# Patient Record
Sex: MALE | Race: WHITE | NOT HISPANIC OR LATINO | Employment: PART TIME | RURAL
[De-identification: names, ages, dates, MRNs, and addresses within clinical notes are randomized per-mention and may not be internally consistent; named-entity substitution may affect disease eponyms.]

---

## 2022-03-23 RX ORDER — BACLOFEN 10 MG/1
TABLET ORAL
Qty: 30 TABLET | Refills: 0 | Status: SHIPPED | OUTPATIENT
Start: 2022-03-23 | End: 2022-04-06 | Stop reason: SDUPTHER

## 2022-03-23 RX ORDER — LEVOCETIRIZINE DIHYDROCHLORIDE 5 MG/1
5 TABLET, FILM COATED ORAL EVERY EVENING
Qty: 30 TABLET | Refills: 0 | Status: SHIPPED | OUTPATIENT
Start: 2022-03-23 | End: 2022-04-06 | Stop reason: SDUPTHER

## 2022-03-23 RX ORDER — TRAZODONE HYDROCHLORIDE 50 MG/1
50 TABLET ORAL NIGHTLY
COMMUNITY
End: 2022-03-23 | Stop reason: SDUPTHER

## 2022-03-23 RX ORDER — MELOXICAM 15 MG/1
15 TABLET ORAL DAILY
COMMUNITY
End: 2022-03-23 | Stop reason: SDUPTHER

## 2022-03-23 RX ORDER — SERTRALINE HYDROCHLORIDE 100 MG/1
100 TABLET, FILM COATED ORAL DAILY
COMMUNITY
End: 2022-03-23 | Stop reason: SDUPTHER

## 2022-03-23 RX ORDER — MELOXICAM 15 MG/1
15 TABLET ORAL DAILY
Qty: 14 TABLET | Refills: 0 | Status: SHIPPED | OUTPATIENT
Start: 2022-03-23 | End: 2022-04-06 | Stop reason: SDUPTHER

## 2022-03-23 RX ORDER — SERTRALINE HYDROCHLORIDE 100 MG/1
100 TABLET, FILM COATED ORAL DAILY
Qty: 30 TABLET | Refills: 0 | Status: SHIPPED | OUTPATIENT
Start: 2022-03-23 | End: 2022-06-07

## 2022-03-23 RX ORDER — OMEPRAZOLE 20 MG/1
20 CAPSULE, DELAYED RELEASE ORAL DAILY
Qty: 30 CAPSULE | Refills: 0 | Status: SHIPPED | OUTPATIENT
Start: 2022-03-23 | End: 2022-04-06 | Stop reason: SDUPTHER

## 2022-03-23 RX ORDER — TRAZODONE HYDROCHLORIDE 50 MG/1
50 TABLET ORAL NIGHTLY
Qty: 30 TABLET | Refills: 0 | Status: SHIPPED | OUTPATIENT
Start: 2022-03-23

## 2022-03-23 RX ORDER — LEVOCETIRIZINE DIHYDROCHLORIDE 5 MG/1
5 TABLET, FILM COATED ORAL EVERY EVENING
COMMUNITY
End: 2022-03-23 | Stop reason: SDUPTHER

## 2022-03-23 RX ORDER — ATORVASTATIN CALCIUM 80 MG/1
80 TABLET, FILM COATED ORAL DAILY
COMMUNITY
End: 2022-03-23 | Stop reason: SDUPTHER

## 2022-03-23 RX ORDER — ATORVASTATIN CALCIUM 80 MG/1
80 TABLET, FILM COATED ORAL DAILY
Qty: 30 TABLET | Refills: 0 | Status: SHIPPED | OUTPATIENT
Start: 2022-03-23 | End: 2022-04-06 | Stop reason: SDUPTHER

## 2022-03-23 RX ORDER — OMEPRAZOLE 20 MG/1
20 CAPSULE, DELAYED RELEASE ORAL DAILY
COMMUNITY
End: 2022-03-23 | Stop reason: SDUPTHER

## 2022-03-23 RX ORDER — BACLOFEN 10 MG/1
10 TABLET ORAL 3 TIMES DAILY
COMMUNITY
End: 2022-03-23 | Stop reason: SDUPTHER

## 2022-04-06 ENCOUNTER — OFFICE VISIT (OUTPATIENT)
Dept: FAMILY MEDICINE CLINIC | Facility: CLINIC | Age: 62
End: 2022-04-06

## 2022-04-06 VITALS
HEART RATE: 61 BPM | OXYGEN SATURATION: 98 % | WEIGHT: 184.8 LBS | SYSTOLIC BLOOD PRESSURE: 112 MMHG | BODY MASS INDEX: 25.03 KG/M2 | HEIGHT: 72 IN | DIASTOLIC BLOOD PRESSURE: 60 MMHG

## 2022-04-06 DIAGNOSIS — M25.50 ARTHRALGIA, UNSPECIFIED JOINT: ICD-10-CM

## 2022-04-06 DIAGNOSIS — E11.9 CONTROLLED TYPE 2 DIABETES MELLITUS WITHOUT COMPLICATION, WITHOUT LONG-TERM CURRENT USE OF INSULIN: ICD-10-CM

## 2022-04-06 DIAGNOSIS — J30.9 ALLERGIC RHINITIS, UNSPECIFIED SEASONALITY, UNSPECIFIED TRIGGER: ICD-10-CM

## 2022-04-06 DIAGNOSIS — K21.9 GASTROESOPHAGEAL REFLUX DISEASE, UNSPECIFIED WHETHER ESOPHAGITIS PRESENT: ICD-10-CM

## 2022-04-06 DIAGNOSIS — R41.3 MEMORY DEFICITS: ICD-10-CM

## 2022-04-06 DIAGNOSIS — E78.2 MIXED HYPERLIPIDEMIA: ICD-10-CM

## 2022-04-06 DIAGNOSIS — E56.9 VITAMIN DEFICIENCY: ICD-10-CM

## 2022-04-06 DIAGNOSIS — I10 BENIGN ESSENTIAL HTN: Primary | ICD-10-CM

## 2022-04-06 DIAGNOSIS — F32.A ANXIETY AND DEPRESSION: ICD-10-CM

## 2022-04-06 DIAGNOSIS — F41.9 ANXIETY AND DEPRESSION: ICD-10-CM

## 2022-04-06 PROCEDURE — 36415 COLL VENOUS BLD VENIPUNCTURE: CPT | Performed by: NURSE PRACTITIONER

## 2022-04-06 PROCEDURE — 99204 OFFICE O/P NEW MOD 45 MIN: CPT | Performed by: NURSE PRACTITIONER

## 2022-04-06 RX ORDER — BACLOFEN 10 MG/1
TABLET ORAL
Qty: 60 TABLET | Refills: 1 | Status: SHIPPED | OUTPATIENT
Start: 2022-04-06 | End: 2022-04-18

## 2022-04-06 RX ORDER — LEVOCETIRIZINE DIHYDROCHLORIDE 5 MG/1
5 TABLET, FILM COATED ORAL EVERY EVENING
Qty: 30 TABLET | Refills: 5 | Status: SHIPPED | OUTPATIENT
Start: 2022-04-06

## 2022-04-06 RX ORDER — MELOXICAM 15 MG/1
15 TABLET ORAL DAILY
Qty: 90 TABLET | Refills: 1 | Status: SHIPPED | OUTPATIENT
Start: 2022-04-06 | End: 2022-11-19

## 2022-04-06 RX ORDER — OMEPRAZOLE 20 MG/1
20 CAPSULE, DELAYED RELEASE ORAL DAILY
Qty: 90 CAPSULE | Refills: 1 | Status: SHIPPED | OUTPATIENT
Start: 2022-04-06 | End: 2022-11-03

## 2022-04-06 RX ORDER — ATORVASTATIN CALCIUM 80 MG/1
80 TABLET, FILM COATED ORAL DAILY
Qty: 90 TABLET | Refills: 1 | Status: SHIPPED | OUTPATIENT
Start: 2022-04-06 | End: 2022-12-03

## 2022-04-06 NOTE — PATIENT INSTRUCTIONS
Obesity, Adult  Obesity is the condition of having too much total body fat. Being overweight or obese means that your weight is greater than what is considered healthy for your body size. Obesity is determined by a measurement called BMI. BMI is an estimate of body fat and is calculated from height and weight. For adults, a BMI of 30 or higher is considered obese.  Obesity can lead to other health concerns and major illnesses, including:  · Stroke.  · Coronary artery disease (CAD).  · Type 2 diabetes.  · Some types of cancer, including cancers of the colon, breast, uterus, and gallbladder.  · Osteoarthritis.  · High blood pressure (hypertension).  · High cholesterol.  · Sleep apnea.  · Gallbladder stones.  · Infertility problems.  What are the causes?  Common causes of this condition include:  · Eating daily meals that are high in calories, sugar, and fat.  · Being born with genes that may make you more likely to become obese.  · Having a medical condition that causes obesity, including:  ? Hypothyroidism.  ? Polycystic ovarian syndrome (PCOS).  ? Binge-eating disorder.  ? Cushing syndrome.  · Taking certain medicines, such as steroids, antidepressants, and seizure medicines.  · Not being physically active (sedentary lifestyle).  · Not getting enough sleep.  · Drinking high amounts of sugar-sweetened beverages, such as soft drinks.  What increases the risk?  The following factors may make you more likely to develop this condition:  · Having a family history of obesity.  · Being a woman of  descent.  · Being a man of  descent.  · Living in an area with limited access to:  ? [place], recreation centers, or sidewalks.  ? Healthy food choices, such as grocery stores and farmers' markets.  What are the signs or symptoms?  The main sign of this condition is having too much body fat.  How is this diagnosed?  This condition is diagnosed based on:  · Your BMI. If you are an adult with a BMI of 30 or  higher, you are considered obese.  · Your waist circumference. This measures the distance around your waistline.  · Your skinfold thickness. Your health care provider may gently pinch a fold of your skin and measure it.  You may have other tests to check for underlying conditions.  How is this treated?  Treatment for this condition often includes changing your lifestyle. Treatment may include some or all of the following:  · Dietary changes. This may include developing a healthy meal plan.  · Regular physical activity. This may include activity that causes your heart to beat faster (aerobic exercise) and strength training. Work with your health care provider to design an exercise program that works for you.  · Medicine to help you lose weight if you are unable to lose 1 pound a week after 6 weeks of healthy eating and more physical activity.  · Treating conditions that cause the obesity (underlying conditions).  · Surgery. Surgical options may include gastric banding and gastric bypass. Surgery may be done if:  ? Other treatments have not helped to improve your condition.  ? You have a BMI of 40 or higher.  ? You have life-threatening health problems related to obesity.  Follow these instructions at home:  Eating and drinking    · Follow recommendations from your health care provider about what you eat and drink. Your health care provider may advise you to:  ? Limit fast food, sweets, and processed snack foods.  ? Choose low-fat options, such as low-fat milk instead of whole milk.  ? Eat 5 or more servings of fruits or vegetables every day.  ? Eat at home more often. This gives you more control over what you eat.  ? Choose healthy foods when you eat out.  ? Learn to read food labels. This will help you understand how much food is considered 1 serving.  ? Learn what a healthy serving size is.  ? Keep low-fat snacks available.  ? Limit sugary drinks, such as soda, fruit juice, sweetened iced tea, and flavored  milk.  · Drink enough water to keep your urine pale yellow.  · Do not follow a fad diet. Fad diets can be unhealthy and even dangerous.    Physical activity  · Exercise regularly, as told by your health care provider.  ? Most adults should get up to 150 minutes of moderate-intensity exercise every week.  ? Ask your health care provider what types of exercise are safe for you and how often you should exercise.  · Warm up and stretch before being active.  · Cool down and stretch after being active.  · Rest between periods of activity.  Lifestyle  · Work with your health care provider and a dietitian to set a weight-loss goal that is healthy and reasonable for you.  · Limit your screen time.  · Find ways to reward yourself that do not involve food.  · Do not drink alcohol if:  ? Your health care provider tells you not to drink.  ? You are pregnant, may be pregnant, or are planning to become pregnant.  · If you drink alcohol:  ? Limit how much you use to:  § 0-1 drink a day for women.  § 0-2 drinks a day for men.  ? Be aware of how much alcohol is in your drink. In the [place] one drink equals one 12 oz bottle of beer (355 mL), one 5 oz glass of wine (148 mL), or one 1½ oz glass of hard liquor (44 mL).  General instructions  · Keep a weight-loss journal to keep track of the food you eat and how much exercise you get.  · Take over-the-counter and prescription medicines only as told by your health care provider.  · Take vitamins and supplements only as told by your health care provider.  · Consider joining a support group. Your health care provider may be able to recommend a support group.  · Keep all follow-up visits as told by your health care provider. This is important.  Contact a health care provider if:  · You are unable to meet your weight loss goal after 6 weeks of dietary and lifestyle changes.  Get help right away if you are having:  · Trouble breathing.  · Suicidal thoughts or behaviors.  Summary  · Obesity is  the condition of having too much total body fat.  · Being overweight or obese means that your weight is greater than what is considered healthy for your body size.  · Work with your health care provider and a dietitian to set a weight-loss goal that is healthy and reasonable for you.  · Exercise regularly, as told by your health care provider. Ask your health care provider what types of exercise are safe for you and how often you should exercise.  This information is not intended to replace advice given to you by your health care provider. Make sure you discuss any questions you have with your health care provider.  Document Revised: 08/22/2019 Document Reviewed: 08/22/2019  [employer] Patient Education © 2021 [employer]      Exercising to Lose Weight  Exercise is structured, repetitive physical activity to improve fitness and health. Getting regular exercise is important for everyone. It is especially important if you are overweight. Being overweight increases your risk of heart disease, stroke, diabetes, high blood pressure, and several types of cancer. Reducing your calorie intake and exercising can help you lose weight.  Exercise is usually categorized as moderate or vigorous intensity. To lose weight, most people need to do a certain amount of moderate-intensity or vigorous-intensity exercise each week.  Moderate-intensity exercise    Moderate-intensity exercise is any activity that gets you moving enough to burn at least three times more energy (calories) than if you were sitting.  Examples of moderate exercise include:  · Walking a mile in 15 minutes.  · Doing light yard work.  · Biking at an easy pace.  Most people should get at least 150 minutes (2 hours and 30 minutes) a week of moderate-intensity exercise to maintain their body weight.  Vigorous-intensity exercise  Vigorous-intensity exercise is any activity that gets you moving enough to burn at least six times more calories than if you were sitting.  When you exercise at this intensity, you should be working hard enough that you are not able to carry on a conversation.  Examples of vigorous exercise include:  · Running.  · Playing a team sport, such as football, basketball, and soccer.  · Jumping rope.  Most people should get at least 75 minutes (1 hour and 15 minutes) a week of vigorous-intensity exercise to maintain their body weight.  How can exercise affect me?  When you exercise enough to burn more calories than you eat, you lose weight. Exercise also reduces body fat and builds muscle. The more muscle you have, the more calories you burn. Exercise also:  · Improves mood.  · Reduces stress and tension.  · Improves your overall fitness, flexibility, and endurance.  · Increases bone strength.  The amount of exercise you need to lose weight depends on:  · Your age.  · The type of exercise.  · Any health conditions you have.  · Your overall physical ability.  Talk to your health care provider about how much exercise you need and what types of activities are safe for you.  What actions can I take to lose weight?  Nutrition    · Make changes to your diet as told by your health care provider or diet and nutrition specialist (dietitian). This may include:  ? Eating fewer calories.  ? Eating more protein.  ? Eating less unhealthy fats.  ? Eating a diet that includes fresh fruits and vegetables, whole grains, low-fat dairy products, and lean protein.  ? Avoiding foods with added fat, salt, and sugar.  · Drink plenty of water while you exercise to prevent dehydration or heat stroke.    Activity  · Choose an activity that you enjoy and set realistic goals. Your health care provider can help you make an exercise plan that works for you.  · Exercise at a moderate or vigorous intensity most days of the week.  ? The intensity of exercise may vary from person to person. You can tell how intense a workout is for you by paying attention to your breathing and heartbeat. Most  people will notice their breathing and heartbeat get faster with more intense exercise.  · Do resistance training twice each week, such as:  ? Push-ups.  ? Sit-ups.  ? Lifting weights.  ? Using resistance bands.  · Getting short amounts of exercise can be just as helpful as long structured periods of exercise. If you have trouble finding time to exercise, try to include exercise in your daily routine.  ? Get up, stretch, and walk around every 30 minutes throughout the day.  ? Go for a walk during your lunch break.  ? Park your car farther away from your destination.  ? If you take public transportation, get off one stop early and walk the rest of the way.  ? Make phone calls while standing up and walking around.  ? Take the stairs instead of elevators or escalators.  · Wear comfortable clothes and shoes with good support.  · Do not exercise so much that you hurt yourself, feel dizzy, or get very short of breath.  Where to find more information  · [place] Department of Health and Human Services: www.hhs.gov  · Centers for Disease Control and Prevention (CDC): www.cdc.gov  Contact a health care provider:  · Before starting a new exercise program.  · If you have questions or concerns about your weight.  · If you have a medical problem that keeps you from exercising.  Get help right away if you have any of the following while exercising:  · Injury.  · Dizziness.  · Difficulty breathing or shortness of breath that does not go away when you stop exercising.  · Chest pain.  · Rapid heartbeat.  Summary  · Being overweight increases your risk of heart disease, stroke, diabetes, high blood pressure, and several types of cancer.  · Losing weight happens when you burn more calories than you eat.  · Reducing the amount of calories you eat in addition to getting regular moderate or vigorous exercise each week helps you lose weight.  This information is not intended to replace advice given to you by your health care provider. Make  sure you discuss any questions you have with your health care provider.  Document Revised: 04/15/2021 Document Reviewed: 04/15/2021  [employer] Patient Education © 2021 [employer]

## 2022-04-06 NOTE — ASSESSMENT & PLAN NOTE
Meds refilled.  We discussed all his medications.  Proper diet and exercise plan discussed and encouraged.  Goal blood pressure less than 130/80.  Annual eye exam encouraged.  Check feet daily.  Continue to follow-up with cardiology Dr. Wong as scheduled.  He will also continue to follow-up with [facility] months as scheduled as well as his counselor.  PHQ-9 completed and discussed.  No thoughts of suicide or hurting himself or anyone else.  Risk of meds discussed and understood.  We also discussed immunizations.  Turn to clinic or ED with any issues or concerns.

## 2022-04-07 LAB
ALBUMIN SERPL-MCNC: 4.4 G/DL (ref 3.8–4.8)
ALBUMIN/GLOB SERPL: 1.8 {RATIO} (ref 1.2–2.2)
ALP SERPL-CCNC: 81 IU/L (ref 44–121)
ALT SERPL-CCNC: 32 IU/L (ref 0–44)
AST SERPL-CCNC: 32 IU/L (ref 0–40)
BASOPHILS # BLD AUTO: 0.1 X10E3/UL (ref 0–0.2)
BASOPHILS NFR BLD AUTO: 1 %
BILIRUB SERPL-MCNC: 0.4 MG/DL (ref 0–1.2)
BUN SERPL-MCNC: 12 MG/DL (ref 8–27)
BUN/CREAT SERPL: 13 (ref 10–24)
CALCIUM SERPL-MCNC: 9.9 MG/DL (ref 8.6–10.2)
CHLORIDE SERPL-SCNC: 105 MMOL/L (ref 96–106)
CHOLEST SERPL-MCNC: 95 MG/DL (ref 100–199)
CO2 SERPL-SCNC: 23 MMOL/L (ref 20–29)
CREAT SERPL-MCNC: 0.93 MG/DL (ref 0.76–1.27)
EGFRCR SERPLBLD CKD-EPI 2021: 93 ML/MIN/1.73
EOSINOPHIL # BLD AUTO: 0.2 X10E3/UL (ref 0–0.4)
EOSINOPHIL NFR BLD AUTO: 3 %
ERYTHROCYTE [DISTWIDTH] IN BLOOD BY AUTOMATED COUNT: 12 % (ref 11.6–15.4)
GLOBULIN SER CALC-MCNC: 2.5 G/DL (ref 1.5–4.5)
GLUCOSE SERPL-MCNC: 117 MG/DL (ref 65–99)
HBA1C MFR BLD: 7.6 % (ref 4.8–5.6)
HCT VFR BLD AUTO: 40.4 % (ref 37.5–51)
HDLC SERPL-MCNC: 38 MG/DL
HGB BLD-MCNC: 13.9 G/DL (ref 13–17.7)
IMM GRANULOCYTES # BLD AUTO: 0 X10E3/UL (ref 0–0.1)
IMM GRANULOCYTES NFR BLD AUTO: 0 %
LDLC SERPL CALC-MCNC: 34 MG/DL (ref 0–99)
LYMPHOCYTES # BLD AUTO: 2 X10E3/UL (ref 0.7–3.1)
LYMPHOCYTES NFR BLD AUTO: 24 %
MCH RBC QN AUTO: 32.7 PG (ref 26.6–33)
MCHC RBC AUTO-ENTMCNC: 34.4 G/DL (ref 31.5–35.7)
MCV RBC AUTO: 95 FL (ref 79–97)
MONOCYTES # BLD AUTO: 0.8 X10E3/UL (ref 0.1–0.9)
MONOCYTES NFR BLD AUTO: 9 %
NEUTROPHILS # BLD AUTO: 5.3 X10E3/UL (ref 1.4–7)
NEUTROPHILS NFR BLD AUTO: 63 %
PLATELET # BLD AUTO: 184 X10E3/UL (ref 150–450)
POTASSIUM SERPL-SCNC: 4.2 MMOL/L (ref 3.5–5.2)
PROT SERPL-MCNC: 6.9 G/DL (ref 6–8.5)
RBC # BLD AUTO: 4.25 X10E6/UL (ref 4.14–5.8)
SODIUM SERPL-SCNC: 143 MMOL/L (ref 134–144)
TRIGL SERPL-MCNC: 128 MG/DL (ref 0–149)
TSH SERPL DL<=0.005 MIU/L-ACNC: 1.36 UIU/ML (ref 0.45–4.5)
VIT B12 SERPL-MCNC: 1077 PG/ML (ref 232–1245)
VLDLC SERPL CALC-MCNC: 23 MG/DL (ref 5–40)
WBC # BLD AUTO: 8.5 X10E3/UL (ref 3.4–10.8)

## 2022-04-15 DIAGNOSIS — M25.50 ARTHRALGIA, UNSPECIFIED JOINT: ICD-10-CM

## 2022-04-18 RX ORDER — BACLOFEN 10 MG/1
TABLET ORAL
Qty: 90 TABLET | Refills: 0 | Status: SHIPPED | OUTPATIENT
Start: 2022-04-18 | End: 2022-05-24

## 2022-05-19 NOTE — TELEPHONE ENCOUNTER
PATIENT IS IN NEED OF MEDICATION REFILLS:    1. NITROGLYCERIN  2. DEXCOM GLUCOSE METER (AND ALL THE STRIPS AND SUPPLIES THAT GO WITH IT)    IF THERE ARE ANY QUESTIONS YOU CAN CONTACT THE PATIENT AT NUMBER LISTED.

## 2022-05-23 ENCOUNTER — TELEPHONE (OUTPATIENT)
Dept: FAMILY MEDICINE CLINIC | Facility: CLINIC | Age: 62
End: 2022-05-23

## 2022-05-23 RX ORDER — NITROGLYCERIN 400 UG/1
SPRAY ORAL
Qty: 1 EACH | Refills: 0 | Status: SHIPPED | OUTPATIENT
Start: 2022-05-23 | End: 2022-06-29 | Stop reason: SDUPTHER

## 2022-05-23 RX ORDER — BLOOD-GLUCOSE SENSOR
EACH MISCELLANEOUS
OUTPATIENT
Start: 2022-05-23

## 2022-05-23 RX ORDER — NITROGLYCERIN 400 UG/1
SPRAY ORAL
COMMUNITY
End: 2022-05-23 | Stop reason: SDUPTHER

## 2022-05-23 RX ORDER — BLOOD-GLUCOSE SENSOR
EACH MISCELLANEOUS
COMMUNITY
End: 2022-05-24 | Stop reason: SDUPTHER

## 2022-05-24 DIAGNOSIS — M25.50 ARTHRALGIA, UNSPECIFIED JOINT: ICD-10-CM

## 2022-05-24 RX ORDER — BACLOFEN 10 MG/1
TABLET ORAL
Qty: 90 TABLET | Refills: 0 | Status: SHIPPED | OUTPATIENT
Start: 2022-05-24 | End: 2022-06-22

## 2022-05-24 RX ORDER — BLOOD-GLUCOSE SENSOR
1 EACH MISCELLANEOUS CONTINUOUS PRN
Qty: 1 EACH | Refills: 2 | Status: SHIPPED | OUTPATIENT
Start: 2022-05-24 | End: 2022-08-18

## 2022-06-03 RX ORDER — PROCHLORPERAZINE 25 MG/1
SUPPOSITORY RECTAL
COMMUNITY
End: 2022-06-03 | Stop reason: SDUPTHER

## 2022-06-04 RX ORDER — PROCHLORPERAZINE 25 MG/1
1 SUPPOSITORY RECTAL 3 TIMES DAILY PRN
Qty: 1 EACH | Refills: 0 | Status: SHIPPED | OUTPATIENT
Start: 2022-06-04 | End: 2022-08-18

## 2022-06-06 RX ORDER — PROCHLORPERAZINE 25 MG/1
SUPPOSITORY RECTAL
COMMUNITY
End: 2022-06-06 | Stop reason: SDUPTHER

## 2022-06-06 RX ORDER — PROCHLORPERAZINE 25 MG/1
1 SUPPOSITORY RECTAL DAILY PRN
Qty: 1 EACH | Refills: 0 | Status: SHIPPED | OUTPATIENT
Start: 2022-06-06 | End: 2022-09-20 | Stop reason: SDUPTHER

## 2022-06-06 NOTE — TELEPHONE ENCOUNTER
Pt stopped in stating that his pharmacy [facility] has not received the script for the Dexcom Transmitter. They have received the script for the Sensor and the .

## 2022-06-07 RX ORDER — SERTRALINE HYDROCHLORIDE 100 MG/1
TABLET, FILM COATED ORAL
Qty: 90 TABLET | Refills: 0 | Status: SHIPPED | OUTPATIENT
Start: 2022-06-07

## 2022-06-07 NOTE — TELEPHONE ENCOUNTER
I contacted [facility] and they state that they did receive all dexcom that was needed for pt with no charge.

## 2022-06-18 DIAGNOSIS — M25.50 ARTHRALGIA, UNSPECIFIED JOINT: ICD-10-CM

## 2022-06-22 RX ORDER — BACLOFEN 10 MG/1
TABLET ORAL
Qty: 90 TABLET | Refills: 0 | Status: SHIPPED | OUTPATIENT
Start: 2022-06-22 | End: 2022-07-18

## 2022-06-29 ENCOUNTER — TELEPHONE (OUTPATIENT)
Dept: FAMILY MEDICINE CLINIC | Facility: CLINIC | Age: 62
End: 2022-06-29

## 2022-06-29 RX ORDER — NITROGLYCERIN 400 UG/1
SPRAY ORAL
Qty: 1 EACH | Refills: 0 | Status: SHIPPED | OUTPATIENT
Start: 2022-06-29

## 2022-06-29 NOTE — TELEPHONE ENCOUNTER
Incoming Refill Request      Medication requested (name and dose): NITROGLYCERIN SPRAY 0.4MG     Pharmacy where request should be sent: [facility]    Additional details provided by patient: PT IS OUT OF THE [facility]    Best call back number: [phone]    Does the patient have less than a 3 day supply:  [x] Yes  [] No    Trice ESQUIVEL Rep  06/29/22, 15:51 EDT

## 2022-06-30 ENCOUNTER — TELEPHONE (OUTPATIENT)
Dept: FAMILY MEDICINE CLINIC | Facility: CLINIC | Age: 62
End: 2022-06-30

## 2022-07-01 ENCOUNTER — OFFICE VISIT (OUTPATIENT)
Dept: FAMILY MEDICINE CLINIC | Facility: CLINIC | Age: 62
End: 2022-07-01

## 2022-07-01 VITALS
HEIGHT: 72 IN | OXYGEN SATURATION: 97 % | WEIGHT: 170 LBS | SYSTOLIC BLOOD PRESSURE: 132 MMHG | BODY MASS INDEX: 23.03 KG/M2 | HEART RATE: 64 BPM | DIASTOLIC BLOOD PRESSURE: 66 MMHG

## 2022-07-01 DIAGNOSIS — F43.10 PTSD (POST-TRAUMATIC STRESS DISORDER): Primary | ICD-10-CM

## 2022-07-01 DIAGNOSIS — Z12.5 SCREENING FOR PROSTATE CANCER: ICD-10-CM

## 2022-07-01 DIAGNOSIS — R63.4 WEIGHT LOSS: ICD-10-CM

## 2022-07-01 DIAGNOSIS — E55.9 VITAMIN D DEFICIENCY: ICD-10-CM

## 2022-07-01 LAB
BILIRUB BLD-MCNC: NEGATIVE MG/DL
CLARITY, POC: CLEAR
COLOR UR: YELLOW
EXPIRATION DATE: ABNORMAL
GLUCOSE UR STRIP-MCNC: NEGATIVE MG/DL
KETONES UR QL: NEGATIVE
LEUKOCYTE EST, POC: NEGATIVE
Lab: ABNORMAL
NITRITE UR-MCNC: NEGATIVE MG/ML
PH UR: 7 [PH] (ref 5–8)
PROT UR STRIP-MCNC: NEGATIVE MG/DL
RBC # UR STRIP: ABNORMAL /UL
SP GR UR: 1.02 (ref 1–1.03)
UROBILINOGEN UR QL: NORMAL

## 2022-07-01 PROCEDURE — 81003 URINALYSIS AUTO W/O SCOPE: CPT | Performed by: NURSE PRACTITIONER

## 2022-07-01 PROCEDURE — 99214 OFFICE O/P EST MOD 30 MIN: CPT | Performed by: NURSE PRACTITIONER

## 2022-07-01 PROCEDURE — 36415 COLL VENOUS BLD VENIPUNCTURE: CPT | Performed by: NURSE PRACTITIONER

## 2022-07-01 RX ORDER — ASPIRIN 81 MG/1
81 TABLET, CHEWABLE ORAL
COMMUNITY

## 2022-07-01 RX ORDER — BUPROPION HYDROCHLORIDE 300 MG/1
300 TABLET ORAL EVERY MORNING
COMMUNITY
Start: 2022-03-25

## 2022-07-01 RX ORDER — BUPROPION HYDROCHLORIDE 150 MG/1
TABLET ORAL
COMMUNITY
Start: 2022-04-05

## 2022-07-01 RX ORDER — PRAZOSIN HYDROCHLORIDE 1 MG/1
1 CAPSULE ORAL NIGHTLY
Qty: 30 CAPSULE | Refills: 0 | Status: SHIPPED | OUTPATIENT
Start: 2022-07-01 | End: 2022-07-29

## 2022-07-01 RX ORDER — DONEPEZIL HYDROCHLORIDE 5 MG/1
TABLET, FILM COATED ORAL
COMMUNITY
Start: 2022-04-07

## 2022-07-01 NOTE — PROGRESS NOTES
"Chief Complaint  chronic PTSD    Subjective          Santi Souza Sr. presents to [facility]  History of Present Illness     States he has been continuing to have weekly visits with his counselor.  He states she recently diagnosed him with chronic PTSD.  States this stems from the  but mainly from around 2011 when his son attempted suicide.  His son recently got out of [facility] in 2020 and states there is a lot of stress that comes with that.  States he has stress, anxiety, nightmares, and he has noticed a weight loss also.  He is unsure if the weight loss is just due to his nerves or if it could be something more.  No thoughts of suicide or hurting himself or anyone else.  States he is [facility] but is hoping we could go ahead and try something today to get it started to try to improve his symptoms.    Objective   Vital Signs:   /66   Pulse 64   Ht 182.9 cm (72\")   Wt 77.1 kg (170 lb)   SpO2 97%   BMI 23.06 kg/m²     Body mass index is 23.06 kg/m².    Review of Systems   Constitutional: Positive for fatigue. Negative for fever.   Eyes: Negative for blurred vision and visual disturbance.   Respiratory: Negative for shortness of breath and wheezing.    Cardiovascular: Negative for chest pain and palpitations.   Gastrointestinal: Negative for abdominal pain, nausea and vomiting.   Genitourinary: Negative for urinary incontinence.   Neurological: Negative for dizziness, headache and confusion.   Psychiatric/Behavioral: Positive for sleep disturbance, depressed mood and stress. Negative for agitation, behavioral problems, decreased concentration, dysphoric mood, hallucinations, self-injury, suicidal ideas and negative for hyperactivity. The patient is nervous/anxious.        Past History:  Medical History: has a past medical history of CAD (coronary artery disease) (11/03/2017), Chronic back pain (11/03/2017), Condition not found " (11/03/2017), Depression, Diabetes (HCC) (11/03/2017), Diabetes mellitus (HCC), GERD (gastroesophageal reflux disease) (11/03/2017), Hyperlipidemia (11/03/2017), Hypertension (11/03/2017), Neuropathy, Osteoarthritis, and Vitamin D deficiency.   Surgical History: has a past surgical history that includes Lumbar spine surgery (1998).   Family History: family history includes Diabetes in his father.   Social History: reports that he has been smoking cigarettes. He has a 3.75 pack-year smoking history. He has never used smokeless tobacco.    PHQ-2 Depression Screening  Little interest or pleasure in doing things? 3-->nearly every day   Feeling down, depressed, or hopeless? 3-->nearly every day   PHQ-2 Total Score 24        PHQ-9 Depression Screening  Little interest or pleasure in doing things? 3-->nearly every day   Feeling down, depressed, or hopeless? 3-->nearly every day   Trouble falling or staying asleep, or sleeping too much? 3-->nearly every day   Feeling tired or having little energy? 3-->nearly every day   Poor appetite or overeating? 3-->nearly every day   Feeling bad about yourself - or that you are a failure or have let yourself or your family down? 3-->nearly every day   Trouble concentrating on things, such as reading the newspaper or watching television? 3-->nearly every day   Moving or speaking so slowly that other people could have noticed? Or the opposite - being so fidgety or restless that you have been moving around a lot more than usual? 3-->nearly every day   Thoughts that you would be better off dead, or of hurting yourself in some way? 0-->not at all   PHQ-9 Total Score 24   If you checked off any problems, how difficult have these problems made it for you to do your work, take care of things at home, or get along with other people? not difficult at all     PHQ-9 Total Score: 24      Patient screened positive for depression based on a PHQ-9 score of 24 on 7/1/2022. Follow-up recommendations  include: Prescribed antidepressant medication treatment, Referral to Mental Health specialist and Suicide Risk Assessment performed.         Current Outpatient Medications:   •  aspirin 81 MG chewable tablet, Chew 81 mg., Disp: , Rfl:   •  atorvastatin (LIPITOR) 80 MG tablet, Take 1 tablet by mouth Daily., Disp: 90 tablet, Rfl: 1  •  baclofen (LIORESAL) 10 MG tablet, TAKE 1 TABLET BY MOUTH THREE TIMES A DAY, Disp: 90 tablet, Rfl: 0  •  buPROPion XL (WELLBUTRIN XL) 150 MG 24 hr tablet, TAKE 1 TABLET BY MOUTH EACH MORNING WITH 300 MG TABLET, Disp: , Rfl:   •  buPROPion XL (WELLBUTRIN XL) 300 MG 24 hr tablet, Take 300 mg by mouth Every Morning., Disp: , Rfl:   •  Continuous Blood Gluc  (Dexcom G6 ) device, 1 kit 3 (Three) Times a Day As Needed (diabetes)., Disp: 1 each, Rfl: 0  •  Continuous Blood Gluc Sensor (Dexcom G4 Sensor) misc, 1 each Continuous As Needed (blood glucose). E11.9, Disp: 1 each, Rfl: 2  •  Continuous Blood Gluc Transmit (Dexcom G6 Transmitter) misc, 1 kit Daily As Needed (Diabetes)., Disp: 1 each, Rfl: 0  •  donepezil (ARICEPT) 5 MG tablet, TAKE 1 TABLET (5 MG TOTAL) BY MOUTH 1 (ONE) TIME EACH DAY WITH BREAKFAST., Disp: , Rfl:   •  empagliflozin (Jardiance) 10 MG tablet tablet, Take 1 tablet by mouth Daily., Disp: 90 tablet, Rfl: 0  •  levocetirizine (XYZAL) 5 MG tablet, Take 1 tablet by mouth Every Evening., Disp: 30 tablet, Rfl: 5  •  meloxicam (MOBIC) 15 MG tablet, Take 1 tablet by mouth Daily., Disp: 90 tablet, Rfl: 1  •  metFORMIN (GLUCOPHAGE) 1000 MG tablet, Take 1 tablet by mouth 2 (Two) Times a Day With Meals., Disp: 180 tablet, Rfl: 1  •  nitroglycerin (NITROLINGUAL) 0.4 MG/SPRAY spray, Place 1 spray by translingual route on or under the tongue at the first sign of an attack, no more than 3 sprays / 15-minute., Disp: 1 each, Rfl: 0  •  omeprazole (priLOSEC) 20 MG capsule, Take 1 capsule by mouth Daily., Disp: 90 capsule, Rfl: 1  •  sertraline (ZOLOFT) 100 MG tablet, TAKE 1  TABLET EVERY MORNING WITH FOOD, Disp: 90 tablet, Rfl: 0  •  SITagliptin (JANUVIA) 100 MG tablet, Take 1 tablet by mouth Daily., Disp: 90 tablet, Rfl: 1  •  traZODone (DESYREL) 50 MG tablet, Take 1 tablet by mouth Every Night., Disp: 30 tablet, Rfl: 0  •  prazosin (MINIPRESS) 1 MG capsule, Take 1 capsule by mouth Every Night., Disp: 30 capsule, Rfl: 0   (Not in a hospital admission)     Allergies: Codeine, Penicillins, and Sulfa antibiotics    Physical Exam  Constitutional:       Appearance: Normal appearance.   Eyes:      Extraocular Movements: Extraocular movements intact.      Conjunctiva/sclera: Conjunctivae normal.      Pupils: Pupils are equal, round, and reactive to light.   Cardiovascular:      Rate and Rhythm: Normal rate and regular rhythm.      Heart sounds: Normal heart sounds.   Pulmonary:      Effort: Pulmonary effort is normal.      Breath sounds: Normal breath sounds.   Abdominal:      General: Abdomen is flat. Bowel sounds are normal.      Palpations: Abdomen is soft.   Neurological:      General: No focal deficit present.      Mental Status: He is alert and oriented to person, place, and time. Mental status is at baseline.   Psychiatric:         Attention and Perception: Attention and perception normal.         Mood and Affect: Mood and affect normal.         Speech: Speech normal.         Behavior: Behavior normal. Behavior is cooperative.         Thought Content: Thought content normal. Thought content does not include homicidal or suicidal ideation. Thought content does not include homicidal or suicidal plan.         Cognition and Memory: Cognition and memory normal.         Judgment: Judgment normal.          Result Review :                   Assessment and Plan    Diagnoses and all orders for this visit:    1. PTSD (post-traumatic stress disorder) (Primary)  Assessment & Plan:  Will add prazosin.  PHQ-9 completed and discussed.  No thoughts of suicide or hurting himself or anyone else.   Follow-up with psychiatrist next week as scheduled.  Encouraged him to continue with weekly counselor visits.  Risk of meds discussed and understood. Return to clinic or ED with any issues or concerns.    Orders:  -     prazosin (MINIPRESS) 1 MG capsule; Take 1 capsule by mouth Every Night.  Dispense: 30 capsule; Refill: 0    2. Weight loss  Assessment & Plan:  Possibly due to his anxiety.  Will refer to GI and hematology though for further evaluation.  Labs drawn.  UA completed and shows blood, culture sent, call in 2 days for results.  Will need to have repeat UA in 1 to 2 weeks to make sure clears.    Orders:  -     CBC & Differential; Future  -     Comprehensive Metabolic Panel; Future  -     TSH Rfx On Abnormal To Free T4; Future  -     Vitamin B12; Future  -     Folate; Future  -     POC Urinalysis Dipstick, Automated; Future  -     Ambulatory Referral to Hematology  -     Ambulatory Referral to Gastroenterology  -     XR Chest PA & Lateral (In Office); Future  -     POC Urinalysis Dipstick, Automated  -     CBC & Differential  -     Comprehensive Metabolic Panel  -     TSH Rfx On Abnormal To Free T4  -     Vitamin B12  -     Folate    3. Screening for prostate cancer  -     PSA SCREENING; Future  -     PSA SCREENING    4. Vitamin D deficiency  -     Vitamin D 25 Hydroxy; Future  -     Vitamin D 25 Hydroxy            BMI is within normal parameters. No other follow-up for BMI required.       Follow Up   Return in about 4 weeks (around 7/29/2022).  Patient was given instructions and counseling regarding his condition or for health maintenance advice. Please see specific information pulled into the AVS if appropriate.     JESSICA Chandler

## 2022-07-03 NOTE — ASSESSMENT & PLAN NOTE
Will add prazosin.  PHQ-9 completed and discussed.  No thoughts of suicide or hurting himself or anyone else.  Follow-up with psychiatrist next week as scheduled.  Encouraged him to continue with weekly counselor visits.  Risk of meds discussed and understood. Return to clinic or ED with any issues or concerns.

## 2022-07-03 NOTE — ASSESSMENT & PLAN NOTE
Possibly due to his anxiety.  Will refer to GI and hematology though for further evaluation.  Labs drawn.  UA completed and shows blood, culture sent, call in 2 days for results.  Will need to have repeat UA in 1 to 2 weeks to make sure clears.

## 2022-07-04 DIAGNOSIS — F43.10 PTSD (POST-TRAUMATIC STRESS DISORDER): ICD-10-CM

## 2022-07-05 LAB
25(OH)D3+25(OH)D2 SERPL-MCNC: 47.9 NG/ML (ref 30–100)
BASOPHILS # BLD AUTO: 0.1 X10E3/UL (ref 0–0.2)
BASOPHILS NFR BLD AUTO: 1 %
EOSINOPHIL # BLD AUTO: 0.2 X10E3/UL (ref 0–0.4)
EOSINOPHIL NFR BLD AUTO: 2 %
ERYTHROCYTE [DISTWIDTH] IN BLOOD BY AUTOMATED COUNT: 12.2 % (ref 11.6–15.4)
FOLATE SERPL-MCNC: 6.1 NG/ML
HCT VFR BLD AUTO: 41.5 % (ref 37.5–51)
HGB BLD-MCNC: 14.1 G/DL (ref 13–17.7)
IMM GRANULOCYTES # BLD AUTO: 0 X10E3/UL (ref 0–0.1)
IMM GRANULOCYTES NFR BLD AUTO: 0 %
LYMPHOCYTES # BLD AUTO: 2.5 X10E3/UL (ref 0.7–3.1)
LYMPHOCYTES NFR BLD AUTO: 25 %
MCH RBC QN AUTO: 32.9 PG (ref 26.6–33)
MCHC RBC AUTO-ENTMCNC: 34 G/DL (ref 31.5–35.7)
MCV RBC AUTO: 97 FL (ref 79–97)
MONOCYTES # BLD AUTO: 0.9 X10E3/UL (ref 0.1–0.9)
MONOCYTES NFR BLD AUTO: 9 %
NEUTROPHILS # BLD AUTO: 6.3 X10E3/UL (ref 1.4–7)
NEUTROPHILS NFR BLD AUTO: 63 %
PLATELET # BLD AUTO: 188 X10E3/UL (ref 150–450)
RBC # BLD AUTO: 4.29 X10E6/UL (ref 4.14–5.8)
SPECIMEN STATUS: NORMAL
WBC # BLD AUTO: 10 X10E3/UL (ref 3.4–10.8)

## 2022-07-06 ENCOUNTER — TELEPHONE (OUTPATIENT)
Dept: FAMILY MEDICINE CLINIC | Facility: CLINIC | Age: 62
End: 2022-07-06

## 2022-07-06 LAB
ALBUMIN SERPL-MCNC: 4.2 G/DL (ref 3.8–4.8)
ALBUMIN/GLOB SERPL: 1.8 {RATIO} (ref 1.2–2.2)
ALP SERPL-CCNC: 79 IU/L (ref 44–121)
ALT SERPL-CCNC: 32 IU/L (ref 0–44)
AST SERPL-CCNC: 24 IU/L (ref 0–40)
BILIRUB SERPL-MCNC: <0.2 MG/DL (ref 0–1.2)
BUN SERPL-MCNC: 20 MG/DL (ref 8–27)
BUN/CREAT SERPL: 22 (ref 10–24)
CALCIUM SERPL-MCNC: 10.4 MG/DL (ref 8.6–10.2)
CHLORIDE SERPL-SCNC: 105 MMOL/L (ref 96–106)
CO2 SERPL-SCNC: 21 MMOL/L (ref 20–29)
CREAT SERPL-MCNC: 0.92 MG/DL (ref 0.76–1.27)
EGFRCR SERPLBLD CKD-EPI 2021: 94 ML/MIN/1.73
GLOBULIN SER CALC-MCNC: 2.3 G/DL (ref 1.5–4.5)
GLUCOSE SERPL-MCNC: 177 MG/DL (ref 65–99)
POTASSIUM SERPL-SCNC: 4.5 MMOL/L (ref 3.5–5.2)
PROT SERPL-MCNC: 6.5 G/DL (ref 6–8.5)
SODIUM SERPL-SCNC: 143 MMOL/L (ref 134–144)
TSH SERPL DL<=0.005 MIU/L-ACNC: 1.47 UIU/ML (ref 0.45–4.5)
VIT B12 SERPL-MCNC: 689 PG/ML (ref 232–1245)

## 2022-07-07 ENCOUNTER — TELEPHONE (OUTPATIENT)
Dept: FAMILY MEDICINE CLINIC | Facility: CLINIC | Age: 62
End: 2022-07-07

## 2022-07-07 RX ORDER — NITROGLYCERIN 400 UG/1
SPRAY ORAL
Qty: 4.9 EACH | OUTPATIENT
Start: 2022-07-07

## 2022-07-07 RX ORDER — PRAZOSIN HYDROCHLORIDE 1 MG/1
CAPSULE ORAL
Qty: 30 CAPSULE | Refills: 0 | OUTPATIENT
Start: 2022-07-07

## 2022-07-08 DIAGNOSIS — E83.52 HYPERCALCEMIA: Primary | ICD-10-CM

## 2022-07-08 DIAGNOSIS — E11.9 TYPE 2 DIABETES MELLITUS WITHOUT COMPLICATION, WITHOUT LONG-TERM CURRENT USE OF INSULIN: Primary | ICD-10-CM

## 2022-07-17 DIAGNOSIS — M25.50 ARTHRALGIA, UNSPECIFIED JOINT: ICD-10-CM

## 2022-07-18 RX ORDER — BACLOFEN 10 MG/1
TABLET ORAL
Qty: 90 TABLET | Refills: 0 | Status: SHIPPED | OUTPATIENT
Start: 2022-07-18 | End: 2022-08-11

## 2022-07-28 DIAGNOSIS — F43.10 PTSD (POST-TRAUMATIC STRESS DISORDER): ICD-10-CM

## 2022-07-29 RX ORDER — PRAZOSIN HYDROCHLORIDE 1 MG/1
CAPSULE ORAL
Qty: 30 CAPSULE | Refills: 0 | Status: SHIPPED | OUTPATIENT
Start: 2022-07-29 | End: 2022-08-23

## 2022-08-10 RX ORDER — EMPAGLIFLOZIN 10 MG/1
TABLET, FILM COATED ORAL
Qty: 90 TABLET | Refills: 0 | Status: SHIPPED | OUTPATIENT
Start: 2022-08-10 | End: 2022-11-11

## 2022-08-11 DIAGNOSIS — M25.50 ARTHRALGIA, UNSPECIFIED JOINT: ICD-10-CM

## 2022-08-11 RX ORDER — BACLOFEN 10 MG/1
TABLET ORAL
Qty: 90 TABLET | Refills: 0 | Status: SHIPPED | OUTPATIENT
Start: 2022-08-11 | End: 2022-09-07

## 2022-08-18 RX ORDER — PROCHLORPERAZINE 25 MG/1
SUPPOSITORY RECTAL
Qty: 3 EACH | Refills: 2 | Status: SHIPPED | OUTPATIENT
Start: 2022-08-18 | End: 2022-09-20 | Stop reason: SDUPTHER

## 2022-08-18 RX ORDER — PROCHLORPERAZINE 25 MG/1
SUPPOSITORY RECTAL
Qty: 1 EACH | Refills: 0 | Status: SHIPPED | OUTPATIENT
Start: 2022-08-18

## 2022-08-23 DIAGNOSIS — F43.10 PTSD (POST-TRAUMATIC STRESS DISORDER): ICD-10-CM

## 2022-08-23 RX ORDER — PRAZOSIN HYDROCHLORIDE 1 MG/1
CAPSULE ORAL
Qty: 30 CAPSULE | Refills: 0 | Status: SHIPPED | OUTPATIENT
Start: 2022-08-23 | End: 2022-09-16

## 2022-09-07 DIAGNOSIS — M25.50 ARTHRALGIA, UNSPECIFIED JOINT: ICD-10-CM

## 2022-09-07 RX ORDER — BACLOFEN 10 MG/1
TABLET ORAL
Qty: 90 TABLET | Refills: 0 | Status: SHIPPED | OUTPATIENT
Start: 2022-09-07 | End: 2022-10-24

## 2022-09-16 DIAGNOSIS — F43.10 PTSD (POST-TRAUMATIC STRESS DISORDER): ICD-10-CM

## 2022-09-16 RX ORDER — PRAZOSIN HYDROCHLORIDE 1 MG/1
CAPSULE ORAL
Qty: 30 CAPSULE | Refills: 0 | Status: SHIPPED | OUTPATIENT
Start: 2022-09-16 | End: 2022-10-11

## 2022-09-20 NOTE — TELEPHONE ENCOUNTER
Caller: Santi Souza Sr.    Relationship: Self    Best call back number: [phone]    Requested Prescriptions:   Requested Prescriptions     Pending Prescriptions Disp Refills   • Continuous Blood Gluc Transmit (Dexcom G6 Transmitter) misc 1 each 0     Si kit Daily As Needed (Diabetes).   • Continuous Blood Gluc Sensor (Dexcom G6 Sensor) 3 each 2        Pharmacy where request should be sent: [facility]/PHARMACY #[id number] - [facility] [address] [phone] [facility] [phone] FX     Additional details provided by patient: WOULD NEED REFILLS FOR BOTH MEDICATIONS AND WOULD LIKE TO GET SEVERAL REFILLS FOR BOTH AS WELL     COMPLETELY OUT AND WOULD NEED ASAP    Does the patient have less than a 3 day supply:  [x] Yes  [] No    Trice Roach Rep   22 16:31 EDT

## 2022-09-21 RX ORDER — PROCHLORPERAZINE 25 MG/1
1 SUPPOSITORY RECTAL DAILY PRN
Qty: 1 EACH | Refills: 3 | Status: SHIPPED | OUTPATIENT
Start: 2022-09-21 | End: 2022-09-28 | Stop reason: SDUPTHER

## 2022-09-21 RX ORDER — PROCHLORPERAZINE 25 MG/1
SUPPOSITORY RECTAL
Qty: 3 EACH | Refills: 2 | Status: SHIPPED | OUTPATIENT
Start: 2022-09-21 | End: 2022-09-28 | Stop reason: SDUPTHER

## 2022-09-28 ENCOUNTER — OFFICE VISIT (OUTPATIENT)
Dept: FAMILY MEDICINE CLINIC | Facility: CLINIC | Age: 62
End: 2022-09-28

## 2022-09-28 VITALS
OXYGEN SATURATION: 97 % | BODY MASS INDEX: 23.31 KG/M2 | WEIGHT: 172.1 LBS | HEART RATE: 71 BPM | HEIGHT: 72 IN | DIASTOLIC BLOOD PRESSURE: 66 MMHG | SYSTOLIC BLOOD PRESSURE: 138 MMHG

## 2022-09-28 DIAGNOSIS — Z79.899 ENCOUNTER FOR LONG-TERM (CURRENT) USE OF OTHER MEDICATIONS: ICD-10-CM

## 2022-09-28 DIAGNOSIS — R41.3 MEMORY DEFICITS: ICD-10-CM

## 2022-09-28 DIAGNOSIS — I10 BENIGN ESSENTIAL HTN: Primary | ICD-10-CM

## 2022-09-28 DIAGNOSIS — E11.9 TYPE 2 DIABETES MELLITUS WITHOUT COMPLICATION, WITHOUT LONG-TERM CURRENT USE OF INSULIN: ICD-10-CM

## 2022-09-28 DIAGNOSIS — R32 URINARY INCONTINENCE, UNSPECIFIED TYPE: ICD-10-CM

## 2022-09-28 DIAGNOSIS — E11.65 TYPE 2 DIABETES MELLITUS WITH HYPERGLYCEMIA, WITHOUT LONG-TERM CURRENT USE OF INSULIN: ICD-10-CM

## 2022-09-28 DIAGNOSIS — Z12.5 SCREENING FOR PROSTATE CANCER: ICD-10-CM

## 2022-09-28 DIAGNOSIS — E83.52 HYPERCALCEMIA: ICD-10-CM

## 2022-09-28 PROCEDURE — 36415 COLL VENOUS BLD VENIPUNCTURE: CPT | Performed by: NURSE PRACTITIONER

## 2022-09-28 PROCEDURE — 99214 OFFICE O/P EST MOD 30 MIN: CPT | Performed by: NURSE PRACTITIONER

## 2022-09-28 RX ORDER — PROCHLORPERAZINE 25 MG/1
1 SUPPOSITORY RECTAL DAILY PRN
Qty: 1 EACH | Refills: 3 | Status: SHIPPED | OUTPATIENT
Start: 2022-09-28 | End: 2023-04-04 | Stop reason: SDUPTHER

## 2022-09-28 RX ORDER — PROCHLORPERAZINE 25 MG/1
SUPPOSITORY RECTAL
Qty: 3 EACH | Refills: 2 | Status: SHIPPED | OUTPATIENT
Start: 2022-09-28 | End: 2023-04-04 | Stop reason: SDUPTHER

## 2022-09-28 NOTE — ASSESSMENT & PLAN NOTE
Labs drawn.  Denies refills of medications.  Sent refills of he has a Dexcom sensor and transmitter.  Proper diet and exercise plan discussed and encouraged.  Annual eye exams encouraged.  Check feet daily.  If A1c comes back uncontrolled he states he is agreeable to try discussing insulins.  Education provided.  Return to clinic or ED with any issues or concerns.

## 2022-09-28 NOTE — ASSESSMENT & PLAN NOTE
Goal blood pressure less than 140/90.  Let me know if gets to or above that.  Proper diet and exercise plan discussed and encouraged.  Return to clinic or ED with any issues or concerns.

## 2022-09-28 NOTE — ASSESSMENT & PLAN NOTE
Keep follow-up with neurology in 2 weeks as scheduled.  Go to ED if worsens.  Return to clinic or ED with any issues or concerns.

## 2022-09-28 NOTE — ASSESSMENT & PLAN NOTE
PSA checked.  UA completed.  Keep follow-up with urology as scheduled.  Education provided.  Go to ED if worsens.  Return to clinic or ED with any issues or concerns.

## 2022-09-30 DIAGNOSIS — M25.50 ARTHRALGIA, UNSPECIFIED JOINT: ICD-10-CM

## 2022-09-30 LAB
ALBUMIN SERPL-MCNC: 4.8 G/DL (ref 3.8–4.8)
ALBUMIN/GLOB SERPL: 2 {RATIO} (ref 1.2–2.2)
ALP SERPL-CCNC: 104 IU/L (ref 44–121)
ALT SERPL-CCNC: 26 IU/L (ref 0–44)
AST SERPL-CCNC: 26 IU/L (ref 0–40)
BASOPHILS # BLD AUTO: 0.1 X10E3/UL (ref 0–0.2)
BASOPHILS NFR BLD AUTO: 1 %
BILIRUB SERPL-MCNC: 0.4 MG/DL (ref 0–1.2)
BUN SERPL-MCNC: 16 MG/DL (ref 8–27)
BUN/CREAT SERPL: 17 (ref 10–24)
CA-I SERPL ISE-MCNC: 5.3 MG/DL (ref 4.5–5.6)
CALCIUM SERPL-MCNC: 9.9 MG/DL (ref 8.6–10.2)
CHLORIDE SERPL-SCNC: 102 MMOL/L (ref 96–106)
CO2 SERPL-SCNC: 22 MMOL/L (ref 20–29)
CREAT SERPL-MCNC: 0.95 MG/DL (ref 0.76–1.27)
EGFRCR SERPLBLD CKD-EPI 2021: 90 ML/MIN/1.73
EOSINOPHIL # BLD AUTO: 0.1 X10E3/UL (ref 0–0.4)
EOSINOPHIL NFR BLD AUTO: 2 %
ERYTHROCYTE [DISTWIDTH] IN BLOOD BY AUTOMATED COUNT: 12.2 % (ref 11.6–15.4)
GLOBULIN SER CALC-MCNC: 2.4 G/DL (ref 1.5–4.5)
GLUCOSE SERPL-MCNC: 96 MG/DL (ref 70–99)
HBA1C MFR BLD: 6.6 % (ref 4.8–5.6)
HCT VFR BLD AUTO: 41.5 % (ref 37.5–51)
HGB BLD-MCNC: 13.9 G/DL (ref 13–17.7)
IMM GRANULOCYTES # BLD AUTO: 0 X10E3/UL (ref 0–0.1)
IMM GRANULOCYTES NFR BLD AUTO: 0 %
LYMPHOCYTES # BLD AUTO: 2.6 X10E3/UL (ref 0.7–3.1)
LYMPHOCYTES NFR BLD AUTO: 33 %
MCH RBC QN AUTO: 33.1 PG (ref 26.6–33)
MCHC RBC AUTO-ENTMCNC: 33.5 G/DL (ref 31.5–35.7)
MCV RBC AUTO: 99 FL (ref 79–97)
MONOCYTES # BLD AUTO: 0.9 X10E3/UL (ref 0.1–0.9)
MONOCYTES NFR BLD AUTO: 11 %
NEUTROPHILS # BLD AUTO: 4.3 X10E3/UL (ref 1.4–7)
NEUTROPHILS NFR BLD AUTO: 53 %
PLATELET # BLD AUTO: 221 X10E3/UL (ref 150–450)
POTASSIUM SERPL-SCNC: 4.8 MMOL/L (ref 3.5–5.2)
PROT SERPL-MCNC: 7.2 G/DL (ref 6–8.5)
PSA SERPL-MCNC: 0.5 NG/ML (ref 0–4)
RBC # BLD AUTO: 4.2 X10E6/UL (ref 4.14–5.8)
SODIUM SERPL-SCNC: 141 MMOL/L (ref 134–144)
WBC # BLD AUTO: 8.1 X10E3/UL (ref 3.4–10.8)

## 2022-09-30 RX ORDER — BACLOFEN 10 MG/1
TABLET ORAL
Qty: 90 TABLET | Refills: 0 | OUTPATIENT
Start: 2022-09-30

## 2022-10-04 ENCOUNTER — TELEPHONE (OUTPATIENT)
Dept: FAMILY MEDICINE CLINIC | Facility: CLINIC | Age: 62
End: 2022-10-04

## 2022-10-04 NOTE — TELEPHONE ENCOUNTER
Caller: Santi Souza Sr.    Relationship to patient: Self    Best call back number: [phone]    Patient is needing: PATIENT RETURNED CALL- UNABLE TO WARM TRANSFER- PATIENT DOES NOT KNOW WHAT THE LABS ARE FOR ON 10/05/22. PLEASE CALL AS SOON AS POSSIBLE TO SAVE A TRIP IF NOT NEEDED.

## 2022-10-04 NOTE — TELEPHONE ENCOUNTER
Looks like patient is coming in tomorrow for lab work.  Can you contact patient, is he coming in just to have his cholesterol levels checked?  Let me know.  Thanks

## 2022-10-11 DIAGNOSIS — F43.10 PTSD (POST-TRAUMATIC STRESS DISORDER): ICD-10-CM

## 2022-10-11 RX ORDER — PRAZOSIN HYDROCHLORIDE 1 MG/1
CAPSULE ORAL
Qty: 30 CAPSULE | Refills: 2 | Status: SHIPPED | OUTPATIENT
Start: 2022-10-11 | End: 2023-01-09

## 2022-10-24 DIAGNOSIS — M25.50 ARTHRALGIA, UNSPECIFIED JOINT: ICD-10-CM

## 2022-10-24 RX ORDER — BACLOFEN 10 MG/1
TABLET ORAL
Qty: 90 TABLET | Refills: 0 | Status: SHIPPED | OUTPATIENT
Start: 2022-10-24 | End: 2022-11-16

## 2022-11-03 DIAGNOSIS — E11.9 CONTROLLED TYPE 2 DIABETES MELLITUS WITHOUT COMPLICATION, WITHOUT LONG-TERM CURRENT USE OF INSULIN: ICD-10-CM

## 2022-11-03 DIAGNOSIS — K21.9 GASTROESOPHAGEAL REFLUX DISEASE, UNSPECIFIED WHETHER ESOPHAGITIS PRESENT: ICD-10-CM

## 2022-11-03 RX ORDER — OMEPRAZOLE 20 MG/1
CAPSULE, DELAYED RELEASE ORAL
Qty: 90 CAPSULE | Refills: 1 | Status: SHIPPED | OUTPATIENT
Start: 2022-11-03 | End: 2023-04-04

## 2022-11-11 RX ORDER — EMPAGLIFLOZIN 10 MG/1
TABLET, FILM COATED ORAL
Qty: 90 TABLET | Refills: 0 | Status: SHIPPED | OUTPATIENT
Start: 2022-11-11 | End: 2022-12-16 | Stop reason: SDUPTHER

## 2022-11-16 ENCOUNTER — TELEPHONE (OUTPATIENT)
Dept: FAMILY MEDICINE CLINIC | Facility: CLINIC | Age: 62
End: 2022-11-16

## 2022-11-16 DIAGNOSIS — M25.50 ARTHRALGIA, UNSPECIFIED JOINT: ICD-10-CM

## 2022-11-16 RX ORDER — BACLOFEN 10 MG/1
TABLET ORAL
Qty: 90 TABLET | Refills: 0 | Status: SHIPPED | OUTPATIENT
Start: 2022-11-16 | End: 2023-01-05 | Stop reason: SDUPTHER

## 2022-11-16 NOTE — TELEPHONE ENCOUNTER
Is he taking the baclofen 3 times daily? If so and if he truly needs it that much we may need to refer him to someone such as a pain specialist or something. Is he taking it still for his arthralgias and back?

## 2022-11-19 DIAGNOSIS — M25.50 ARTHRALGIA, UNSPECIFIED JOINT: ICD-10-CM

## 2022-11-19 RX ORDER — MELOXICAM 15 MG/1
TABLET ORAL
Qty: 30 TABLET | Refills: 0 | Status: SHIPPED | OUTPATIENT
Start: 2022-11-19 | End: 2022-12-16 | Stop reason: SDUPTHER

## 2022-11-19 NOTE — TELEPHONE ENCOUNTER
Pt contacted. Said he has been on it for years and does take 3 times a day as a results of Dr. Suarez doing 4 surgeries for back pain

## 2022-11-30 RX ORDER — EMPAGLIFLOZIN 10 MG/1
TABLET, FILM COATED ORAL
Qty: 30 TABLET | Refills: 2 | OUTPATIENT
Start: 2022-11-30

## 2022-12-03 DIAGNOSIS — E78.2 MIXED HYPERLIPIDEMIA: ICD-10-CM

## 2022-12-03 RX ORDER — ATORVASTATIN CALCIUM 80 MG/1
TABLET, FILM COATED ORAL
Qty: 90 TABLET | Refills: 0 | Status: SHIPPED | OUTPATIENT
Start: 2022-12-03 | End: 2023-03-03

## 2022-12-16 ENCOUNTER — OFFICE VISIT (OUTPATIENT)
Dept: FAMILY MEDICINE CLINIC | Facility: CLINIC | Age: 62
End: 2022-12-16

## 2022-12-16 VITALS
OXYGEN SATURATION: 98 % | WEIGHT: 184.44 LBS | HEART RATE: 64 BPM | SYSTOLIC BLOOD PRESSURE: 104 MMHG | BODY MASS INDEX: 24.98 KG/M2 | DIASTOLIC BLOOD PRESSURE: 60 MMHG | HEIGHT: 72 IN

## 2022-12-16 DIAGNOSIS — E78.2 MIXED HYPERLIPIDEMIA: ICD-10-CM

## 2022-12-16 DIAGNOSIS — E11.65 TYPE 2 DIABETES MELLITUS WITH HYPERGLYCEMIA, WITHOUT LONG-TERM CURRENT USE OF INSULIN: ICD-10-CM

## 2022-12-16 DIAGNOSIS — M25.50 ARTHRALGIA, UNSPECIFIED JOINT: Primary | ICD-10-CM

## 2022-12-16 DIAGNOSIS — Z79.899 ENCOUNTER FOR LONG-TERM (CURRENT) USE OF OTHER MEDICATIONS: ICD-10-CM

## 2022-12-16 PROCEDURE — 36415 COLL VENOUS BLD VENIPUNCTURE: CPT | Performed by: NURSE PRACTITIONER

## 2022-12-16 PROCEDURE — 99214 OFFICE O/P EST MOD 30 MIN: CPT | Performed by: NURSE PRACTITIONER

## 2022-12-16 RX ORDER — MELOXICAM 15 MG/1
15 TABLET ORAL DAILY
Qty: 90 TABLET | Refills: 1 | Status: SHIPPED | OUTPATIENT
Start: 2022-12-16 | End: 2023-03-20

## 2022-12-16 RX ORDER — CALCIUM CARBONATE 300MG(750)
400 TABLET,CHEWABLE ORAL DAILY
COMMUNITY

## 2022-12-16 NOTE — ASSESSMENT & PLAN NOTE
Jardiance refilled.  Continue also with metformin and Januvia.  Denies refills of other medications.  Annual eye exams encouraged.  Check feet daily.  Proper diet and exercise plan discussed and encouraged.  Risk of meds discussed understood.  Labs drawn.  Return to clinic or ED with any issues or concerns.

## 2022-12-16 NOTE — ASSESSMENT & PLAN NOTE
Meloxicam refilled.  Risk discussed understood.  Labs drawn.  Education provided.  Return to clinic or ED with any issues or concerns.

## 2022-12-16 NOTE — ASSESSMENT & PLAN NOTE
Continue atorvastatin.  Denies refills at this medication.  Proper diet and exercise plan discussed and encouraged.  Fasting labs drawn.  Education provided.  Risk discussed understood.  Return to clinic or ED with any issues or concerns.

## 2022-12-16 NOTE — PROGRESS NOTES
"Chief Complaint  Diabetes    Subjective          Santi Souza Sr. presents to [facility] PRIMARY [facility]  History of Present Illness     Patient presents for checkup of diabetes.  He is currently on Januvia Jardiance and metformin.  Only needs refills of Jardiance today.  Eye exam up-to-date.  States feet are fine with no cuts or sores.  He states his blood sugars have been all over the place lately.  States they range from 100s to 250s.  States his diet has not really changed so he is not sure what is going on.  He used to be on insulin in the past but states that was 7 to 8 years ago.    Also history of arthralgias and does take meloxicam.  Requesting refills of this.    States he is doing well with his other medications with no issues or concerns.  He is continuing to follow-up with all specialist including:    Cardiology Dr. Boston at   Neurology  Hematology   GI  Psyhciatry    Patient knows he will be due a repeat low-dose lung CT scan after 12/29/2022 and he will contact me to let me know when he wants me to schedule it.    Objective   Vital Signs:   /60   Pulse 64   Ht 182.9 cm (72\")   Wt 83.7 kg (184 lb 7 oz)   SpO2 98%   BMI 25.01 kg/m²     Body mass index is 25.01 kg/m².    Review of Systems   Constitutional: Negative for chills, fatigue, fever and unexpected weight loss.   Eyes: Negative for blurred vision and visual disturbance.   Respiratory: Negative for cough, shortness of breath and wheezing.    Cardiovascular: Negative for chest pain and palpitations.   Gastrointestinal: Negative for abdominal pain, diarrhea, nausea and vomiting.   Endocrine: Negative for polydipsia, polyphagia and polyuria.   Genitourinary: Negative for decreased urine volume, dysuria, frequency, hematuria and urgency.   Musculoskeletal: Positive for arthralgias.   Skin: Negative for pallor.   Neurological: Negative for dizziness, tremors, seizures and headache.   Psychiatric/Behavioral: Negative.  " The patient is not nervous/anxious.        Past History:  Medical History: has a past medical history of CAD (coronary artery disease) (11/03/2017), Chronic back pain (11/03/2017), Condition not found (11/03/2017), Depression, Diabetes (HCC) (11/03/2017), Diabetes mellitus (HCC), GERD (gastroesophageal reflux disease) (11/03/2017), Hyperlipidemia (11/03/2017), Hypertension (11/03/2017), Neuropathy, Osteoarthritis, and Vitamin D deficiency.   Surgical History: has a past surgical history that includes Lumbar spine surgery (1998).   Family History: family history includes Diabetes in his father.   Social History: reports that he has been smoking cigarettes. He has a 3.75 pack-year smoking history. He has never used smokeless tobacco.    PHQ-2 Depression Screening  Little interest or pleasure in doing things? 0-->not at all   Feeling down, depressed, or hopeless? 0-->not at all   PHQ-2 Total Score 0        PHQ-9 Depression Screening  Little interest or pleasure in doing things? 0-->not at all   Feeling down, depressed, or hopeless? 0-->not at all   Trouble falling or staying asleep, or sleeping too much?     Feeling tired or having little energy?     Poor appetite or overeating?     Feeling bad about yourself - or that you are a failure or have let yourself or your family down?     Trouble concentrating on things, such as reading the newspaper or watching television?     Moving or speaking so slowly that other people could have noticed? Or the opposite - being so fidgety or restless that you have been moving around a lot more than usual?     Thoughts that you would be better off dead, or of hurting yourself in some way?     PHQ-9 Total Score 0   If you checked off any problems, how difficult have these problems made it for you to do your work, take care of things at home, or get along with other people?       PHQ-9 Total Score: 0      Patient screened positive for depression based on a PHQ-9 score of 0 on 12/16/2022.  Follow-up recommendations include:         Current Outpatient Medications:   •  aspirin 81 MG chewable tablet, Chew 81 mg., Disp: , Rfl:   •  atorvastatin (LIPITOR) 80 MG tablet, TAKE 1 TABLET BY MOUTH EVERY DAY, Disp: 90 tablet, Rfl: 0  •  baclofen (LIORESAL) 10 MG tablet, TAKE 1 TABLET BY MOUTH THREE TIMES A DAY, Disp: 90 tablet, Rfl: 0  •  buPROPion XL (WELLBUTRIN XL) 150 MG 24 hr tablet, TAKE 1 TABLET BY MOUTH EACH MORNING WITH 300 MG TABLET, Disp: , Rfl:   •  buPROPion XL (WELLBUTRIN XL) 300 MG 24 hr tablet, Take 300 mg by mouth Every Morning., Disp: , Rfl:   •  Continuous Blood Gluc  (Dexcom G6 ) device, USE 3 TIMES DAILY AND AS NEEDED AS DIRECTED, Disp: 1 each, Rfl: 0  •  Continuous Blood Gluc Sensor (Dexcom G6 Sensor), Check glucose 2-3 times daily, Disp: 3 each, Rfl: 2  •  Continuous Blood Gluc Transmit (Dexcom G6 Transmitter) misc, 1 kit Daily As Needed (Diabetes)., Disp: 1 each, Rfl: 3  •  donepezil (ARICEPT) 5 MG tablet, TAKE 1 TABLET (5 MG TOTAL) BY MOUTH 1 (ONE) TIME EACH DAY WITH BREAKFAST., Disp: , Rfl:   •  empagliflozin (Jardiance) 10 MG tablet tablet, Take 1 tablet by mouth Daily., Disp: 90 tablet, Rfl: 1  •  levocetirizine (XYZAL) 5 MG tablet, Take 1 tablet by mouth Every Evening., Disp: 30 tablet, Rfl: 5  •  Magnesium 400 MG tablet, 400 mg Daily., Disp: , Rfl:   •  meloxicam (MOBIC) 15 MG tablet, Take 1 tablet by mouth Daily., Disp: 90 tablet, Rfl: 1  •  metFORMIN (GLUCOPHAGE) 1000 MG tablet, TAKE 1 TABLET BY MOUTH TWICE A DAY WITH MEALS, Disp: 180 tablet, Rfl: 1  •  nitroglycerin (NITROLINGUAL) 0.4 MG/SPRAY spray, Place 1 spray by translingual route on or under the tongue at the first sign of an attack, no more than 3 sprays / 15-minute., Disp: 1 each, Rfl: 0  •  omeprazole (priLOSEC) 20 MG capsule, TAKE 1 CAPSULE BY MOUTH EVERY DAY, Disp: 90 capsule, Rfl: 1  •  prazosin (MINIPRESS) 1 MG capsule, TAKE 1 CAPSULE BY MOUTH EVERY NIGHT, Disp: 30 capsule, Rfl: 2  •  sertraline  (ZOLOFT) 100 MG tablet, TAKE 1 TABLET EVERY MORNING WITH FOOD, Disp: 90 tablet, Rfl: 0  •  SITagliptin (JANUVIA) 100 MG tablet, Take 1 tablet by mouth Daily., Disp: 90 tablet, Rfl: 1  •  traZODone (DESYREL) 50 MG tablet, Take 1 tablet by mouth Every Night., Disp: 30 tablet, Rfl: 0   (Not in a hospital admission)     Allergies: Codeine, Penicillins, and Sulfa antibiotics    Physical Exam  Constitutional:       Appearance: Normal appearance.   HENT:      Right Ear: External ear normal.   Eyes:      Conjunctiva/sclera: Conjunctivae normal.      Pupils: Pupils are equal, round, and reactive to light.   Cardiovascular:      Rate and Rhythm: Normal rate and regular rhythm.      Heart sounds: Normal heart sounds.   Pulmonary:      Effort: Pulmonary effort is normal.      Breath sounds: Normal breath sounds.   Neurological:      General: No focal deficit present.      Mental Status: He is alert and oriented to person, place, and time. Mental status is at baseline.   Psychiatric:         Mood and Affect: Mood normal.         Behavior: Behavior normal.         Thought Content: Thought content normal.         Judgment: Judgment normal.          Result Review :                   Assessment and Plan    Diagnoses and all orders for this visit:    1. Arthralgia, unspecified joint (Primary)  Assessment & Plan:  Meloxicam refilled.  Risk discussed understood.  Labs drawn.  Education provided.  Return to clinic or ED with any issues or concerns.    Orders:  -     meloxicam (MOBIC) 15 MG tablet; Take 1 tablet by mouth Daily.  Dispense: 90 tablet; Refill: 1    2. Encounter for long-term (current) use of other medications  -     CBC & Differential; Future  -     Basic Metabolic Panel; Future    3. Mixed hyperlipidemia  Assessment & Plan:  Continue atorvastatin.  Denies refills at this medication.  Proper diet and exercise plan discussed and encouraged.  Fasting labs drawn.  Education provided.  Risk discussed understood.  Return to  clinic or ED with any issues or concerns.    Orders:  -     Lipid Panel; Future    4. Type 2 diabetes mellitus with hyperglycemia, without long-term current use of insulin (HCC)  Assessment & Plan:  Jardiance refilled.  Continue also with metformin and Januvia.  Denies refills of other medications.  Annual eye exams encouraged.  Check feet daily.  Proper diet and exercise plan discussed and encouraged.  Risk of meds discussed understood.  Labs drawn.  Return to clinic or ED with any issues or concerns.    Orders:  -     Hemoglobin A1c; Future  -     empagliflozin (Jardiance) 10 MG tablet tablet; Take 1 tablet by mouth Daily.  Dispense: 90 tablet; Refill: 1            BMI is >= 25 and <30. (Overweight) The following options were offered after discussion;: exercise counseling/recommendations and nutrition counseling/recommendations       Follow Up   Return in about 3 months (around 3/16/2023).  Patient was given instructions and counseling regarding his condition or for health maintenance advice. Please see specific information pulled into the AVS if appropriate.     JESSICA Chandler

## 2022-12-17 LAB
BASOPHILS # BLD AUTO: 0.1 X10E3/UL (ref 0–0.2)
BASOPHILS NFR BLD AUTO: 1 %
BUN SERPL-MCNC: 16 MG/DL (ref 8–27)
BUN/CREAT SERPL: 16 (ref 10–24)
CALCIUM SERPL-MCNC: 10.1 MG/DL (ref 8.6–10.2)
CHLORIDE SERPL-SCNC: 104 MMOL/L (ref 96–106)
CHOLEST SERPL-MCNC: 153 MG/DL (ref 100–199)
CO2 SERPL-SCNC: 25 MMOL/L (ref 20–29)
CREAT SERPL-MCNC: 1 MG/DL (ref 0.76–1.27)
EGFRCR SERPLBLD CKD-EPI 2021: 85 ML/MIN/1.73
EOSINOPHIL # BLD AUTO: 0.2 X10E3/UL (ref 0–0.4)
EOSINOPHIL NFR BLD AUTO: 2 %
ERYTHROCYTE [DISTWIDTH] IN BLOOD BY AUTOMATED COUNT: 12.6 % (ref 11.6–15.4)
GLUCOSE SERPL-MCNC: 135 MG/DL (ref 70–99)
HBA1C MFR BLD: 7.6 % (ref 4.8–5.6)
HCT VFR BLD AUTO: 45.7 % (ref 37.5–51)
HDLC SERPL-MCNC: 38 MG/DL
HGB BLD-MCNC: 15.2 G/DL (ref 13–17.7)
IMM GRANULOCYTES # BLD AUTO: 0 X10E3/UL (ref 0–0.1)
IMM GRANULOCYTES NFR BLD AUTO: 0 %
LDLC SERPL CALC-MCNC: 86 MG/DL (ref 0–99)
LYMPHOCYTES # BLD AUTO: 2.3 X10E3/UL (ref 0.7–3.1)
LYMPHOCYTES NFR BLD AUTO: 28 %
MCH RBC QN AUTO: 30.7 PG (ref 26.6–33)
MCHC RBC AUTO-ENTMCNC: 33.3 G/DL (ref 31.5–35.7)
MCV RBC AUTO: 92 FL (ref 79–97)
MONOCYTES # BLD AUTO: 0.7 X10E3/UL (ref 0.1–0.9)
MONOCYTES NFR BLD AUTO: 9 %
NEUTROPHILS # BLD AUTO: 4.8 X10E3/UL (ref 1.4–7)
NEUTROPHILS NFR BLD AUTO: 60 %
PLATELET # BLD AUTO: 192 X10E3/UL (ref 150–450)
POTASSIUM SERPL-SCNC: 4.7 MMOL/L (ref 3.5–5.2)
RBC # BLD AUTO: 4.95 X10E6/UL (ref 4.14–5.8)
SODIUM SERPL-SCNC: 144 MMOL/L (ref 134–144)
TRIGL SERPL-MCNC: 169 MG/DL (ref 0–149)
VLDLC SERPL CALC-MCNC: 29 MG/DL (ref 5–40)
WBC # BLD AUTO: 8 X10E3/UL (ref 3.4–10.8)

## 2023-01-05 DIAGNOSIS — M25.50 ARTHRALGIA, UNSPECIFIED JOINT: ICD-10-CM

## 2023-01-05 RX ORDER — BACLOFEN 10 MG/1
10 TABLET ORAL 3 TIMES DAILY
Qty: 90 TABLET | Refills: 0 | Status: SHIPPED | OUTPATIENT
Start: 2023-01-05 | End: 2023-01-30

## 2023-01-05 NOTE — TELEPHONE ENCOUNTER
Caller: Santi Souza Sr.    Relationship: Self    Best call back number: [phone]    Requested Prescriptions:    baclofen (LIORESAL) 10 MG tablet      AND ALL OTHER MEDICATIONS PER PATIENT    Pharmacy where request should be sent: [facility]/PHARMACY #[id number] - [place], [place] - [address] - [phone] [facility] [phone] FX     Additional details provided by patient: PLEASE SEND REFILLS. PHARMACY HAS NOT RECEIVED REPLY FROM MULTIPLE REQUESTS    Does the patient have less than a 3 day supply:  [x] Yes  [] No OUT    Would you like a call back once the refill request has been completed: [x] Yes [] No    If the office needs to give you a call back, can they leave a voicemail: [x] Yes [] No    Trice Castorena Rep   01/05/23 15:27 EST

## 2023-01-07 DIAGNOSIS — F43.10 PTSD (POST-TRAUMATIC STRESS DISORDER): ICD-10-CM

## 2023-01-09 RX ORDER — PRAZOSIN HYDROCHLORIDE 1 MG/1
CAPSULE ORAL
Qty: 90 CAPSULE | Refills: 0 | Status: SHIPPED | OUTPATIENT
Start: 2023-01-09 | End: 2023-04-04

## 2023-01-17 NOTE — PROGRESS NOTES
"Chief Complaint  Med Refill (Had lemonade/)    Subjective          Santi Souza  presents to [facility] PRIMARY [facility]  Patient presents for medication refills.  Past medical history of hypertension, hyperlipidemia, type 2 diabetes, arthralgias and gerd.  Doing well on all medications with no issues or concerns.  Tries to watch diet but could do better.  States he does stay active and is on the go a lot.  Continues to see cardiology Dr. Wong.  He also sees psychiatry [facility] every 3 months for anxiety and depression and doing well.  States over the past year or so he has had worsening memory so he has an appointment scheduled with  neurology tomorrow.  No dizziness no headache no chest pain no chest pressure no shortness of breath or trouble breathing no urinary or bowel issues.    Colonoscopy up-to-date.  Has had a recent lung CT scan and has recently also followed up with his cardiologist Dr. Wong.      Objective   Vital Signs:   /60   Pulse 61   Ht 182.9 cm (72\")   Wt 83.8 kg (184 lb 12.8 oz)   SpO2 98%   BMI 25.06 kg/m²     Body mass index is 25.06 kg/m².    Review of Systems   Constitutional: Negative for chills, fatigue and fever.   Eyes: Negative for blurred vision and visual disturbance.   Respiratory: Negative for chest tightness, shortness of breath and wheezing.    Cardiovascular: Negative.    Gastrointestinal: Negative for diarrhea, nausea and vomiting.   Genitourinary: Negative for dysuria and urgency.   Musculoskeletal: Negative for arthralgias and back pain.   Neurological: Negative for dizziness, light-headedness and headache.   Psychiatric/Behavioral: Negative.        Past History:  Medical History: has a past medical history of CAD (coronary artery disease) (11/03/2017), Chronic back pain (11/03/2017), Condition not found (11/03/2017), Depression, Diabetes (HCC) (11/03/2017), Diabetes mellitus (HCC), GERD (gastroesophageal reflux disease) (11/03/2017), " Hyperlipidemia (11/03/2017), Hypertension (11/03/2017), Neuropathy, Osteoarthritis, and Vitamin D deficiency.   Surgical History: has a past surgical history that includes Lumbar spine surgery (1998).   Family History: family history includes Diabetes in his father.   Social History: reports that he has been smoking cigarettes. He has a 3.75 pack-year smoking history. He has never used smokeless tobacco.    PHQ-2 Depression Screening  Little interest or pleasure in doing things? 1-->several days   Feeling down, depressed, or hopeless? 2-->more than half the days   PHQ-2 Total Score 12        PHQ-9 Depression Screening  Little interest or pleasure in doing things? 1-->several days   Feeling down, depressed, or hopeless? 2-->more than half the days   Trouble falling or staying asleep, or sleeping too much? 2-->more than half the days   Feeling tired or having little energy? 2-->more than half the days   Poor appetite or overeating? 1-->several days   Feeling bad about yourself - or that you are a failure or have let yourself or your family down? 1-->several days   Trouble concentrating on things, such as reading the newspaper or watching television? 2-->more than half the days   Moving or speaking so slowly that other people could have noticed? Or the opposite - being so fidgety or restless that you have been moving around a lot more than usual? 1-->several days   Thoughts that you would be better off dead, or of hurting yourself in some way? 0-->not at all   PHQ-9 Total Score 12   If you checked off any problems, how difficult have these problems made it for you to do your work, take care of things at home, or get along with other people? somewhat difficult     PHQ-9 Total Score: 12      Patient screened positive for depression based on a PHQ-9 score of 12 on 4/6/2022. Follow-up recommendations include: Referral to Mental Health specialist.         Current Outpatient Medications:   •  atorvastatin (LIPITOR) 80 MG  tablet, Take 1 tablet by mouth Daily., Disp: 90 tablet, Rfl: 1  •  baclofen (LIORESAL) 10 MG tablet, 1 tablet PO TID PRN, Disp: 60 tablet, Rfl: 1  •  levocetirizine (XYZAL) 5 MG tablet, Take 1 tablet by mouth Every Evening., Disp: 30 tablet, Rfl: 5  •  meloxicam (MOBIC) 15 MG tablet, Take 1 tablet by mouth Daily., Disp: 90 tablet, Rfl: 1  •  metFORMIN (GLUCOPHAGE) 1000 MG tablet, Take 1 tablet by mouth 2 (Two) Times a Day With Meals., Disp: 180 tablet, Rfl: 1  •  omeprazole (priLOSEC) 20 MG capsule, Take 1 capsule by mouth Daily., Disp: 90 capsule, Rfl: 1  •  SITagliptin (JANUVIA) 100 MG tablet, Take 1 tablet by mouth Daily., Disp: 90 tablet, Rfl: 1  •  sertraline (ZOLOFT) 100 MG tablet, Take 1 tablet by mouth Daily., Disp: 30 tablet, Rfl: 0  •  traZODone (DESYREL) 50 MG tablet, Take 1 tablet by mouth Every Night., Disp: 30 tablet, Rfl: 0   (Not in a hospital admission)     Allergies: Codeine, Penicillins, and Sulfa antibiotics    Physical Exam  Constitutional:       Appearance: Normal appearance. He is normal weight.   Eyes:      Conjunctiva/sclera: Conjunctivae normal.      Pupils: Pupils are equal, round, and reactive to light.   Cardiovascular:      Rate and Rhythm: Normal rate and regular rhythm.      Heart sounds: Normal heart sounds.   Pulmonary:      Effort: Pulmonary effort is normal.      Breath sounds: Normal breath sounds.   Neurological:      General: No focal deficit present.      Mental Status: He is alert and oriented to person, place, and time. Mental status is at baseline.   Psychiatric:         Attention and Perception: Attention and perception normal.         Mood and Affect: Mood and affect normal.         Speech: Speech normal.         Behavior: Behavior normal. Behavior is cooperative.         Thought Content: Thought content normal. Thought content does not include homicidal or suicidal ideation. Thought content does not include homicidal or suicidal plan.         Cognition and Memory:  Cognition and memory normal.         Judgment: Judgment normal.          Result Review :                   Assessment and Plan    Diagnoses and all orders for this visit:    1. Benign essential HTN (Primary)  Assessment & Plan:  Meds refilled.  We discussed all his medications.  Proper diet and exercise plan discussed and encouraged.  Goal blood pressure less than 130/80.  Annual eye exam encouraged.  Check feet daily.  Continue to follow-up with cardiology Dr. Wong as scheduled.  He will also continue to follow-up with his psychiatrist at [facility] every 3 months as scheduled as well as his counselor.  PHQ-9 completed and discussed.  No thoughts of suicide or hurting himself or anyone else.  Risk of meds discussed and understood.  We also discussed immunizations.  Turn to clinic or ED with any issues or concerns.    Orders:  -     CBC & Differential; Future  -     Comprehensive Metabolic Panel; Future  -     TSH Rfx On Abnormal To Free T4; Future  -     CBC & Differential  -     Comprehensive Metabolic Panel  -     TSH Rfx On Abnormal To Free T4    2. Mixed hyperlipidemia  -     Lipid Panel; Future  -     atorvastatin (LIPITOR) 80 MG tablet; Take 1 tablet by mouth Daily.  Dispense: 90 tablet; Refill: 1  -     Lipid Panel    3. Controlled type 2 diabetes mellitus without complication, without long-term current use of insulin (HCC)  -     Hemoglobin A1c; Future  -     metFORMIN (GLUCOPHAGE) 1000 MG tablet; Take 1 tablet by mouth 2 (Two) Times a Day With Meals.  Dispense: 180 tablet; Refill: 1  -     SITagliptin (JANUVIA) 100 MG tablet; Take 1 tablet by mouth Daily.  Dispense: 90 tablet; Refill: 1  -     Hemoglobin A1c    4. Anxiety and depression    5. Allergic rhinitis, unspecified seasonality, unspecified trigger  -     levocetirizine (XYZAL) 5 MG tablet; Take 1 tablet by mouth Every Evening.  Dispense: 30 tablet; Refill: 5    6. Arthralgia, unspecified joint  -     baclofen (LIORESAL) 10 MG tablet; 1 tablet  PO TID PRN  Dispense: 60 tablet; Refill: 1  -     meloxicam (MOBIC) 15 MG tablet; Take 1 tablet by mouth Daily.  Dispense: 90 tablet; Refill: 1    7. Vitamin deficiency  -     Vitamin B12; Future  -     Vitamin B12    8. Memory deficits  Assessment & Plan:  Keep appointment with neurology as scheduled for tomorrow.      9. Gastroesophageal reflux disease, unspecified whether esophagitis present  -     omeprazole (priLOSEC) 20 MG capsule; Take 1 capsule by mouth Daily.  Dispense: 90 capsule; Refill: 1            BMI is above normal parameters. Recommendations: exercise counseling/recommendations and nutrition counseling/recommendations        Follow Up   Return in about 6 months (around 10/6/2022).  Patient was given instructions and counseling regarding his condition or for health maintenance advice. Please see specific information pulled into the AVS if appropriate.     JESSICA Chandler   Calculus of gallbladder with acute and chronic cholecystitis without obstruction

## 2023-01-30 DIAGNOSIS — M25.50 ARTHRALGIA, UNSPECIFIED JOINT: ICD-10-CM

## 2023-01-30 RX ORDER — BACLOFEN 10 MG/1
TABLET ORAL
Qty: 90 TABLET | Refills: 0 | Status: SHIPPED | OUTPATIENT
Start: 2023-01-30 | End: 2023-02-27

## 2023-02-24 DIAGNOSIS — M25.50 ARTHRALGIA, UNSPECIFIED JOINT: ICD-10-CM

## 2023-02-25 ENCOUNTER — TELEPHONE (OUTPATIENT)
Dept: FAMILY MEDICINE CLINIC | Facility: CLINIC | Age: 63
End: 2023-02-25
Payer: MEDICARE

## 2023-02-27 RX ORDER — BACLOFEN 10 MG/1
TABLET ORAL
Qty: 90 TABLET | Refills: 0 | Status: SHIPPED | OUTPATIENT
Start: 2023-02-27 | End: 2023-03-22

## 2023-03-03 DIAGNOSIS — E78.2 MIXED HYPERLIPIDEMIA: ICD-10-CM

## 2023-03-03 RX ORDER — ATORVASTATIN CALCIUM 80 MG/1
TABLET, FILM COATED ORAL
Qty: 90 TABLET | Refills: 0 | Status: SHIPPED | OUTPATIENT
Start: 2023-03-03 | End: 2023-03-20

## 2023-03-20 DIAGNOSIS — M25.50 ARTHRALGIA, UNSPECIFIED JOINT: ICD-10-CM

## 2023-03-20 DIAGNOSIS — E78.2 MIXED HYPERLIPIDEMIA: ICD-10-CM

## 2023-03-20 RX ORDER — MELOXICAM 15 MG/1
TABLET ORAL
Qty: 90 TABLET | Refills: 1 | Status: SHIPPED | OUTPATIENT
Start: 2023-03-20

## 2023-03-20 RX ORDER — ATORVASTATIN CALCIUM 80 MG/1
TABLET, FILM COATED ORAL
Qty: 90 TABLET | Refills: 0 | Status: SHIPPED | OUTPATIENT
Start: 2023-03-20 | End: 2023-04-06 | Stop reason: SDUPTHER

## 2023-03-22 DIAGNOSIS — M25.50 ARTHRALGIA, UNSPECIFIED JOINT: ICD-10-CM

## 2023-03-22 RX ORDER — BACLOFEN 10 MG/1
TABLET ORAL
Qty: 90 TABLET | Refills: 0 | Status: SHIPPED | OUTPATIENT
Start: 2023-03-22

## 2023-04-04 DIAGNOSIS — E11.65 TYPE 2 DIABETES MELLITUS WITH HYPERGLYCEMIA, WITHOUT LONG-TERM CURRENT USE OF INSULIN: ICD-10-CM

## 2023-04-04 DIAGNOSIS — M25.50 ARTHRALGIA, UNSPECIFIED JOINT: ICD-10-CM

## 2023-04-04 DIAGNOSIS — F43.10 PTSD (POST-TRAUMATIC STRESS DISORDER): ICD-10-CM

## 2023-04-04 DIAGNOSIS — E11.9 CONTROLLED TYPE 2 DIABETES MELLITUS WITHOUT COMPLICATION, WITHOUT LONG-TERM CURRENT USE OF INSULIN: ICD-10-CM

## 2023-04-04 DIAGNOSIS — K21.9 GASTROESOPHAGEAL REFLUX DISEASE, UNSPECIFIED WHETHER ESOPHAGITIS PRESENT: ICD-10-CM

## 2023-04-04 RX ORDER — PRAZOSIN HYDROCHLORIDE 1 MG/1
CAPSULE ORAL
Qty: 90 CAPSULE | Refills: 0 | Status: SHIPPED | OUTPATIENT
Start: 2023-04-04

## 2023-04-04 RX ORDER — PROCHLORPERAZINE 25 MG/1
1 SUPPOSITORY RECTAL DAILY PRN
Qty: 1 EACH | Refills: 3 | Status: SHIPPED | OUTPATIENT
Start: 2023-04-04

## 2023-04-04 RX ORDER — PROCHLORPERAZINE 25 MG/1
SUPPOSITORY RECTAL
Qty: 3 EACH | Refills: 2 | Status: SHIPPED | OUTPATIENT
Start: 2023-04-04

## 2023-04-04 RX ORDER — OMEPRAZOLE 20 MG/1
CAPSULE, DELAYED RELEASE ORAL
Qty: 90 CAPSULE | Refills: 1 | Status: SHIPPED | OUTPATIENT
Start: 2023-04-04

## 2023-04-04 RX ORDER — BACLOFEN 10 MG/1
TABLET ORAL
Qty: 90 TABLET | Refills: 0 | OUTPATIENT
Start: 2023-04-04

## 2023-04-04 RX ORDER — FLUTICASONE PROPIONATE 50 MCG
SPRAY, SUSPENSION (ML) NASAL
Qty: 48 ML | Refills: 1 | Status: SHIPPED | OUTPATIENT
Start: 2023-04-04

## 2023-04-04 NOTE — TELEPHONE ENCOUNTER
Caller: Santi Souza Sr.    Relationship: Self    Best call back number:    [phone]        Requested Prescriptions:   Requested Prescriptions     Pending Prescriptions Disp Refills   • Continuous Blood Gluc Sensor (Dexcom G6 Sensor) 3 each 2     Sig: Check glucose 2-3 times daily   • Continuous Blood Gluc Transmit (Dexcom G6 Transmitter) misc 1 each 3     Si kit Daily As Needed (Diabetes).        Pharmacy where request should be sent: [address] [phone] [facility] [phone]      Last office visit with prescribing clinician: 2022   Last telemedicine visit with prescribing clinician: Visit date not found   Next office visit with prescribing clinician: Visit date not found     Additional details provided by patient: THE PATIENT REPORTS THAT HE NEEDS THE TRANSMITTER AND SENSOR FOR HIS DEXCOM G6 AND WAS ADVISED BY HIS NEW [facility] MEDICARE INSURANCE THAT A PRIOR AUTHORIZATION IS REQUIRED ON BOTH.      Does the patient have less than a 3 day supply:  [x] Yes  [] No    Would you like a call back once the refill request has been completed: [x] Yes [] No    If the office needs to give you a call back, can they leave a voicemail: [x] Yes [] No    Trice Delgado Rep   23 11:39 EDT

## 2023-04-06 ENCOUNTER — OFFICE VISIT (OUTPATIENT)
Dept: FAMILY MEDICINE CLINIC | Facility: CLINIC | Age: 63
End: 2023-04-06
Payer: MEDICARE

## 2023-04-06 VITALS
OXYGEN SATURATION: 97 % | BODY MASS INDEX: 22.89 KG/M2 | HEART RATE: 65 BPM | HEIGHT: 72 IN | WEIGHT: 169 LBS | SYSTOLIC BLOOD PRESSURE: 110 MMHG | DIASTOLIC BLOOD PRESSURE: 68 MMHG

## 2023-04-06 DIAGNOSIS — F41.9 ANXIETY AND DEPRESSION: ICD-10-CM

## 2023-04-06 DIAGNOSIS — Z12.11 SCREENING FOR COLON CANCER: ICD-10-CM

## 2023-04-06 DIAGNOSIS — J30.9 ALLERGIC RHINITIS, UNSPECIFIED SEASONALITY, UNSPECIFIED TRIGGER: ICD-10-CM

## 2023-04-06 DIAGNOSIS — Z12.5 SCREENING FOR PROSTATE CANCER: ICD-10-CM

## 2023-04-06 DIAGNOSIS — F43.10 PTSD (POST-TRAUMATIC STRESS DISORDER): ICD-10-CM

## 2023-04-06 DIAGNOSIS — K21.9 GASTROESOPHAGEAL REFLUX DISEASE, UNSPECIFIED WHETHER ESOPHAGITIS PRESENT: ICD-10-CM

## 2023-04-06 DIAGNOSIS — M25.50 ARTHRALGIA, UNSPECIFIED JOINT: ICD-10-CM

## 2023-04-06 DIAGNOSIS — R41.3 MEMORY DEFICITS: ICD-10-CM

## 2023-04-06 DIAGNOSIS — Z11.59 NEED FOR HEPATITIS C SCREENING TEST: ICD-10-CM

## 2023-04-06 DIAGNOSIS — E11.65 TYPE 2 DIABETES MELLITUS WITH HYPERGLYCEMIA, WITHOUT LONG-TERM CURRENT USE OF INSULIN: ICD-10-CM

## 2023-04-06 DIAGNOSIS — G89.29 CHRONIC MIDLINE LOW BACK PAIN, UNSPECIFIED WHETHER SCIATICA PRESENT: ICD-10-CM

## 2023-04-06 DIAGNOSIS — Z12.2 SCREENING FOR LUNG CANCER: ICD-10-CM

## 2023-04-06 DIAGNOSIS — F32.A ANXIETY AND DEPRESSION: ICD-10-CM

## 2023-04-06 DIAGNOSIS — Z71.89 ADVANCED DIRECTIVES, COUNSELING/DISCUSSION: ICD-10-CM

## 2023-04-06 DIAGNOSIS — I25.10 CORONARY ARTERY DISEASE INVOLVING NATIVE CORONARY ARTERY OF NATIVE HEART WITHOUT ANGINA PECTORIS: ICD-10-CM

## 2023-04-06 DIAGNOSIS — Z79.899 ENCOUNTER FOR LONG-TERM (CURRENT) USE OF OTHER MEDICATIONS: ICD-10-CM

## 2023-04-06 DIAGNOSIS — I67.9 CEREBROVASCULAR DISEASE: ICD-10-CM

## 2023-04-06 DIAGNOSIS — Z00.00 INITIAL MEDICARE ANNUAL WELLNESS VISIT: Primary | ICD-10-CM

## 2023-04-06 DIAGNOSIS — E56.9 VITAMIN DEFICIENCY: ICD-10-CM

## 2023-04-06 DIAGNOSIS — M54.50 CHRONIC MIDLINE LOW BACK PAIN, UNSPECIFIED WHETHER SCIATICA PRESENT: ICD-10-CM

## 2023-04-06 DIAGNOSIS — I10 BENIGN ESSENTIAL HTN: ICD-10-CM

## 2023-04-06 DIAGNOSIS — E78.2 MIXED HYPERLIPIDEMIA: ICD-10-CM

## 2023-04-06 DIAGNOSIS — E55.9 VITAMIN D DEFICIENCY: ICD-10-CM

## 2023-04-06 DIAGNOSIS — G47.00 INSOMNIA, UNSPECIFIED TYPE: ICD-10-CM

## 2023-04-06 PROBLEM — R32 URINARY INCONTINENCE: Status: RESOLVED | Noted: 2022-09-28 | Resolved: 2023-04-06

## 2023-04-06 PROBLEM — R63.4 WEIGHT LOSS: Status: RESOLVED | Noted: 2022-07-01 | Resolved: 2023-04-06

## 2023-04-06 LAB
ALBUMIN/CREATININE RATIO, URINE: 30
BILIRUB BLD-MCNC: NEGATIVE MG/DL
CLARITY, POC: CLEAR
COLOR UR: YELLOW
EXPIRATION DATE: ABNORMAL
GLUCOSE UR STRIP-MCNC: ABNORMAL MG/DL
KETONES UR QL: NEGATIVE
LEUKOCYTE EST, POC: NEGATIVE
Lab: ABNORMAL
NITRITE UR-MCNC: NEGATIVE MG/ML
PH UR: 7.5 [PH] (ref 5–8)
POC CREATININE URINE: 200
POC MICROALBUMIN URINE: 10
PROT UR STRIP-MCNC: NEGATIVE MG/DL
RBC # UR STRIP: NEGATIVE /UL
SP GR UR: 1.02 (ref 1–1.03)
UROBILINOGEN UR QL: NORMAL

## 2023-04-06 RX ORDER — ATORVASTATIN CALCIUM 80 MG/1
80 TABLET, FILM COATED ORAL DAILY
Qty: 90 TABLET | Refills: 1 | Status: SHIPPED | OUTPATIENT
Start: 2023-04-06

## 2023-04-06 NOTE — ASSESSMENT & PLAN NOTE
Fasting labs drawn.  UA completed.  Micro completed.  Proper diet and exercise plan discussed and encouraged.  Check feet daily.  Annual dental and eye exams encouraged.  Goal blood pressure less than 140/90.  Let me know if gets to or above that.  PHQ-9 completed and discussed.  No thoughts of suicide or hurting himself or anyone else.  Colonoscopy up-to-date.  Patient states he will discuss all immunizations further with his pharmacist including tetanus and pneumonia vaccines.  Patient states he does have mild cognitive impairment so he will continue to follow-up with his neurologist regularly.  No hearing impairment.  Does not need help with ADLs.  Risk of meds discussed and understood.  Education provided.  Follow-up with all specialist including Dr. Wong cardiology, Dr. Benitez neurology, Dr. Garay urology, and his psychiatrist at [facility] as scheduled.  Patient states he did not smoke over 1 pack a year for 20 years so would not be a candidate for low-dose lung CT scan.  He did have a CTA though of the lungs 9/2022.  Return to clinic or ED with any issues or concerns.

## 2023-04-06 NOTE — PROGRESS NOTES
The ABCs of the Annual Wellness Visit  Initial Medicare Wellness Visit    Subjective     Santi Souza Sr. is a 62 y.o. male who presents for an Initial Medicare Wellness Visit.    The following portions of the patient's history were reviewed and   updated as appropriate: allergies, current medications, past family history, past medical history, past social history, past surgical history and problem list.     Compared to one year ago, the patient feels his physical   health is the same.    Compared to one year ago, the patient feels his mental   health is the same.    Patient presents for annual exam.  He is fasting.  Quit smoking 1.5 years ago.  States he did not smoke over 20 years and did not smoke a pack a day.  No alcohol use.  Tries to watch his diet but admits that it could be better.  Walks daily.  No dizziness no headache no chest pain no chest pressure no shortness of breath no trouble breathing no urinary or bowel issues.    Past medical history of hypertension hyperlipidemia type 2 diabetes anxiety and depression PTSD chronic low back pain with past surgeries coronary artery disease with 7 stents placed GERD insomnia and cognitive impairment with cerebrovascular disease.  States he is doing well on current medications with no issues or side effects.    Has been checking his blood sugars as he has a Dexcom and states they range from 100-300 at times depending on what he eats.  States he is up-to-date on annual eye exams and actually has one coming up.  States his feet are fine with no cuts or sores.  He would like a referral to endocrinology though just with how his blood sugars are all over the place.  States a few years ago he was on insulin and is wondering if he may end up having to get back to it.    Colonoscopy completed in 2020 and is good for 10 years.  He states he is up-to-date on COVID flu and shingles vaccines.  He has had 1 pneumonia vaccine and states he will wait until he is 74 to get  another one.  He is unsure on his last tetanus vaccine and states he will discuss it further with his pharmacist.    Specialist he currently sees include:    Dr. Wong (cardiology)  Dr. Benitez (neurology)  Dr. Sosa (urology, history of kidney stones)  Psychiatry ([facility], he was unsure of their name)    In the past I had also referred him to GI at [facility] and hematology for weight loss.  The gastroenterologist did not come up with anything.  He is unsure if he followed up with hematology but he states his weight is stable now and is not concerned about this. He has lost weight since his last appointment but he states then his diet had been poor over the holidays so is not surprised.     Recent Hospitalizations:  He was not admitted to the hospital during the last year.       Current Medical Providers:  Patient Care Team:  Jordan Heart APRN as PCP - General (Nurse Practitioner)    Outpatient Medications Prior to Visit   Medication Sig Dispense Refill   • aspirin 81 MG chewable tablet Chew 1 tablet.     • baclofen (LIORESAL) 10 MG tablet TAKE 1 TABLET BY MOUTH THREE TIMES A DAY 90 tablet 0   • buPROPion XL (WELLBUTRIN XL) 150 MG 24 hr tablet TAKE 1 TABLET BY MOUTH EACH MORNING WITH 300 MG TABLET     • buPROPion XL (WELLBUTRIN XL) 300 MG 24 hr tablet Take 1 tablet by mouth Every Morning.     • Continuous Blood Gluc  (Dexcom G6 ) device USE 3 TIMES DAILY AND AS NEEDED AS DIRECTED 1 each 0   • Continuous Blood Gluc Sensor (Dexcom G6 Sensor) Check glucose 2-3 times daily 3 each 2   • Continuous Blood Gluc Transmit (Dexcom G6 Transmitter) misc 1 kit Daily As Needed (Diabetes). 1 each 3   • donepezil (ARICEPT) 5 MG tablet TAKE 1 TABLET (5 MG TOTAL) BY MOUTH 1 (ONE) TIME EACH DAY WITH BREAKFAST.     • fluticasone (FLONASE) 50 MCG/ACT nasal spray SPRAY 1 TO 2 SPRAYS INTO EACH NOSTRIL EVERY DAY AS NEEDED 48 mL 1   • levocetirizine (XYZAL) 5 MG tablet Take 1 tablet by mouth Every Evening. 30 tablet 5    • Magnesium 400 MG tablet 400 mg Daily.     • meloxicam (MOBIC) 15 MG tablet TAKE 1 TABLET BY MOUTH EVERY DAY 90 tablet 1   • metFORMIN (GLUCOPHAGE) 1000 MG tablet TAKE 1 TABLET BY MOUTH TWICE A DAY WITH MEALS 180 tablet 1   • nitroglycerin (NITROLINGUAL) 0.4 MG/SPRAY spray Place 1 spray by translingual route on or under the tongue at the first sign of an attack, no more than 3 sprays / 15-minute. 1 each 0   • omeprazole (priLOSEC) 20 MG capsule TAKE 1 CAPSULE BY MOUTH EVERY DAY 90 capsule 1   • prazosin (MINIPRESS) 1 MG capsule TAKE 1 CAPSULE BY MOUTH EVERY NIGHT 90 capsule 0   • sertraline (ZOLOFT) 100 MG tablet TAKE 1 TABLET EVERY MORNING WITH FOOD 90 tablet 0   • traZODone (DESYREL) 50 MG tablet Take 1 tablet by mouth Every Night. 30 tablet 0   • atorvastatin (LIPITOR) 80 MG tablet TAKE 1 TABLET BY MOUTH EVERY DAY 90 tablet 0   • empagliflozin (Jardiance) 25 MG tablet tablet Take 1 tablet by mouth Daily. 90 tablet 1   • SITagliptin (JANUVIA) 100 MG tablet Take 1 tablet by mouth Daily. 90 tablet 1     No facility-administered medications prior to visit.       No opioid medication identified on active medication list. I have reviewed chart for other potential  high risk medication/s and harmful drug interactions in the elderly.          Aspirin is on active medication list. Aspirin use is indicated based on review of current medical condition/s. Pros and cons of this therapy have been discussed today. Benefits of this medication outweigh potential harm.  Patient has been encouraged to continue taking this medication.  .      Patient Active Problem List   Diagnosis   • Benign essential HTN   • Mixed hyperlipidemia   • Type 2 diabetes mellitus with hyperglycemia, without long-term current use of insulin   • Anxiety and depression   • Allergic rhinitis   • Arthralgia   • Vitamin deficiency   • Memory deficits   • PTSD (post-traumatic stress disorder)   • Screening for prostate cancer   • Vitamin D deficiency   •  "Encounter for long-term (current) use of other medications   • Initial Medicare annual wellness visit   • Advanced directives, counseling/discussion   • Screening for colon cancer   • Screening for lung cancer   • GERD (gastroesophageal reflux disease)   • Insomnia   • CAD (coronary artery disease)   • Cerebrovascular disease   • Chronic midline low back pain   • Need for hepatitis C screening test     Advance Care Planning  Advance Directive is not on file.  ACP discussion was held with the patient during this visit. Patient does not have an advance directive, information provided.       Objective    Vitals:    04/06/23 1106 04/06/23 1226   BP: 98/58 110/68   Pulse: 54 65   SpO2: 97%    Weight: 76.7 kg (169 lb)    Height: 182.9 cm (72\")      Estimated body mass index is 22.92 kg/m² as calculated from the following:    Height as of this encounter: 182.9 cm (72\").    Weight as of this encounter: 76.7 kg (169 lb).    BMI is within normal parameters. No other follow-up for BMI required.      Does the patient have evidence of cognitive impairment?   No  Approrpriate today in clinic but he will continue to follow-up with his neurologist Dr. Benitez             Review of Systems   Constitutional: Negative for chills, fatigue, unexpected weight gain and unexpected weight loss.   HENT: Negative for congestion, sinus pressure, sneezing, sore throat and swollen glands.    Eyes: Negative for blurred vision and visual disturbance.   Respiratory: Negative for cough, shortness of breath and wheezing.    Cardiovascular: Negative.    Gastrointestinal: Negative.    Endocrine: Negative for polydipsia, polyphagia and polyuria.   Genitourinary: Negative for decreased urine volume, dysuria, frequency, hematuria and urgency.   Musculoskeletal: Positive for back pain. Negative for myalgias and neck pain.   Skin: Negative for pallor, rash and wound.   Neurological: Negative.  Negative for dizziness, tremors, seizures, speech difficulty and " weakness.   Psychiatric/Behavioral: Negative.  The patient is not nervous/anxious.         Physical Exam  Constitutional:       Appearance: Normal appearance.   HENT:      Head: Normocephalic.      Right Ear: Tympanic membrane, ear canal and external ear normal.      Left Ear: Tympanic membrane, ear canal and external ear normal.      Nose: Nose normal.      Mouth/Throat:      Mouth: Mucous membranes are moist.      Pharynx: Oropharynx is clear.   Eyes:      Extraocular Movements: Extraocular movements intact.      Conjunctiva/sclera: Conjunctivae normal.      Pupils: Pupils are equal, round, and reactive to light.   Cardiovascular:      Rate and Rhythm: Normal rate and regular rhythm.      Heart sounds: Normal heart sounds.   Pulmonary:      Effort: Pulmonary effort is normal.      Breath sounds: Normal breath sounds.   Abdominal:      General: Abdomen is flat. Bowel sounds are normal.      Palpations: Abdomen is soft.   Musculoskeletal:         General: Normal range of motion.      Cervical back: Normal range of motion.   Skin:     General: Skin is warm.      Findings: No erythema or rash.   Neurological:      General: No focal deficit present.      Mental Status: He is alert and oriented to person, place, and time. Mental status is at baseline.   Psychiatric:         Mood and Affect: Mood normal.         Behavior: Behavior normal.         Thought Content: Thought content normal.         Judgment: Judgment normal.          HEALTH RISK ASSESSMENT    Smoking Status:  Social History     Tobacco Use   Smoking Status Former   • Packs/day: 0.25   • Years: 32.00   • Pack years: 8.00   • Types: Cigarettes   • Start date:    • Quit date:    • Years since quittin.2   Smokeless Tobacco Never     Alcohol Consumption:  Social History     Substance and Sexual Activity   Alcohol Use None     Fall Risk Screen:    [facility] Fall Risk Assessment has not been completed.    Depression Screen:   PHQ-2/PHQ-9 Depression  Screening 12/16/2022   Little Interest or Pleasure in Doing Things 0-->not at all   Feeling Down, Depressed or Hopeless 0-->not at all   Trouble Falling or Staying Asleep, or Sleeping Too Much -   Feeling Tired or Having Little Energy -   Poor Appetite or Overeating -   Feeling Bad about Yourself - or that You are a Failure or Have Let Yourself or Your Family Down -   Trouble Concentrating on Things, Such as Reading the Newspaper or Watching Television -   Moving or Speaking So Slowly that Other People Could Have Noticed? Or the Opposite - Being So Fidgety -   Thoughts that You Would be Better Off Dead or of Hurting Yourself in Some Way -   PHQ-9: Brief Depression Severity Measure Score 0   If You Checked Off Any Problems, How Difficult Have These Problems Made It For You to Do Your Work, Take Care of Things at Home, or Get Along with Other People? -       Health Habits and Functional and Cognitive Screening:  Functional & Cognitive Status 4/6/2023   Do you have difficulty preparing food and eating? Yes   Do you have difficulty bathing yourself, getting dressed or grooming yourself? No   Do you have difficulty using the toilet? No   Do you have difficulty moving around from place to place? No   Do you have trouble with steps or getting out of a bed or a chair? No   Current Diet Unhealthy Diet   Dental Exam Not up to date   Eye Exam Not up to date   Exercise (times per week) 7 times per week   Current Exercises Include Other        Exercise Comment walking, playing with dog   Do you need help using the phone?  No   Are you deaf or do you have serious difficulty hearing?  No   Do you need help with transportation? No   Do you need help shopping? No   Do you need help preparing meals?  Yes   Do you need help with housework?  No   Do you need help with laundry? No   Do you need help taking your medications? No   Do you need help managing money? No   Do you ever drive or ride in a car without wearing a seat belt? No    Have you felt unusual stress, anger or loneliness in the last month? Yes   Who do you live with? Alone   If you need help, do you have trouble finding someone available to you? No   Have you been bothered in the last four weeks by sexual problems? No   Do you have difficulty concentrating, remembering or making decisions? Yes       Age-appropriate Screening Schedule:  Refer to the list below for future screening recommendations based on patient's age, sex and/or medical conditions. Orders for these recommended tests are listed in the plan section. The patient has been provided with a written plan.    Health Maintenance   Topic Date Due   • DIABETIC FOOT EXAM  Never done   • DIABETIC EYE EXAM  Never done   • HEPATITIS C SCREENING  04/06/2023 (Originally 3/23/2022)   • Pneumococcal Vaccine 0-64 (2 - PPSV23 if available, else PCV20) 04/06/2023 (Originally 10/1/2017)   • TDAP/TD VACCINES (1 - Tdap) 04/06/2023 (Originally 7/12/1979)   • HEMOGLOBIN A1C  06/16/2023   • INFLUENZA VACCINE  08/01/2023   • LIPID PANEL  12/16/2023   • ANNUAL WELLNESS VISIT  04/06/2024   • URINE MICROALBUMIN  04/06/2024   • COLORECTAL CANCER SCREENING  07/22/2030   • COVID-19 Vaccine  Completed   • ZOSTER VACCINE  Completed          CMS Preventative Services Quick Reference  Risk Factors Identified During Encounter    Chronic Pain: Continue medications as prescribed   Immunizations Discussed/Encouraged: Td, Tdap, Hepatitis A Vaccine/Series, Hepatitis B Vaccine/Series, Influenza, Pneumococcal 23, Prevnar 20 (Pneumococcal 20-valent conjugate), Vaxneuvance (Pneumococcal 15-valent conjugate), Shingrix and COVID19  Dental Screening Recommended  Vision Screening Recommended    The above risks/problems have been discussed with the patient.  Pertinent information has been shared with the patient in the After Visit Summary.  An After Visit Summary and PPPS were made available to the patient.  Diagnoses and all orders for this visit:    1. Initial  Medicare annual wellness visit (Primary)  Assessment & Plan:  Fasting labs drawn.  UA completed.  Micro completed.  Proper diet and exercise plan discussed and encouraged.  Check feet daily.  Annual dental and eye exams encouraged.  Goal blood pressure less than 140/90.  Let me know if gets to or above that.  PHQ-9 completed and discussed.  No thoughts of suicide or hurting himself or anyone else.  Colonoscopy up-to-date.  Patient states he will discuss all immunizations further with his pharmacist including tetanus and pneumonia vaccines.  Patient states he does have mild cognitive impairment so he will continue to follow-up with his neurologist regularly.  No hearing impairment.  Does not need help with ADLs.  Risk of meds discussed and understood.  Education provided.  Follow-up with all specialist including Dr. Wong cardiology, Dr. Benitez neurology, Dr. Garay urology, and his psychiatrist at [facility] as scheduled.  Patient states he did not smoke over 1 pack a year for 20 years so would not be a candidate for low-dose lung CT scan.  He did have a CTA though of the lungs 9/2022.  Return to clinic or ED with any issues or concerns.    Orders:  -     CBC & Differential; Future  -     Comprehensive Metabolic Panel; Future  -     TSH Rfx On Abnormal To Free T4; Future  -     POC Urinalysis Dipstick, Automated    2. Allergic rhinitis, unspecified seasonality, unspecified trigger    3. Benign essential HTN    4. Mixed hyperlipidemia  -     Lipid Panel; Future  -     atorvastatin (LIPITOR) 80 MG tablet; Take 1 tablet by mouth Daily.  Dispense: 90 tablet; Refill: 1    5. Type 2 diabetes mellitus with hyperglycemia, without long-term current use of insulin  -     Hemoglobin A1c; Future  -     POC Microalbumin  -     SITagliptin (JANUVIA) 100 MG tablet; Take 1 tablet by mouth Daily.  Dispense: 90 tablet; Refill: 1  -     empagliflozin (Jardiance) 25 MG tablet tablet; Take 1 tablet by mouth Daily.  Dispense: 90  tablet; Refill: 1    6. Vitamin D deficiency    7. Vitamin deficiency    8. Screening for prostate cancer    9. Encounter for long-term (current) use of other medications  -     Magnesium; Future    10. Anxiety and depression    11. PTSD (post-traumatic stress disorder)    12. Arthralgia, unspecified joint    13. Memory deficits    14. Advanced directives, counseling/discussion    15. Coronary artery disease involving native coronary artery of native heart without angina pectoris    16. Gastroesophageal reflux disease, unspecified whether esophagitis present    17. Screening for colon cancer    18. Chronic midline low back pain, unspecified whether sciatica present    19. Cerebrovascular disease    20. Insomnia, unspecified type    21. Screening for lung cancer    22. Need for hepatitis C screening test  -     Hepatitis C antibody; Future    Follow Up:  Next Medicare Wellness visit to be scheduled in 1 year.        Return in about 3 months (around 7/6/2023).

## 2023-04-07 LAB
ALBUMIN SERPL-MCNC: 4.8 G/DL (ref 3.8–4.8)
ALBUMIN/GLOB SERPL: 2 {RATIO} (ref 1.2–2.2)
ALP SERPL-CCNC: 91 IU/L (ref 44–121)
ALT SERPL-CCNC: 34 IU/L (ref 0–44)
AST SERPL-CCNC: 26 IU/L (ref 0–40)
BASOPHILS # BLD AUTO: 0.1 X10E3/UL (ref 0–0.2)
BASOPHILS NFR BLD AUTO: 1 %
BILIRUB SERPL-MCNC: 0.4 MG/DL (ref 0–1.2)
BUN SERPL-MCNC: 19 MG/DL (ref 8–27)
BUN/CREAT SERPL: 21 (ref 10–24)
CALCIUM SERPL-MCNC: 9.9 MG/DL (ref 8.6–10.2)
CHLORIDE SERPL-SCNC: 101 MMOL/L (ref 96–106)
CHOLEST SERPL-MCNC: 118 MG/DL (ref 100–199)
CO2 SERPL-SCNC: 23 MMOL/L (ref 20–29)
CREAT SERPL-MCNC: 0.9 MG/DL (ref 0.76–1.27)
EGFRCR SERPLBLD CKD-EPI 2021: 96 ML/MIN/1.73
EOSINOPHIL # BLD AUTO: 0.1 X10E3/UL (ref 0–0.4)
EOSINOPHIL NFR BLD AUTO: 2 %
ERYTHROCYTE [DISTWIDTH] IN BLOOD BY AUTOMATED COUNT: 12.5 % (ref 11.6–15.4)
GLOBULIN SER CALC-MCNC: 2.4 G/DL (ref 1.5–4.5)
GLUCOSE SERPL-MCNC: 109 MG/DL (ref 70–99)
HBA1C MFR BLD: 7.1 % (ref 4.8–5.6)
HCT VFR BLD AUTO: 43.7 % (ref 37.5–51)
HCV IGG SERPL QL IA: NON REACTIVE
HDLC SERPL-MCNC: 46 MG/DL
HGB BLD-MCNC: 15 G/DL (ref 13–17.7)
IMM GRANULOCYTES # BLD AUTO: 0 X10E3/UL (ref 0–0.1)
IMM GRANULOCYTES NFR BLD AUTO: 0 %
LDLC SERPL CALC-MCNC: 52 MG/DL (ref 0–99)
LYMPHOCYTES # BLD AUTO: 2.7 X10E3/UL (ref 0.7–3.1)
LYMPHOCYTES NFR BLD AUTO: 31 %
MAGNESIUM SERPL-MCNC: 2 MG/DL (ref 1.6–2.3)
MCH RBC QN AUTO: 32.4 PG (ref 26.6–33)
MCHC RBC AUTO-ENTMCNC: 34.3 G/DL (ref 31.5–35.7)
MCV RBC AUTO: 94 FL (ref 79–97)
MONOCYTES # BLD AUTO: 0.6 X10E3/UL (ref 0.1–0.9)
MONOCYTES NFR BLD AUTO: 7 %
NEUTROPHILS # BLD AUTO: 5 X10E3/UL (ref 1.4–7)
NEUTROPHILS NFR BLD AUTO: 59 %
PLATELET # BLD AUTO: 190 X10E3/UL (ref 150–450)
POTASSIUM SERPL-SCNC: 4.9 MMOL/L (ref 3.5–5.2)
PROT SERPL-MCNC: 7.2 G/DL (ref 6–8.5)
RBC # BLD AUTO: 4.63 X10E6/UL (ref 4.14–5.8)
SODIUM SERPL-SCNC: 140 MMOL/L (ref 134–144)
TRIGL SERPL-MCNC: 108 MG/DL (ref 0–149)
TSH SERPL DL<=0.005 MIU/L-ACNC: 0.83 UIU/ML (ref 0.45–4.5)
VLDLC SERPL CALC-MCNC: 20 MG/DL (ref 5–40)
WBC # BLD AUTO: 8.5 X10E3/UL (ref 3.4–10.8)

## 2023-04-12 ENCOUNTER — TELEPHONE (OUTPATIENT)
Dept: FAMILY MEDICINE CLINIC | Facility: CLINIC | Age: 63
End: 2023-04-12
Payer: MEDICARE

## 2023-04-12 NOTE — TELEPHONE ENCOUNTER
Caller: Santi Souza Sr.    Relationship: Self    Best call back number: [phone]    What medications are you currently taking:   Current Outpatient Medications on File Prior to Visit   Medication Sig Dispense Refill   • aspirin 81 MG chewable tablet Chew 1 tablet.     • atorvastatin (LIPITOR) 80 MG tablet Take 1 tablet by mouth Daily. 90 tablet 1   • baclofen (LIORESAL) 10 MG tablet TAKE 1 TABLET BY MOUTH THREE TIMES A DAY 90 tablet 0   • buPROPion XL (WELLBUTRIN XL) 150 MG 24 hr tablet TAKE 1 TABLET BY MOUTH EACH MORNING WITH 300 MG TABLET     • buPROPion XL (WELLBUTRIN XL) 300 MG 24 hr tablet Take 1 tablet by mouth Every Morning.     • Continuous Blood Gluc  (Dexcom G6 ) device USE 3 TIMES DAILY AND AS NEEDED AS DIRECTED 1 each 0   • Continuous Blood Gluc Sensor (Dexcom G6 Sensor) Check glucose 2-3 times daily 3 each 2   • Continuous Blood Gluc Transmit (Dexcom G6 Transmitter) misc 1 kit Daily As Needed (Diabetes). 1 each 3   • donepezil (ARICEPT) 5 MG tablet TAKE 1 TABLET (5 MG TOTAL) BY MOUTH 1 (ONE) TIME EACH DAY WITH BREAKFAST.     • empagliflozin (Jardiance) 25 MG tablet tablet Take 1 tablet by mouth Daily. 90 tablet 1   • fluticasone (FLONASE) 50 MCG/ACT nasal spray SPRAY 1 TO 2 SPRAYS INTO EACH NOSTRIL EVERY DAY AS NEEDED 48 mL 1   • levocetirizine (XYZAL) 5 MG tablet Take 1 tablet by mouth Every Evening. 30 tablet 5   • Magnesium 400 MG tablet 400 mg Daily.     • meloxicam (MOBIC) 15 MG tablet TAKE 1 TABLET BY MOUTH EVERY DAY 90 tablet 1   • metFORMIN (GLUCOPHAGE) 1000 MG tablet TAKE 1 TABLET BY MOUTH TWICE A DAY WITH MEALS 180 tablet 1   • nitroglycerin (NITROLINGUAL) 0.4 MG/SPRAY spray Place 1 spray by translingual route on or under the tongue at the first sign of an attack, no more than 3 sprays / 15-minute. 1 each 0   • omeprazole (priLOSEC) 20 MG capsule TAKE 1 CAPSULE BY MOUTH EVERY DAY 90 capsule 1   • prazosin (MINIPRESS) 1 MG capsule TAKE 1 CAPSULE BY MOUTH EVERY NIGHT 90  capsule 0   • sertraline (ZOLOFT) 100 MG tablet TAKE 1 TABLET EVERY MORNING WITH FOOD 90 tablet 0   • SITagliptin (JANUVIA) 100 MG tablet Take 1 tablet by mouth Daily. 90 tablet 1   • traZODone (DESYREL) 50 MG tablet Take 1 tablet by mouth Every Night. 30 tablet 0     No current facility-administered medications on file prior to visit.        Which medication are you concerned about:     DEXCOM    What are your concerns:     PATIENT HAS LAST SENSOR ON NOW AND IT WILL GO OUT IN TWO TO THREE DAYS     NEEDS PRIOR AUTHORIZATION ON ALL DEXCOM STUFF TO [facility] IN [place]

## 2023-04-13 ENCOUNTER — TELEPHONE (OUTPATIENT)
Dept: FAMILY MEDICINE CLINIC | Facility: CLINIC | Age: 63
End: 2023-04-13
Payer: MEDICARE

## 2023-04-13 NOTE — TELEPHONE ENCOUNTER
HUB TO READ    I was talking with pt and we got disconnected  Tried to call pt back and had to leave VM    To get PA approved on Dexcom pt will need to call insurance or [phone] to get him activated to try and get him approved    Message left

## 2023-04-17 ENCOUNTER — TELEPHONE (OUTPATIENT)
Dept: FAMILY MEDICINE CLINIC | Facility: CLINIC | Age: 63
End: 2023-04-17
Payer: MEDICARE

## 2023-04-17 NOTE — TELEPHONE ENCOUNTER
Patient called concerning a referral to Endocrinology, he stated that in his last office visit it was dicussed. With his [employer] he needs to be seen to get a new diabetic meter, so he can check his blood sugar asked for a call back.

## 2023-04-17 NOTE — TELEPHONE ENCOUNTER
Please let pt. Know that his last A1c has improved to 7.1 so I was under the impression he would continue with our current plan. Does he want to be set up with an endocrinologist? If so just let me know and I can put in referral. Thanks

## 2023-04-18 NOTE — TELEPHONE ENCOUNTER
Pt contacted  States that he thinks insurance requires referral to get his Dexcom supplies  Pt states that he will call insurance today and find out exactly what's needed

## 2023-04-26 DIAGNOSIS — E11.65 TYPE 2 DIABETES MELLITUS WITH HYPERGLYCEMIA, WITHOUT LONG-TERM CURRENT USE OF INSULIN: Primary | ICD-10-CM

## 2023-05-15 ENCOUNTER — TELEPHONE (OUTPATIENT)
Dept: FAMILY MEDICINE CLINIC | Facility: CLINIC | Age: 63
End: 2023-05-15

## 2023-05-15 ENCOUNTER — OFFICE VISIT (OUTPATIENT)
Dept: FAMILY MEDICINE CLINIC | Facility: CLINIC | Age: 63
End: 2023-05-15
Payer: MEDICARE

## 2023-05-15 VITALS
HEART RATE: 68 BPM | DIASTOLIC BLOOD PRESSURE: 78 MMHG | SYSTOLIC BLOOD PRESSURE: 124 MMHG | WEIGHT: 166.25 LBS | OXYGEN SATURATION: 97 % | HEIGHT: 72 IN | BODY MASS INDEX: 22.52 KG/M2

## 2023-05-15 DIAGNOSIS — E11.65 TYPE 2 DIABETES MELLITUS WITH HYPERGLYCEMIA, WITHOUT LONG-TERM CURRENT USE OF INSULIN: Primary | ICD-10-CM

## 2023-05-15 DIAGNOSIS — G31.84 MILD COGNITIVE IMPAIRMENT: ICD-10-CM

## 2023-05-15 DIAGNOSIS — R73.09 LABILE BLOOD GLUCOSE: ICD-10-CM

## 2023-05-15 LAB
EXPIRATION DATE: NORMAL
HBA1C MFR BLD: 7.1 %
Lab: NORMAL

## 2023-05-15 PROCEDURE — 1160F RVW MEDS BY RX/DR IN RCRD: CPT | Performed by: NURSE PRACTITIONER

## 2023-05-15 PROCEDURE — 3074F SYST BP LT 130 MM HG: CPT | Performed by: NURSE PRACTITIONER

## 2023-05-15 PROCEDURE — 1159F MED LIST DOCD IN RCRD: CPT | Performed by: NURSE PRACTITIONER

## 2023-05-15 PROCEDURE — 3078F DIAST BP <80 MM HG: CPT | Performed by: NURSE PRACTITIONER

## 2023-05-15 PROCEDURE — 3051F HG A1C>EQUAL 7.0%<8.0%: CPT | Performed by: NURSE PRACTITIONER

## 2023-05-15 RX ORDER — PROCHLORPERAZINE 25 MG/1
SUPPOSITORY RECTAL
Qty: 3 EACH | Refills: 2 | Status: SHIPPED | OUTPATIENT
Start: 2023-05-15 | End: 2023-05-15 | Stop reason: SDUPTHER

## 2023-05-15 RX ORDER — PROCHLORPERAZINE 25 MG/1
1 SUPPOSITORY RECTAL DAILY PRN
Qty: 1 EACH | Refills: 3 | Status: SHIPPED | OUTPATIENT
Start: 2023-05-15

## 2023-05-15 RX ORDER — PROCHLORPERAZINE 25 MG/1
1 SUPPOSITORY RECTAL DAILY PRN
Qty: 1 EACH | Refills: 3 | Status: SHIPPED | OUTPATIENT
Start: 2023-05-15 | End: 2023-05-15 | Stop reason: SDUPTHER

## 2023-05-15 RX ORDER — EMPAGLIFLOZIN 10 MG/1
TABLET, FILM COATED ORAL
COMMUNITY
Start: 2023-04-04 | End: 2023-05-15

## 2023-05-15 RX ORDER — PROCHLORPERAZINE 25 MG/1
SUPPOSITORY RECTAL
Qty: 3 EACH | Refills: 2 | Status: SHIPPED | OUTPATIENT
Start: 2023-05-15

## 2023-05-15 NOTE — ASSESSMENT & PLAN NOTE
He will continue to follow-up with neurology as scheduled.  We will place an order for case management to discuss possible resources.  Education provided.  Return to clinic or ED with any issues or concerns.

## 2023-05-15 NOTE — ASSESSMENT & PLAN NOTE
Continue metformin Jardiance and Januvia. A1c 7.1 today in office. Proper diet and exercise plan discussed and encouraged. Annual eye exams encouraged. Check feet daily. Follow=up with endocrinology. Risk of meds discussed and understood. Follow=up 3 months. Will see if we can get dexcom approved as he states he cannot tolerate pricking his fingers and due to his labile blood sugars.  Informed patient to go to hospital if blood sugars get above 350.  Return to clinic or ED with any issues or concerns.

## 2023-05-15 NOTE — TELEPHONE ENCOUNTER
Can we try to get his dexcom approved. Can we try using 'labile blood sugars' as that can sometimes get covereage. Let me know. Thanks

## 2023-05-15 NOTE — PROGRESS NOTES
"Chief Complaint  Diabetes    Subjective          Santi Souza Sr. presents to [facility] PR[facility]  History of Present Illness     Patient presents with concerns of diabetes.  States he has been checking his blood sugars, although not for the past 2 weeks, and they are ranging anywhere from 90s to upper 200s.  He is currently taking Jardiance metformin and Januvia.  States his feet are fine with no cuts or sores.  States tries to watch what he eats.  Walks daily.  No dizziness no headache no chest pain no chest pressure no shortness of breath no trouble breathing no urinary or bowel issues.    States his insurance will no longer pay for Dexcom without him being on insulin.  States he ran out of all glucose supplies 2 weeks ago. States he has been doing dexcom for 2 years. States he cannot tolerate pricking his fingers for blood sugars as it is much too painful for him. Wants to see if we can get dexcom approved.  He states Dexcom is very helpful for him and is hoping we can get it approved.    Is scheduled with endocrinology on 8/16/2023.    He also states he has mild cognitive impairment.  He continues to follow-up with neurology regarding this.  He states he is wondering if there are any resources available that could help him remember to do things.  I informed him I can place an order for case management for further evaluation of this and to try and help him out a little bit.    Objective   Vital Signs:   /78   Pulse 68   Ht 182.9 cm (72\")   Wt 75.4 kg (166 lb 4 oz)   SpO2 97%   BMI 22.55 kg/m²     Body mass index is 22.55 kg/m².    Review of Systems   Constitutional: Negative for chills, fatigue and fever.   Eyes: Negative for visual disturbance.   Respiratory: Negative for cough, shortness of breath and wheezing.    Cardiovascular: Negative.    Gastrointestinal: Negative for abdominal pain, diarrhea, nausea and vomiting.   Endocrine: Negative for polydipsia, polyphagia and " polyuria.   Genitourinary: Negative for decreased urine volume, dysuria, frequency, hematuria and urgency.   Neurological: Negative for dizziness, numbness and headache.   Psychiatric/Behavioral: Negative for suicidal ideas.       Past History:  Medical History: has a past medical history of CAD (coronary artery disease) (11/03/2017), Chronic back pain (11/03/2017), Condition not found (11/03/2017), Depression, Diabetes (11/03/2017), Diabetes mellitus, GERD (gastroesophageal reflux disease) (11/03/2017), Hyperlipidemia (11/03/2017), Hypertension (11/03/2017), Neuropathy, Osteoarthritis, and Vitamin D deficiency.   Surgical History: has a past surgical history that includes Lumbar spine surgery (1998).   Family History: family history includes Diabetes in his father.   Social History: reports that he quit smoking about 16 months ago. His smoking use included cigarettes. He started smoking about 49 years ago. He has a 8.00 pack-year smoking history. He has never used smokeless tobacco.    PHQ-2 Depression Screening  Little interest or pleasure in doing things?     Feeling down, depressed, or hopeless?     PHQ-2 Total Score          PHQ-9 Depression Screening  Little interest or pleasure in doing things?     Feeling down, depressed, or hopeless?     Trouble falling or staying asleep, or sleeping too much?     Feeling tired or having little energy?     Poor appetite or overeating?     Feeling bad about yourself - or that you are a failure or have let yourself or your family down?     Trouble concentrating on things, such as reading the newspaper or watching television?     Moving or speaking so slowly that other people could have noticed? Or the opposite - being so fidgety or restless that you have been moving around a lot more than usual?     Thoughts that you would be better off dead, or of hurting yourself in some way?     PHQ-9 Total Score     If you checked off any problems, how difficult have these problems made  it for you to do your work, take care of things at home, or get along with other people?       PHQ-9 Total Score:        Patient screened positive for depression based on a PHQ-9 score of 0 on 12/16/2022. Follow-up recommendations include:          Current Outpatient Medications:   •  aspirin 81 MG chewable tablet, Chew 1 tablet., Disp: , Rfl:   •  atorvastatin (LIPITOR) 80 MG tablet, Take 1 tablet by mouth Daily., Disp: 90 tablet, Rfl: 1  •  baclofen (LIORESAL) 10 MG tablet, TAKE 1 TABLET BY MOUTH THREE TIMES A DAY, Disp: 90 tablet, Rfl: 0  •  buPROPion XL (WELLBUTRIN XL) 150 MG 24 hr tablet, TAKE 1 TABLET BY MOUTH EACH MORNING WITH 300 MG TABLET, Disp: , Rfl:   •  buPROPion XL (WELLBUTRIN XL) 300 MG 24 hr tablet, Take 1 tablet by mouth Every Morning., Disp: , Rfl:   •  Continuous Blood Gluc  (Dexcom G6 ) device, USE 3 TIMES DAILY AND AS NEEDED AS DIRECTED, Disp: 1 each, Rfl: 0  •  Continuous Blood Gluc Sensor (Dexcom G6 Sensor), Check glucose 2-3 times daily, Disp: 3 each, Rfl: 2  •  Continuous Blood Gluc Transmit (Dexcom G6 Transmitter) misc, 1 kit Daily As Needed (Diabetes)., Disp: 1 each, Rfl: 3  •  donepezil (ARICEPT) 5 MG tablet, TAKE 1 TABLET (5 MG TOTAL) BY MOUTH 1 (ONE) TIME EACH DAY WITH BREAKFAST., Disp: , Rfl:   •  empagliflozin (JARDIANCE) 25 MG tablet tablet, Take 1 tablet by mouth Daily., Disp: , Rfl:   •  levocetirizine (XYZAL) 5 MG tablet, Take 1 tablet by mouth Every Evening., Disp: 30 tablet, Rfl: 5  •  Magnesium 400 MG tablet, 400 mg Daily., Disp: , Rfl:   •  meloxicam (MOBIC) 15 MG tablet, TAKE 1 TABLET BY MOUTH EVERY DAY, Disp: 90 tablet, Rfl: 1  •  metFORMIN (GLUCOPHAGE) 1000 MG tablet, TAKE 1 TABLET BY MOUTH TWICE A DAY WITH MEALS, Disp: 180 tablet, Rfl: 1  •  nitroglycerin (NITROLINGUAL) 0.4 MG/SPRAY spray, Place 1 spray by translingual route on or under the tongue at the first sign of an attack, no more than 3 sprays / 15-minute., Disp: 1 each, Rfl: 0  •  omeprazole  (priLOSEC) 20 MG capsule, TAKE 1 CAPSULE BY MOUTH EVERY DAY, Disp: 90 capsule, Rfl: 1  •  prazosin (MINIPRESS) 1 MG capsule, TAKE 1 CAPSULE BY MOUTH EVERY NIGHT, Disp: 90 capsule, Rfl: 0  •  sertraline (ZOLOFT) 100 MG tablet, TAKE 1 TABLET EVERY MORNING WITH FOOD, Disp: 90 tablet, Rfl: 0  •  SITagliptin (JANUVIA) 100 MG tablet, Take 1 tablet by mouth Daily., Disp: , Rfl:   •  traZODone (DESYREL) 50 MG tablet, Take 1 tablet by mouth Every Night., Disp: 30 tablet, Rfl: 0   (Not in a hospital admission)     Allergies: Codeine, Penicillins, and Sulfa antibiotics    Physical Exam  Constitutional:       Appearance: Normal appearance.   Eyes:      Conjunctiva/sclera: Conjunctivae normal.      Pupils: Pupils are equal, round, and reactive to light.   Cardiovascular:      Rate and Rhythm: Normal rate and regular rhythm.      Heart sounds: Normal heart sounds.   Pulmonary:      Effort: Pulmonary effort is normal.      Breath sounds: Normal breath sounds.   Neurological:      General: No focal deficit present.      Mental Status: He is alert and oriented to person, place, and time. Mental status is at baseline.   Psychiatric:         Mood and Affect: Mood normal.         Behavior: Behavior normal.         Thought Content: Thought content normal.         Judgment: Judgment normal.          Result Review :                   Assessment and Plan    Diagnoses and all orders for this visit:    1. Type 2 diabetes mellitus with hyperglycemia, without long-term current use of insulin (Primary)  Assessment & Plan:   Continue metformin Jardiance and Januvia. A1c 7.1 today in office. Proper diet and exercise plan discussed and encouraged. Annual eye exams encouraged. Check feet daily. Follow=up with endocrinology. Risk of meds discussed and understood. Follow=up 3 months. Will see if we can get dexcom approved as he states he cannot tolerate pricking his fingers and due to his labile blood sugars. Return to clinic or ED with any issues or  concerns.     Orders:  -     POC Glycosylated Hemoglobin (Hb A1C); Future  -     Discontinue: empagliflozin (Jardiance) 25 MG tablet tablet; Take 1 tablet by mouth Daily.  Dispense: 30 tablet; Refill: 2  -     Ambulatory Referral to Case Management Value Based Care, Disease Education, Rising Risk    2. Mild cognitive impairment  Assessment & Plan:  He will continue to follow-up with neurology as scheduled.  We will place an order for case management to discuss possible resources.  Education provided.  Return to clinic or ED with any issues or concerns.    Orders:  -     Ambulatory Referral to Case Management Value Based Care, Disease Education, Rising Risk            BMI is within normal parameters. No other follow-up for BMI required.       Follow Up   Return in about 3 months (around 8/15/2023).  Patient was given instructions and counseling regarding his condition or for health maintenance advice. Please see specific information pulled into the AVS if appropriate.     JESSICA Chandler

## 2023-05-16 ENCOUNTER — REFERRAL TRIAGE (OUTPATIENT)
Dept: CASE MANAGEMENT | Facility: OTHER | Age: 63
End: 2023-05-16
Payer: MEDICARE

## 2023-05-19 ENCOUNTER — TELEPHONE (OUTPATIENT)
Dept: CASE MANAGEMENT | Facility: OTHER | Age: 63
End: 2023-05-19
Payer: MEDICARE

## 2023-05-22 ENCOUNTER — PATIENT OUTREACH (OUTPATIENT)
Dept: CASE MANAGEMENT | Facility: OTHER | Age: 63
End: 2023-05-22
Payer: MEDICARE

## 2023-05-22 ENCOUNTER — TELEPHONE (OUTPATIENT)
Dept: FAMILY MEDICINE CLINIC | Facility: CLINIC | Age: 63
End: 2023-05-22

## 2023-05-22 ENCOUNTER — TELEPHONE (OUTPATIENT)
Dept: CASE MANAGEMENT | Facility: OTHER | Age: 63
End: 2023-05-22
Payer: MEDICARE

## 2023-05-22 DIAGNOSIS — F41.9 ANXIETY: Primary | ICD-10-CM

## 2023-05-22 DIAGNOSIS — I10 BENIGN ESSENTIAL HTN: ICD-10-CM

## 2023-05-22 DIAGNOSIS — E11.65 TYPE 2 DIABETES MELLITUS WITH HYPERGLYCEMIA, WITHOUT LONG-TERM CURRENT USE OF INSULIN: Primary | ICD-10-CM

## 2023-05-22 DIAGNOSIS — F43.10 PTSD (POST-TRAUMATIC STRESS DISORDER): ICD-10-CM

## 2023-05-22 NOTE — TELEPHONE ENCOUNTER
Pharmacy Name:  [facility]    Pharmacy representative name: KARRIE    Pharmacy representative phone number: [phone]    What medication are you calling in regards to: ALL     What question does the pharmacy have: KARRIE STATES THAT SHE IS UNABLE TO GET IN CONTACT WITH THE PHARMACY AND IS REQUESTING THAT SURAJ SEND ALL PRESCRIPTIONS     Who is the provider that prescribed the medication:     Additional notes:

## 2023-05-22 NOTE — TELEPHONE ENCOUNTER
Spoke to patient about anxiety and PTSD management. Patient states he would like to find psychiatry office that takes his insurance. Do you care to place referral?

## 2023-05-22 NOTE — TELEPHONE ENCOUNTER
Patient will need to contact [facility] pharmacy to have prescription transferred to new pharmacy. Attempted to notify patient; left voicemail to return call.

## 2023-05-22 NOTE — OUTREACH NOTE
AMBULATORY CASE MANAGEMENT NOTE    Name and Relationship of Patient/Support Person: Santi Souza Sr. - Self     CCM Interim Update    Spoke with patient and discussed the purpose, goals, and expectations of the program. Reviewed possible costs and ability to stop program at any time. Patient consented to CCM.    Spoke to patient about diabetes management. Patient states he walks daily for at least 30 minutes. Encouraged patient to moderate carb intake and increase water consumption. Patient states he is not interested in utilizing glucometer due to discomfort of pricking finger multiple times per day. Patient states he has utilized Dexcom in the past and would like to receive additional sensors and transmitter.     Patient states he switched to [facility] pharmacy, but he has not heard anything about diabetes equipment or medications. Contacted [facility] pharmacy. Rep states patient's current pharmacy needs to send over medication information. Called [facility] pharmacy to have rep fax medication history and current prescriptions to [facility]. [facility] also indicated that patient needs to go through [facility] medical equipment for Dexcom because Medicare part B covers diabetes equipment. Contacted [facility] medical equipment for further information. Rep states they need to get in touch with patient to discuss copay; then they will send request to PCP office for visit note and completion of order form.     Social determinant questions completed. Patient states he has financial difficulties with medical care, food, and utilities. Patient is not in immediate need of resources. He is utilizing [employer]. Sent referral request to social care services. Also provided [employer] [facility] as an option.    Patient states he struggles with anxiety and PTSD. He would like to find psychiatrist that would be covered through insurance. Sent referral request to PCP. Encouraged patient to practice self care and talk to  family/friends about feelings. Patient states he talks to brother often and has a dog that helps with management of symptoms.         SDOH updated and reviewed with the patient during this program:  Financial Resource Strain: High Risk   • Difficulty of Paying Living Expenses: Very hard      Physical Activity: Sufficiently Active   • Days of Exercise per Week: 6 days   • Minutes of Exercise per Session: 40 min      Food Insecurity: Food Insecurity Present   • Worried About Running Out of Food in the Last Year: Sometimes true   • Ran Out of Food in the Last Year: Sometimes true      Social Connections: Moderately Integrated   • Frequency of Communication with Friends and Family: More than three times a week   • Frequency of Social Gatherings with Friends and Family: Once a week   • Attends Jewish Services: More than 4 times per year   • Active Member of Clubs or Organizations: Yes   • Attends Club or Organization Meetings: Never   • Marital Status:       Transportation Needs: No Transportation Needs   • Lack of Transportation (Medical): No   • Lack of Transportation (Non-Medical): No      Housing Stability: Low Risk    • Unable to Pay for Housing in the Last Year: No   • Number of Places Lived in the Last Year: 1   • Unstable Housing in the Last Year: No      Stress: Stress Concern Present   • Feeling of Stress : Very much       Send Education  Questions/Answers    Flowsheet Row Most Recent Value   Other Patient Education/Resources  Advanced Care Planning, 24/7 [facility] Nurse Call Line   24/7 Nurse Call Line Education Method Verbal   ACP Education Method Verbal          Social Work Assessment  Questions/Answers    Flowsheet Row Most Recent Value   Current Living Arrangements home   Q1: How often do you have a drink containing alcohol? Never   Q2: How many drinks containing alcohol do you have on a typical day when you are drinking? None   Q3: How often do you have six or more drinks on one  occasion? Never   Audit-C Score 0          Adult Patient Profile  Questions/Answers    Flowsheet Row Most Recent Value   Symptoms/Conditions Managed at Home diabetes, type 2   Barriers to Managing Health stress of chronic illness   Diabetes Management Strategies medication therapy, blood glucose testing   A1C Target 7   Last A1C Result 7.1   Identifying Health Goals keep illness under control, managing stress, anxiety or depression   Q1: How often do you have a drink containing alcohol? Never   Q2: How many drinks containing alcohol do you have on a typical day when you are drinking? None   Q3: How often do you have six or more drinks on one occasion? Never   Audit-C Score 0   Current Living Arrangements home   Resource/Environmental Concerns none          Education Documentation  unresolved or worsening symptoms, taught by Adrienne Carrizales RN at 5/22/2023 11:28 AM.  Learner: Patient  Readiness: Acceptance  Method: Explanation  Response: Verbalizes Understanding    tobacco use/smoke exposure, taught by Adrienne Carrizales, RN at 5/22/2023 11:28 AM.  Learner: Patient  Readiness: Acceptance  Method: Explanation  Response: Verbalizes Understanding    safety, taught by Adrienne Carrizales RN at 5/22/2023 11:28 AM.  Learner: Patient  Readiness: Acceptance  Method: Explanation  Response: Verbalizes Understanding    medication management, taught by Adrienne Carrizales RN at 5/22/2023 11:28 AM.  Learner: Patient  Readiness: Acceptance  Method: Explanation  Response: Verbalizes Understanding    food/fluid intake, taught by Adrienne Carrizales, RN at 5/22/2023 11:28 AM.  Learner: Patient  Readiness: Acceptance  Method: Explanation  Response: Verbalizes Understanding    family planning overview, taught by Adrienne Carrizales RN at 5/22/2023 11:28 AM.  Learner: Patient  Readiness: Acceptance  Method: Explanation  Response: Verbalizes Understanding    drug/alcohol use, taught by Adrienne Carrizales RN at 5/22/2023 11:28 AM.  Learner:  Patient  Readiness: Acceptance  Method: Explanation  Response: Verbalizes Understanding    coping strategies, taught by Adrienne Carrizales RN at 5/22/2023 11:28 AM.  Learner: Patient  Readiness: Acceptance  Method: Explanation  Response: Verbalizes Understanding    activity, taught by Adrienne Carrizales RN at 5/22/2023 11:28 AM.  Learner: Patient  Readiness: Acceptance  Method: Explanation  Response: Verbalizes Understanding    preventive care, taught by Adrienne Carrizales RN at 5/22/2023 11:28 AM.  Learner: Patient  Readiness: Acceptance  Method: Explanation  Response: Verbalizes Understanding    practice overview, taught by Adrienne Carrizales RN at 5/22/2023 11:28 AM.  Learner: Patient  Readiness: Acceptance  Method: Explanation  Response: Verbalizes Understanding    patient-centered medical home, taught by Adrienne Carrizales RN at 5/22/2023 11:28 AM.  Learner: Patient  Readiness: Acceptance  Method: Explanation  Response: Verbalizes Understanding    Unresolved/Worsening Symptoms, taught by Adrienne Carrizales RN at 5/22/2023 11:28 AM.  Learner: Patient  Readiness: Acceptance  Method: Explanation  Response: Verbalizes Understanding    Prevention of Complications, taught by Adrienne Carrizales RN at 5/22/2023 11:28 AM.  Learner: Patient  Readiness: Acceptance  Method: Explanation  Response: Verbalizes Understanding    Chronic Complications, taught by Adrienne Carrizales RN at 5/22/2023 11:28 AM.  Learner: Patient  Readiness: Acceptance  Method: Explanation  Response: Verbalizes Understanding    Risks, taught by Adrienne Carrizales RN at 5/22/2023 11:28 AM.  Learner: Patient  Readiness: Acceptance  Method: Explanation  Response: Verbalizes Understanding    Benefits, taught by Adrienne Carrizales RN at 5/22/2023 11:28 AM.  Learner: Patient  Readiness: Acceptance  Method: Explanation  Response: Verbalizes Understanding    Adjustment to Diet/Medications, taught by Adrienne Carrizales RN at 5/22/2023 11:28 AM.  Learner: Patient  Readiness:  Acceptance  Method: Explanation  Response: Verbalizes Understanding    Sweeteners, taught by Adrienne Carrizales RN at 5/22/2023 11:28 AM.  Learner: Patient  Readiness: Acceptance  Method: Explanation  Response: Verbalizes Understanding    Sick-Day Management, taught by Adrienne Carrizales RN at 5/22/2023 11:28 AM.  Learner: Patient  Readiness: Acceptance  Method: Explanation  Response: Verbalizes Understanding    Portion Management, taught by Adrienne Carrizales RN at 5/22/2023 11:28 AM.  Learner: Patient  Readiness: Acceptance  Method: Explanation  Response: Verbalizes Understanding    Insulin Dose Matched to Carbohydrate Intake, taught by Adrienne Carrizales RN at 5/22/2023 11:28 AM.  Learner: Patient  Readiness: Acceptance  Method: Explanation  Response: Verbalizes Understanding    Follow-Up Care, taught by Adrienne Carrizales RN at 5/22/2023 11:28 AM.  Learner: Patient  Readiness: Acceptance  Method: Explanation  Response: Verbalizes Understanding    Emotions and Feelings, taught by Adrienne Carrizales RN at 5/22/2023 11:28 AM.  Learner: Patient  Readiness: Acceptance  Method: Explanation  Response: Verbalizes Understanding    Consistent Eating Pattern, taught by Adrienne Carrizales RN at 5/22/2023 11:28 AM.  Learner: Patient  Readiness: Acceptance  Method: Explanation  Response: Verbalizes Understanding    Carbohydrate Counting, taught by Adrienne Carrizales RN at 5/22/2023 11:28 AM.  Learner: Patient  Readiness: Acceptance  Method: Explanation  Response: Verbalizes Understanding    Alcohol, taught by Adrienne Carrizales RN at 5/22/2023 11:28 AM.  Learner: Patient  Readiness: Acceptance  Method: Explanation  Response: Verbalizes Understanding    Monitoring Carbohydrate Intake, taught by Adrienne Carrizales RN at 5/22/2023 11:28 AM.  Learner: Patient  Readiness: Acceptance  Method: Explanation  Response: Verbalizes Understanding    Healthy Food Choices, taught by Adrienne Carrizales RN at 5/22/2023 11:28 AM.  Learner: Patient  Readiness:  Acceptance  Method: Explanation  Response: Verbalizes Understanding    Carbohydrate-Containing Foods, taught by Adrienne Carrizales RN at 5/22/2023 11:28 AM.  Learner: Patient  Readiness: Acceptance  Method: Explanation  Response: Verbalizes Understanding    Hypoglycemia Identification and Treatment, taught by Adrienne Carrizales RN at 5/22/2023 11:28 AM.  Learner: Patient  Readiness: Acceptance  Method: Explanation  Response: Verbalizes Understanding    Hyperglycemia Identification and Treatment, taught by Adrienne Carrizales RN at 5/22/2023 11:28 AM.  Learner: Patient  Readiness: Acceptance  Method: Explanation  Response: Verbalizes Understanding    Oral Medication, taught by Adrienne Carrizales RN at 5/22/2023 11:28 AM.  Learner: Patient  Readiness: Acceptance  Method: Explanation  Response: Verbalizes Understanding    Importance of Glycemic Management, taught by Adrienne Carrizales RN at 5/22/2023 11:28 AM.  Learner: Patient  Readiness: Acceptance  Method: Explanation  Response: Verbalizes Understanding    Blood Glucose Monitor Use, taught by Adrienne Carrizales RN at 5/22/2023 11:28 AM.  Learner: Patient  Readiness: Acceptance  Method: Explanation  Response: Verbalizes Understanding    Blood Glucose Monitoring, taught by Adrienne Carrizales RN at 5/22/2023 11:28 AM.  Learner: Patient  Readiness: Acceptance  Method: Explanation  Response: Verbalizes Understanding    Blood Glucose Goal, taught by Adrienne Carrizales RN at 5/22/2023 11:28 AM.  Learner: Patient  Readiness: Acceptance  Method: Explanation  Response: Verbalizes Understanding    A1C Goal, taught by Adrienne Carrizales RN at 5/22/2023 11:28 AM.  Learner: Patient  Readiness: Acceptance  Method: Explanation  Response: Verbalizes Understanding    Frequency of Testing, taught by Adrienne Carrizales RN at 5/22/2023 11:28 AM.  Learner: Patient  Readiness: Acceptance  Method: Explanation  Response: Verbalizes Understanding    Definition, taught by Adrienne Carrizales RN at 5/22/2023 11:28  AM.  Learner: Patient  Readiness: Acceptance  Method: Explanation  Response: Verbalizes Understanding    Diagnosis Criteria, taught by Adrienne Carrizales, RN at 5/22/2023 11:28 AM.  Learner: Patient  Readiness: Acceptance  Method: Explanation  Response: Verbalizes Understanding    Diabetes, Type 2, taught by Adrienne Carrizales, RN at 5/22/2023 11:28 AM.  Learner: Patient  Readiness: Acceptance  Method: Explanation  Response: Verbalizes Understanding          Adrienne LEONE  Ambulatory Case Management    5/22/2023, 11:29 EDT

## 2023-05-24 ENCOUNTER — PATIENT OUTREACH (OUTPATIENT)
Dept: CASE MANAGEMENT | Facility: OTHER | Age: 63
End: 2023-05-24
Payer: MEDICARE

## 2023-05-24 DIAGNOSIS — I10 BENIGN ESSENTIAL HTN: Primary | ICD-10-CM

## 2023-05-24 DIAGNOSIS — E11.65 TYPE 2 DIABETES MELLITUS WITH HYPERGLYCEMIA, WITHOUT LONG-TERM CURRENT USE OF INSULIN: ICD-10-CM

## 2023-05-24 NOTE — OUTREACH NOTE
AMBULATORY CASE MANAGEMENT NOTE    Name and Relationship of Patient/Support Person: Santi Souza Sr. - Self    CCM Interim Update    Patient called to discuss updates with [facility] pharmacy and Dexcom supplies. He states [facility] needs prescriptions from past pharmacy and authorization from PCP office. Contacted [facility] pharmacy. Rep states patient is ready to receive medications from pharmacy. Rep suggested sending new prescriptions from PCP office. Notified patient.    Patient states [email or web address] medical supply contacted patient about Dexcom. He states it will cost $150 every 3 months. Medicare will cover 80% of device. Patient would like to talk to  about financial support before deciding to purchase device. Advised pt to check blood sugar daily with glucometer and record values for PCP review. Also encouraged patient to contact ACM if values are lower than 75 or greater than 200. Patient agreed.        Education Documentation  No documentation found.        Adrienne LEONE  Ambulatory Case Management    5/24/2023, 10:12 EDT

## 2023-05-30 ENCOUNTER — PATIENT OUTREACH (OUTPATIENT)
Dept: CASE MANAGEMENT | Facility: CLINIC | Age: 63
End: 2023-05-30

## 2023-05-30 ENCOUNTER — OFFICE VISIT (OUTPATIENT)
Dept: FAMILY MEDICINE CLINIC | Facility: CLINIC | Age: 63
End: 2023-05-30

## 2023-05-30 VITALS
BODY MASS INDEX: 22.91 KG/M2 | SYSTOLIC BLOOD PRESSURE: 140 MMHG | WEIGHT: 169.19 LBS | OXYGEN SATURATION: 97 % | HEART RATE: 61 BPM | DIASTOLIC BLOOD PRESSURE: 72 MMHG | HEIGHT: 72 IN

## 2023-05-30 DIAGNOSIS — M25.511 ACUTE PAIN OF RIGHT SHOULDER: Primary | ICD-10-CM

## 2023-05-30 PROCEDURE — 99213 OFFICE O/P EST LOW 20 MIN: CPT | Performed by: NURSE PRACTITIONER

## 2023-05-30 NOTE — OUTREACH NOTE
"Patient Outreach    SW received SW evaluation request via RN NASREEN. SW attempted to call and speak to patient. Patient answered (outreach created), but reports he was unable to discuss concerns at this time due to \"getting ready for doctor appt\". SW will contact patient, at a later time.     Becka PEREZ -   Ambulatory Case Management    5/30/2023, 15:30 EDT,b      "
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
## 2023-05-30 NOTE — ASSESSMENT & PLAN NOTE
X-ray completed.  Will let know if radiologist sees anything.  Rest and ice in 15-minute intervals encouraged.  Stretching encouraged.  He is on meloxicam.  Will refer to orthopedics for further evaluation.  Go to ED if worsens or becomes severe.  Return to clinic or ED with any issues or concerns.    Monitor blood pressure as it is borderline elevated today.  Let me know if it does not come back down to below the goal of 140/90.

## 2023-05-31 ENCOUNTER — TELEPHONE (OUTPATIENT)
Dept: FAMILY MEDICINE CLINIC | Facility: CLINIC | Age: 63
End: 2023-05-31

## 2023-05-31 ENCOUNTER — PATIENT OUTREACH (OUTPATIENT)
Dept: CASE MANAGEMENT | Facility: OTHER | Age: 63
End: 2023-05-31

## 2023-05-31 DIAGNOSIS — K21.9 GASTROESOPHAGEAL REFLUX DISEASE, UNSPECIFIED WHETHER ESOPHAGITIS PRESENT: ICD-10-CM

## 2023-05-31 DIAGNOSIS — I10 BENIGN ESSENTIAL HTN: Primary | ICD-10-CM

## 2023-05-31 DIAGNOSIS — E11.9 CONTROLLED TYPE 2 DIABETES MELLITUS WITHOUT COMPLICATION, WITHOUT LONG-TERM CURRENT USE OF INSULIN: ICD-10-CM

## 2023-05-31 DIAGNOSIS — E78.2 MIXED HYPERLIPIDEMIA: ICD-10-CM

## 2023-05-31 DIAGNOSIS — J30.9 ALLERGIC RHINITIS, UNSPECIFIED SEASONALITY, UNSPECIFIED TRIGGER: ICD-10-CM

## 2023-05-31 DIAGNOSIS — F43.10 PTSD (POST-TRAUMATIC STRESS DISORDER): ICD-10-CM

## 2023-05-31 DIAGNOSIS — E11.65 TYPE 2 DIABETES MELLITUS WITH HYPERGLYCEMIA, WITHOUT LONG-TERM CURRENT USE OF INSULIN: ICD-10-CM

## 2023-05-31 DIAGNOSIS — M25.50 ARTHRALGIA, UNSPECIFIED JOINT: ICD-10-CM

## 2023-05-31 RX ORDER — PRAZOSIN HYDROCHLORIDE 1 MG/1
1 CAPSULE ORAL NIGHTLY
Qty: 90 CAPSULE | Refills: 0 | Status: CANCELLED | OUTPATIENT
Start: 2023-05-31

## 2023-05-31 RX ORDER — BUPROPION HYDROCHLORIDE 150 MG/1
TABLET ORAL
Status: CANCELLED | OUTPATIENT
Start: 2023-05-31

## 2023-05-31 RX ORDER — NITROGLYCERIN 400 UG/1
SPRAY ORAL
Qty: 1 EACH | Refills: 0 | Status: CANCELLED | OUTPATIENT
Start: 2023-05-31

## 2023-05-31 RX ORDER — SERTRALINE HYDROCHLORIDE 100 MG/1
TABLET, FILM COATED ORAL
Qty: 90 TABLET | Refills: 0 | Status: CANCELLED | OUTPATIENT
Start: 2023-05-31

## 2023-05-31 RX ORDER — OMEPRAZOLE 20 MG/1
20 CAPSULE, DELAYED RELEASE ORAL DAILY
Qty: 90 CAPSULE | Refills: 1 | Status: CANCELLED | OUTPATIENT
Start: 2023-05-31

## 2023-05-31 RX ORDER — ATORVASTATIN CALCIUM 80 MG/1
80 TABLET, FILM COATED ORAL DAILY
Qty: 90 TABLET | Refills: 1 | Status: SHIPPED | OUTPATIENT
Start: 2023-05-31 | End: 2023-06-01 | Stop reason: SDUPTHER

## 2023-05-31 RX ORDER — PRAZOSIN HYDROCHLORIDE 1 MG/1
1 CAPSULE ORAL NIGHTLY
Qty: 90 CAPSULE | Refills: 1 | Status: SHIPPED | OUTPATIENT
Start: 2023-05-31 | End: 2023-06-01 | Stop reason: SDUPTHER

## 2023-05-31 RX ORDER — LEVOCETIRIZINE DIHYDROCHLORIDE 5 MG/1
5 TABLET, FILM COATED ORAL EVERY EVENING
Qty: 90 TABLET | Refills: 1 | Status: SHIPPED | OUTPATIENT
Start: 2023-05-31 | End: 2023-06-01 | Stop reason: SDUPTHER

## 2023-05-31 RX ORDER — MELOXICAM 15 MG/1
15 TABLET ORAL DAILY
Qty: 90 TABLET | Refills: 1 | Status: CANCELLED | OUTPATIENT
Start: 2023-05-31

## 2023-05-31 RX ORDER — BACLOFEN 10 MG/1
10 TABLET ORAL 3 TIMES DAILY
Qty: 90 TABLET | Refills: 0 | Status: SHIPPED | OUTPATIENT
Start: 2023-05-31 | End: 2023-06-01 | Stop reason: SDUPTHER

## 2023-05-31 RX ORDER — DONEPEZIL HYDROCHLORIDE 5 MG/1
TABLET, FILM COATED ORAL
Status: CANCELLED | OUTPATIENT
Start: 2023-05-31

## 2023-05-31 RX ORDER — MELOXICAM 15 MG/1
15 TABLET ORAL DAILY
Qty: 90 TABLET | Refills: 1 | Status: SHIPPED | OUTPATIENT
Start: 2023-05-31 | End: 2023-06-01 | Stop reason: SDUPTHER

## 2023-05-31 RX ORDER — BUPROPION HYDROCHLORIDE 300 MG/1
300 TABLET ORAL EVERY MORNING
Status: CANCELLED | OUTPATIENT
Start: 2023-05-31

## 2023-05-31 RX ORDER — OMEPRAZOLE 20 MG/1
20 CAPSULE, DELAYED RELEASE ORAL DAILY
Qty: 90 CAPSULE | Refills: 1 | Status: SHIPPED | OUTPATIENT
Start: 2023-05-31 | End: 2023-06-01 | Stop reason: SDUPTHER

## 2023-05-31 RX ORDER — ATORVASTATIN CALCIUM 80 MG/1
80 TABLET, FILM COATED ORAL DAILY
Qty: 90 TABLET | Refills: 1 | Status: CANCELLED | OUTPATIENT
Start: 2023-05-31

## 2023-05-31 RX ORDER — LEVOCETIRIZINE DIHYDROCHLORIDE 5 MG/1
5 TABLET, FILM COATED ORAL EVERY EVENING
Qty: 30 TABLET | Refills: 5 | Status: CANCELLED | OUTPATIENT
Start: 2023-05-31

## 2023-05-31 RX ORDER — NITROGLYCERIN 400 UG/1
SPRAY ORAL
Qty: 1 EACH | Refills: 0 | Status: SHIPPED | OUTPATIENT
Start: 2023-05-31 | End: 2023-06-01 | Stop reason: SDUPTHER

## 2023-05-31 NOTE — TELEPHONE ENCOUNTER
I sent most of his meds in to [facility]. It looks like he gets his bupropion and zoloft from his psychiatrist and his donepezil from his neurologist so he needs to contact them for refills of those meds. Thanks

## 2023-05-31 NOTE — OUTREACH NOTE
[facility] End of Month Documentation    This Chronic Medical Management Care Plan for Santi Souza Sr., 62 y.o. male, has been a new plan of care implemented; established and a new plan of care implemented for the month of May.  A cumulative time of 131  minutes was spent on this patient record this month, including phone call with patient; face to face with provider; electronic communication with primary care provider; chart review.    Regarding the patient's problems: has Benign essential HTN; Mixed hyperlipidemia; Type 2 diabetes mellitus with hyperglycemia, without long-term current use of insulin; Anxiety and depression; Allergic rhinitis; Arthralgia; Vitamin deficiency; Memory deficits; PTSD (post-traumatic stress disorder); Screening for prostate cancer; Vitamin D deficiency; Encounter for long-term (current) use of other medications; Initial Medicare annual wellness visit; Advanced directives, counseling/discussion; Screening for colon cancer; Screening for lung cancer; GERD (gastroesophageal reflux disease); Insomnia; CAD (coronary artery disease); Cerebrovascular disease; Chronic midline low back pain; Need for hepatitis C screening test; Mild cognitive impairment; Labile blood glucose; and Acute pain of right shoulder on their problem list., the following items were addressed: medical records; medications and any changes can be found within the plan section of the note.  A detailed listing of time spent for chronic care management is tracked within each outreach encounter.  Current medications include:  has a current medication list which includes the following prescription(s): aspirin, atorvastatin, baclofen, bupropion xl, bupropion xl, dexcom g6 , dexcom g6 sensor, dexcom g6 transmitter, donepezil, empagliflozin, levocetirizine, magnesium, meloxicam, metformin, nitroglycerin, omeprazole, prazosin, sertraline, sitagliptin, and trazodone. and the patient is reported to be patient is compliant with  medication protocol,  Medications are reported to be effective.  All notes on chart for PCP to review.    The patient was monitored remotely for medications; blood glucose.    The patient's physical needs include:  not applicable.     The patient's mental support needs include:  increased support; mental health referral    The patient's cognitive support needs include:  health care; medication    The patient's psychosocial support needs include:  need for increased support, needs additional support with finances    The patient's functional needs include: not applicable    The patient's environmental needs include:  not applicable    Care Plan overall comments:  No data recorded    Refer to previous outreach notes for more information on the areas listed above.    Monthly Billing Diagnoses  (I10) Benign essential HTN    (E11.65) Type 2 diabetes mellitus with hyperglycemia, without long-term current use of insulin    Medications   · Medications have been reconciled    Care Plan progress this month:      Recently Modified Care Plans Updates made since 4/30/2023 12:00 AM     Diabetes Type 2 (Adult)         Problem Priority Last Modified     Glycemic Management (Diabetes, Type 2) --  5/22/2023 11:12 AM by Adirenne Carrizales RN              Goal Recent Progress Last Modified     Glycemic Management Optimized --  5/22/2023 11:12 AM by Adrienne Carrizales RN     Evidence-based guidance:   Anticipate A1C testing (point-of-care) every 3 to 6 months based on goal attainment.   Review mutually-set A1C goal or target range.   Anticipate use of antihyperglycemic with or without insulin and periodic adjustments; consider active involvement of pharmacist.   Provide medical nutrition therapy and development of individualized eating.   Compare self-reported symptoms of hypo or hyperglycemia to blood glucose levels, diet and fluid intake, current medications, psychosocial and physiologic stressors, change in activity and barriers to care  adherence.   Promote self-monitoring of blood glucose levels.   Assess and address barriers to management plan, such as food insecurity, age, developmental ability, depression, anxiety, fear of hypoglycemia or weight gain, as well as medication cost, side effects and complicated regimen.   Consider referral to community-based diabetes education program, visiting nurse, community health worker or health .   Encourage regular dental care for treatment of periodontal disease; refer to dental provider when needed.    Notes:            Task Due Date Last Modified     Alleviate Barriers to Glycemic Management 8/28/2023 5/22/2023 11:24 AM by Adrienne Carrizales RN     Care Management Activities:      - barriers to adherence to treatment plan identified  - blood glucose monitoring encouraged  - mutual A1C goal set or reviewed  - resources required to improve adherence to care identified      Notes:              Problem Priority Last Modified     Disease Progression (Diabetes, Type 2) --  5/22/2023 11:12 AM by Adrienne Carrizales RN              Goal Recent Progress Last Modified     Disease Progression Prevented or Minimized --  5/22/2023 11:24 AM by Adrienne Carrizales RN     Evidence-based guidance:   Prepare patient for laboratory and diagnostic exams based on risk and presentation.   Encourage lifestyle changes, such as increased intake of plant-based foods, stress reduction, consistent physical activity and smoking cessation to prevent long-term complications and chronic disease.    Individualize activity and exercise recommendations while considering potential limitations, such as neuropathy, retinopathy or the ability to prevent hyperglycemia or hypoglycemia.    Prepare patient for use of pharmacologic therapy that may include antihypertensive, analgesic, prostaglandin E1 with periodic adjustments, based on presenting chronic condition and laboratory results.   Assess signs/symptoms and risk factors for hypertension,  sleep-disordered breathing, neuropathy (including changes in gait and balance), retinopathy, nephropathy and sexual dysfunction.   Address pregnancy planning and contraceptive choice, especially when prescribing antihypertensive or statin.   Ensure completion of annual comprehensive foot exam and dilated eye exam.    Implement additional individualized goals and interventions based on identified risk factors.   Prepare patient for consultation or referral for specialist care, such as ophthalmology, neurology, cardiology, podiatry, nephrology or perinatology.    Notes:              Task Due Date Last Modified     Monitor and Manage Follow-up for Comorbidities 8/28/2023 5/22/2023 11:25 AM by Adrienne Carrizales, RN     Care Management Activities:      - activity based on tolerance and functional limitations encouraged  - healthy lifestyle promoted  - reduction of sedentary activity encouraged      Notes:                      · Current Specialty Plan of Care Status signed by both patient and provider    Instructions   · Patient was provided an electronic copy of care plan  · CCM services were explained and offered and patient has accepted these services.  · Patient has given their written consent to receive CCM services and understands that this includes the authorization of electronic communication of medical information with the other treating providers.  · Patient understands that they may stop CCM services at any time and these changes will be effective at the end of the calendar month and will effectively revocate the agreement of CCM services.  · Patient understands that only one practitioner can furnish and be paid for CCM services during one calendar month.  Patient also understands that there may be co-payment or deductible fees in association with CCM services.  · Patient will continue with at least monthly follow-up calls with the Ambulatory .    Adrienne LEONE  Ambulatory Case Management    5/31/2023,  08:51 EDT

## 2023-05-31 NOTE — TELEPHONE ENCOUNTER
Caller: Santi Souza Sr.    Relationship: Self    Best call back number: [phone]    Requested Prescriptions:   Requested Prescriptions     Pending Prescriptions Disp Refills   • atorvastatin (LIPITOR) 80 MG tablet 90 tablet 1     Sig: Take 1 tablet by mouth Daily.   • baclofen (LIORESAL) 10 MG tablet 90 tablet 0     Sig: Take 1 tablet by mouth 3 (Three) Times a Day.   • buPROPion XL (WELLBUTRIN XL) 150 MG 24 hr tablet     • buPROPion XL (WELLBUTRIN XL) 300 MG 24 hr tablet       Sig: Take 1 tablet by mouth Every Morning.   • donepezil (ARICEPT) 5 MG tablet     • empagliflozin (JARDIANCE) 25 MG tablet tablet 30 tablet      Sig: Take 1 tablet by mouth Daily.   • levocetirizine (XYZAL) 5 MG tablet 30 tablet 5     Sig: Take 1 tablet by mouth Every Evening.   • meloxicam (MOBIC) 15 MG tablet 90 tablet 1     Sig: Take 1 tablet by mouth Daily.   • metFORMIN (GLUCOPHAGE) 1000 MG tablet 180 tablet 1     Sig: Take 1 tablet by mouth 2 (Two) Times a Day With Meals.   • nitroglycerin (NITROLINGUAL) 0.4 MG/SPRAY spray 1 each 0     Sig: Place 1 spray by translingual route on or under the tongue at the first sign of an attack, no more than 3 sprays / 15-minute.   • omeprazole (priLOSEC) 20 MG capsule 90 capsule 1     Sig: Take 1 capsule by mouth Daily.   • prazosin (MINIPRESS) 1 MG capsule 90 capsule 0     Sig: Take 1 capsule by mouth Every Night.   • sertraline (ZOLOFT) 100 MG tablet 90 tablet 0   • SITagliptin (JANUVIA) 100 MG tablet       Sig: Take 1 tablet by mouth Daily.        Pharmacy where request should be sent: [facility] - [place], [place] - [address] - [phone]  - [phone] FX     Last office visit with prescribing clinician: 5/30/2023   Last telemedicine visit with prescribing clinician: Visit date not found   Next office visit with prescribing clinician: Visit date not found     Additional details provided by patient: OUT OF baclofen (LIORESAL) 10 MG tablet , NEEDS NEW  PRESCRIPTIONS SENT ASAP! DEO TELLS HIM THEY HAVE TRIED TO CONTACT OFFICE SEVERAL TIMES (7) FOR THESE AND HAVE NOT HEARD FROM YOU.    Does the patient have less than a 3 day supply:  [x] Yes  [] No    Would you like a call back once the refill request has been completed: [x] Yes [] No    If the office needs to give you a call back, can they leave a voicemail: [x] Yes [] No    Tamar Quijano MA   05/31/23 12:32 EDT

## 2023-06-01 ENCOUNTER — TELEPHONE (OUTPATIENT)
Dept: FAMILY MEDICINE CLINIC | Facility: CLINIC | Age: 63
End: 2023-06-01

## 2023-06-01 DIAGNOSIS — K21.9 GASTROESOPHAGEAL REFLUX DISEASE, UNSPECIFIED WHETHER ESOPHAGITIS PRESENT: ICD-10-CM

## 2023-06-01 DIAGNOSIS — J30.9 ALLERGIC RHINITIS, UNSPECIFIED SEASONALITY, UNSPECIFIED TRIGGER: ICD-10-CM

## 2023-06-01 DIAGNOSIS — M25.50 ARTHRALGIA, UNSPECIFIED JOINT: ICD-10-CM

## 2023-06-01 DIAGNOSIS — E78.2 MIXED HYPERLIPIDEMIA: ICD-10-CM

## 2023-06-01 DIAGNOSIS — E11.65 TYPE 2 DIABETES MELLITUS WITH HYPERGLYCEMIA, WITHOUT LONG-TERM CURRENT USE OF INSULIN: ICD-10-CM

## 2023-06-01 DIAGNOSIS — R73.09 LABILE BLOOD GLUCOSE: ICD-10-CM

## 2023-06-01 DIAGNOSIS — E11.9 CONTROLLED TYPE 2 DIABETES MELLITUS WITHOUT COMPLICATION, WITHOUT LONG-TERM CURRENT USE OF INSULIN: ICD-10-CM

## 2023-06-01 DIAGNOSIS — F43.10 PTSD (POST-TRAUMATIC STRESS DISORDER): ICD-10-CM

## 2023-06-01 RX ORDER — MELOXICAM 15 MG/1
15 TABLET ORAL DAILY
Qty: 90 TABLET | Refills: 1 | Status: SHIPPED | OUTPATIENT
Start: 2023-06-01

## 2023-06-01 RX ORDER — NITROGLYCERIN 400 UG/1
SPRAY ORAL
Qty: 1 EACH | Refills: 0 | Status: SHIPPED | OUTPATIENT
Start: 2023-06-01

## 2023-06-01 RX ORDER — LEVOCETIRIZINE DIHYDROCHLORIDE 5 MG/1
5 TABLET, FILM COATED ORAL EVERY EVENING
Qty: 90 TABLET | Refills: 1 | Status: SHIPPED | OUTPATIENT
Start: 2023-06-01

## 2023-06-01 RX ORDER — PRAZOSIN HYDROCHLORIDE 1 MG/1
1 CAPSULE ORAL NIGHTLY
Qty: 90 CAPSULE | Refills: 1 | Status: SHIPPED | OUTPATIENT
Start: 2023-06-01

## 2023-06-01 RX ORDER — OMEPRAZOLE 20 MG/1
20 CAPSULE, DELAYED RELEASE ORAL DAILY
Qty: 90 CAPSULE | Refills: 1 | Status: SHIPPED | OUTPATIENT
Start: 2023-06-01

## 2023-06-01 RX ORDER — ATORVASTATIN CALCIUM 80 MG/1
80 TABLET, FILM COATED ORAL DAILY
Qty: 90 TABLET | Refills: 1 | Status: SHIPPED | OUTPATIENT
Start: 2023-06-01

## 2023-06-01 RX ORDER — BACLOFEN 10 MG/1
10 TABLET ORAL 3 TIMES DAILY
Qty: 90 TABLET | Refills: 0 | Status: SHIPPED | OUTPATIENT
Start: 2023-06-01

## 2023-06-01 NOTE — TELEPHONE ENCOUNTER
PT IS CALLING AND STATING HE WAS AT [facility] AND THAT THEY DO NOT HAVE HIS MEDICATION, AND IT WAS SENT TO [facility] INSTEAD. HE IS ASKING THAT WE SEND THEM TO [facility] INSTEAD AND POSSIBLY CANCEL THE ORDERS FOR THE MAIL IN.

## 2023-06-06 ENCOUNTER — OFFICE VISIT (OUTPATIENT)
Dept: ORTHOPEDIC SURGERY | Facility: CLINIC | Age: 63
End: 2023-06-06
Payer: MEDICARE

## 2023-06-06 VITALS
BODY MASS INDEX: 22.9 KG/M2 | WEIGHT: 169.09 LBS | DIASTOLIC BLOOD PRESSURE: 82 MMHG | HEIGHT: 72 IN | SYSTOLIC BLOOD PRESSURE: 126 MMHG

## 2023-06-06 DIAGNOSIS — M25.511 ACUTE PAIN OF RIGHT SHOULDER: Primary | ICD-10-CM

## 2023-06-06 DIAGNOSIS — S46.011A TRAUMATIC COMPLETE TEAR OF RIGHT ROTATOR CUFF, INITIAL ENCOUNTER: ICD-10-CM

## 2023-06-06 DIAGNOSIS — M75.31 CALCIFIC TENDINITIS OF RIGHT SHOULDER: ICD-10-CM

## 2023-06-06 RX ORDER — METHYLPREDNISOLONE 4 MG/1
TABLET ORAL
Qty: 21 TABLET | Refills: 0 | Status: SHIPPED | OUTPATIENT
Start: 2023-06-06
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
## 2023-06-07 ENCOUNTER — PATIENT OUTREACH (OUTPATIENT)
Dept: CASE MANAGEMENT | Facility: CLINIC | Age: 63
End: 2023-06-07
Payer: MEDICARE

## 2023-06-07 NOTE — OUTREACH NOTE
Social Work Assessment  Questions/Answers      Flowsheet Row Most Recent Value   Referral Source nursing, outpatient staff, outpatient clinic   Reason for Consult financial concerns   Preferred Language English   Advance Care Planning Reviewed no concerns identified   People in Home alone   Current Living Arrangements home   Potentially Unsafe Housing Conditions none   Primary Care Provided by self   Provides Primary Care For no one, other (see comments)  [sibling (brother)]   Family Caregiver if Needed none   Able to Return to Prior Arrangements yes   Employment Status disabled   Source of Income disability, social security   Financial/Environmental Concerns unable to afford rent/mortgage, unable to afford food   Application for Public Assistance applied   Usual Activity Tolerance moderate   Current Activity Tolerance moderate   Medications independent   Meal Preparation independent   Housekeeping independent   Laundry independent   Shopping independent          SDOH updated and reviewed with the patient during this program:  Financial Resource Strain: High Risk    Difficulty of Paying Living Expenses: Very hard      Food Insecurity: Food Insecurity Present    Worried About Running Out of Food in the Last Year: Sometimes true    Ran Out of Food in the Last Year: Sometimes true      Transportation Needs: No Transportation Needs    Lack of Transportation (Medical): No    Lack of Transportation (Non-Medical): No      Housing Stability: Low Risk     Unable to Pay for Housing in the Last Year: No    Number of Places Lived in the Last Year: 1    Unstable Housing in the Last Year: No     Patient Outreach    SW received SW evaluation re: financial insecurity and VA benefits. SW called and spoke to patient via telephone. 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 Patient receives SSDI. Patient reports financial hardship with meals and rent / utility payments. Reports not behind on any bills,  however, reports bills consumes > half of his monthly earnings. SW provided financial resource information ([employer] [phone] and [employer] [phone]). Patient thanked this SW for information provided .     Care Coordination    SW made referral to [facility] re in home care services - Anticipate wait lists.     Care Coordination    CONRAD discussed with RN NASREEN re: possible GeriPact via VA for additional services as GeriPact can be obtained without service connection benefits. Patient will require [facility] PCP , along with, [facility] PCP. SW requesting RN CM assistance with VA healthcare services.     Patient Outreach    SW called and  spoke to patient and provided update re: VA PCP and GeriPact program. Patient verbalized appreciation and will await outreach from RN CM to schedule [facility] PCP.       Becka PEREZ -   Ambulatory Case Management    6/7/2023, 12:51 EDT

## 2023-06-19 ENCOUNTER — TELEPHONE (OUTPATIENT)
Dept: FAMILY MEDICINE CLINIC | Facility: CLINIC | Age: 63
End: 2023-06-19

## 2023-06-19 ENCOUNTER — OFFICE VISIT (OUTPATIENT)
Dept: FAMILY MEDICINE CLINIC | Facility: CLINIC | Age: 63
End: 2023-06-19
Payer: MEDICARE

## 2023-06-19 VITALS
OXYGEN SATURATION: 97 % | HEART RATE: 67 BPM | SYSTOLIC BLOOD PRESSURE: 112 MMHG | BODY MASS INDEX: 21.84 KG/M2 | DIASTOLIC BLOOD PRESSURE: 64 MMHG | HEIGHT: 72 IN | WEIGHT: 161.25 LBS

## 2023-06-19 DIAGNOSIS — M54.2 NECK PAIN: Primary | ICD-10-CM

## 2023-06-19 DIAGNOSIS — M54.6 ACUTE MIDLINE THORACIC BACK PAIN: ICD-10-CM

## 2023-06-19 PROCEDURE — 3051F HG A1C>EQUAL 7.0%<8.0%: CPT | Performed by: NURSE PRACTITIONER

## 2023-06-19 PROCEDURE — 99213 OFFICE O/P EST LOW 20 MIN: CPT | Performed by: NURSE PRACTITIONER

## 2023-06-19 PROCEDURE — 3078F DIAST BP <80 MM HG: CPT | Performed by: NURSE PRACTITIONER

## 2023-06-19 PROCEDURE — 3074F SYST BP LT 130 MM HG: CPT | Performed by: NURSE PRACTITIONER

## 2023-06-19 PROCEDURE — 1160F RVW MEDS BY RX/DR IN RCRD: CPT | Performed by: NURSE PRACTITIONER

## 2023-06-19 PROCEDURE — 1159F MED LIST DOCD IN RCRD: CPT | Performed by: NURSE PRACTITIONER

## 2023-06-19 NOTE — TELEPHONE ENCOUNTER
Caller: Santi Souza Sr.    Relationship: Self    Best call back number: [phone]     Caller requesting test results: PATIENT    What test was performed: NECK AND SHOULDER X-RAY    When was the test performed: TODAY    Where was the test performed: [facility]
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
## 2023-07-25 ENCOUNTER — TELEPHONE (OUTPATIENT)
Dept: FAMILY MEDICINE CLINIC | Facility: CLINIC | Age: 63
End: 2023-07-25

## 2023-07-25 ENCOUNTER — OFFICE VISIT (OUTPATIENT)
Dept: FAMILY MEDICINE CLINIC | Facility: CLINIC | Age: 63
End: 2023-07-25
Payer: MEDICARE

## 2023-07-25 VITALS
HEART RATE: 62 BPM | SYSTOLIC BLOOD PRESSURE: 114 MMHG | HEIGHT: 72 IN | DIASTOLIC BLOOD PRESSURE: 62 MMHG | OXYGEN SATURATION: 97 % | BODY MASS INDEX: 22.55 KG/M2 | WEIGHT: 166.5 LBS

## 2023-07-25 DIAGNOSIS — F43.10 PTSD (POST-TRAUMATIC STRESS DISORDER): ICD-10-CM

## 2023-07-25 DIAGNOSIS — F41.9 ANXIETY AND DEPRESSION: Primary | ICD-10-CM

## 2023-07-25 DIAGNOSIS — R41.3 MEMORY DEFICITS: ICD-10-CM

## 2023-07-25 DIAGNOSIS — F32.A ANXIETY AND DEPRESSION: Primary | ICD-10-CM

## 2023-07-25 DIAGNOSIS — R41.89 COGNITIVE IMPAIRMENT: ICD-10-CM

## 2023-07-25 DIAGNOSIS — I67.9 CEREBROVASCULAR DISEASE: ICD-10-CM

## 2023-07-25 PROCEDURE — 99214 OFFICE O/P EST MOD 30 MIN: CPT | Performed by: NURSE PRACTITIONER

## 2023-07-25 PROCEDURE — 1160F RVW MEDS BY RX/DR IN RCRD: CPT | Performed by: NURSE PRACTITIONER

## 2023-07-25 PROCEDURE — 3051F HG A1C>EQUAL 7.0%<8.0%: CPT | Performed by: NURSE PRACTITIONER

## 2023-07-25 PROCEDURE — 3074F SYST BP LT 130 MM HG: CPT | Performed by: NURSE PRACTITIONER

## 2023-07-25 PROCEDURE — 1159F MED LIST DOCD IN RCRD: CPT | Performed by: NURSE PRACTITIONER

## 2023-07-25 PROCEDURE — 3078F DIAST BP <80 MM HG: CPT | Performed by: NURSE PRACTITIONER

## 2023-07-25 NOTE — ASSESSMENT & PLAN NOTE
Continue with current medications and he will continue to follow-up with his psychiatry team as scheduled.  I am going to reach out to the case management team to see if there is any other options to help him find a therapist that he can afford especially with him being a .  PHQ-9 completed and discussed.  No thoughts of suicide or hurting himself or anyone else.  Education provided.  Return to clinic or ED with any issues or concerns.

## 2023-07-25 NOTE — PROGRESS NOTES
"Chief Complaint  Home Health Care    Subjective          Santi Souza Sr. presents to [facility]  History of Present Illness    Patient presents requesting help with getting some assistance.  History of anxiety and depression PTSD and cognitive impairment cerebrovascular disease.  States due to this he has trouble doing daily life skills in the home such as cleaning and taking care of things such as bills.  He has been seeing neurology Trisha Benitez but states he does not feel they are very beneficial so he would like to be referred to a neurologist with [facility] to see if they can help any further.  He feels that his memory and cognitive impairment is worsening.  He is following up with psychiatry now with [facility] and he states this has been great for him.  He would like to see a therapist but he states due to co-pays he does not feel that is affordable.  He is asking if they have any other options for therapist.  Case management team is working with him also.  States he has a lot of stress at home with his neighbors.  No thoughts of suicide or hurting himself or anyone else.  No dizziness no headache no chest pain no chest pressure no shortness of breath no trouble breathing no urinary or bowel issues.    Objective   Vital Signs:   /62   Pulse 62   Ht 182.9 cm (72\")   Wt 75.5 kg (166 lb 8 oz)   SpO2 97%   BMI 22.58 kg/m²     Body mass index is 22.58 kg/m².    Review of Systems   Constitutional:  Negative for chills, fatigue and fever.   HENT:  Negative for congestion.    Eyes:  Negative for visual disturbance.   Respiratory:  Negative for cough, shortness of breath and wheezing.    Cardiovascular:  Negative for chest pain.   Gastrointestinal:  Negative for abdominal pain.   Genitourinary:  Negative for dysuria and frequency.   Neurological:  Positive for memory problem. Negative for syncope and headache.   Psychiatric/Behavioral:  Positive for stress. Negative for " agitation, behavioral problems, decreased concentration, dysphoric mood, hallucinations, self-injury, sleep disturbance, suicidal ideas, negative for hyperactivity and depressed mood. The patient is not nervous/anxious.      Past History:  Medical History: has a past medical history of CAD (coronary artery disease) (11/03/2017), Chronic back pain (11/03/2017), Condition not found (11/03/2017), Depression, Diabetes (11/03/2017), Diabetes mellitus, GERD (gastroesophageal reflux disease) (11/03/2017), Hyperlipidemia (11/03/2017), Hypertension (11/03/2017), Neuropathy, Osteoarthritis, and Vitamin D deficiency.   Surgical History: has a past surgical history that includes Lumbar spine surgery (1998).   Family History: family history includes Diabetes in his father.   Social History: reports that he quit smoking about 18 months ago. His smoking use included cigarettes. He started smoking about 49 years ago. He has a 8.00 pack-year smoking history. He has never used smokeless tobacco. He reports that he does not use drugs.    PHQ-2 Depression Screening  Little interest or pleasure in doing things?     Feeling down, depressed, or hopeless?     PHQ-2 Total Score          PHQ-9 Depression Screening  Little interest or pleasure in doing things?     Feeling down, depressed, or hopeless?     Trouble falling or staying asleep, or sleeping too much?     Feeling tired or having little energy?     Poor appetite or overeating?     Feeling bad about yourself - or that you are a failure or have let yourself or your family down?     Trouble concentrating on things, such as reading the newspaper or watching television?     Moving or speaking so slowly that other people could have noticed? Or the opposite - being so fidgety or restless that you have been moving around a lot more than usual?     Thoughts that you would be better off dead, or of hurting yourself in some way?     PHQ-9 Total Score     If you checked off any problems, how  difficult have these problems made it for you to do your work, take care of things at home, or get along with other people?       PHQ-9 Total Score:        Patient screened positive for depression based on a PHQ-9 score of 23 on 7/20/2023. Follow-up recommendations include: Referral to Mental Health specialist and [place] to follow=up with psychiatry as scheduled .         Current Outpatient Medications:     aspirin 81 MG chewable tablet, Chew 1 tablet., Disp: , Rfl:     atorvastatin (LIPITOR) 80 MG tablet, Take 1 tablet by mouth Daily., Disp: 90 tablet, Rfl: 1    baclofen (LIORESAL) 10 MG tablet, Take 1 tablet by mouth 3 (Three) Times a Day., Disp: 90 tablet, Rfl: 0    buPROPion XL (Wellbutrin XL) 150 MG 24 hr tablet, Take 1 tablet  by mouth every morning for 14 days then stop., Disp: 14 tablet, Rfl: 0    donepezil (ARICEPT) 10 MG tablet, Take 1 tablet by mouth Daily With Breakfast., Disp: , Rfl:     erythromycin (ROMYCIN) 5 MG/GM ophthalmic ointment, , Disp: , Rfl:     Jardiance 25 MG tablet tablet, TAKE 1 TABLET BY MOUTH EVERY DAY, Disp: 90 tablet, Rfl: 0    levocetirizine (XYZAL) 5 MG tablet, Take 1 tablet by mouth Every Evening., Disp: 90 tablet, Rfl: 1    Magnesium 400 MG tablet, 400 mg Daily., Disp: , Rfl:     meloxicam (MOBIC) 15 MG tablet, Take 1 tablet by mouth Daily., Disp: 90 tablet, Rfl: 1    metFORMIN (GLUCOPHAGE) 1000 MG tablet, Take 1 tablet by mouth 2 (Two) Times a Day With Meals., Disp: 180 tablet, Rfl: 1    nitroglycerin (NITROLINGUAL) 0.4 MG/SPRAY spray, Place 1 spray by translingual route on or under the tongue at the first sign of an attack, no more than 3 sprays / 15-minute., Disp: 1 each, Rfl: 0    omeprazole (priLOSEC) 20 MG capsule, Take 1 capsule by mouth Daily., Disp: 90 capsule, Rfl: 1    prazosin (MINIPRESS) 2 MG capsule, Take 1 capsule by mouth at bedtime., Disp: 30 capsule, Rfl: 0    sertraline (ZOLOFT) 25 MG tablet, Take 1 tablet by mouth daily for 14 days then stop., Disp: 14  tablet, Rfl: 0    SITagliptin (JANUVIA) 100 MG tablet, Take 1 tablet by mouth Daily., Disp: 90 tablet, Rfl: 1    traZODone (DESYREL) 50 MG tablet, Take 1/2 to 1 tablet by mouth every day at bedtime., Disp: 30 tablet, Rfl: 0    valACYclovir (VALTREX) 500 MG tablet, , Disp: , Rfl:    (Not in a hospital admission)     Allergies: Codeine, Penicillins, and Sulfa antibiotics    Physical Exam  Constitutional:       Appearance: Normal appearance.   Eyes:      Conjunctiva/sclera: Conjunctivae normal.      Pupils: Pupils are equal, round, and reactive to light.   Cardiovascular:      Rate and Rhythm: Normal rate and regular rhythm.      Heart sounds: Normal heart sounds.   Pulmonary:      Effort: Pulmonary effort is normal.      Breath sounds: Normal breath sounds.   Neurological:      General: No focal deficit present.      Mental Status: He is alert and oriented to person, place, and time. Mental status is at baseline.   Psychiatric:         Mood and Affect: Mood normal.         Behavior: Behavior normal.         Thought Content: Thought content normal.         Judgment: Judgment normal.        Result Review :                   Assessment and Plan    Diagnoses and all orders for this visit:    1. Anxiety and depression (Primary)  Assessment & Plan:  Continue with current medications and he will continue to follow-up with his psychiatry team as scheduled.  I am going to reach out to the case management team to see if there is any other options to help him find a therapist that he can afford especially with him being a .  PHQ-9 completed and discussed.  No thoughts of suicide or hurting himself or anyone else.  Education provided.  Return to clinic or ED with any issues or concerns.      2. PTSD (post-traumatic stress disorder)  Assessment & Plan:  Continue with current medications and he will continue to follow-up with his psychiatry team as scheduled.  I am going to reach out to the case management team to see if  there is any other options to help him find a therapist that he can afford especially with him being a .  PHQ-9 completed and discussed.  No thoughts of suicide or hurting himself or anyone else.  Education provided.  Return to clinic or ED with any issues or concerns.    Orders:  -     Ambulatory Referral to Neurology    3. Cerebrovascular disease  Assessment & Plan:  Will refer him to a new neurologist.  We will type up a note asking that he be evaluated for programs for helping in his home and again we will also see if case management can help with stuff like this.  Education provided.  Return to clinic or ED with any issues or concerns.    Orders:  -     Ambulatory Referral to Neurology    4. Memory deficits  Assessment & Plan:  Will refer him to a new neurologist.  We will type up a note asking that he be evaluated for programs for helping in his home and again we will also see if case management can help with stuff like this.  Education provided.  Return to clinic or ED with any issues or concerns.    Orders:  -     Ambulatory Referral to Neurology    5. Cognitive impairment  Assessment & Plan:  Will refer him to a new neurologist.  We will type up a note asking that he be evaluated for programs for helping in his home and again we will also see if case management can help with stuff like this.  Education provided.  Return to clinic or ED with any issues or concerns.    Orders:  -     Ambulatory Referral to Neurology      I spent 30 minutes caring for Santi on this date of service. This time includes time spent by me in the following activities:preparing for the visit, reviewing tests, obtaining and/or reviewing a separately obtained history, performing a medically appropriate examination and/or evaluation , counseling and educating the patient/family/caregiver, ordering medications, tests, or procedures, referring and communicating with other health care professionals , and documenting information in  the medical record        BMI is within normal parameters. No other follow-up for BMI required.       Follow Up   Return in about 3 months (around 10/25/2023), or if symptoms worsen or fail to improve.  Patient was given instructions and counseling regarding his condition or for health maintenance advice. Please see specific information pulled into the AVS if appropriate.     JESSICA Chandler

## 2023-07-25 NOTE — ASSESSMENT & PLAN NOTE
Will refer him to a new neurologist.  We will type up a note asking that he be evaluated for programs for helping in his home and again we will also see if case management can help with stuff like this.  Education provided.  Return to clinic or ED with any issues or concerns.

## 2023-07-25 NOTE — LETTER
July 25, 2023     Patient: Santi Souza Sr.   YOB: 1960   Date of Visit: 7/25/2023       To Whom It May Concern:    It is my medical opinion that Santi Souza be evaluated to receive extra assistance in his home with daily activities such as cleaning due to his past medical history.        Sincerely,        JESSICA Chandler    CC: No Recipients

## 2023-07-25 NOTE — TELEPHONE ENCOUNTER
(Please make sure this message gets to Adrienne). Can you help patient possibly find a therapist.  He is a .  He would greatly benefit from having 1 but states he currently cannot afford co-pays.  Are there any programs that would allow him to see 1 for free?  Can you also check on assistance programs to help with things in his home such as with cleaning and money management due to his history of cognitive impairment.  Thanks

## 2023-07-26 ENCOUNTER — PATIENT OUTREACH (OUTPATIENT)
Dept: CASE MANAGEMENT | Facility: OTHER | Age: 63
End: 2023-07-26
Payer: MEDICARE

## 2023-07-26 DIAGNOSIS — E11.65 TYPE 2 DIABETES MELLITUS WITH HYPERGLYCEMIA, WITHOUT LONG-TERM CURRENT USE OF INSULIN: ICD-10-CM

## 2023-07-26 DIAGNOSIS — I10 BENIGN ESSENTIAL HTN: Primary | ICD-10-CM

## 2023-07-26 NOTE — OUTREACH NOTE
AMBULATORY CASE MANAGEMENT NOTE    Name and Relationship of Patient/Support Person:  -     Care Coordination    Received request from PCP regarding patient's need for affordable mental health services and home care. Contacted [facility] benefits eligibility department. Rep states patient is ineligible due to only serving 3 months in [employer]. According to SW note, call made to [facility] for in home care services- hopefully placed on waiting list. Discussed care with SW regarding additional support. Also sent request to referral coordinators. Coordinator will send counseling referral to [employer] as they will provide patient with affordable resources if they are unable to take them into their program. PCP notified.     Education Documentation  No documentation found.        Adrienne LEONE  Ambulatory Case Management    7/26/2023, 09:00 EDT

## 2023-07-26 NOTE — TELEPHONE ENCOUNTER
Patient may need to contact [facility] about therapy visits. Will send referral to social care services for additional needs.

## 2023-07-28 ENCOUNTER — HOSPITAL ENCOUNTER (OUTPATIENT)
Dept: MRI IMAGING | Facility: HOSPITAL | Age: 63
Discharge: HOME | End: 2023-07-28
Admitting: STUDENT IN AN ORGANIZED HEALTH CARE EDUCATION/TRAINING PROGRAM
Payer: MEDICARE

## 2023-07-28 ENCOUNTER — PATIENT OUTREACH (OUTPATIENT)
Dept: CASE MANAGEMENT | Facility: OTHER | Age: 63
End: 2023-07-28
Payer: MEDICARE

## 2023-07-28 DIAGNOSIS — E11.65 TYPE 2 DIABETES MELLITUS WITH HYPERGLYCEMIA, WITHOUT LONG-TERM CURRENT USE OF INSULIN: ICD-10-CM

## 2023-07-28 DIAGNOSIS — S46.011A TRAUMATIC COMPLETE TEAR OF RIGHT ROTATOR CUFF, INITIAL ENCOUNTER: ICD-10-CM

## 2023-07-28 DIAGNOSIS — I10 BENIGN ESSENTIAL HTN: Primary | ICD-10-CM

## 2023-07-28 PROCEDURE — 73221 MRI JOINT UPR EXTREM W/O DYE: CPT

## 2023-07-28 NOTE — OUTREACH NOTE
[facility] End of Month Documentation    This Chronic Medical Management Care Plan for Santi Souza Sr., 63 y.o. male, has been monitored and managed and a new plan of care implemented for the month of July.  A cumulative time of 35  minutes was spent on this patient record this month, including phone call with patient; face to face with provider; chart review; electronic communication with other providers; phone call with other providers.    Regarding the patient's problems: has Benign essential HTN; Mixed hyperlipidemia; Type 2 diabetes mellitus with hyperglycemia, without long-term current use of insulin; Anxiety and depression; Allergic rhinitis; Arthralgia; Vitamin deficiency; Memory deficits; PTSD (post-traumatic stress disorder); Screening for prostate cancer; Vitamin D deficiency; Encounter for long-term (current) use of other medications; Initial Medicare annual wellness visit; Advanced directives, counseling/discussion; Screening for colon cancer; Screening for lung cancer; GERD (gastroesophageal reflux disease); Insomnia; CAD (coronary artery disease); Cerebrovascular disease; Chronic midline low back pain; Need for hepatitis C screening test; Mild cognitive impairment; Labile blood glucose; Acute pain of right shoulder; Neck pain; Acute midline thoracic back pain; and Cognitive impairment on their problem list., the following items were addressed: medical records; referrals to community service providers and any changes can be found within the plan section of the note.  A detailed listing of time spent for chronic care management is tracked within each outreach encounter.  Current medications include:  has a current medication list which includes the following prescription(s): aspirin, atorvastatin, baclofen, bupropion xl, donepezil, erythromycin, jardiance, levocetirizine, magnesium, meloxicam, metformin, nitroglycerin, omeprazole, prazosin, sertraline, sitagliptin, trazodone, and valacyclovir. and the  patient is reported to be patient is compliant with medication protocol,  Medications are reported to be non-effective in controlling symptoms and changes have been made to the medication protocol.  All notes on chart for PCP to review.    The patient was monitored remotely for mood & behavior.    The patient's physical needs include:  not applicable.     The patient's mental support needs include:  increased support; mental health referral    The patient's cognitive support needs include:  health care; medication    The patient's psychosocial support needs include:  not applicable    The patient's functional needs include: not applicable    The patient's environmental needs include:  not applicable    Care Plan overall comments:  No data recorded    Refer to previous outreach notes for more information on the areas listed above.    Monthly Billing Diagnoses  (I10) Benign essential HTN    (E11.65) Type 2 diabetes mellitus with hyperglycemia, without long-term current use of insulin    Medications   Medications have been reconciled    Care Plan progress this month:      Recently Modified Care Plans Updates made since 6/27/2023 12:00 AM      No recently modified care plans.                Instructions   Patient was provided an electronic copy of care plan  CCM services were explained and offered and patient has accepted these services.  Patient has given their written consent to receive CCM services and understands that this includes the authorization of electronic communication of medical information with the other treating providers.  Patient understands that they may stop CCM services at any time and these changes will be effective at the end of the calendar month and will effectively revocate the agreement of CCM services.  Patient understands that only one practitioner can furnish and be paid for CCM services during one calendar month.  Patient also understands that there may be co-payment or deductible fees in  association with CCM services.  Patient will continue with at least monthly follow-up calls with the Ambulatory .    Adrienne LEONE  Ambulatory Case Management    7/28/2023, 10:00 EDT

## 2023-08-03 ENCOUNTER — OFFICE VISIT (OUTPATIENT)
Dept: BEHAVIORAL HEALTH | Facility: CLINIC | Age: 63
End: 2023-08-03
Payer: MEDICARE

## 2023-08-03 VITALS
HEART RATE: 65 BPM | DIASTOLIC BLOOD PRESSURE: 60 MMHG | WEIGHT: 170 LBS | HEIGHT: 72 IN | SYSTOLIC BLOOD PRESSURE: 100 MMHG | BODY MASS INDEX: 23.03 KG/M2

## 2023-08-03 DIAGNOSIS — F34.9 PERSISTENT MOOD (AFFECTIVE) DISORDER, UNSPECIFIED: Primary | ICD-10-CM

## 2023-08-03 DIAGNOSIS — F33.1 MODERATE EPISODE OF RECURRENT MAJOR DEPRESSIVE DISORDER: ICD-10-CM

## 2023-08-03 DIAGNOSIS — F41.1 GENERALIZED ANXIETY DISORDER: ICD-10-CM

## 2023-08-03 DIAGNOSIS — F43.10 POST TRAUMATIC STRESS DISORDER (PTSD): ICD-10-CM

## 2023-08-03 DIAGNOSIS — G47.20 CIRCADIAN RHYTHM SLEEP DISORDER, UNSPECIFIED TYPE: ICD-10-CM

## 2023-08-03 RX ORDER — PRAZOSIN HYDROCHLORIDE 2 MG/1
CAPSULE ORAL
Qty: 30 CAPSULE | Refills: 0 | Status: SHIPPED | OUTPATIENT
Start: 2023-08-03

## 2023-08-03 RX ORDER — TRAZODONE HYDROCHLORIDE 50 MG/1
TABLET ORAL
Qty: 30 TABLET | Refills: 0 | Status: SHIPPED | OUTPATIENT
Start: 2023-08-03

## 2023-08-03 RX ORDER — DULOXETIN HYDROCHLORIDE 20 MG/1
20 CAPSULE, DELAYED RELEASE ORAL DAILY
Qty: 30 CAPSULE | Refills: 0 | Status: SHIPPED | OUTPATIENT
Start: 2023-08-03

## 2023-08-08 ENCOUNTER — TELEPHONE (OUTPATIENT)
Dept: ORTHOPEDIC SURGERY | Facility: CLINIC | Age: 63
End: 2023-08-08

## 2023-08-08 ENCOUNTER — TELEPHONE (OUTPATIENT)
Dept: FAMILY MEDICINE CLINIC | Facility: CLINIC | Age: 63
End: 2023-08-08
Payer: MEDICARE

## 2023-08-08 NOTE — TELEPHONE ENCOUNTER
"Caller: Santi Souza Sr. \"Alonso Ellis\"    Relationship: Self    Best call back number: [phone]    What is the medical concern/diagnosis:      What specialty or service is being requested: CARDIOLOGY    What is the provider, practice or medical service name:     What is the office location: [place]    What is the office phone number:     Any additional details:       "

## 2023-08-15 ENCOUNTER — OFFICE VISIT (OUTPATIENT)
Dept: ORTHOPEDIC SURGERY | Facility: CLINIC | Age: 63
End: 2023-08-15
Payer: MEDICARE

## 2023-08-15 VITALS
BODY MASS INDEX: 23.02 KG/M2 | HEIGHT: 72 IN | SYSTOLIC BLOOD PRESSURE: 119 MMHG | WEIGHT: 169.97 LBS | DIASTOLIC BLOOD PRESSURE: 71 MMHG

## 2023-08-15 DIAGNOSIS — M75.101 TEAR OF RIGHT SUPRASPINATUS TENDON: ICD-10-CM

## 2023-08-15 DIAGNOSIS — M75.31 CALCIFIC TENDINITIS OF RIGHT SHOULDER: Primary | ICD-10-CM
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
## 2023-08-16 ENCOUNTER — OFFICE VISIT (OUTPATIENT)
Dept: ENDOCRINOLOGY | Facility: CLINIC | Age: 63
End: 2023-08-16
Payer: MEDICARE

## 2023-08-16 VITALS
HEART RATE: 72 BPM | BODY MASS INDEX: 23.03 KG/M2 | SYSTOLIC BLOOD PRESSURE: 130 MMHG | HEIGHT: 72 IN | WEIGHT: 170 LBS | DIASTOLIC BLOOD PRESSURE: 70 MMHG | OXYGEN SATURATION: 98 %

## 2023-08-16 DIAGNOSIS — E11.42 TYPE 2 DIABETES MELLITUS WITH DIABETIC POLYNEUROPATHY, WITHOUT LONG-TERM CURRENT USE OF INSULIN: Primary | ICD-10-CM

## 2023-08-16 LAB
EXPIRATION DATE: NORMAL
HBA1C MFR BLD: 6.8 %
Lab: NORMAL

## 2023-08-16 PROCEDURE — 1159F MED LIST DOCD IN RCRD: CPT | Performed by: HOSPITALIST

## 2023-08-16 PROCEDURE — 3044F HG A1C LEVEL LT 7.0%: CPT | Performed by: HOSPITALIST

## 2023-08-16 PROCEDURE — 1160F RVW MEDS BY RX/DR IN RCRD: CPT | Performed by: HOSPITALIST

## 2023-08-16 PROCEDURE — 83036 HEMOGLOBIN GLYCOSYLATED A1C: CPT | Performed by: HOSPITALIST

## 2023-08-16 PROCEDURE — 99203 OFFICE O/P NEW LOW 30 MIN: CPT | Performed by: HOSPITALIST

## 2023-08-16 PROCEDURE — 3078F DIAST BP <80 MM HG: CPT | Performed by: HOSPITALIST

## 2023-08-16 PROCEDURE — 3075F SYST BP GE 130 - 139MM HG: CPT | Performed by: HOSPITALIST

## 2023-08-16 NOTE — PROGRESS NOTES
Chief complaint/Reason for consult: T2DM    Consult requested by JESSICA Chandler    HPI: Mr. Souza is a 63-year-old man with T2DM, hypertension, mixed hyperlipidemia, PTSD, CVA and cognitive impairment who comes for consultation of uncontrolled diabetes.    # [id number], Controlled with complications  # ASCVD with known cerebrovascular disease  # Elevated urine microalbumin  - Diagnosed: 1989 based on routine labs   - Has lost 150 lbs   - Has family history of T2DM on paternal side   - Current regimen includes: Jardiance 25 mg daily and Januvia 100 mg daily  - He stopped metformin because he reports it caused impotence (has been off it for about 3 weeks)   - No history of pancreatitis   - Compliance with medications is good  - HbA1c: 6.8% today, 7.1% on 5/15/2023; no known anemia  - Checks FSBG once daily, reports in the afternoon his BG is mid 100s to mid 200s depending on what he eats, fasting BG is low 100s   - Previously on CGM which he loved but no longer covered by insurance    - BG review unavailable   - Hypoglycemia rarely occurs   - Patient has symptoms with lows when BG is less than 70mg/dl   - Hyperglycemia occurs with poor diet choices   - Patient reports neuropathy   - Patient denies gastroparesis   - Patient with known ASCVD   - not taking ACEi/ARB; blood pressure today 130/70    DM Health Maintenance:  Ophtho: has cataract surgery next month, denies being told he has retinopathy   Monofilament / Foot exam: due 4/2024   Lipids/Statin: taking a statin with last FLP showing LDL 52 done 4/6/2023  JACKSON:Cr 30 done 4/6/2023  TSH: 0.834 done 4/6/2023  Aspirin: taking for secondary prevention    Past medical history, past surgical history, family history and social history reviewed within this encounter.     Review of Systems   Constitutional:  Positive for fatigue. Negative for unexpected weight change.   HENT:  Positive for dental problem. Negative for trouble swallowing and voice change.    Eyes:   "Positive for visual disturbance.   Respiratory:  Negative for shortness of breath.    Cardiovascular:  Negative for chest pain.   Gastrointestinal:  Negative for abdominal pain, constipation and nausea.   Endocrine: Negative for cold intolerance and heat intolerance.   Genitourinary:  Negative for dysuria and genital sores.   Musculoskeletal:  Positive for arthralgias, gait problem, myalgias, neck pain and neck stiffness.   Skin:  Negative for rash and wound.   Neurological:  Positive for numbness.   Psychiatric/Behavioral:  Positive for confusion, decreased concentration and sleep disturbance. Negative for agitation.       /70   Pulse 72   Ht 182.9 cm (72\")   Wt 77.1 kg (170 lb)   SpO2 98%   BMI 23.06 kg/mý      Physical Exam  Vitals reviewed.   Constitutional:       General: He is not in acute distress.  HENT:      Head: Normocephalic.      Nose: Nose normal.   Eyes:      Conjunctiva/sclera: Conjunctivae normal.   Cardiovascular:      Rate and Rhythm: Normal rate.   Pulmonary:      Effort: Pulmonary effort is normal.   Abdominal:      Tenderness: There is no guarding.   Musculoskeletal:      Comments: Right arm in shoulder sling   Skin:     General: Skin is warm and dry.   Neurological:      Mental Status: He is alert and oriented to person, place, and time.   Psychiatric:         Mood and Affect: Mood normal.      Labs and images reviewed as noted in the HPI    Assessment and plan:    Diagnoses and all orders for this visit:    1. Type 2 diabetes mellitus with diabetic polyneuropathy, without long-term current use of insulin (Primary)  Assessment & Plan:  -Diabetes is currently reasonably well controlled based on the data I have  -Complications include known diabetic polyneuropathy, ASCVD status post CVA, elevated urine microalbumin  -Counseled that long term hyperglycemia can cause worsening neuropathy, kidney damage, eye damage, and cardiovascular disease   -Patient would like to continue holding " metformin as he believes it causes impotence  -Recommend he check FSBG daily and if he notices it trending up to notify me for medication changes or additional medication  -Continue Jardiance 25 mg daily and Januvia 100 mg daily  -If his insurance changes and new insurance will cover a CGM I am happy to order it  -Holding ACEi/ARB due to history of borderline low blood pressure; blood pressure today 130/70    DM Health Maintenance:  Ophtho: has cataract surgery next month, denies being told he has retinopathy   Monofilament / Foot exam: due 4/2024, he checks his feet nightly  Lipids/Statin: Continue atorvastatin 80 mg daily, last FLP showing LDL 52 done 4/6/2023  JACKSON:Cr 30 done 4/6/2023; recommend he minimize NSAID intake  TSH: 0.834 done 4/6/2023  Aspirin: taking for secondary prevention    Orders:  -     POC Glycosylated Hemoglobin (Hb A1C)  -     SITagliptin (JANUVIA) 100 MG tablet; Take 1 tablet by mouth Daily.  Dispense: 90 tablet; Refill: 1  -     empagliflozin (Jardiance) 25 MG tablet tablet; Take 1 tablet by mouth Daily.  Dispense: 90 tablet; Refill: 1       Return in about 3 months (around 11/16/2023) for T2DM.     Electronically signed by: Kyle S Rosenstein, MD

## 2023-08-16 NOTE — ASSESSMENT & PLAN NOTE
-Diabetes is currently reasonably well controlled based on the data I have  -Complications include known diabetic polyneuropathy, ASCVD status post CVA, elevated urine microalbumin  -Counseled that long term hyperglycemia can cause worsening neuropathy, kidney damage, eye damage, and cardiovascular disease   -Patient would like to continue holding metformin as he believes it causes impotence  -Recommend he check FSBG daily and if he notices it trending up to notify me for medication changes or additional medication  -Continue Jardiance 25 mg daily and Januvia 100 mg daily  -If his insurance changes and new insurance will cover a CGM I am happy to order it  -Holding ACEi/ARB due to history of borderline low blood pressure; blood pressure today 130/70    DM Health Maintenance:  Ophtho: has cataract surgery next month, denies being told he has retinopathy   Monofilament / Foot exam: due 4/2024, he checks his feet nightly  Lipids/Statin: Continue atorvastatin 80 mg daily, last FLP showing LDL 52 done 4/6/2023  JACKSON:Cr 30 done 4/6/2023; recommend he minimize NSAID intake  TSH: 0.834 done 4/6/2023  Aspirin: taking for secondary prevention

## 2023-08-18 ENCOUNTER — TELEPHONE (OUTPATIENT)
Dept: FAMILY MEDICINE CLINIC | Facility: CLINIC | Age: 63
End: 2023-08-18
Payer: MEDICARE

## 2023-08-18 NOTE — TELEPHONE ENCOUNTER
Pt came into clinic. He saw dr leanna nava on 8/16/23. He has a torn rotator cuff and is in a lot of pain. He sees the surgeon for first appt next Tuesday.    Please send in a few days pain killers for patient to hold him over until he sees ortho next week.    Patient can't take norco. It makes him sick. He requests we send in something else. Tramadol is not helping.    Call alivia   [phone]     Jeremiah maxwell:  Phone: [phone] Fax: [phone]

## 2023-08-19 ENCOUNTER — OFFICE VISIT (OUTPATIENT)
Dept: FAMILY MEDICINE CLINIC | Facility: CLINIC | Age: 63
End: 2023-08-19
Payer: MEDICARE

## 2023-08-19 VITALS
WEIGHT: 173 LBS | HEART RATE: 66 BPM | OXYGEN SATURATION: 96 % | BODY MASS INDEX: 23.43 KG/M2 | DIASTOLIC BLOOD PRESSURE: 72 MMHG | HEIGHT: 72 IN | SYSTOLIC BLOOD PRESSURE: 130 MMHG

## 2023-08-19 DIAGNOSIS — M25.511 CHRONIC RIGHT SHOULDER PAIN: Primary | ICD-10-CM

## 2023-08-19 DIAGNOSIS — G89.29 CHRONIC RIGHT SHOULDER PAIN: Primary | ICD-10-CM

## 2023-08-19 PROCEDURE — 3078F DIAST BP <80 MM HG: CPT | Performed by: NURSE PRACTITIONER

## 2023-08-19 PROCEDURE — 1160F RVW MEDS BY RX/DR IN RCRD: CPT | Performed by: NURSE PRACTITIONER

## 2023-08-19 PROCEDURE — 99213 OFFICE O/P EST LOW 20 MIN: CPT | Performed by: NURSE PRACTITIONER

## 2023-08-19 PROCEDURE — 3075F SYST BP GE 130 - 139MM HG: CPT | Performed by: NURSE PRACTITIONER

## 2023-08-19 PROCEDURE — 1159F MED LIST DOCD IN RCRD: CPT | Performed by: NURSE PRACTITIONER

## 2023-08-19 PROCEDURE — 3044F HG A1C LEVEL LT 7.0%: CPT | Performed by: NURSE PRACTITIONER

## 2023-08-19 RX ORDER — DICLOFENAC SODIUM 75 MG/1
75 TABLET, DELAYED RELEASE ORAL 2 TIMES DAILY
Qty: 60 TABLET | Refills: 0 | Status: SHIPPED | OUTPATIENT
Start: 2023-08-19

## 2023-08-19 NOTE — PROGRESS NOTES
"Chief Complaint  rotator cuff pain (Has seen aleshia and ortho over the last 2 months. Had MRI read Tuesday by Ortho)    Subjective          Santi Souza . presents to [facility]  History of Present Illness  Pt has had right shoulder pain for several months. He has had XRs and MRIs and has seen ortho. He states he has an appt with a shoulder surgeon on Tuesday. He states he cannot take steroids due to his diabetes and he cannot take Tylenol bc of upset stomach. He states he cannot apply ice bc this increases his pain. He was given an order for PT by the ortho but he states his pain is too severe for this.  He took one of his brother's Percocet and this improved his pain.    Objective   Vital Signs:   /72   Pulse 66   Ht 182.9 cm (72\")   Wt 78.5 kg (173 lb)   SpO2 96%   BMI 23.46 kg/mý     Body mass index is 23.46 kg/mý.    Review of Systems   Constitutional:  Negative for fatigue and fever.   Respiratory:  Negative for shortness of breath.    Cardiovascular:  Negative for chest pain, palpitations and leg swelling.   Musculoskeletal:  Positive for arthralgias.   Neurological:  Negative for syncope.   Psychiatric/Behavioral:  The patient is not nervous/anxious.         Current Outpatient Medications:     aspirin 81 MG chewable tablet, Chew 1 tablet., Disp: , Rfl:     atorvastatin (LIPITOR) 80 MG tablet, Take 1 tablet by mouth Daily., Disp: 90 tablet, Rfl: 1    baclofen (LIORESAL) 10 MG tablet, Take 1 tablet by mouth 3 (Three) Times a Day., Disp: 90 tablet, Rfl: 0    donepezil (ARICEPT) 10 MG tablet, Take 1 tablet by mouth Daily With Breakfast., Disp: , Rfl:     DULoxetine (CYMBALTA) 20 MG capsule, Take 1 capsule by mouth Daily., Disp: 30 capsule, Rfl: 0    empagliflozin (Jardiance) 25 MG tablet tablet, Take 1 tablet by mouth Daily., Disp: 90 tablet, Rfl: 1    erythromycin (ROMYCIN) 5 MG/GM ophthalmic ointment, , Disp: , Rfl:     levocetirizine (XYZAL) 5 MG tablet, Take 1 " tablet by mouth Every Evening., Disp: 90 tablet, Rfl: 1    Magnesium 400 MG tablet, 400 mg Daily., Disp: , Rfl:     meloxicam (MOBIC) 15 MG tablet, Take 1 tablet by mouth Daily., Disp: 90 tablet, Rfl: 1    nitroglycerin (NITROLINGUAL) 0.4 MG/SPRAY spray, Place 1 spray by translingual route on or under the tongue at the first sign of an attack, no more than 3 sprays / 15-minute., Disp: 1 each, Rfl: 0    omeprazole (priLOSEC) 20 MG capsule, Take 1 capsule by mouth Daily., Disp: 90 capsule, Rfl: 1    prazosin (MINIPRESS) 2 MG capsule, Take 1 capsule by mouth at bedtime., Disp: 30 capsule, Rfl: 0    SITagliptin (JANUVIA) 100 MG tablet, Take 1 tablet by mouth Daily., Disp: 90 tablet, Rfl: 1    traZODone (DESYREL) 50 MG tablet, Take 1/2 to 1 tablet by mouth every day at bedtime., Disp: 30 tablet, Rfl: 0    valACYclovir (VALTREX) 500 MG tablet, , Disp: , Rfl:     diclofenac (VOLTAREN) 75 MG EC tablet, Take 1 tablet by mouth 2 (Two) Times a Day. (Do not combine with Mobic), Disp: 60 tablet, Rfl: 0    Diclofenac Sodium (VOLTAREN) 1 % gel gel, Apply 4 g topically to the appropriate area as directed 4 (Four) Times a Day As Needed (pain)., Disp: 350 g, Rfl: 0      Allergies: Codeine, Penicillins, and Sulfa antibiotics    Physical Exam  Constitutional:       Appearance: Normal appearance.   HENT:      Head: Normocephalic.   Eyes:      Conjunctiva/sclera: Conjunctivae normal.      Pupils: Pupils are equal, round, and reactive to light.   Cardiovascular:      Rate and Rhythm: Normal rate and regular rhythm.      Heart sounds: Normal heart sounds.   Pulmonary:      Effort: Pulmonary effort is normal.      Breath sounds: Normal breath sounds.   Abdominal:      Tenderness: There is no abdominal tenderness.   Musculoskeletal:         General: Normal range of motion.      Comments: Right shoulder tenderness. Unable to eval ROM due to pain. No edema/erythema/warmth.    Skin:     General: Skin is warm and dry.      Capillary Refill:  Capillary refill takes less than 2 seconds.   Neurological:      General: No focal deficit present.      Mental Status: He is alert and oriented to person, place, and time.   Psychiatric:         Mood and Affect: Mood normal.         Behavior: Behavior normal.         Thought Content: Thought content normal.         Judgment: Judgment normal.        Result Review :                   Assessment and Plan    Diagnoses and all orders for this visit:    1. Chronic right shoulder pain (Primary)  Comments:  I offered prednisone but he declined. Apply ice to painful areas and ROM stretching. Change to Diclofenac and use gel. Add Tylenol. Keep appt with orth on Tues.  Orders:  -     diclofenac (VOLTAREN) 75 MG EC tablet; Take 1 tablet by mouth 2 (Two) Times a Day. (Do not combine with Mobic)  Dispense: 60 tablet; Refill: 0  -     Diclofenac Sodium (VOLTAREN) 1 % gel gel; Apply 4 g topically to the appropriate area as directed 4 (Four) Times a Day As Needed (pain).  Dispense: 350 g; Refill: 0                Follow Up   No follow-ups on file.  Patient was given instructions and counseling regarding his condition or for health maintenance advice. Please see specific information pulled into the AVS if appropriate.     JESSICA Gregory

## 2023-08-22 ENCOUNTER — PREP FOR SURGERY (OUTPATIENT)
Dept: OTHER | Facility: HOSPITAL | Age: 63
End: 2023-08-22
Payer: MEDICARE

## 2023-08-22 ENCOUNTER — TELEPHONE (OUTPATIENT)
Dept: ORTHOPEDIC SURGERY | Facility: CLINIC | Age: 63
End: 2023-08-22

## 2023-08-22 ENCOUNTER — OFFICE VISIT (OUTPATIENT)
Dept: ORTHOPEDIC SURGERY | Facility: CLINIC | Age: 63
End: 2023-08-22
Payer: MEDICARE

## 2023-08-22 VITALS
SYSTOLIC BLOOD PRESSURE: 108 MMHG | BODY MASS INDEX: 23.43 KG/M2 | HEIGHT: 72 IN | DIASTOLIC BLOOD PRESSURE: 58 MMHG | WEIGHT: 173 LBS

## 2023-08-22 DIAGNOSIS — M75.31 CALCIFIC TENDINITIS OF RIGHT SHOULDER: ICD-10-CM

## 2023-08-22 DIAGNOSIS — E11.42 TYPE 2 DIABETES MELLITUS WITH DIABETIC POLYNEUROPATHY, WITHOUT LONG-TERM CURRENT USE OF INSULIN: ICD-10-CM

## 2023-08-22 DIAGNOSIS — M75.101 TEAR OF RIGHT SUPRASPINATUS TENDON: ICD-10-CM

## 2023-08-22 DIAGNOSIS — M25.611 POST-TRAUMATIC STIFFNESS OF RIGHT SHOULDER JOINT: ICD-10-CM

## 2023-08-22 DIAGNOSIS — G89.29 CHRONIC RIGHT SHOULDER PAIN: ICD-10-CM

## 2023-08-22 DIAGNOSIS — R29.818 PSEUDOPARALYSIS: ICD-10-CM

## 2023-08-22 DIAGNOSIS — S46.011A TRAUMATIC COMPLETE TEAR OF RIGHT ROTATOR CUFF, INITIAL ENCOUNTER: Primary | ICD-10-CM

## 2023-08-22 DIAGNOSIS — M25.511 ACUTE PAIN OF RIGHT SHOULDER: ICD-10-CM

## 2023-08-22 DIAGNOSIS — M25.511 CHRONIC RIGHT SHOULDER PAIN: ICD-10-CM

## 2023-08-22 DIAGNOSIS — F17.210 SMOKING GREATER THAN 30 PACK YEARS: ICD-10-CM

## 2023-08-22 RX ORDER — METHOCARBAMOL 500 MG/1
500 TABLET, FILM COATED ORAL 3 TIMES DAILY PRN
Qty: 42 TABLET | Refills: 0 | Status: SHIPPED | OUTPATIENT
Start: 2023-08-22 | End: 2023-09-05

## 2023-08-22 NOTE — PROGRESS NOTES
[facility] Orthopaedic Surgery Office Visit - Geoffrey Francis MD    Office Visit       Patient Name: Santi Souza Sr.    Chief Complaint:   Chief Complaint   Patient presents with    Right Shoulder - Pain     Calcific tendinitis of right shoulder; Tear of right supraspinatus tendon       Referring Physician: No ref. provider found    History of Present Illness:   Santi Souza Sr. is a 63 y.o. male who presents with right body part: shoulder Reason: pain.  Onset:Onset: direct blow. The issue has been ongoing for 7 week(s). Pain is a 10/10 on the pain scale. Pain is described as Pain Characterization: dull, aching, burning, throbbing, stabbing, and shooting. Associated symptoms include Symptoms: pain and stiffness. The pain is worse with walking, standing, sitting, climbing stairs, sleeping, working, leisure, lying on affected side, rising from seated position, and any movement of the joint; pain medication and/or NSAID improve the pain. Previous treatments have included: bracing and NSAIDS.  I have reviewed the patient's history of present illness as noted/entered above.    I have reviewed the patient's past medical history, surgical history, social history, family history, medications, and allergies as noted in the electronic medical record and as noted/entered.  I have reviewed the patient's review of systems as noted/enter and updated as noted in the patient's HPI.    Referral from Dr. Perez    Right shoulder pain  Pain worse over the last 7 weeks, fall direct injury  Direct blow  Rates the pain as 10 out of 10  [place]  Treated with diclofenac activity related pain with all the above walking standing sitting climbing sleeping working leisure lying on the affected side rising from a seated position, any movement of the joint  Pain is rated as all of the above dull aching burning throbbing stabbing shooting pain    Positive smoking-counseled on  "smoking cessation or cutting back he has attempted to quit multiple times in the past; counseled on infection,healing difficulties    Balance issues caused the fall, stroke history, fell and \"ripped my whole shoulder apart\"    RHD -- \"I can't function\"    Stroke - May 2022, balance issues    Diabetes mellitus    \"7 stents\"    Cardiologist -- Dr. Wong    Very high risk for any surgery with stroke history, diabetes mellitus, smoking, cardiac history (7 stents) prior to 2013      63 y.o. male  Body mass index is 23.46 kg/mý.    Subjective   Subjective      Review of Systems   Constitutional: Negative.  Negative for chills, fatigue and fever.   HENT: Negative.  Negative for congestion and dental problem.    Eyes: Negative.  Negative for blurred vision.   Respiratory: Negative.  Negative for shortness of breath.    Cardiovascular: Negative.  Negative for leg swelling.   Gastrointestinal: Negative.  Negative for abdominal pain.   Endocrine: Negative.  Negative for polyuria.   Genitourinary: Negative.  Negative for difficulty urinating.   Musculoskeletal:  Positive for arthralgias.   Skin: Negative.    Allergic/Immunologic: Negative.    Neurological: Negative.    Hematological: Negative.  Negative for adenopathy.   Psychiatric/Behavioral: Negative.  Negative for behavioral problems.       Past Medical History:   Past Medical History:   Diagnosis Date    CAD (coronary artery disease) 11/03/2017    Chronic back pain 11/03/2017    Condition not found 11/03/2017    IDDM    Depression     Diabetes 11/03/2017    Diabetes mellitus     GERD (gastroesophageal reflux disease) 11/03/2017    Hyperlipidemia 11/03/2017    Hypertension 11/03/2017    Neuropathy     Osteoarthritis     Rotator cuff syndrome 6/23    Should be on my chart    Vitamin D deficiency        Past Surgical History:   Past Surgical History:   Procedure Laterality Date    BACK SURGERY  1999    4 lumbar surgeries    LUMBAR SPINE SURGERY  1998 2006, 2013 "       Family History:   Family History   Problem Relation Age of Onset    Diabetes Father     Cancer Maternal Grandfather        Social History:   Social History     Socioeconomic History    Marital status: Single   Tobacco Use    Smoking status: Some Days     Packs/day: 0.25     Years: 32.00     Pack years: 8.00     Types: Cigarettes     Start date: 1974     Last attempt to quit: 2022     Years since quittin.6    Smokeless tobacco: Never   Vaping Use    Vaping Use: Never used   Substance and Sexual Activity    Alcohol use: Never    Drug use: Never    Sexual activity: Not Currently     Partners: Female     Birth control/protection: Condom       Medications:   Current Outpatient Medications:     aspirin 81 MG chewable tablet, Chew 1 tablet., Disp: , Rfl:     atorvastatin (LIPITOR) 80 MG tablet, Take 1 tablet by mouth Daily., Disp: 90 tablet, Rfl: 1    baclofen (LIORESAL) 10 MG tablet, Take 1 tablet by mouth 3 (Three) Times a Day., Disp: 90 tablet, Rfl: 0    diclofenac (VOLTAREN) 75 MG EC tablet, Take 1 tablet by mouth 2 (Two) Times a Day. (Do not combine with Mobic), Disp: 60 tablet, Rfl: 0    Diclofenac Sodium (VOLTAREN) 1 % gel gel, Apply 4 g topically to the appropriate area as directed 4 (Four) Times a Day As Needed (pain)., Disp: 350 g, Rfl: 0    donepezil (ARICEPT) 10 MG tablet, Take 1 tablet by mouth Daily With Breakfast., Disp: , Rfl:     DULoxetine (CYMBALTA) 20 MG capsule, Take 1 capsule by mouth Daily., Disp: 30 capsule, Rfl: 0    empagliflozin (Jardiance) 25 MG tablet tablet, Take 1 tablet by mouth Daily., Disp: 90 tablet, Rfl: 1    erythromycin (ROMYCIN) 5 MG/GM ophthalmic ointment, , Disp: , Rfl:     levocetirizine (XYZAL) 5 MG tablet, Take 1 tablet by mouth Every Evening., Disp: 90 tablet, Rfl: 1    Magnesium 400 MG tablet, 400 mg Daily., Disp: , Rfl:     meloxicam (MOBIC) 15 MG tablet, Take 1 tablet by mouth Daily. (Patient not taking: Reported on 2023), Disp: 90 tablet, Rfl: 1     "methocarbamol (ROBAXIN) 500 MG tablet, Take 1 tablet by mouth 3 (Three) Times a Day As Needed for Muscle Spasms for up to 14 days., Disp: 42 tablet, Rfl: 0    nitroglycerin (NITROLINGUAL) 0.4 MG/SPRAY spray, Place 1 spray by translingual route on or under the tongue at the first sign of an attack, no more than 3 sprays / 15-minute., Disp: 1 each, Rfl: 0    omeprazole (priLOSEC) 20 MG capsule, Take 1 capsule by mouth Daily., Disp: 90 capsule, Rfl: 1    prazosin (MINIPRESS) 2 MG capsule, Take 1 capsule by mouth at bedtime., Disp: 30 capsule, Rfl: 0    SITagliptin (JANUVIA) 100 MG tablet, Take 1 tablet by mouth Daily., Disp: 90 tablet, Rfl: 1    traZODone (DESYREL) 50 MG tablet, Take 1/2 to 1 tablet by mouth every day at bedtime., Disp: 30 tablet, Rfl: 0    valACYclovir (VALTREX) 500 MG tablet, , Disp: , Rfl:     Allergies:   Allergies   Allergen Reactions    Codeine Nausea And Vomiting and Provider Review Needed    Penicillins Swelling and Provider Review Needed    Sulfa Antibiotics Shortness Of Breath, Rash and Provider Review Needed       The following portions of the patient's history were reviewed and updated as appropriate: allergies, current medications, past family history, past medical history, past social history, past surgical history and problem list.        Objective    Objective      Vital Signs:   Vitals:    08/22/23 0957   BP: 108/58   Weight: 78.5 kg (173 lb)   Height: 182.9 cm (72.01\")       Ortho Exam:  RIGHT SHOULDER  General: very significant pain response, unable to assess motion due to significant pain response    He allows no palpation of the shoulder and no passive range of motion.  No evidence of septic joint    Profound pseudoparalysis  Vitals reviewed in chart    Musculoskeletal Exam    SIDE: RIGHT SHOULDER  Shoulder Exam:    Tenderness: rotator cuff biceps    Range of motion measurements (degrees)  Forward flexion/Abduction/External rotation at side/ER at 90/IR at 90/IR position  Active: " 20/20  Passive: Does not allow passive motion beyond the active portion.    Pain and guarded response unable to assess if he has posttraumatic stiffness as well but certainly concern for posttraumatic stiffness given his guarding of the arm and use of his sling.  Did  to wean out of the sling and move the arm as able.    Painful arc of motion: yes  No evidence of septic joint  Pain with forward flexion and abduction greater: 10 degrees  Impingement testing Neer's test - positive/painful  Impingement testing Hawkin's test - positive/painful    Rotator Cuff Testing:  Tenderness to palpation at rotator cuff - YES  Rotator cuff testing Malick's test - positive  Rotator cuff testing External rotation - no  Rotator cuff testing Lag signs -unable to determine as keeps the arm abducted at the side  Rotator cuff testing Belly press - no  Pain with abduction great than 90 degrees - yes    Scapular dyskinesis - present, abnormal scapular motion    Long head of the biceps testing:  Nguyen's test for biceps & Speed's test -painful  Bicipital groove tenderness to palpation/tenderness to palpation of biceps tendon -full      Results Review:   Imaging Results (Last 24 Hours)       ** No results found for the last 24 hours. **          XXR Shoulder 2+ View Right    Result Date: 6/7/2023  Impression: No acute fracture or traumatic malalignment. No significant interval change from 5/30/2023 exam. Electronically Signed: Emmett Bradford  6/7/2023 5:54 PM EDT  Workstation ID: [id number]    XR Shoulder 2+ View Right    Result Date: 5/30/2023  Impression: Rotator cuff calcific tendinosis. Mild degenerative change of the glenohumeral joint. Electronically Signed: Emmett Bradford  5/30/2023 4:52 PM EDT  Workstation ID: [id number]    MRI Shoulder Right Without Contrast    Result Date: 7/31/2023  Impression: Rotator cuff tendinopathy with focal full-thickness tear of the distal anterior supraspinatus tendon. No focal muscular atrophy. Calcific  tendinopathy of infraspinatus. Moderate acromioclavicular and mild glenohumeral degenerative changes. Degeneration and tearing of the labrum with more focal discrete tear inferiorly. Electronically Signed: Vahe Nicole  7/31/2023 12:34 PM EDT  Workstation ID: [id number]      Reviewed the images above calcific tendinitis noted on the x-ray on the MRI.  Full-thickness tearing of the supraspinatus.  Degenerative labral tearing.    Procedures             Assessment / Plan      Assessment/Plan:   Problem List Items Addressed This Visit          Endocrine and Metabolic    Type 2 diabetes mellitus with diabetic polyneuropathy, without long-term current use of insulin    Relevant Medications    SITagliptin (JANUVIA) 100 MG tablet    empagliflozin (Jardiance) 25 MG tablet tablet    methocarbamol (ROBAXIN) 500 MG tablet       Musculoskeletal and Injuries    Acute pain of right shoulder    Relevant Medications    methocarbamol (ROBAXIN) 500 MG tablet    Calcific tendinitis of right shoulder    Relevant Medications    methocarbamol (ROBAXIN) 500 MG tablet    Tear of right supraspinatus tendon    Relevant Medications    methocarbamol (ROBAXIN) 500 MG tablet    Traumatic complete tear of right rotator cuff - Primary    Relevant Medications    methocarbamol (ROBAXIN) 500 MG tablet    Post-traumatic stiffness of right shoulder joint    Relevant Medications    methocarbamol (ROBAXIN) 500 MG tablet       Tobacco    Smoking greater than 30 pack years    Relevant Medications    methocarbamol (ROBAXIN) 500 MG tablet       Other    Pseudoparalysis    Relevant Medications    methocarbamol (ROBAXIN) 500 MG tablet       RIGHT SHOULDER    Risks and benefits of continued nonoperative management versus surgical management were discussed. The patient desires to proceed with operative intervention.    Profound pain response on clinical exam.  He carries significant medical comorbidities as we counseled about today.  Including smoking, stroke  "history, 7 stents, diabetes mellitus non-insulin-dependent.    Rotator cuff repair with possible biceps tenodesis and subacromial decompression with acromioplasty as indicated. Sometimes the rotator cuff tear is not repairable and may require debridement or partial repair. The procedure is routinely done arthroscopically, but sometimes a larger incision needs to be made to complete the repair in an \"open\" fashion for rare cases.    Specific risks include pain, bleeding, infection, injury to surrounding nerve and blood vessels, fracture, failure or lack of healing of rotator cuff repair, incomplete pain relief, hardware failure, potential need for additional procedures, stiffness after surgery, possible biceps deformity, and potential inability to restore range of motion and strength. Medical, anesthetic, and block complications are possible.    General anesthesia is required, sling compliance, and compliance with physical therapy and/or a surgeon guided exercise program will be very important to the recovery process.    Opioid medications are utilized for a short course after surgery for pain control.     RIGHT SHOULDER  Rotator cuff tear - Arthroscopic rotator cuff repair CPT code 48314  Biceps tendonitis - Arthroscopic biceps tenodesis CPT code 26690 -- POSSIBLE  Shoulder impingement syndrome      Ultrasling III - a neutral rotation sling is recommended for this patient for perioperative care.  The patient was fitted for the sling and the sling was provided today.  The sling will be a critical part of the perioperative care for these diagnoses.      Significant risk of nonhealing.  He notes that he cannot perform any functions with his right arm at this time due to the trauma and rotator cuff tear.  He strongly desires to proceed with surgery.  He understands the risks and benefits.  Understands the risk of infection, nonhealing, incomplete restoration of motion.  He is profoundly pseudoparalytic he allows almost " no palpation of the shoulder or passive motion.  No evidence of infection on clinical exam.    Cardiac clearance needed -- Dr. Wong   MAIN  Last A1c 6.8 2-3 days ago  Labs pending  Counseled on smoking cessation  Non-insulin dependent Diabetes  Stroke history      The patient desires to proceed with RIGHT shoulder surgery.    After clinic - desired something for pain -- our team counseled this is reserved for after surgery; Robaxin Rx sent in      Follow Up: desires to proceed with surgery -- RIGHT        Geoffrey Francis MD, FAAOS  Orthopedic Surgeon  Fellowship Trained Shoulder and Elbow Surgeon  [facility]  Orthopedics and Sports Medicine  [address] [place] [address], [address]  [place] [address]    08/22/23  11:35 EDT

## 2023-08-22 NOTE — TELEPHONE ENCOUNTER
Geoffrey Francis MD  You1 minute ago (11:33 AM)     Yes I agree we do not give pain medications before rotator cuff repair surgery.  We will provide pain medication after surgery.  I will send in Robaxin which is for muscle spasms and pain I will send it to the [facility].  We also use Robaxin after surgery so it is very effective.       I spoke with the patient to let him know what  stated above. The patient verbalized understanding.     Marycarmen Ruiz

## 2023-08-22 NOTE — TELEPHONE ENCOUNTER
I spoke with the patient to let him know the typically do not give pain medication prior to surgery. I explained the reason we do not give pain medication before is it makes the recovery worse due to you body being used to that medication. I asked the patient if he has taken any ibuprofen or tylenol and he states he can not take either of those medications due to them hurting his stomach when taking them. The patient asked for me to ask  if there was anything he could do or take over the next 3 weeks to help with the pain.      is there anything he can do or take for the pain until his surgery in 3 weeks?     Marycarmen Ruiz

## 2023-08-22 NOTE — TELEPHONE ENCOUNTER
Patient was seen today and walked back in office, states he feels a lot of pain after getting wrapped up with Arik. Per Arik, normal to experience since patient is sore. Patient would like some pain meds called in     Pharmacy: [facility] in [place].

## 2023-08-28 DIAGNOSIS — M25.50 ARTHRALGIA, UNSPECIFIED JOINT: ICD-10-CM

## 2023-08-30 RX ORDER — BACLOFEN 10 MG/1
TABLET ORAL
Qty: 90 TABLET | Refills: 0 | OUTPATIENT
Start: 2023-08-30

## 2023-08-31 ENCOUNTER — PRE-ADMISSION TESTING (OUTPATIENT)
Dept: PREADMISSION TESTING | Facility: HOSPITAL | Age: 63
End: 2023-08-31
Payer: MEDICARE

## 2023-08-31 ENCOUNTER — LAB (OUTPATIENT)
Dept: LAB | Facility: HOSPITAL | Age: 63
End: 2023-08-31
Payer: MEDICARE

## 2023-08-31 DIAGNOSIS — E11.42 TYPE 2 DIABETES MELLITUS WITH DIABETIC POLYNEUROPATHY, WITHOUT LONG-TERM CURRENT USE OF INSULIN: ICD-10-CM

## 2023-08-31 DIAGNOSIS — M25.511 CHRONIC RIGHT SHOULDER PAIN: ICD-10-CM

## 2023-08-31 DIAGNOSIS — M25.511 ACUTE PAIN OF RIGHT SHOULDER: ICD-10-CM

## 2023-08-31 DIAGNOSIS — R29.818 PSEUDOPARALYSIS: ICD-10-CM

## 2023-08-31 DIAGNOSIS — G89.29 CHRONIC RIGHT SHOULDER PAIN: ICD-10-CM

## 2023-08-31 DIAGNOSIS — M75.101 TEAR OF RIGHT SUPRASPINATUS TENDON: ICD-10-CM

## 2023-08-31 DIAGNOSIS — S46.011A TRAUMATIC COMPLETE TEAR OF RIGHT ROTATOR CUFF, INITIAL ENCOUNTER: ICD-10-CM

## 2023-08-31 DIAGNOSIS — M75.31 CALCIFIC TENDINITIS OF RIGHT SHOULDER: ICD-10-CM

## 2023-08-31 LAB
ALBUMIN SERPL-MCNC: 4.7 G/DL (ref 3.5–5.2)
ALBUMIN/GLOB SERPL: 1.6 G/DL
ALP SERPL-CCNC: 101 U/L (ref 39–117)
ALT SERPL W P-5'-P-CCNC: 36 U/L (ref 1–41)
ANION GAP SERPL CALCULATED.3IONS-SCNC: 10 MMOL/L (ref 5–15)
AST SERPL-CCNC: 30 U/L (ref 1–40)
BASOPHILS # BLD AUTO: 0.08 10*3/MM3 (ref 0–0.2)
BASOPHILS NFR BLD AUTO: 0.9 % (ref 0–1.5)
BILIRUB SERPL-MCNC: 0.5 MG/DL (ref 0–1.2)
BUN SERPL-MCNC: 22 MG/DL (ref 8–23)
BUN/CREAT SERPL: 19.6 (ref 7–25)
CALCIUM SPEC-SCNC: 9.5 MG/DL (ref 8.6–10.5)
CHLORIDE SERPL-SCNC: 103 MMOL/L (ref 98–107)
CO2 SERPL-SCNC: 29 MMOL/L (ref 22–29)
CREAT SERPL-MCNC: 1.12 MG/DL (ref 0.76–1.27)
DEPRECATED RDW RBC AUTO: 44.3 FL (ref 37–54)
EGFRCR SERPLBLD CKD-EPI 2021: 73.4 ML/MIN/1.73
EOSINOPHIL # BLD AUTO: 0.15 10*3/MM3 (ref 0–0.4)
EOSINOPHIL NFR BLD AUTO: 1.7 % (ref 0.3–6.2)
ERYTHROCYTE [DISTWIDTH] IN BLOOD BY AUTOMATED COUNT: 12.1 % (ref 12.3–15.4)
GLOBULIN UR ELPH-MCNC: 3 GM/DL
GLUCOSE SERPL-MCNC: 161 MG/DL (ref 65–99)
HCT VFR BLD AUTO: 46.5 % (ref 37.5–51)
HGB BLD-MCNC: 15.6 G/DL (ref 13–17.7)
IMM GRANULOCYTES # BLD AUTO: 0.03 10*3/MM3 (ref 0–0.05)
IMM GRANULOCYTES NFR BLD AUTO: 0.3 % (ref 0–0.5)
LYMPHOCYTES # BLD AUTO: 2.77 10*3/MM3 (ref 0.7–3.1)
LYMPHOCYTES NFR BLD AUTO: 30.9 % (ref 19.6–45.3)
MCH RBC QN AUTO: 32.9 PG (ref 26.6–33)
MCHC RBC AUTO-ENTMCNC: 33.5 G/DL (ref 31.5–35.7)
MCV RBC AUTO: 98.1 FL (ref 79–97)
MONOCYTES # BLD AUTO: 0.77 10*3/MM3 (ref 0.1–0.9)
MONOCYTES NFR BLD AUTO: 8.6 % (ref 5–12)
NEUTROPHILS NFR BLD AUTO: 5.15 10*3/MM3 (ref 1.7–7)
NEUTROPHILS NFR BLD AUTO: 57.6 % (ref 42.7–76)
NRBC BLD AUTO-RTO: 0 /100 WBC (ref 0–0.2)
PLATELET # BLD AUTO: 204 10*3/MM3 (ref 140–450)
PMV BLD AUTO: 10.7 FL (ref 6–12)
POTASSIUM SERPL-SCNC: 4.4 MMOL/L (ref 3.5–5.2)
PROT SERPL-MCNC: 7.7 G/DL (ref 6–8.5)
RBC # BLD AUTO: 4.74 10*6/MM3 (ref 4.14–5.8)
SODIUM SERPL-SCNC: 142 MMOL/L (ref 136–145)
WBC NRBC COR # BLD: 8.95 10*3/MM3 (ref 3.4–10.8)

## 2023-08-31 PROCEDURE — 85025 COMPLETE CBC W/AUTO DIFF WBC: CPT

## 2023-08-31 PROCEDURE — 93005 ELECTROCARDIOGRAM TRACING: CPT

## 2023-08-31 PROCEDURE — 36415 COLL VENOUS BLD VENIPUNCTURE: CPT

## 2023-08-31 PROCEDURE — 80053 COMPREHEN METABOLIC PANEL: CPT

## 2023-08-31 NOTE — PAT
An arrival time for procedure was not provided during PAT visit. If patient had any questions or concerns about their arrival time, they were instructed to contact their surgeon/physician.  Additionally, if the patient referred to an arrival time that was acquired from their my chart account, patient was encouraged to verify that time with their surgeon/physician. Arrival times are NOT provided in Pre Admission Testing Department.     Patient denies any current skin issues.      [employer] ([employer] pain pump patient informational handout given to patient.  Instructed patient to watch [employer] Patient Education Video regarding Peripheral Nerve Catheter that will be in place for upcoming surgery unless contraindicated. The video can be accessed using QR code noted on handout.  Patient agreed to watch video.  Stressed to patient to call [facility] Nursing Hotline 24/7 if patient has any questions or concerns after discharge.      Patient's surgeon called in a prescription for Benzol Peroxide 5% wash to [facility].  Patient instructed to  from [facility] pharmacy that was submitted electronically.  Verbal and written instructions given regarding proper use of the Benzoyl Peroxide wash were provided to patient and/or famlily during PAT visit. Patient/family also instructed to complete Benzol Peroxide checklist and return it to Pre-op on the day of surgery.  Patient and/or family verbalized understanding.      Additionally, reinforced with patient to acquire this prescription from the [facility] retail pharmacy before leaving the hospital after PAT visit due to the potential unavailability at local pharmacies.     Patient verbalized understanding.

## 2023-08-31 NOTE — DISCHARGE INSTRUCTIONS
[employer] ([employer]) pain pump patient informational handout given to patient.  Instructed patient to watch [employer] Patient Education Video regarding Peripheral Nerve Catheter that will be in place for upcoming surgery unless contraindicated. The video can be accessed using QR code noted on handout.  Patient agreed to watch video.  Stressed to patient to call [facility] Nursing Hotline 24/7 if patient has any questions or concerns after discharge.      Patient's surgeon called in a prescription for Benzol Peroxide 5% wash to [facility].  Patient instructed to  from [facility] pharmacy that was submitted electronically.  Verbal and written instructions given regarding proper use of the Benzoyl Peroxide wash were provided to patient and/or famlily during PAT visit. Patient/family also instructed to complete Benzol Peroxide checklist and return it to Pre-op on the day of surgery.  Patient and/or family verbalized understanding.      Additionally, reinforced with patient to acquire this prescription from the [facility] retail pharmacy before leaving the hospital after PAT visit due to the potential unavailability at local pharmacies.     Patient verbalized understanding.

## 2023-09-01 ENCOUNTER — PATIENT OUTREACH (OUTPATIENT)
Dept: CASE MANAGEMENT | Facility: OTHER | Age: 63
End: 2023-09-01
Payer: MEDICARE

## 2023-09-01 DIAGNOSIS — I10 BENIGN ESSENTIAL HTN: Primary | ICD-10-CM

## 2023-09-01 DIAGNOSIS — E11.65 TYPE 2 DIABETES MELLITUS WITH HYPERGLYCEMIA, WITHOUT LONG-TERM CURRENT USE OF INSULIN: ICD-10-CM

## 2023-09-01 LAB
QT INTERVAL: 430 MS
QTC INTERVAL: 430 MS

## 2023-09-01 NOTE — OUTREACH NOTE
"AMBULATORY CASE MANAGEMENT NOTE    Name and Relationship of Patient/Support Person: Santi Souza Sr. \"Alonso Ellis\" - Self    CCM Interim Update    Discussed recent MRI and orthopedic appointment. Patient states he has surgery scheduled on 9/13 to repair torn rotator cuff. He states his pain is managed at this time. He received Robaxin to help with muscle spasms; he is also avoiding heavy lifting and strenuous activity. Advised patient to utilize heat/cold therapy for pain as well.    Patient had appointment with endocrinologist recently. He received good report from Dr. Rosenstein. His A1C decreased to 6.8. He continues to check blood sugar daily. His blood sugar ranges from 98 to 167. Advised patient to contact [facility] if it increases above 200 or below 70.    Patient recently attended counseling appointment at  behavioral health. He feels like his anxiety/depression symptoms are better managed. Patient states he needs help with medical care and copays. Provided number of SW for additional support.           Education Documentation  No documentation found.        Adrienne LEONE  Ambulatory Case Management    9/1/2023, 11:18 EDT  "

## 2023-09-05 ENCOUNTER — OFFICE VISIT (OUTPATIENT)
Dept: BEHAVIORAL HEALTH | Facility: CLINIC | Age: 63
End: 2023-09-05
Payer: MEDICARE

## 2023-09-05 VITALS
HEART RATE: 93 BPM | SYSTOLIC BLOOD PRESSURE: 122 MMHG | HEIGHT: 71 IN | DIASTOLIC BLOOD PRESSURE: 60 MMHG | WEIGHT: 170 LBS | BODY MASS INDEX: 23.8 KG/M2

## 2023-09-05 DIAGNOSIS — G47.20 CIRCADIAN RHYTHM SLEEP DISORDER, UNSPECIFIED TYPE: ICD-10-CM

## 2023-09-05 DIAGNOSIS — F33.1 MODERATE EPISODE OF RECURRENT MAJOR DEPRESSIVE DISORDER: ICD-10-CM

## 2023-09-05 DIAGNOSIS — F43.10 POST TRAUMATIC STRESS DISORDER (PTSD): ICD-10-CM

## 2023-09-05 DIAGNOSIS — F41.1 GENERALIZED ANXIETY DISORDER: Primary | ICD-10-CM

## 2023-09-05 RX ORDER — TRAZODONE HYDROCHLORIDE 50 MG/1
TABLET ORAL
Qty: 30 TABLET | Refills: 0 | Status: SHIPPED | OUTPATIENT
Start: 2023-09-05

## 2023-09-05 RX ORDER — DULOXETIN HYDROCHLORIDE 30 MG/1
30 CAPSULE, DELAYED RELEASE ORAL DAILY
Qty: 30 CAPSULE | Refills: 0 | Status: SHIPPED | OUTPATIENT
Start: 2023-09-05

## 2023-09-05 RX ORDER — PRAZOSIN HYDROCHLORIDE 2 MG/1
CAPSULE ORAL
Qty: 30 CAPSULE | Refills: 0 | Status: SHIPPED | OUTPATIENT
Start: 2023-09-05

## 2023-09-05 NOTE — PROGRESS NOTES
Follow Up Office Visit      Patient Name: Santi Souza Sr.  : 1960   MRN: [id number]     Referring Provider: Jordan Heart APRN    Chief Complaint:      ICD-10-CM ICD-9-CM   1. Generalized anxiety disorder  F41.1 300.02   2. Moderate episode of recurrent major depressive disorder  F33.1 296.32   3. Post traumatic stress disorder (PTSD)  F43.10 309.81   4. Circadian rhythm sleep disorder, unspecified type  G47.20 327.30        History of Present Illness:   Santi Souza Sr. is a 63 y.o. male who is here today for follow up with psychiatric medications.    Subjective      Patient Reports: Had a rough 4-5 days switching medications, never felt like that before.  Hardly ate, just didn't feel good.   The last 10 days to 2 weeks have felt so much better.  Been thinking about how it has been 4 years since I have been able to ride my motorcycle, upsets me.  Having some neighbor issues, they finally got evicted.      Having reconstructive rotator cuff surgery on the .    Review of Systems:   Review of Systems   Psychiatric/Behavioral:  Positive for depressed mood and stress.      RISK ASSESSMENT:  Patient denies any high risk factors today.     Medications:     Current Outpatient Medications:     prazosin (MINIPRESS) 2 MG capsule, Take 1 capsule by mouth at bedtime., Disp: 30 capsule, Rfl: 0    traZODone (DESYREL) 50 MG tablet, Take 1 tablet by mouth every day at bedtime., Disp: 30 tablet, Rfl: 0    aspirin 81 MG chewable tablet, Chew 1 tablet., Disp: , Rfl:     atorvastatin (LIPITOR) 80 MG tablet, Take 1 tablet by mouth Daily., Disp: 90 tablet, Rfl: 1    baclofen (LIORESAL) 10 MG tablet, Take 1 tablet by mouth 3 (Three) Times a Day., Disp: 90 tablet, Rfl: 0    diclofenac (VOLTAREN) 75 MG EC tablet, Take 1 tablet by mouth 2 (Two) Times a Day. (Do not combine with Mobic), Disp: 60 tablet, Rfl: 0    Diclofenac Sodium (VOLTAREN) 1 % gel gel, Apply 4 g topically to the appropriate area as  directed 4 (Four) Times a Day As Needed (pain)., Disp: 350 g, Rfl: 0    donepezil (ARICEPT) 10 MG tablet, Take 1 tablet by mouth Daily With Breakfast., Disp: , Rfl:     DULoxetine (CYMBALTA) 30 MG capsule, Take 1 capsule by mouth Daily., Disp: 30 capsule, Rfl: 0    empagliflozin (Jardiance) 25 MG tablet tablet, Take 1 tablet by mouth Daily., Disp: 90 tablet, Rfl: 1    erythromycin (ROMYCIN) 5 MG/GM ophthalmic ointment, Will start when have cataract surgery, Disp: , Rfl:     levocetirizine (XYZAL) 5 MG tablet, Take 1 tablet by mouth Every Evening. (Patient taking differently: Take 1 tablet by mouth As Needed for Allergies.), Disp: 90 tablet, Rfl: 1    Magnesium 400 MG tablet, Take 400 mg by mouth Daily., Disp: , Rfl:     methocarbamol (ROBAXIN) 500 MG tablet, Take 1 tablet by mouth 3 (Three) Times a Day As Needed for Muscle Spasms for up to 14 days., Disp: 42 tablet, Rfl: 0    nitroglycerin (NITROLINGUAL) 0.4 MG/SPRAY spray, Place 1 spray by translingual route on or under the tongue at the first sign of an attack, no more than 3 sprays / 15-minute. (Patient taking differently: 1 spray. Place 1 spray by translingual route on or under the tongue at the first sign of an attack, no more than 3 sprays / 15-minute.), Disp: 1 each, Rfl: 0    omeprazole (priLOSEC) 20 MG capsule, Take 1 capsule by mouth Daily., Disp: 90 capsule, Rfl: 1    SITagliptin (JANUVIA) 100 MG tablet, Take 1 tablet by mouth Daily., Disp: 90 tablet, Rfl: 1    valACYclovir (VALTREX) 500 MG tablet, Pt does not recognize this, Disp: , Rfl:     Medication Considerations:  JAMARI reviewed and appropriate.      Allergies:   Allergies   Allergen Reactions    Penicillins Swelling and Provider Review Needed     Entire body swelled as a child     Sulfa Antibiotics Shortness Of Breath, Rash and Provider Review Needed    Codeine Nausea And Vomiting and Provider Review Needed    Oxycontin [Oxycodone] Itching       Objective     Physical Exam:  Vital Signs:   Vitals:  "   09/05/23 0926   BP: 122/60   Pulse: 93   Weight: 77.1 kg (170 lb)   Height: 180.3 cm (71\")     Body mass index is 23.71 kg/m².     Mental Status Exam:   Hygiene:   good  Cooperation:  Cooperative  Eye Contact:  Good  Psychomotor Behavior:  Appropriate  Affect:  Full range  Mood: irritable  Speech:  Normal  Thought Process:  Circum  Thought Content:  Mood congruent  Suicidal:  None  Homicidal:  None  Hallucinations:  None  Delusion:  None  Memory:  Intact  Orientation:  Grossly intact  Reliability:  good  Insight:  Good  Judgement:  Good  Impulse Control:  Fair  Physical/Medical Issues:  Yes Rt Shoulder surgery 9/13/23-pt wearing sling today.      Assessment / Plan      Visit Diagnosis/Orders Placed This Visit:  Diagnoses and all orders for this visit:    1. Generalized anxiety disorder (Primary)  -     DULoxetine (CYMBALTA) 30 MG capsule; Take 1 capsule by mouth Daily.  Dispense: 30 capsule; Refill: 0  -     prazosin (MINIPRESS) 2 MG capsule; Take 1 capsule by mouth at bedtime.  Dispense: 30 capsule; Refill: 0  -     traZODone (DESYREL) 50 MG tablet; Take 1 tablet by mouth every day at bedtime.  Dispense: 30 tablet; Refill: 0    2. Moderate episode of recurrent major depressive disorder  -     DULoxetine (CYMBALTA) 30 MG capsule; Take 1 capsule by mouth Daily.  Dispense: 30 capsule; Refill: 0  -     traZODone (DESYREL) 50 MG tablet; Take 1 tablet by mouth every day at bedtime.  Dispense: 30 tablet; Refill: 0    3. Post traumatic stress disorder (PTSD)  -     DULoxetine (CYMBALTA) 30 MG capsule; Take 1 capsule by mouth Daily.  Dispense: 30 capsule; Refill: 0  -     prazosin (MINIPRESS) 2 MG capsule; Take 1 capsule by mouth at bedtime.  Dispense: 30 capsule; Refill: 0    4. Circadian rhythm sleep disorder, unspecified type  -     prazosin (MINIPRESS) 2 MG capsule; Take 1 capsule by mouth at bedtime.  Dispense: 30 capsule; Refill: 0  -     traZODone (DESYREL) 50 MG tablet; Take 1 tablet by mouth every day at " bedtime.  Dispense: 30 tablet; Refill: 0         Functional Status: Mild impairment     Prognosis: Good with Ongoing Treatment     Impression/Formulation:  Patient appeared alert and oriented.  Patient is receptive to assistance with maintaining a stable lifestyle.  Patient presents with history of     ICD-10-CM ICD-9-CM   1. Generalized anxiety disorder  F41.1 300.02   2. Moderate episode of recurrent major depressive disorder  F33.1 296.32   3. Post traumatic stress disorder (PTSD)  F43.10 309.81   4. Circadian rhythm sleep disorder, unspecified type  G47.20 327.30   .   Patient struggled with switching medications for 4-5 days.  Seeing improvement at this time.  Continue with plan.  Treatment Plan:     Increase duloxetine  Continue prazosin, trazodone  Patient will continue supportive psychotherapy efforts and medications as indicated. Clinic will obtain release of information for current treatment team for continuity of care as needed. Patient will contact this office, call 911 or present to the nearest emergency room should suicidal or homicidal ideations occur.  Discussed medication options and treatment plan of prescribed medication(s) as well as the risks, benefits, and potential side effects. Patient ackowledged and verbally consented to continue with current treatment plan and was educated on the importance of compliance with treatment and follow-up appointments.     Follow Up:   Return in about 4 weeks (around 10/3/2023) for Med Check.        JESSICA Nur, BRIEN-BC  [facility]     This is electronically signed by JESSICA Nur, ANGELO  [unfilled] 16:41 EDT

## 2023-09-12 ENCOUNTER — ANESTHESIA EVENT (OUTPATIENT)
Dept: PERIOP | Facility: HOSPITAL | Age: 63
End: 2023-09-12
Payer: MEDICARE

## 2023-09-12 RX ORDER — FAMOTIDINE 10 MG/ML
20 INJECTION, SOLUTION INTRAVENOUS ONCE
Status: CANCELLED | OUTPATIENT
Start: 2023-09-12 | End: 2023-09-12

## 2023-09-12 RX ORDER — SODIUM CHLORIDE 0.9 % (FLUSH) 0.9 %
10 SYRINGE (ML) INJECTION EVERY 12 HOURS SCHEDULED
Status: CANCELLED | OUTPATIENT
Start: 2023-09-12

## 2023-09-12 RX ORDER — SODIUM CHLORIDE 0.9 % (FLUSH) 0.9 %
10 SYRINGE (ML) INJECTION AS NEEDED
Status: CANCELLED | OUTPATIENT
Start: 2023-09-12

## 2023-09-12 RX ORDER — SODIUM CHLORIDE 9 MG/ML
40 INJECTION, SOLUTION INTRAVENOUS AS NEEDED
Status: CANCELLED | OUTPATIENT
Start: 2023-09-12

## 2023-09-13 ENCOUNTER — HOSPITAL ENCOUNTER (OUTPATIENT)
Facility: HOSPITAL | Age: 63
Discharge: HOME | End: 2023-09-13
Attending: ORTHOPAEDIC SURGERY | Admitting: ORTHOPAEDIC SURGERY
Payer: MEDICARE

## 2023-09-13 ENCOUNTER — ANESTHESIA (OUTPATIENT)
Dept: PERIOP | Facility: HOSPITAL | Age: 63
End: 2023-09-13
Payer: MEDICARE

## 2023-09-13 ENCOUNTER — ANESTHESIA EVENT CONVERTED (OUTPATIENT)
Dept: ANESTHESIOLOGY | Facility: HOSPITAL | Age: 63
End: 2023-09-13
Payer: MEDICARE

## 2023-09-13 VITALS
DIASTOLIC BLOOD PRESSURE: 64 MMHG | TEMPERATURE: 98.1 F | HEIGHT: 72 IN | BODY MASS INDEX: 23.43 KG/M2 | RESPIRATION RATE: 18 BRPM | HEART RATE: 68 BPM | OXYGEN SATURATION: 95 % | SYSTOLIC BLOOD PRESSURE: 138 MMHG | WEIGHT: 173 LBS

## 2023-09-13 DIAGNOSIS — S46.011D TRAUMATIC COMPLETE TEAR OF RIGHT ROTATOR CUFF, SUBSEQUENT ENCOUNTER: Primary | ICD-10-CM

## 2023-09-13 DIAGNOSIS — M75.101 TEAR OF RIGHT SUPRASPINATUS TENDON: ICD-10-CM

## 2023-09-13 DIAGNOSIS — E11.42 TYPE 2 DIABETES MELLITUS WITH DIABETIC POLYNEUROPATHY, WITHOUT LONG-TERM CURRENT USE OF INSULIN: ICD-10-CM

## 2023-09-13 DIAGNOSIS — R29.818 PSEUDOPARALYSIS: ICD-10-CM

## 2023-09-13 DIAGNOSIS — M25.611 POST-TRAUMATIC STIFFNESS OF RIGHT SHOULDER JOINT: ICD-10-CM

## 2023-09-13 DIAGNOSIS — M75.31 CALCIFIC TENDINITIS OF RIGHT SHOULDER: ICD-10-CM

## 2023-09-13 DIAGNOSIS — M25.511 ACUTE PAIN OF RIGHT SHOULDER: ICD-10-CM

## 2023-09-13 DIAGNOSIS — F17.210 SMOKING GREATER THAN 30 PACK YEARS: ICD-10-CM

## 2023-09-13 DIAGNOSIS — S46.011A TRAUMATIC COMPLETE TEAR OF RIGHT ROTATOR CUFF, INITIAL ENCOUNTER: ICD-10-CM

## 2023-09-13 DIAGNOSIS — M25.511 CHRONIC RIGHT SHOULDER PAIN: ICD-10-CM

## 2023-09-13 DIAGNOSIS — G89.29 CHRONIC RIGHT SHOULDER PAIN: ICD-10-CM

## 2023-09-13 LAB — GLUCOSE BLDC GLUCOMTR-MCNC: 116 MG/DL (ref 70–130)

## 2023-09-13 PROCEDURE — 25010000002 PHENYLEPHRINE 10 MG/ML SOLUTION: Performed by: ANESTHESIOLOGY

## 2023-09-13 PROCEDURE — 29827 SHO ARTHRS SRG RT8TR CUF RPR: CPT

## 2023-09-13 PROCEDURE — 29827 SHO ARTHRS SRG RT8TR CUF RPR: CPT | Performed by: ORTHOPAEDIC SURGERY

## 2023-09-13 PROCEDURE — 29828 SHO ARTHRS SRG BICP TENODSIS: CPT | Performed by: ORTHOPAEDIC SURGERY

## 2023-09-13 PROCEDURE — 63710000001 FAMOTIDINE 20 MG TABLET: Performed by: ANESTHESIOLOGY

## 2023-09-13 PROCEDURE — S0260 H&P FOR SURGERY: HCPCS | Performed by: ORTHOPAEDIC SURGERY

## 2023-09-13 PROCEDURE — A9270 NON-COVERED ITEM OR SERVICE: HCPCS | Performed by: ANESTHESIOLOGY

## 2023-09-13 PROCEDURE — C1713 ANCHOR/SCREW BN/BN,TIS/BN: HCPCS | Performed by: ORTHOPAEDIC SURGERY

## 2023-09-13 PROCEDURE — 29828 SHO ARTHRS SRG BICP TENODSIS: CPT

## 2023-09-13 PROCEDURE — 25010000002 PHENYLEPHRINE 10 MG/ML SOLUTION 1 ML VIAL: Performed by: ANESTHESIOLOGY

## 2023-09-13 PROCEDURE — 25010000002 DEXAMETHASONE PER 1 MG: Performed by: ANESTHESIOLOGY

## 2023-09-13 PROCEDURE — 25010000002 PROPOFOL 10 MG/ML EMULSION: Performed by: ANESTHESIOLOGY

## 2023-09-13 PROCEDURE — 82948 REAGENT STRIP/BLOOD GLUCOSE: CPT

## 2023-09-13 PROCEDURE — 25010000002 FENTANYL CITRATE (PF) 50 MCG/ML SOLUTION: Performed by: NURSE ANESTHETIST, CERTIFIED REGISTERED

## 2023-09-13 PROCEDURE — 29826 SHO ARTHRS SRG DECOMPRESSION: CPT | Performed by: ORTHOPAEDIC SURGERY

## 2023-09-13 PROCEDURE — 63710000001 ASPIRIN 325 MG TABLET: Performed by: ANESTHESIOLOGY

## 2023-09-13 PROCEDURE — 25010000002 ROPIVACAINE PER 1 MG: Performed by: NURSE ANESTHETIST, CERTIFIED REGISTERED

## 2023-09-13 PROCEDURE — 25010000002 ONDANSETRON PER 1 MG: Performed by: ANESTHESIOLOGY

## 2023-09-13 PROCEDURE — 25010000002 BUPIVACAINE (PF) 0.25 % SOLUTION: Performed by: NURSE ANESTHETIST, CERTIFIED REGISTERED

## 2023-09-13 PROCEDURE — 25010000002 VANCOMYCIN 10 G RECONSTITUTED SOLUTION: Performed by: ORTHOPAEDIC SURGERY

## 2023-09-13 PROCEDURE — 29826 SHO ARTHRS SRG DECOMPRESSION: CPT

## 2023-09-13 DEVICE — FOOTPRINT ULTRA PK SUTURE ANCHOR 5.5
Type: IMPLANTABLE DEVICE | Site: SHOULDER | Status: FUNCTIONAL
Brand: FOOTPRINT

## 2023-09-13 DEVICE — IMPLANTABLE DEVICE: Type: IMPLANTABLE DEVICE | Site: SHOULDER | Status: FUNCTIONAL

## 2023-09-13 DEVICE — HEALICOIL PK 4.5 MM SUTURE ANCHOR                                    WITH TWO ULTRABRAID NO.2 SUTURES                                    BLUE, BLUE-COBRAID STERILE
Type: IMPLANTABLE DEVICE | Site: SHOULDER | Status: FUNCTIONAL
Brand: HEALICOIL

## 2023-09-13 RX ORDER — FENTANYL CITRATE 50 UG/ML
INJECTION, SOLUTION INTRAMUSCULAR; INTRAVENOUS
Status: COMPLETED | OUTPATIENT
Start: 2023-09-13 | End: 2023-09-13

## 2023-09-13 RX ORDER — PHENYLEPHRINE HYDROCHLORIDE 10 MG/ML
INJECTION INTRAVENOUS AS NEEDED
Status: DISCONTINUED | OUTPATIENT
Start: 2023-09-13 | End: 2023-09-13 | Stop reason: SURG

## 2023-09-13 RX ORDER — MIDAZOLAM HYDROCHLORIDE 1 MG/ML
1 INJECTION INTRAMUSCULAR; INTRAVENOUS
Status: DISCONTINUED | OUTPATIENT
Start: 2023-09-13 | End: 2023-09-13 | Stop reason: HOSPADM

## 2023-09-13 RX ORDER — ROPIVACAINE HYDROCHLORIDE 2 MG/ML
INJECTION, SOLUTION EPIDURAL; INFILTRATION; PERINEURAL CONTINUOUS
Status: DISCONTINUED | OUTPATIENT
Start: 2023-09-13 | End: 2023-09-13 | Stop reason: HOSPADM

## 2023-09-13 RX ORDER — ASPIRIN 325 MG
325 TABLET ORAL
Status: COMPLETED | OUTPATIENT
Start: 2023-09-13 | End: 2023-09-13

## 2023-09-13 RX ORDER — BUPIVACAINE HYDROCHLORIDE 2.5 MG/ML
INJECTION, SOLUTION EPIDURAL; INFILTRATION; INTRACAUDAL
Status: COMPLETED | OUTPATIENT
Start: 2023-09-13 | End: 2023-09-13

## 2023-09-13 RX ORDER — HYDROMORPHONE HYDROCHLORIDE 1 MG/ML
0.5 INJECTION, SOLUTION INTRAMUSCULAR; INTRAVENOUS; SUBCUTANEOUS
Status: DISCONTINUED | OUTPATIENT
Start: 2023-09-13 | End: 2023-09-13 | Stop reason: HOSPADM

## 2023-09-13 RX ORDER — FAMOTIDINE 20 MG/1
20 TABLET, FILM COATED ORAL ONCE
Status: COMPLETED | OUTPATIENT
Start: 2023-09-13 | End: 2023-09-13

## 2023-09-13 RX ORDER — FENTANYL CITRATE 50 UG/ML
50 INJECTION, SOLUTION INTRAMUSCULAR; INTRAVENOUS
Status: DISCONTINUED | OUTPATIENT
Start: 2023-09-13 | End: 2023-09-13 | Stop reason: HOSPADM

## 2023-09-13 RX ORDER — SODIUM CHLORIDE, SODIUM LACTATE, POTASSIUM CHLORIDE, CALCIUM CHLORIDE 600; 310; 30; 20 MG/100ML; MG/100ML; MG/100ML; MG/100ML
9 INJECTION, SOLUTION INTRAVENOUS CONTINUOUS
Status: DISCONTINUED | OUTPATIENT
Start: 2023-09-13 | End: 2023-09-13 | Stop reason: HOSPADM

## 2023-09-13 RX ORDER — OXYCODONE AND ACETAMINOPHEN 10; 325 MG/1; MG/1
1 TABLET ORAL EVERY 4 HOURS PRN
Qty: 42 TABLET | Refills: 0 | Status: SHIPPED | OUTPATIENT
Start: 2023-09-13 | End: 2023-09-20

## 2023-09-13 RX ORDER — GLYCOPYRROLATE 0.2 MG/ML
INJECTION INTRAMUSCULAR; INTRAVENOUS AS NEEDED
Status: DISCONTINUED | OUTPATIENT
Start: 2023-09-13 | End: 2023-09-13 | Stop reason: SURG

## 2023-09-13 RX ORDER — MAGNESIUM HYDROXIDE 1200 MG/15ML
LIQUID ORAL AS NEEDED
Status: DISCONTINUED | OUTPATIENT
Start: 2023-09-13 | End: 2023-09-13 | Stop reason: HOSPADM

## 2023-09-13 RX ORDER — ONDANSETRON 2 MG/ML
INJECTION INTRAMUSCULAR; INTRAVENOUS AS NEEDED
Status: DISCONTINUED | OUTPATIENT
Start: 2023-09-13 | End: 2023-09-13 | Stop reason: SURG

## 2023-09-13 RX ORDER — LIDOCAINE HYDROCHLORIDE 10 MG/ML
0.5 INJECTION, SOLUTION EPIDURAL; INFILTRATION; INTRACAUDAL; PERINEURAL ONCE AS NEEDED
Status: COMPLETED | OUTPATIENT
Start: 2023-09-13 | End: 2023-09-13

## 2023-09-13 RX ORDER — ROCURONIUM BROMIDE 10 MG/ML
INJECTION, SOLUTION INTRAVENOUS AS NEEDED
Status: DISCONTINUED | OUTPATIENT
Start: 2023-09-13 | End: 2023-09-13 | Stop reason: SURG

## 2023-09-13 RX ORDER — ONDANSETRON 4 MG/1
4 TABLET, FILM COATED ORAL EVERY 6 HOURS PRN
Qty: 28 TABLET | Refills: 1 | Status: SHIPPED | OUTPATIENT
Start: 2023-09-13 | End: 2023-09-21

## 2023-09-13 RX ORDER — EPHEDRINE SULFATE 50 MG/ML
INJECTION, SOLUTION INTRAVENOUS AS NEEDED
Status: DISCONTINUED | OUTPATIENT
Start: 2023-09-13 | End: 2023-09-13 | Stop reason: SURG

## 2023-09-13 RX ORDER — DEXAMETHASONE SODIUM PHOSPHATE 4 MG/ML
INJECTION, SOLUTION INTRA-ARTICULAR; INTRALESIONAL; INTRAMUSCULAR; INTRAVENOUS; SOFT TISSUE AS NEEDED
Status: DISCONTINUED | OUTPATIENT
Start: 2023-09-13 | End: 2023-09-13 | Stop reason: SURG

## 2023-09-13 RX ORDER — PROPOFOL 10 MG/ML
VIAL (ML) INTRAVENOUS AS NEEDED
Status: DISCONTINUED | OUTPATIENT
Start: 2023-09-13 | End: 2023-09-13 | Stop reason: SURG

## 2023-09-13 RX ORDER — LIDOCAINE HYDROCHLORIDE 10 MG/ML
INJECTION, SOLUTION EPIDURAL; INFILTRATION; INTRACAUDAL; PERINEURAL AS NEEDED
Status: DISCONTINUED | OUTPATIENT
Start: 2023-09-13 | End: 2023-09-13 | Stop reason: SURG

## 2023-09-13 RX ADMIN — Medication 1000 MG: at 11:22

## 2023-09-13 RX ADMIN — EPHEDRINE SULFATE 10 MG: 50 INJECTION INTRAVENOUS at 10:18

## 2023-09-13 RX ADMIN — EPHEDRINE SULFATE 10 MG: 50 INJECTION INTRAVENOUS at 10:12

## 2023-09-13 RX ADMIN — EPHEDRINE SULFATE 10 MG: 50 INJECTION INTRAVENOUS at 09:34

## 2023-09-13 RX ADMIN — ASPIRIN 325 MG: 325 TABLET ORAL at 09:09

## 2023-09-13 RX ADMIN — BUPIVACAINE HYDROCHLORIDE 15 ML: 2.5 INJECTION, SOLUTION EPIDURAL; INFILTRATION; INTRACAUDAL; PERINEURAL at 08:57

## 2023-09-13 RX ADMIN — SODIUM CHLORIDE, POTASSIUM CHLORIDE, SODIUM LACTATE AND CALCIUM CHLORIDE 9 ML/HR: 600; 310; 30; 20 INJECTION, SOLUTION INTRAVENOUS at 08:31

## 2023-09-13 RX ADMIN — EPHEDRINE SULFATE 10 MG: 50 INJECTION INTRAVENOUS at 09:57

## 2023-09-13 RX ADMIN — LIDOCAINE HYDROCHLORIDE 0.5 ML: 10 INJECTION, SOLUTION EPIDURAL; INFILTRATION; INTRACAUDAL; PERINEURAL at 08:31

## 2023-09-13 RX ADMIN — EPHEDRINE SULFATE 10 MG: 50 INJECTION INTRAVENOUS at 09:50

## 2023-09-13 RX ADMIN — EPHEDRINE SULFATE 10 MG: 50 INJECTION INTRAVENOUS at 09:40

## 2023-09-13 RX ADMIN — VANCOMYCIN HYDROCHLORIDE 1250 MG: 10 INJECTION, POWDER, LYOPHILIZED, FOR SOLUTION INTRAVENOUS at 09:10

## 2023-09-13 RX ADMIN — PHENYLEPHRINE HYDROCHLORIDE 0.2 MCG/KG/MIN: 10 INJECTION INTRAVENOUS at 09:35

## 2023-09-13 RX ADMIN — PROPOFOL 200 MG: 10 INJECTION, EMULSION INTRAVENOUS at 09:22

## 2023-09-13 RX ADMIN — FENTANYL CITRATE 100 MCG: 50 INJECTION, SOLUTION INTRAMUSCULAR; INTRAVENOUS at 08:42

## 2023-09-13 RX ADMIN — ONDANSETRON 4 MG: 2 INJECTION INTRAMUSCULAR; INTRAVENOUS at 10:40

## 2023-09-13 RX ADMIN — PHENYLEPHRINE HYDROCHLORIDE 100 MCG: 10 INJECTION INTRAVENOUS at 09:39

## 2023-09-13 RX ADMIN — FAMOTIDINE 20 MG: 20 TABLET ORAL at 08:31

## 2023-09-13 RX ADMIN — GLYCOPYRROLATE 0.2 MG: 0.2 INJECTION INTRAMUSCULAR; INTRAVENOUS at 09:49

## 2023-09-13 RX ADMIN — PHENYLEPHRINE HYDROCHLORIDE 100 MCG: 10 INJECTION INTRAVENOUS at 09:34

## 2023-09-13 RX ADMIN — LIDOCAINE HYDROCHLORIDE 50 MG: 10 INJECTION, SOLUTION EPIDURAL; INFILTRATION; INTRACAUDAL; PERINEURAL at 09:22

## 2023-09-13 RX ADMIN — EPHEDRINE SULFATE 10 MG: 50 INJECTION INTRAVENOUS at 09:45

## 2023-09-13 RX ADMIN — EPHEDRINE SULFATE 5 MG: 50 INJECTION INTRAVENOUS at 10:41

## 2023-09-13 RX ADMIN — EPHEDRINE SULFATE 10 MG: 50 INJECTION INTRAVENOUS at 09:31

## 2023-09-13 RX ADMIN — ROCURONIUM BROMIDE 50 MG: 10 SOLUTION INTRAVENOUS at 09:22

## 2023-09-13 RX ADMIN — DEXAMETHASONE SODIUM PHOSPHATE 4 MG: 4 INJECTION, SOLUTION INTRAMUSCULAR; INTRAVENOUS at 09:44

## 2023-09-13 NOTE — OP NOTE
Operative Report     Side/SHOULDER: Right shoulder    LOCATION: [facility] operating room #9    [facility] OPERATIVE REPORT     Surgeon: Geoffrey Francis MD     Assistant: MARIELENA Vincent     The skilled assistance of the above noted first assistant was necessary during this complex surgical procedure.  The surgical assistant assisted with every aspect of the operation including, but not limited to, proper and safe positioning of the patient, obtaining adequate surgical exposure, manipulation of surgical instruments, suture management, surgical knot tying when necessary, the continual process of hemostasis during the procedure itself in addition to surgical wound closure and removal of the patient from the operating table and returning the patient back to the [facility].  The assistance of the surgical assistant allowed me to perform the most sensitive and technical potions of this operation using 2 hands, thus enhancing efficiency and patient safety.  This would not be possible without the help of a skilled assistant familiar with the procedure and capable of safely performing the aforementioned tasks.     Date of Surgery: 9/13/2023  Shoulder: Right shoulder   Preoperative Diagnosis:  1.     Rotator cuff tear full-thickness tearing supraspinatus into the infraspinatus with interlaminar split tearing/delamination, calcific tendinitis, intrasubstance tearing subscapularis- treated with arthroscopic rotator cuff repair - CPT 83360  2.     Biceps tendinopathy, base of the biceps tearing, SLAP 2 tearing- treated with arthroscopic biceps tenodesis - CPT 55695  3.     Shoulder impingement syndrome - treated with arthroscopic subacromial decompression with acromioplasty - CPT 51485  4.     Concerned about preoperative stiffness given profound limitations due to pain, pseudoparalysis, limited motion in the office-fortunately good range of motion with examination under anesthesia,  limitations suspected to be due to pain     Postoperative Diagnosis:  1.     SAME as preoperative diagnoses     Procedure:  1.     Arthroscopic rotator cuff repair (2x2) - CPT 21075  2.     Arthroscopic biceps tenodesis (8-8) - CPT 23481  3.     Arthroscopic subacromial decompression with acromioplasty - CPT 44584        Admission status: elective outpatient surgery     COVID-19 Statement: The patient understands that the surgery is elective surgery.  Patient is aware of the ongoing COVID-19 pandemic and potential implications.     Complications: None     EBL: 10mL     Specimen: NONE     Anesthesia: general anesthesia     Block: regional interscalene block with indwelling catheter per anesthesia     Antibiotics: weight based IV antibiotics infused prior to incision     Time out: Time out was called and the patient, side, site, and intended procedures were confirmed.     Counts: Needle and sponge counts were correct prior to closure.     DVT prophylaxis: The patient will be weight bearing as tolerated on bilateral lower extremities postoperatively with no additional chemical DVT prophylaxis indicated.     Marked: The patient was marked with an indelible marker in the preoperative holding area confirming the correct side and site.     History & Physical:   A history and physical examination was completed and updated in the preoperative holding area.     Consent: The patient signed the consent form for surgery with an understanding of the risks and benefits as outlined in the clinic and in the preoperative holding area.     Risks and Benefits: Specific risks and benefits discussed included - pain, bleeding, infection, injury to nerves or blood vessels, fracture, stiffness, failure to heal, revision surgery, deformity of biceps or asymmetry, complications of the block, anesthetic complications, and medical complications associated with surgery.        Indications for surgery:  The patient has history, physical  examination, and radiographic findings confirming the diagnoses.  The patient has persistent pain and functional loss unresponsive to non-operative treatment consisting of selective rest/activity modification, medications, and exercises.  MRI documented the status of the rotator cuff - rotator cuff tearing was noted.     Impingement syndrome - the patient had history and clinical exam findings consistent with shoulder impingement syndrome.  Additionally, the patient had subacromial bursitis which was encountered during the arthroscopic shoulder procedure.  Subacromial decompression with minimal acromioplasty was indicated as part of the treatment of impingement syndrome, and additionally to enhance arthroscopic visualization to enable minimally invasive, arthroscopic rotator cuff repair.      Patient carries significant medical comorbidities including smoking, diabetes mellitus, multiple stents.  He required cardiac clearance from Dr. Wong.  Patient strongly desire to proceed as he notes he is unable to use the arm at all.  In fact he notes that he would like to proceed with evaluation of the contralateral shoulder as soon as this one is completed-he discussed this today in preop.  He is concerned about the contralateral shoulder as well.        Operative Findings:  Rotator Cuff Tissue Quality: Chronic dysplastic tissue noted with intrasubstance tearing and interlaminar split tearing full-thickness tearing supraspinatus into the infraspinatus.     Status of the Rotator Cuff:  Supraspinatus -full-thickness  Infraspinatus -full-thickness anterior portion  Subscapularis -intrasubstance tearing but good attachment at the anatomical footprint  Teres minor - no tear     Size of Rotator Cuff Tear:  Combined supraspinatus tear with extension into the infraspinatus -26 to 28 mm    Calcific tendinitis-I did perform needle decompression with a spinal needle to see if any extravasation of calcification was noted.  Appear to  be fairl mature and more in the infraspinatus.  Suspect some head extravasated as part of the repair for the infraspinatus component.      Rotator Cuff Repair Construct:  2x2 Transosseous Equivalent Double Row Arthroscopic Rotator Cuff Repair      Rotator Cuff Implants:  Medial Row: 2 Medial Anchors - [employer] 4.5mm Healicoil PEEK, all 8 suture limbs were utilized  Lateral Row: 2 Lateral Anchors - [employer] 5.5mm Footprint PEEK      Bone Quality: Good bone quality    Labrum: Degenerative labral tearing, SLAP 2 tearing, base of the biceps tearing     Biceps: tendinopathy and synovitis and base of the biceps tearing, intra-articular and extra-articular findings that were notable.  SLAP 2 tearing as well     Biceps Tenodesis Construct:  Suprapectoral biceps tenodesis in the bicipital groove     Biceps Tendon Implant:  Arthrex SwiveLock Biceps Tenodesis BioComposite screw  Drill Size: 8mm  Screw Size: 8mm     Humeral Head: Mild degenerative  Glenoid: Mild degenerative     Contracture: No obvious contracture in the operating room despite concerns for contracture in the office-presume that this is a pain limited phenomenon.  Guarded prognosis with pseudoparalysis despite MRI and intraoperative findings.  Pathological laxity: Negative     Procedure in detail:  Regional interscalene block was completed in the preoperative area by the anesthesia team.  The patient was supine on the operative table and the anesthesia team initiated general anesthesia.  The patient was placed in a modified beach chair position with the neck secured in neutral alignment and all bony prominences were well padded.     The shoulder was assessed with an examination under anesthesia.     The operative extremity was cleaned with alcohol and then the extremity was prepped and draped in the standard fashion.  The surgical arm was placed into a well-padded arm castro. A standard posterior portal was created with an incision made 1 cm inferior  1 cm medial to the posterolateral acromial corner.  A blunt metal trocar and cannula were inserted into the glenohumeral joint.  The arthroscopic pump was connected and the above findings and diagnoses were noted as part of the diagnostic shoulder arthroscopy.       A spinal needle was used to localize the anterior portal position in the rotator interval. A 5.5mm plastic cannula and trocar were inserted followed by a 4.5mm shaver/cautery device.  The shaver was used for debridement in the glenohumeral joint.  Arthroscopic scissors were used to perform biceps tenotomy of the pathologic biceps tendon prior to subsequent biceps tenodesis.  The remaining intra-articular portion of the biceps tendon was debrided using the shaver/cautery device.  The upper border had some fraying and some intrasubstance tearing but this was tested dynamically with a grasper and also with shuck testing and noted to be stable with excellent anatomical footprint attachment of the subscapularis.     The arthroscope and metal cannula were then removed in order to transition to the subacromial space.  The blunt metal trocar and cannula were inserted into the subacromial space and the lateral portal site identified with a spinal needle.  An 8 mm plastic cannula and trocar were inserted.  I turned my attention to the arthroscopic subacromial decompression with acromioplasty. Bursitis was noted in the subacromial space and impacted visualization of the rotator cuff.  The 4.5mm shaver/cautery device was used to clear the subacromial space until the acromion could be identified and bursal resection was completed to allow rotator cuff visualization.  The shaver was used to remove fibrous tissue from the acromial undersurface until the anterolateral and anterior medial corners of the acromion were clearly identified.  The coracoacromial ligament was not released the CA ligament tearing was noted and was debrided as part of the acromioplasty.  The  shaver was used to perform a minimal acromioplasty.  The shaver/cautery device was used to perform the subacromial decompression with minimal acromioplasty.     I turned my attention to the arthroscopic biceps tenodesis. The arm was placed in a slightly external rotator position and the elbow flexed and shoulder forward flexed into a biceps tendon repair position.  The biceps tendon repair position in achieved in order to try to maintain proper biceps tendon length-tension relationship during the repair.  The biceps sheath was opened using the shaver/cautery device and the pathologic biceps tendon was visualized.  The appropriately sized drill bit was used to create a drill hole for the tendon above the pectoralis major tendon and within the bicipital groove.  The biceps tendon was placed into the drill hole and the screw inserted and tested.  The arthroscopic biceps tenodesis was completed and the repair was noted to be dynamically stable with a solid repair.     I turned my attention to the rotator cuff tear and the arthroscopic rotator cuff repair.  Interlaminar split tearing of the rotator cuff/delamination was noted.  The torn rotator cuff tendon was grasped with a handheld grasper and assessed for mobility and integrity.  The soft tissue at the greater tuberosity insertion site was removed and a power shaver was used to create a healthy bleeding bed to enhance rotator cuff tendon healing.  I also attempted to decompression before and after repair with regards to the calcific tendinitis component.  I counseled the patient preoperatively that some of this may persist given the location and the maturity.     Arthroscopic rotator cuff suture anchors were then inserted for the rotator cuff repair.  The two medial row arthroscopic rotator cuff repair anchors were inserted first from anterior to posterior and the sutures from the anchors were withdrawn through the anterior cannula. An arthroscopic suture passer was  used to place the high strength sutures through the rotator cuff tendon in an inverted mattress fashion. The sutures were then inserted laterally into 2 lateral knotless anchors in a cruciform pattern with appropriate tensioning.  The completed arthroscopic rotator cuff repair was inspected dynamically and the arthroscopic instruments removed along with the arthroscopic fluid. The incisions were closed with nylon suture and steri-strips were placed over the incisions.  A sterile dressing was applied followed by a neutral rotation sling.  The patient tolerated the procedure well and was transported to the recovery room in satisfactory condition.        Postoperative Plan:  Neutral rotation sling  Non-weight bearing  No biceps loading  Pendulums, elbow, wrist, and hand exercises encouraged  Clinic follow-up appointment scheduled for 2 weeks after surgery      Rotator Cuff Repair - POSTOPERATIVE PLAN:  Follow-up in the office in 2 weeks with 1 view of the operative shoulder at that time  Sutures: Nylon sutures to come out in 2 weeks  DVT prophylaxis: Patient will continue his home aspirin as per cardiac instructions  Weightbearing: Nonweightbearing on operative extremity  Sling for 3 to 4 weeks following the surgery  Physical therapy: Formal outpatient physical therapy will be provided at the 2-week appointment in the office.  Rotator cuff repair protocol    Electronically signed by Geoffrey Francis MD, 09/13/23, 10:57 AM EDT.

## 2023-09-13 NOTE — ANESTHESIA PREPROCEDURE EVALUATION
Anesthesia Evaluation     Patient summary reviewed and Nursing notes reviewed   no history of anesthetic complications:   NPO Solid Status: > 8 hours  NPO Liquid Status: > 2 hours           Airway   Mallampati: II  TM distance: >3 FB  Neck ROM: full  No difficulty expected  Dental    (+) edentulous    Pulmonary - normal exam   (+) a smoker Current,  Cardiovascular - normal exam    ECG reviewed    (+) hypertension, CAD, cardiac stents Drug eluting stent more than 12 months ago , hyperlipidemia    ROS comment: Cardiac clearance per Dr. Wong    Neuro/Psych  (+) CVA (short term memory loss and balance difficulty), psychiatric history Anxiety, Depression and PTSD  (-) seizures, TIA  GI/Hepatic/Renal/Endo    (+) GERD well controlled, diabetes mellitus type 2  (-) liver disease, no renal disease, no thyroid disorder    Musculoskeletal     (+) neck pain  Abdominal    Substance History   (+) drug use (marijuana)     OB/GYN          Other   arthritis,                   Anesthesia Plan    ASA 3     general with block     (ASA given DOS pt ran out at home and has not taken in a couple days)  intravenous induction     Anesthetic plan, risks, benefits, and alternatives have been provided, discussed and informed consent has been obtained with: patient.    Plan discussed with CRNA.    CODE STATUS:

## 2023-09-13 NOTE — BRIEF OP NOTE
SHOULDER ARTHROSCOPY WITH ROTATOR CUFF REPAIR  Progress Note    Santi Souza .  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]  Type 2 diabetes mellitus with diabetic polyneuropathy, without long-term current use of insulin [E11.42]  Chronic right shoulder pain [M25.511, G89.29]       Post-Op Diagnosis Codes:     * Traumatic complete tear of right rotator cuff, initial encounter [S46.011A]     * Calcific tendinitis of right shoulder [M75.31]     * Acute pain of right shoulder [M25.511]     * Tear of right supraspinatus tendon [M75.101]     * 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]     * Type 2 diabetes mellitus with diabetic polyneuropathy, without long-term current use of insulin [E11.42]     * Chronic right shoulder pain [M25.511, G89.29]    Procedure/CPT® Codes:        Procedure(s):  SHOULDER ARTHROSCOPY WITH ROTATOR CUFF REPAIR; BICEP TENDONESIS; SUBACROMIAL DECOMPRESSION- RIGHT        SURGICAL APPROACH: Double Row        Surgeon(s):  Geoffrey Francis MD    Anesthesia: General with Block    Staff:   Circulator: Drew Hunter RN; Caity Vega RN; Marycarmen Looney RN  Scrub Person: Darin Umaña Amy, RN  Vendor Representative: Valentino Alegria  Assistant: Yuli Jackson PA-C  Assistant: Yuli Jackson PA-C      Estimated Blood Loss: minimal    Urine Voided: * No values recorded between 9/13/2023  9:15 AM and 9/13/2023 10:56 AM *    Specimens:                None          Drains: * No LDAs found *    Findings:   Full-thickness tearing of the supraspinatus extending into the infraspinatus.  Minimal calcific tendinitis visible arthroscopically with attempted decompression.  No obvious contracture despite preoperative concerns for posttraumatic stiffness.  Base of the biceps  tearing, SLAP 2 tearing        Complications: None    Assistant: Yuli Jackson PA-C  was responsible for performing the following activities: Retraction, Suction, Irrigation, Suturing, Closing, and Held/Positioned Camera and their skilled assistance was necessary for the success of this case.    Geoffrey Francis MD     Date: 9/13/2023  Time: 10:56 EDT

## 2023-09-13 NOTE — ANESTHESIA POSTPROCEDURE EVALUATION
Patient: Santi Souza Sr.    Procedure Summary       Date: 09/13/23 Room / Location:  [facility]    Anesthesia Start: 0913 Anesthesia Stop:     Procedure: SHOULDER ARTHROSCOPY WITH ROTATOR CUFF REPAIR; BICEP TENDONESIS; SUBACROMIAL DECOMPRESSION- RIGHT (Right: Shoulder) Diagnosis:       Traumatic complete tear of right rotator cuff, initial encounter      Calcific tendinitis of right shoulder      Acute pain of right shoulder      Tear of right supraspinatus tendon      Pseudoparalysis      Type 2 diabetes mellitus with diabetic polyneuropathy, without long-term current use of insulin      Chronic right shoulder pain      (Traumatic complete tear of right rotator cuff, initial encounter [S46.011A])      (Calcific tendinitis of right shoulder [M75.31])      (Acute pain of right shoulder [M25.511])      (Tear of right supraspinatus tendon [M75.101])      (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          Post Anesthesia Care and Evaluation    Patient location during evaluation: PACU  Patient participation: complete - patient participated  Level of consciousness: awake and responsive to verbal stimuli  Pain score: 2  Pain management: adequate    Airway patency: patent  Anesthetic complications: No anesthetic complications    Cardiovascular status: acceptable  Respiratory status: acceptable  Hydration status: acceptable    Comments: Pt awake and responsive. SV. VSS. Report to RN. Patient Vitals in the past 24 hrs:  09/13/23 0806, BP:118/82, Temp:97.1 °F (36.2 °C), Temp src:Temporal, Pulse:60, Resp:18, SpO2:95 %, Height:182.9 cm  "(72.01\"), Weight:78.5 kg (173 lb)  133/78. p 72. r 16. t 98.1                "

## 2023-09-13 NOTE — DISCHARGE INSTRUCTIONS
InfuBLOCK - Patient Information    What is a pain pump?  The InfuBLOCK pump delivers post-operative, non-narcotic, numbing medication to the nerve near the surgical site for pain relief.     Where can I find information about my pain pump?           For more information about your pain pump, scan the QR code.  For additional patient resources, visit [email or web address].                                                                                               While your physician is your primary source for information about your treatment there may be times during your treatment that you need assistance with your infusion pump.     If you need assistance take the following steps:    The [employer] Nursing Hotline is Here for You 24/7.  Please call [phone] for the following concerns or complications:    Answers to questions about your infusion pump                 Tubing disconnect  Assistance with pump alarms                                                      Dislodged catheter  Excessive leakage noted from pump                                         Inadequate pain control    2.   [facility] Anesthesia Acute Pain Service: [phone] is available 24/7 for any further needs or concerns about medication or pain control.     -------------------------------------------------------------------------    Nerve Catheter Removal Instructions  When your device is empty:    Remove your catheter by pulling the dressing off slowly (like you would remove a regular bandage). The catheter should pull right out of the skin.  Check that the BLUE tip is intact.                                                                                     If the catheter is stuck, reposition your   extremity and pull slowly until removed.  *If catheter is HURTING and WON'T come out, stop and call [phone] for further assistance.    Remove medication bag from the black carrying case.  Cut the  tubing on right and left side of pump, and discard the medication bag and tubing into garbage.  Place the pump and black carrying case into the plastic bag and then place this into the return box.  Seal box with blue stickers and return to [place] postal service. THIS IS PRE-PAID POSTAGE.        -------------------------------------------------------------------------    [facility] COLD THERAPY - PATIENT INSTRUCTION SHEET    Cold Compression Therapy for your comfort and rehabilitation  Your caregivers want you to be productive in your rehab and comfortable during your stay. In keeping with those goals, you will be receiving an SMI Cold Therapy Wrap to help ease post-operative pain and swelling that might keep you from getting back on track! Your SMI Cold Therapy Wrap is effective and simple-to-use, and you will be encouraged to apply it throughout your hospital stay and at home through the duration of your recovery.    When you are ready to go home  Be sure to take your SMI Cold Therapy Wrap and both sets of Gel Bags with you for continued comfort and use throughout your rehabilitation. If you don't already have them, ask your nurse or aide to retrieve your SMI Gel Bags from the patient freezer.    Home use precautions  Always follow your medical professional's application instructions upon discharge. Your SMI Cold Therapy Wrap and Gel Bags are designed to last for months following your surgery. Never heat the Gel Bags unless specified by your healthcare provider. Supervision is advised when using this product on children or geriatric patients. To avoid danger of suffocation, please keep the outer plastic packaging away from children & pets.    Cold Therapy Instructions  Place Gel Bags in a freezer set ¾ of the way to max temperature for at least (4) hours. For best results, lay the Gel Bags flat and [id number] in the freezer. Once frozen, slide Gel Bags into the gel pouch and secure your wrap to the affected area with the  straps.  Gel wraps that have been stored in a freezer for an extended period of time may require a (10) minute period of softening up in a room temperature environment before application.  The gel pouch acts as a protective barrier. NEVER place frozen bags directly onto skin, as this may cause frostbite injury.  The [facility] Cold Therapy Wrap is designed to be able to be worm while ambulating. The compression straps can be secured well enough so that the Wrap won't fall off while moving.  Wrap Application Videos can be viewed at [email or web address].  An additional protective barrier such as clothing, a washcloth, hand-towel or pillowcase may be used during prolonged treatment applications.  The Gel-Pouch and Wrap are both Latex-Free and the Gel Bag ingredients are non toxic.    [facility] Wrap care instructions  The [facility] Cold Therapy Wrap may be hand washed and hung to dry when needed.    [facility] re-order information  Additional [facility] body specific wraps and/or Gel Bags can be re-ordered from [employer] or call [employer] ([phone])

## 2023-09-13 NOTE — ANESTHESIA PROCEDURE NOTES
Airway  Urgency: elective    Date/Time: 9/13/2023 9:23 AM  Airway not difficult    General Information and Staff    Patient location during procedure: OR  SRNA: Ryan Torres SRNA  Indications and Patient Condition  Indications for airway management: airway protection    Preoxygenated: yes  MILS not maintained throughout  Mask difficulty assessment: 1 - vent by mask    Final Airway Details  Final airway type: endotracheal airway      Successful airway: ETT  Cuffed: yes   Successful intubation technique: direct laryngoscopy  Facilitating devices/methods: intubating stylet  Endotracheal tube insertion site: oral  Blade: Taylor  Blade size: 3  ETT size (mm): 7.5  Cormack-Lehane Classification: grade IIa - partial view of glottis  Placement verified by: chest auscultation and capnometry   Cuff volume (mL): 8  Measured from: lips  ETT/EBT  to lips (cm): 21  Number of attempts at approach: 1  Assessment: lips, teeth, and gum same as pre-op and atraumatic intubation    Additional Comments  Negative epigastric sounds, Breath sound equal bilaterally with symmetric chest rise and fall

## 2023-09-13 NOTE — H&P
Pre-Op H&P  Santi Souza .  [id number]  1960      Chief complaint: Right Shoulder Pain      Subjective:  Patient is a 63 y.o.male presents for scheduled surgery by Dr. Francis. He anticipates a SHOULDER ARTHROSCOPY WITH ROTATOR CUFF REPAIR - RIGHT - Right today.  The patient endorses having right shoulder pain for approximately the past 2-3 months.  He endorses having pain in his shoulder, with some numbness and tingling down his right upper extremity.  She also endorses having decreased range of motion in his right arm.  He denies taking any medications to help alleviate his pain.    Review of Systems:  Constitutional-- No fever, chills or sweats. No fatigue.  CV-- No chest pain, palpitation or syncope. +HTN, HLD.  Resp-- No SOB, cough, hemoptysis  Skin--No rashes or lesions      Allergies:   Allergies   Allergen Reactions    Hydrocodone Itching    Penicillins Swelling and Provider Review Needed     Entire body swelled as a child     Sulfa Antibiotics Shortness Of Breath, Rash and Provider Review Needed    Codeine Nausea And Vomiting, Confusion and Provider Review Needed    Oxycontin [Oxycodone] Itching         Home Meds:  Medications Prior to Admission   Medication Sig Dispense Refill Last Dose    aspirin 81 MG chewable tablet Chew 1 tablet.   Past Week    atorvastatin (LIPITOR) 80 MG tablet Take 1 tablet by mouth Daily. 90 tablet 1 9/13/2023    baclofen (LIORESAL) 10 MG tablet Take 1 tablet by mouth 3 (Three) Times a Day. 90 tablet 0 Past Month    diclofenac (VOLTAREN) 75 MG EC tablet Take 1 tablet by mouth 2 (Two) Times a Day. (Do not combine with Mobic) 60 tablet 0 9/12/2023    Diclofenac Sodium (VOLTAREN) 1 % gel gel Apply 4 g topically to the appropriate area as directed 4 (Four) Times a Day As Needed (pain). 350 g 0 9/12/2023    donepezil (ARICEPT) 10 MG tablet Take 1 tablet by mouth Daily With Breakfast.   9/13/2023    DULoxetine (CYMBALTA) 30 MG capsule Take 1 capsule by mouth Daily. 30 capsule 0  9/13/2023    empagliflozin (Jardiance) 25 MG tablet tablet Take 1 tablet by mouth Daily. 90 tablet 1 9/13/2023    Magnesium 400 MG tablet Take 400 mg by mouth Daily.   9/13/2023    omeprazole (priLOSEC) 20 MG capsule Take 1 capsule by mouth Daily. 90 capsule 1 9/13/2023    prazosin (MINIPRESS) 2 MG capsule Take 1 capsule by mouth at bedtime. 30 capsule 0 9/13/2023    SITagliptin (JANUVIA) 100 MG tablet Take 1 tablet by mouth Daily. 90 tablet 1 9/13/2023    traZODone (DESYREL) 50 MG tablet Take 1 tablet by mouth every day at bedtime. 30 tablet 0 9/12/2023    erythromycin (ROMYCIN) 5 MG/GM ophthalmic ointment Will start when have cataract surgery   Unknown    levocetirizine (XYZAL) 5 MG tablet Take 1 tablet by mouth Every Evening. (Patient taking differently: Take 1 tablet by mouth As Needed for Allergies.) 90 tablet 1 More than a month    nitroglycerin (NITROLINGUAL) 0.4 MG/SPRAY spray Place 1 spray by translingual route on or under the tongue at the first sign of an attack, no more than 3 sprays / 15-minute. (Patient taking differently: 1 spray. Place 1 spray by translingual route on or under the tongue at the first sign of an attack, no more than 3 sprays / 15-minute.) 1 each 0 Unknown    valACYclovir (VALTREX) 500 MG tablet Pt does not recognize this   Unknown         PMH:   Past Medical History:   Diagnosis Date    CAD (coronary artery disease) 11/03/2017    Cataracta     Chronic back pain 11/03/2017    Depression     Diabetes mellitus--Insulin Dependant 11/03/2017    IDDM    GERD (gastroesophageal reflux disease) 11/03/2017    Hyperlipidemia 11/03/2017    Hypertension 11/03/2017    Neuropathy     Osteoarthritis     Rotator cuff syndrome 6/23    Should be on my chart    Stroke 2022    short term memory and difficulty balance    Vitamin D deficiency     Wears glasses     Wears hearing aid in both ears      PSH:    Past Surgical History:   Procedure Laterality Date    BACK SURGERY  1999    4 lumbar surgeries     "CARDIAC CATHETERIZATION      total of 7 stents    COLONOSCOPY      LUMBAR SPINE SURGERY  1998,     TONSILLECTOMY         Immunization History:  Influenza:   Pneumococcal: Yes  Tetanus: Yes  Covid : x3    Social History:   Tobacco:   Social History     Tobacco Use   Smoking Status Some Days    Packs/day: 0.25    Years: 32.00    Pack years: 8.00    Types: Cigarettes    Start date: 1974    Last attempt to quit: 2022    Years since quittin.6   Smokeless Tobacco Never      Alcohol:     Social History     Substance and Sexual Activity   Alcohol Use Never         Physical Exam:/82 (BP Location: Left arm, Patient Position: Lying)   Pulse 60   Temp 97.1 °F (36.2 °C) (Temporal)   Resp 18   Ht 182.9 cm (72.01\")   Wt 78.5 kg (173 lb)   SpO2 95%   BMI 23.46 kg/m²       General Appearance:    Alert, cooperative, no distress, appears stated age   Head:    Normocephalic, without obvious abnormality, atraumatic   Lungs:     Clear to auscultation bilaterally, respirations unlabored    Heart:   Regular rate and rhythm, S1 and S2 normal    Abdomen:    Soft without tenderness   Extremities:   Extremities normal, atraumatic, no cyanosis or edema   Skin:   Skin color, texture, turgor normal, no rashes or lesions   Neurologic:   Grossly intact     Results Review:     LABS:  Lab Results   Component Value Date    WBC 8.95 2023    HGB 15.6 2023    HCT 46.5 2023    MCV 98.1 (H) 2023     2023    NEUTROABS 5.15 2023    GLUCOSE 161 (H) 2023    BUN 22 2023    CREATININE 1.12 2023    EGFRIFNONA >60 10/11/2021    EGFRIFAFRI >60 10/11/2021     2023    K 4.4 2023     2023    CO2 29.0 2023    MG 2.0 2023    CALCIUM 9.5 2023    ALBUMIN 4.7 2023    AST 30 2023    ALT 36 2023    BILITOT 0.5 2023       RADIOLOGY:  Imaging Results (Last 72 Hours)       ** No results found for the last " 72 hours. **            I reviewed the patient's new clinical results.    Impression:   Traumatic complete tear of right rotator cuff, initial encounter       Plan: SHOULDER ARTHROSCOPY WITH ROTATOR CUFF REPAIR - RIGHT - Right       Go Gupta, APRN   9/13/2023   08:43 EDT

## 2023-09-13 NOTE — ANESTHESIA PROCEDURE NOTES
Right Interscalene Catheter      Patient reassessed immediately prior to procedure    Patient location during procedure: pre-op  Reason for block: at surgeon's request and post-op pain management  Performed by  CRNA/CAA: Trevin Yang CRNA  Preanesthetic Checklist  Completed: patient identified, IV checked, site marked, risks and benefits discussed, surgical consent, monitors and equipment checked, pre-op evaluation and timeout performed  Prep:  Sterile barriers:cap, gloves, mask and washed/disinfected hands  Prep: ChloraPrep  Patient monitoring: blood pressure monitoring, continuous pulse oximetry and EKG  Procedure    Sedation: yes  Performed under: local infiltration  Guidance:ultrasound guided    ULTRASOUND INTERPRETATION.  Using ultrasound guidance a 20 G gauge needle was placed in close proximity to the nerve, at which point, under ultrasound guidance anesthetic was injected in the area of the nerve and spread of the anesthesia was seen on ultrasound in close proximity thereto.  There were no abnormalities seen on ultrasound; a digital image was taken; and the patient tolerated the procedure with no complications. Images:still images obtained, printed/placed on chart    Laterality:right  Block Type:interscalene  Injection Technique:catheter  Needle Type:Tuohy and echogenic  Needle Gauge:18 G  Resistance on Injection: none  Catheter Size:20 G (20g)  Cath Depth at skin: 12 cm    Medications Used: bupivacaine PF (MARCAINE) 0.25 % injection - Injection   15 mL - 9/13/2023 8:57:00 AM  fentaNYL citrate (PF) (SUBLIMAZE) injection - Intravenous   100 mcg - 9/13/2023 8:42:00 AM      Post Assessment  Injection Assessment: negative aspiration for heme, no paresthesia on injection and incremental injection  Patient Tolerance:comfortable throughout block  Complications:no  Additional Notes  CATHETER  A high-frequency linear transducer, with sterile cover, was placed in the supraclavicular fossa to identify the subclavian  "artery and trunks and divisions of the brachial plexus. The transducer was then moved in a cephalad orientation with a slight rotation to continue visualization of the brachial plexus from the trunks and divisions, on to the C5-C7 roots. The insertion site was prepped and draped in sterile fashion. Skin and cutaneous tissue was infiltrated with 2-5 ml of 1% Lidocaine. Using ultrasound-guidance, an 18-gauge Contiplex Ultra 360 Touhy needle was advanced in plane from lateral to medial. Preservative-free normal saline was utilized for hydro-dissection of tissue, advancement of Touhy, and to confirm final needle placement at the fascial plane between the middle scalene muscle and sheath of the brachial plexus (C5-C7). A 20-gauge Contiplex Echo catheter was placed through the needle and advance out the tip of the Touhy 3-5 cm with the \"Hernández Flip\". The Touhy needle was then removed, and final catheter position verified lateral to the brachial plexus with local anesthetic (LA) and ultrasound visualization. The catheter was secured in the usual fashion with skin glue, benzoin, steri-strips, CHG tegaderm and label noting \"Nerve Block Catheter\". Jerk tape applied at yellow connector and catheter connection. All LA was injected in increments of 3-5 ml after catheter secured. Aspiration every 5 ml to prevent intravascular injection. Injection was completed with negative aspiration of blood and negative intravascular injection. Injection pressures were normal with minimal resistance.           "

## 2023-09-14 ENCOUNTER — HOSPITAL ENCOUNTER (OUTPATIENT)
Dept: PREOP | Facility: HOSPITAL | Age: 63
Setting detail: HOSPITAL OUTPATIENT SURGERY
Discharge: HOME | End: 2023-09-14
Payer: MEDICARE

## 2023-09-14 ENCOUNTER — NURSE TRIAGE (OUTPATIENT)
Dept: CALL CENTER | Facility: HOSPITAL | Age: 63
End: 2023-09-14
Payer: MEDICARE

## 2023-09-14 NOTE — TELEPHONE ENCOUNTER
Reason for Disposition   [1] Caller has NON-URGENT question AND [2] triager unable to answer question    Additional Information   Negative: Sounds like a life-threatening emergency to the triager   Negative: Chest pain   Negative: Difficulty breathing   Negative: Acting confused (e.g., disoriented, slurred speech) or excessively sleepy   Negative: Post-Op tonsil and adenoid surgery, symptoms or questions about   Negative: Surgical incision symptoms and questions   Negative: [1] Pain or burning with passing urine (urination) AND [2] male   Negative: [1] Pain or burning with passing urine (urination) AND [2] female   Negative: Constipation   Negative: New or worsening leg (calf, thigh) pain   Negative: New or worsening leg swelling   Negative: Dizziness is severe, or persists > 24 hours after surgery   Negative: Pain, redness, swelling, or pus at IV Site   Negative: Symptoms arising from use of a urinary catheter (e.g., Coude, Jeffries)   Negative: Cast problems or questions   Negative: Medication question   Negative: [1] Widespread rash AND [2] bright red, sunburn-like   Negative: [1] SEVERE headache AND [2] after spinal (epidural) anesthesia   Negative: [1] Vomiting AND [2] persists > 4 hours   Negative: [1] Vomiting AND [2] abdomen looks much more swollen than usual   Negative: [1] Drinking very little AND [2] dehydration suspected (e.g., no urine > 12 hours, very dry mouth, very lightheaded)   Negative: Patient sounds very sick or weak to the triager   Negative: Sounds like a serious complication to the triager   Negative: Fever > 100.4 F (38.0 C)   Negative: [1] SEVERE post-op pain (e.g., excruciating, pain scale 8-10) AND [2] not controlled with pain medications   Negative: [1] Caller has URGENT question AND [2] triager unable to answer question   Negative: [1] Headache AND [2] after spinal (epidural) anesthesia AND [3] not severe   Negative: Fever present > 3 days (72 hours)   Negative: [1] MILD-MODERATE post-op  "pain (e.g., pain scale 1-7) AND [2] not controlled with pain medications    Answer Assessment - Initial Assessment Questions  1. SYMPTOM: \"What's the main symptom you're concerned about?\" (e.g., pain, fever, vomiting)       Wiorried pain pump not attached right.   2. ONSET: \"When did this start?\"       This morning  3. SURGERY: \"What surgery did you have?\"       yesterday  4. DATE of SURGERY: \"When was the surgery?\"        Yesterday had rotator repair n  5. ANESTHESIA: \" What type of anesthesia did you have?\" (e.g., general, spinal, epidural, local)      general  6. PAIN: \"Is there any pain?\" If Yes, ask: \"How bad is it?\"  (Scale 1-10; or mild, moderate, severe)      M, moderate  7. FEVER: \"Do you have a fever?\" If Yes, ask: \"What is your temperature, how was it measured, and when did it start?\"      denies  8. VOMITING: \"Is there any vomiting?\" If Yes, ask: \"How many times?\"      denies  9. BLEEDING: \"Is there any bleeding?\" If Yes, ask: \"How much?\" and \"Where?\"      denies  10. OTHER SYMPTOMS: \"Do you have any other symptoms?\" (e.g., drainage from wound, painful urination, constipation)         Has questions for surgeons office.    Protocols used: Post-Op Symptoms and Questions-ADULT-    "

## 2023-09-15 NOTE — PROGRESS NOTES
JENNIFER Willis    Nerve Cath Post Op Call    Patient Name: Santi Souza Sr.  :  1960  MRN:  [id number]  Date of Discharge: 2023    Nerve Cath Post Op Call:    Analgesia:Good  Pain Score:3/10  Side Effects:Metallic taste  Catheter Site:clean  Volume: .  Patient/Family instructed to call ON CALL anesthesia provider for any questions or problems.  Patient Follow Up:          Alerted by surgeon () that patient was experiencing metallic taste in mouth. Spoke with patient by phone, he had already turned pump off. Made plans with patient to come to hospital to have catheter evaluated. Upon arrival, catheter was flushed with PF saline and aspirated. No blood was detected with several attempts of flushing and aspirating. Patient experiencing pain at this point, so catheter was bolused with 0.5% ropivicaine 5 ml, as I felt confident it wasn't intravascular. He was observed for ten minutes and had no symptoms of intravascular absorption. He began to feel relief of pain and I restarted the pump at its upper extremity settings. I escorted patient personally to the exit and he had no further complaints.

## 2023-09-16 ENCOUNTER — HOSPITAL ENCOUNTER (EMERGENCY)
Facility: HOSPITAL | Age: 63
Discharge: HOME | End: 2023-09-16
Attending: STUDENT IN AN ORGANIZED HEALTH CARE EDUCATION/TRAINING PROGRAM
Payer: MEDICARE

## 2023-09-16 ENCOUNTER — APPOINTMENT (OUTPATIENT)
Dept: CT IMAGING | Facility: HOSPITAL | Age: 63
End: 2023-09-16
Payer: MEDICARE

## 2023-09-16 ENCOUNTER — APPOINTMENT (OUTPATIENT)
Dept: GENERAL RADIOLOGY | Facility: HOSPITAL | Age: 63
End: 2023-09-16
Payer: MEDICARE

## 2023-09-16 VITALS
BODY MASS INDEX: 23.03 KG/M2 | OXYGEN SATURATION: 96 % | RESPIRATION RATE: 18 BRPM | SYSTOLIC BLOOD PRESSURE: 123 MMHG | WEIGHT: 170 LBS | HEART RATE: 62 BPM | HEIGHT: 72 IN | DIASTOLIC BLOOD PRESSURE: 96 MMHG | TEMPERATURE: 98.3 F

## 2023-09-16 DIAGNOSIS — R06.02 SHORTNESS OF BREATH: Primary | ICD-10-CM

## 2023-09-16 LAB
ALBUMIN SERPL-MCNC: 4.3 G/DL (ref 3.5–5.2)
ALBUMIN/GLOB SERPL: 1.4 G/DL
ALP SERPL-CCNC: 97 U/L (ref 39–117)
ALT SERPL W P-5'-P-CCNC: 39 U/L (ref 1–41)
ANION GAP SERPL CALCULATED.3IONS-SCNC: 13 MMOL/L (ref 5–15)
AST SERPL-CCNC: 44 U/L (ref 1–40)
BASOPHILS # BLD AUTO: 0.05 10*3/MM3 (ref 0–0.2)
BASOPHILS NFR BLD AUTO: 0.6 % (ref 0–1.5)
BILIRUB SERPL-MCNC: 0.6 MG/DL (ref 0–1.2)
BUN SERPL-MCNC: 19 MG/DL (ref 8–23)
BUN/CREAT SERPL: 26.4 (ref 7–25)
CALCIUM SPEC-SCNC: 9.8 MG/DL (ref 8.6–10.5)
CHLORIDE SERPL-SCNC: 104 MMOL/L (ref 98–107)
CO2 SERPL-SCNC: 24 MMOL/L (ref 22–29)
CREAT SERPL-MCNC: 0.72 MG/DL (ref 0.76–1.27)
DEPRECATED RDW RBC AUTO: 43.2 FL (ref 37–54)
EGFRCR SERPLBLD CKD-EPI 2021: 102 ML/MIN/1.73
EOSINOPHIL # BLD AUTO: 0.12 10*3/MM3 (ref 0–0.4)
EOSINOPHIL NFR BLD AUTO: 1.5 % (ref 0.3–6.2)
ERYTHROCYTE [DISTWIDTH] IN BLOOD BY AUTOMATED COUNT: 11.9 % (ref 12.3–15.4)
GLOBULIN UR ELPH-MCNC: 3 GM/DL
GLUCOSE SERPL-MCNC: 232 MG/DL (ref 65–99)
HCT VFR BLD AUTO: 44 % (ref 37.5–51)
HGB BLD-MCNC: 14.7 G/DL (ref 13–17.7)
HOLD SPECIMEN: NORMAL
IMM GRANULOCYTES # BLD AUTO: 0.03 10*3/MM3 (ref 0–0.05)
IMM GRANULOCYTES NFR BLD AUTO: 0.4 % (ref 0–0.5)
LYMPHOCYTES # BLD AUTO: 1.84 10*3/MM3 (ref 0.7–3.1)
LYMPHOCYTES NFR BLD AUTO: 23.1 % (ref 19.6–45.3)
MCH RBC QN AUTO: 32.5 PG (ref 26.6–33)
MCHC RBC AUTO-ENTMCNC: 33.4 G/DL (ref 31.5–35.7)
MCV RBC AUTO: 97.3 FL (ref 79–97)
MONOCYTES # BLD AUTO: 0.69 10*3/MM3 (ref 0.1–0.9)
MONOCYTES NFR BLD AUTO: 8.7 % (ref 5–12)
NEUTROPHILS NFR BLD AUTO: 5.24 10*3/MM3 (ref 1.7–7)
NEUTROPHILS NFR BLD AUTO: 65.7 % (ref 42.7–76)
NRBC BLD AUTO-RTO: 0 /100 WBC (ref 0–0.2)
NT-PROBNP SERPL-MCNC: 36.3 PG/ML (ref 0–900)
PLATELET # BLD AUTO: 155 10*3/MM3 (ref 140–450)
PMV BLD AUTO: 10.3 FL (ref 6–12)
POTASSIUM SERPL-SCNC: 4.1 MMOL/L (ref 3.5–5.2)
PROT SERPL-MCNC: 7.3 G/DL (ref 6–8.5)
RBC # BLD AUTO: 4.52 10*6/MM3 (ref 4.14–5.8)
SODIUM SERPL-SCNC: 141 MMOL/L (ref 136–145)
TROPONIN T SERPL HS-MCNC: 15 NG/L
WBC NRBC COR # BLD: 7.97 10*3/MM3 (ref 3.4–10.8)
WHOLE BLOOD HOLD COAG: NORMAL
WHOLE BLOOD HOLD SPECIMEN: NORMAL

## 2023-09-16 PROCEDURE — 93005 ELECTROCARDIOGRAM TRACING: CPT | Performed by: STUDENT IN AN ORGANIZED HEALTH CARE EDUCATION/TRAINING PROGRAM

## 2023-09-16 PROCEDURE — 71045 X-RAY EXAM CHEST 1 VIEW: CPT

## 2023-09-16 PROCEDURE — 93005 ELECTROCARDIOGRAM TRACING: CPT

## 2023-09-16 PROCEDURE — 84484 ASSAY OF TROPONIN QUANT: CPT | Performed by: STUDENT IN AN ORGANIZED HEALTH CARE EDUCATION/TRAINING PROGRAM

## 2023-09-16 PROCEDURE — 80053 COMPREHEN METABOLIC PANEL: CPT | Performed by: STUDENT IN AN ORGANIZED HEALTH CARE EDUCATION/TRAINING PROGRAM

## 2023-09-16 PROCEDURE — 25510000001 IOPAMIDOL PER 1 ML: Performed by: STUDENT IN AN ORGANIZED HEALTH CARE EDUCATION/TRAINING PROGRAM

## 2023-09-16 PROCEDURE — 83880 ASSAY OF NATRIURETIC PEPTIDE: CPT | Performed by: STUDENT IN AN ORGANIZED HEALTH CARE EDUCATION/TRAINING PROGRAM

## 2023-09-16 PROCEDURE — 85025 COMPLETE CBC W/AUTO DIFF WBC: CPT | Performed by: STUDENT IN AN ORGANIZED HEALTH CARE EDUCATION/TRAINING PROGRAM

## 2023-09-16 PROCEDURE — 71275 CT ANGIOGRAPHY CHEST: CPT

## 2023-09-16 PROCEDURE — 99285 EMERGENCY DEPT VISIT HI MDM: CPT

## 2023-09-16 RX ORDER — SODIUM CHLORIDE 0.9 % (FLUSH) 0.9 %
10 SYRINGE (ML) INJECTION AS NEEDED
Status: DISCONTINUED | OUTPATIENT
Start: 2023-09-16 | End: 2023-09-16 | Stop reason: HOSPADM

## 2023-09-16 RX ADMIN — IOPAMIDOL 80 ML: 755 INJECTION, SOLUTION INTRAVENOUS at 12:59

## 2023-09-16 NOTE — ED PROVIDER NOTES
Subjective   History of Present Illness  Mr. Souza is a 63 yr old male with a history of CAD, DM, PTSD, prior CVA, HTN, HLD, who underwent right rotator cuff repair on 9/13 (Dr. Francis).  He had a ropivacaine pump placed for pain control.  He presents to the ED today with complaints of shortness of breath and states he feels like he is not getting a full breath.  No chest pain.  No fever.  No cough.  He states he was told that the pump could extravasate and could decreased diaphragmatic exertion on the affected side.   He is desiring adjustment of the pump.      Review of Systems   Constitutional:  Negative for chills and fever.   HENT:  Negative for sore throat.    Respiratory:  Positive for shortness of breath. Negative for cough.    Cardiovascular:  Negative for chest pain, palpitations and leg swelling.   Gastrointestinal:  Negative for abdominal pain, nausea and vomiting.   Genitourinary:  Negative for dysuria.   Musculoskeletal:  Positive for arthralgias (right shoulder post-op pain).   Skin:  Negative for wound.   Allergic/Immunologic: Negative for immunocompromised state.   Neurological:  Negative for numbness.   Hematological: Negative.    Psychiatric/Behavioral: Negative.       Past Medical History:   Diagnosis Date    CAD (coronary artery disease) 11/03/2017    Cataracta     Chronic back pain 11/03/2017    Depression     Diabetes mellitus--Insulin Dependant 11/03/2017    IDDM    GERD (gastroesophageal reflux disease) 11/03/2017    Hyperlipidemia 11/03/2017    Hypertension 11/03/2017    Neuropathy     Osteoarthritis     Rotator cuff syndrome 6/23    Should be on my chart    Stroke 2022    short term memory and difficulty balance    Vitamin D deficiency     Wears glasses     Wears hearing aid in both ears        Allergies   Allergen Reactions    Hydrocodone Itching    Penicillins Swelling and Provider Review Needed     Entire body swelled as a child     Sulfa Antibiotics Shortness Of Breath, Rash and  Provider Review Needed    Codeine Nausea And Vomiting, Confusion and Provider Review Needed    Oxycontin [Oxycodone] Itching       Past Surgical History:   Procedure Laterality Date    BACK SURGERY      4 lumbar surgeries    CARDIAC CATHETERIZATION      total of 7 stents    COLONOSCOPY      LUMBAR SPINE SURGERY  1998,     SHOULDER ARTHROSCOPY W/ ROTATOR CUFF REPAIR Right 2023    Procedure: SHOULDER ARTHROSCOPY WITH ROTATOR CUFF REPAIR, BICEP TENDONESIS, SUBACROMIAL DECOMPRESSION- RIGHT;  Surgeon: Geoffrey Francis MD;  Location: [facility];  Service: Orthopedics;  Laterality: Right;    TONSILLECTOMY         Family History   Problem Relation Age of Onset    Diabetes Father     Cancer Maternal Grandfather        Social History     Socioeconomic History    Marital status: Single   Tobacco Use    Smoking status: Some Days     Packs/day: 0.25     Years: 32.00     Pack years: 8.00     Types: Cigarettes     Start date: 1974     Last attempt to quit: 2022     Years since quittin.7    Smokeless tobacco: Never   Vaping Use    Vaping Use: Never used   Substance and Sexual Activity    Alcohol use: Never    Drug use: Yes     Frequency: 7.0 times per week     Types: Marijuana     Comment: vape every night or smoke    Sexual activity: Defer     Partners: Female     Birth control/protection: Condom           Objective   Physical Exam  Constitutional:       General: He is not in acute distress.     Appearance: He is well-developed.   HENT:      Head: Normocephalic.      Nose: Nose normal.      Mouth/Throat:      Mouth: Mucous membranes are moist.   Eyes:      Pupils: Pupils are equal, round, and reactive to light.   Cardiovascular:      Rate and Rhythm: Normal rate and regular rhythm.      Pulses: Normal pulses.      Heart sounds: Normal heart sounds.   Pulmonary:      Effort: Pulmonary effort is normal.      Breath sounds: Normal breath sounds. No wheezing, rhonchi or rales.   Musculoskeletal:       Cervical back: Normal range of motion and neck supple.      Right lower leg: No edema.      Left lower leg: No edema.   Skin:     General: Skin is warm.   Neurological:      Mental Status: He is alert and oriented to person, place, and time.   Psychiatric:         Mood and Affect: Mood normal.       Procedures           ED Course      In summary, 63-year-old male status post right shoulder surgery on 913 (Dr. Francis), presents with some shortness of breath.  The patient states that he feels like he cannot get a full breath.  No chest pain.  No cough or fever.  The patient has a ropivacaine pump in place on the right shoulder and states that he was told that this may extravasate and cause decreased diaphragmatic excursion on the affected side, leading to shortness of breath.  The patient is requesting that someone from anesthesiology see him to adjust his pump.  Past medical history of coronary artery disease, diabetes, prior CVA, PTSD and previous back surgeries.    MDM: Differential includes pneumonia, PE, pneumothorax, impaired diaphragmatic excursion secondary to anesthetic effects on diaphragmatic nerve, CHF, etc.    Labs, chest x-ray and EKG obtained.  Patient sent for CTA chest to rule out PE.    Labs are bland.  White count is normal at 7.97.  No anemia.  Normal chemistries.  Glucose is up a bit at 232.  proBNP is normal at 36.  High-sensitivity troponin is 15.    EKG shows normal sinus rhythm with sinus arrhythmia with a rate of 69.  No ischemia.    I spoke with anesthesiology (Dr. Argueta) who advised that he would come see the pt and see if he could adjust the pump.    15:24 EDT  I was advised by the RN that the anesthesiologist had been in to see and pt and told her the pt was ready for discharge.                                                 Medical Decision Making  Amount and/or Complexity of Data Reviewed  Labs: ordered.  Radiology: ordered.  ECG/medicine tests: ordered.    Risk  Prescription drug  management.        Final diagnoses:   Shortness of breath       ED Disposition  ED Disposition       ED Disposition   Discharge    Condition   Stable    Comment   --               Jordan Heart APRN  [address] [facility] [address]  [phone]      As needed    Geoffrey Francis MD  [address] [facility] [address]  [place] [address]  [phone]      As needed    [facility] EMERGENCY DEPARTMENT  [address] [facility] [address] [phone]    If symptoms worsen         Medication List        Changed      levocetirizine 5 MG tablet  Commonly known as: XYZAL  Take 1 tablet by mouth Every Evening.  What changed:   when to take this  reasons to take this     nitroglycerin 0.4 MG/SPRAY spray  Commonly known as: NITROLINGUAL  Place 1 spray by translingual route on or under the tongue at the first sign of an attack, no more than 3 sprays / 15-minute.  What changed: how much to take                 Mikael Dash, PA  09/16/23 1524

## 2023-09-17 LAB
QT INTERVAL: 400 MS
QTC INTERVAL: 428 MS

## 2023-09-18 ENCOUNTER — TELEPHONE (OUTPATIENT)
Dept: ORTHOPEDIC SURGERY | Facility: CLINIC | Age: 63
End: 2023-09-18
Payer: MEDICARE

## 2023-09-18 NOTE — TELEPHONE ENCOUNTER
Called patient back. Explained that Dr Francis is only going to prescribe the Oxycodone. I recommended that he take the benadryl with it. He states that his niece is bringing him some. He had spoken to the anesthesia team and has turned off the pain pump and will remove it here shortly. He did not have any further questions at this time. I confirmed his follow up appointment with him. He will keep that appointment and call back with any further problems in the meantime.     Marya

## 2023-09-18 NOTE — TELEPHONE ENCOUNTER
Caller: JOSIAH   Relationship to Patient: SELF  Phone Number: [phone]  Reason for Call: POST OP PATIENT HAVING ISSUES WITH HIS PAIN PUMP, HE CALLED LAST WEEK AND WAS TOLD TO GO TO THE ED. SO HE DID, HE STATES THAT HE WANTS THE PAIN PUMP REMOVED HE STATES THAT HE COULD NOT GET IT REMOVED WHILE HE WAS IN THE ED AND HE WAS INFORMED TO CALL DR WOODARD. ATTEMPTED TO WARM TRANSFER AND ASKED TO SEND URGENT MESSAGE TO CLINICAL    High Dose Vitamin A Pregnancy And Lactation Text: High dose vitamin A therapy is contraindicated during pregnancy and breast feeding.

## 2023-09-18 NOTE — TELEPHONE ENCOUNTER
Called patient back. He states that the pain pump is causing him issues and he wants to remove it. I explained to him that he can remove it at any time if he chooses to. He states that he cannot take Hydrocodone or Oxycodone due to it causing extreme issues with itching. He is requesting and oral form of morphine to be prescribed instead. He has a long history of back issues and states that this is the only medication that he has been able to take. He is not going to remove the pain pump until He hears back from me. I let him know that I would send the message to Dr Franics and see if he could provide a different medication and get back to him.     Marya

## 2023-09-28 ENCOUNTER — OFFICE VISIT (OUTPATIENT)
Dept: ORTHOPEDIC SURGERY | Facility: CLINIC | Age: 63
End: 2023-09-28
Payer: MEDICARE

## 2023-09-28 VITALS — TEMPERATURE: 98.1 F

## 2023-09-28 DIAGNOSIS — Z98.890 S/P RIGHT ROTATOR CUFF REPAIR: ICD-10-CM

## 2023-09-28 DIAGNOSIS — Z09 SURGERY FOLLOW-UP: Primary | ICD-10-CM

## 2023-09-28 PROCEDURE — 99024 POSTOP FOLLOW-UP VISIT: CPT

## 2023-09-28 NOTE — PROGRESS NOTES
[facility] Follow Up       Office Follow Up Visit       Patient Name: Santi Souza Sr.    Chief Complaint:   Chief Complaint   Patient presents with    Post-op     2 week s/p Right shoulder Rotator Cuff Repair with Biceps Tenodesis 9/13/23         Referring Physician: No ref. provider found    History of Present Illness:   Santi Souza Sr. returns to clinic today for 2-week postop visit s/p right shoulder arthroscopy rotator cuff repair, biceps tenodesis 9/13/2023 with Dr. Francis.  He reports to be doing well at this point.  He had some concerns initially postoperatively due to shortness of breath from nerve block catheter.  He went to the emergency department on 9/16/2023 due to the shortness of breath.  Nerve catheter was pulled at that time in the ED.  Symptoms of shortness of breath resolved.  He has now had some issues regarding pain control given allergies to oxycodone and hydrocodone.  He is also unable to tolerate NSAID medications and says Tylenol is ineffective.  He says icing makes the pain worse. He has been fighting through pain especially at nighttime.  Reports slow improvements in pain since time of surgery.  He has been compliant with sling use.       Procedure:  1.     Arthroscopic rotator cuff repair (2x2) - CPT 60188  2.     Arthroscopic biceps tenodesis (8-8) - CPT 05758  3.     Arthroscopic subacromial decompression with acromioplasty - CPT 57501       Subjective     Review of Systems   Constitutional:  Negative for chills, fever, unexpected weight gain and unexpected weight loss.   HENT:  Negative for congestion, postnasal drip and rhinorrhea.    Eyes:  Negative for blurred vision and photophobia.   Respiratory:  Negative for shortness of breath.    Cardiovascular:  Negative for leg swelling.   Gastrointestinal:  Negative for abdominal pain, nausea and vomiting.   Genitourinary:  Negative for  difficulty urinating.   Musculoskeletal:  Positive for arthralgias. Negative for gait problem, joint swelling and myalgias.   Skin:  Negative for skin lesions and wound.   Neurological:  Negative for dizziness, weakness, light-headedness and numbness.   Hematological:  Does not bruise/bleed easily.   Psychiatric/Behavioral:  Negative for depressed mood.    All other systems reviewed and are negative.     I have reviewed and updated the following portions of the patient's history and review of systems: allergies, current medications, past family history, past medical history, past social history, past surgical history and problem list.    Medications:   Current Outpatient Medications:     aspirin 81 MG chewable tablet, Chew 1 tablet., Disp: , Rfl:     atorvastatin (LIPITOR) 80 MG tablet, Take 1 tablet by mouth Daily., Disp: 90 tablet, Rfl: 1    baclofen (LIORESAL) 10 MG tablet, Take 1 tablet by mouth 3 (Three) Times a Day., Disp: 90 tablet, Rfl: 0    diclofenac (VOLTAREN) 75 MG EC tablet, Take 1 tablet by mouth 2 (Two) Times a Day. (Do not combine with Mobic), Disp: 60 tablet, Rfl: 0    donepezil (ARICEPT) 10 MG tablet, Take 1 tablet by mouth Daily With Breakfast., Disp: , Rfl:     DULoxetine (CYMBALTA) 30 MG capsule, Take 1 capsule by mouth Daily., Disp: 30 capsule, Rfl: 0    empagliflozin (Jardiance) 25 MG tablet tablet, Take 1 tablet by mouth Daily., Disp: 90 tablet, Rfl: 1    erythromycin (ROMYCIN) 5 MG/GM ophthalmic ointment, Will start when have cataract surgery, Disp: , Rfl:     levocetirizine (XYZAL) 5 MG tablet, Take 1 tablet by mouth Every Evening. (Patient taking differently: Take 1 tablet by mouth As Needed for Allergies.), Disp: 90 tablet, Rfl: 1    Magnesium 400 MG tablet, Take 400 mg by mouth Daily., Disp: , Rfl:     nitroglycerin (NITROLINGUAL) 0.4 MG/SPRAY spray, Place 1 spray by translingual route on or under the tongue at the first sign of an attack, no more than 3 sprays / 15-minute. (Patient  taking differently: 1 spray. Place 1 spray by translingual route on or under the tongue at the first sign of an attack, no more than 3 sprays / 15-minute.), Disp: 1 each, Rfl: 0    omeprazole (priLOSEC) 20 MG capsule, Take 1 capsule by mouth Daily., Disp: 90 capsule, Rfl: 1    prazosin (MINIPRESS) 2 MG capsule, Take 1 capsule by mouth at bedtime., Disp: 30 capsule, Rfl: 0    SITagliptin (JANUVIA) 100 MG tablet, Take 1 tablet by mouth Daily., Disp: 90 tablet, Rfl: 1    traZODone (DESYREL) 50 MG tablet, Take 1 tablet by mouth every day at bedtime., Disp: 30 tablet, Rfl: 0    valACYclovir (VALTREX) 500 MG tablet, Pt does not recognize this, Disp: , Rfl:     Allergies:   Allergies   Allergen Reactions    Hydrocodone Itching    Penicillins Swelling and Provider Review Needed     Entire body swelled as a child     Sulfa Antibiotics Shortness Of Breath, Rash and Provider Review Needed    Codeine Nausea And Vomiting, Confusion and Provider Review Needed    Oxycontin [Oxycodone] Itching         Objective      Vital Signs:   Vitals:    09/28/23 1358   Temp: 98.1 °F (36.7 °C)       Ortho Exam:  General: comfortable  Vascular: 2+ radial pulse  Neurologic: sensation to light touch is intact distally, elbow flexion/elbow extension/wrist flexion/wrist extension/hand intrinsics intact, able to fire deltoid; no residual defects noted from the block  Dermatologic: surgical incisions are well appearing, with no drainage or surrounding erythema; no signs or symptoms of infection or DVT      Results Review:  XR Shoulder 1 View Right  AP view of the right shoulder is taken in the office today.  No acute bony   abnormalities are noted.  There is no significant difference when compared   to the AP view of the right shoulder with internal rotation on 6/6/2023.     Quality Metrics:   BMI:   BMI is within normal parameters. No other follow-up for BMI required.       Tobacco:   Santi Souza Sr.  reports that he has been smoking  cigarettes. He started smoking about 49 years ago. He has a 8.00 pack-year smoking history. He has never used smokeless tobacco..  He understands risk associated with tobacco use.  Assessment / Plan      Assessment:   Diagnoses and all orders for this visit:    1. Surgery follow-up (Primary)  -     XR Shoulder 1 View Right  -     Ambulatory Referral to Physical Therapy Evaluate and treat, Ortho, POST OP    2. S/P right rotator cuff repair  -     Ambulatory Referral to Physical Therapy Evaluate and treat, Ortho, POST OP           Plan:  2 weeks s/p rotator cuff repair, biceps tenodesis, subacromial decompression with Dr. Francis.  He had large tearing of his rotator cuff with a 2 x 2 repair.  He understands extent of tearing and importance of recovery process for healing.  Pain control has been difficult given allergies to oxycodone and hydrocodone.  He says he is unable to tolerate NSAIDs and Tylenol is effective.  Icing has worsened the pain.  He  understands the pain is temporary and will improve as rotator cuff heals.  Patient will continue with the sling for another week or 2.  Remain nonweightbearing on operative side.  To start with physical therapy.  I have given a PT prescription as well as 2 copies of the protocol. Plan to return to see Dr. Francis in 2 months.    We reviewed the intraoperative photographs together.  Sutures out today.         Follow Up:   2 months    Yuli Jackson PA-C  [facility]    Dictated using Dragon Speech Recognition.

## 2023-10-03 ENCOUNTER — TELEPHONE (OUTPATIENT)
Dept: ORTHOPEDIC SURGERY | Facility: CLINIC | Age: 63
End: 2023-10-03
Payer: MEDICARE

## 2023-10-03 DIAGNOSIS — M25.511 ACUTE PAIN OF RIGHT SHOULDER: Primary | ICD-10-CM

## 2023-10-03 DIAGNOSIS — M75.101 TEAR OF RIGHT SUPRASPINATUS TENDON: ICD-10-CM

## 2023-10-03 DIAGNOSIS — G47.20 CIRCADIAN RHYTHM SLEEP DISORDER, UNSPECIFIED TYPE: ICD-10-CM

## 2023-10-03 DIAGNOSIS — F43.10 POST TRAUMATIC STRESS DISORDER (PTSD): ICD-10-CM

## 2023-10-03 DIAGNOSIS — S46.011A TRAUMATIC COMPLETE TEAR OF RIGHT ROTATOR CUFF, INITIAL ENCOUNTER: ICD-10-CM

## 2023-10-03 DIAGNOSIS — R29.818 PSEUDOPARALYSIS: ICD-10-CM

## 2023-10-03 DIAGNOSIS — M25.511 CHRONIC RIGHT SHOULDER PAIN: ICD-10-CM

## 2023-10-03 DIAGNOSIS — F41.1 GENERALIZED ANXIETY DISORDER: ICD-10-CM

## 2023-10-03 DIAGNOSIS — M75.31 CALCIFIC TENDINITIS OF RIGHT SHOULDER: ICD-10-CM

## 2023-10-03 DIAGNOSIS — F17.210 SMOKING GREATER THAN 30 PACK YEARS: ICD-10-CM

## 2023-10-03 DIAGNOSIS — E11.42 TYPE 2 DIABETES MELLITUS WITH DIABETIC POLYNEUROPATHY, WITHOUT LONG-TERM CURRENT USE OF INSULIN: ICD-10-CM

## 2023-10-03 DIAGNOSIS — F33.1 MODERATE EPISODE OF RECURRENT MAJOR DEPRESSIVE DISORDER: ICD-10-CM

## 2023-10-03 DIAGNOSIS — M25.611 POST-TRAUMATIC STIFFNESS OF RIGHT SHOULDER JOINT: ICD-10-CM

## 2023-10-03 DIAGNOSIS — M25.511 ACUTE PAIN OF RIGHT SHOULDER: ICD-10-CM

## 2023-10-03 DIAGNOSIS — G89.29 CHRONIC RIGHT SHOULDER PAIN: ICD-10-CM

## 2023-10-03 DIAGNOSIS — Z98.890 S/P RIGHT ROTATOR CUFF REPAIR: ICD-10-CM

## 2023-10-03 RX ORDER — DICLOFENAC SODIUM 75 MG/1
TABLET, DELAYED RELEASE ORAL
Qty: 60 TABLET | Refills: 0 | Status: SHIPPED | OUTPATIENT
Start: 2023-10-03

## 2023-10-03 RX ORDER — OXYCODONE AND ACETAMINOPHEN 10; 325 MG/1; MG/1
1 TABLET ORAL EVERY 12 HOURS PRN
Qty: 14 TABLET | Refills: 0 | Status: SHIPPED | OUTPATIENT
Start: 2023-10-03 | End: 2023-10-10

## 2023-10-03 RX ORDER — METHOCARBAMOL 500 MG/1
TABLET, FILM COATED ORAL
Qty: 42 TABLET | Refills: 0 | Status: SHIPPED | OUTPATIENT
Start: 2023-10-03

## 2023-10-03 NOTE — TELEPHONE ENCOUNTER
Called pt to let him know that Dr Francis sent them a refill for Oxycodone into their pharmacy and noticed he did not have his 2 month post op appointment made so we also scheduled him a 2 month f/u appointment 11/28 at 11:30am. Pt verbalized understanding and was thankful for making him an appointment.

## 2023-10-03 NOTE — TELEPHONE ENCOUNTER
Caller: PIEDAD POON     Relationship: SELF    Best call back number: [phone] (home)       Requested Prescriptions:   OXYCODONE   325MG WITH TYLENOL    Pharmacy where request should be sent: [facility] [address] [place] [address] [phone] [facility] [phone] FX     Last office visit with prescribing clinician: 8/22/2023   Last telemedicine visit with prescribing clinician: Visit date not found   Next office visit with prescribing clinician: Visit date not found     Additional details provided by patient: PATIENT STATED HE WAS PRESCRIBED THIS WHEN HE HAD HIS RIGHT SHOULDER SX. PATIENT STATED HE TAKES ONE IN THE MORNING AND ONE AT NIGHT.     Does the patient have less than a 3 day supply:  [x] Yes  [] No    Would you like a call back once the refill request has been completed: [x] Yes [] No    If the office needs to give you a call back, can they leave a voicemail: [x] Yes [] No    Trice Huang Rep   10/03/23 15:36 EDT

## 2023-10-04 NOTE — TELEPHONE ENCOUNTER
Rx Refill Note    Requested Prescriptions     Pending Prescriptions Disp Refills    prazosin (MINIPRESS) 2 MG capsule [Pharmacy Med Name: Prazosin HCl 2 MG Oral Capsule] 30 capsule 0     Sig: Take 1 capsule by mouth at bedtime    DULoxetine (CYMBALTA) 30 MG capsule [Pharmacy Med Name: DULoxetine HCl 30 MG Oral Capsule Delayed Release Particles] 30 capsule 0     Sig: Take 1 capsule by mouth once daily        Last office visit with prescribing clinician: 9/5/2023      Next office visit with prescribing clinician: 10/12/2023   Last labs:   Last refill: 09/05/2023   Pharmacy (be sure to add in Epic). correct

## 2023-10-05 RX ORDER — PRAZOSIN HYDROCHLORIDE 2 MG/1
CAPSULE ORAL
Qty: 30 CAPSULE | Refills: 0 | OUTPATIENT
Start: 2023-10-05

## 2023-10-05 RX ORDER — DULOXETIN HYDROCHLORIDE 30 MG/1
30 CAPSULE, DELAYED RELEASE ORAL DAILY
Qty: 30 CAPSULE | Refills: 0 | OUTPATIENT
Start: 2023-10-05

## 2023-10-06 ENCOUNTER — TELEPHONE (OUTPATIENT)
Dept: BEHAVIORAL HEALTH | Facility: CLINIC | Age: 63
End: 2023-10-06
Payer: MEDICARE

## 2023-10-06 ENCOUNTER — TELEPHONE (OUTPATIENT)
Dept: FAMILY MEDICINE CLINIC | Facility: CLINIC | Age: 63
End: 2023-10-06
Payer: MEDICARE

## 2023-10-06 DIAGNOSIS — F43.10 POST TRAUMATIC STRESS DISORDER (PTSD): ICD-10-CM

## 2023-10-06 DIAGNOSIS — F33.1 MODERATE EPISODE OF RECURRENT MAJOR DEPRESSIVE DISORDER: ICD-10-CM

## 2023-10-06 DIAGNOSIS — F41.1 GENERALIZED ANXIETY DISORDER: ICD-10-CM

## 2023-10-06 NOTE — TELEPHONE ENCOUNTER
Rx Refill Note    Requested Prescriptions     Pending Prescriptions Disp Refills    DULoxetine (CYMBALTA) 30 MG capsule [Pharmacy Med Name: DULoxetine HCl 30 MG Oral Capsule Delayed Release Particles] 30 capsule 0     Sig: Take 1 capsule by mouth once daily        Last office visit with prescribing clinician: 9/5/2023      Next office visit with prescribing clinician: 10/12/2023   Last labs:   Last refill: 09/05/2023   Pharmacy (be sure to add in Epic). correct

## 2023-10-06 NOTE — TELEPHONE ENCOUNTER
[facility] CALLED STATING THEY COULD NOT PRESCRIBE PATIENT AN EMERGENCY DOSE, DUE TO THE BEING DENIED BY PROVIDER .     PHARMACY DID ASK FOR AUTHORIZATION FOR ENOUGH TO GET PATIENT TO HIS APPT ON 10/12/23.

## 2023-10-06 NOTE — TELEPHONE ENCOUNTER
PATIENT CALLED REQUESTING A REFILL ON HIS MEDS. INFORMED PATIENT THAT [facility] IS NOT IN THE OFFICE ON FRIDAYS. THE BEST I CAN DO IS SEND A MESSAGED.     PATIENT STATES HE HAS BEEN OUT OF MEDICATION FOR 3 DAYS NOW. STATES HE IS ABOUT TO LOSE IT. AGAIN REQUESTED SOMEONE TO FILL HIS MEDICATION.     PATIENT ASKED FOR A WAY TO CONTACT [facility], TOLD HIM I COULD NOT GIVE THAT INFORMATION. INFORMED PATIENT HE COULD CALL 911, IF HE FELT LIKE IT WAS AN EMERGENCY, PATIENT HUNG UP ON ME.        REFILL REQUEST WAS PUT IN PREVIOUSLY ON 10/03/23, [facility] REPLIED THAT PATIENT HAS UPCOMING APPT NEXT WEEK, MAY NEED SOME ADJUSTMENTS

## 2023-10-09 RX ORDER — DULOXETIN HYDROCHLORIDE 30 MG/1
30 CAPSULE, DELAYED RELEASE ORAL DAILY
Qty: 4 CAPSULE | Refills: 0 | Status: SHIPPED | OUTPATIENT
Start: 2023-10-09

## 2023-10-16 ENCOUNTER — TELEPHONE (OUTPATIENT)
Dept: BEHAVIORAL HEALTH | Facility: CLINIC | Age: 63
End: 2023-10-16
Payer: MEDICARE

## 2023-10-16 ENCOUNTER — OFFICE VISIT (OUTPATIENT)
Dept: BEHAVIORAL HEALTH | Facility: CLINIC | Age: 63
End: 2023-10-16
Payer: MEDICARE

## 2023-10-16 DIAGNOSIS — F41.1 GENERALIZED ANXIETY DISORDER: ICD-10-CM

## 2023-10-16 DIAGNOSIS — F43.10 PTSD (POST-TRAUMATIC STRESS DISORDER): ICD-10-CM

## 2023-10-16 DIAGNOSIS — F41.9 ANXIETY AND DEPRESSION: Primary | ICD-10-CM

## 2023-10-16 DIAGNOSIS — F32.A ANXIETY AND DEPRESSION: Primary | ICD-10-CM

## 2023-10-16 DIAGNOSIS — G47.20 CIRCADIAN RHYTHM SLEEP DISORDER, UNSPECIFIED TYPE: ICD-10-CM

## 2023-10-16 DIAGNOSIS — F33.1 MODERATE EPISODE OF RECURRENT MAJOR DEPRESSIVE DISORDER: ICD-10-CM

## 2023-10-16 DIAGNOSIS — F43.10 POST TRAUMATIC STRESS DISORDER (PTSD): ICD-10-CM

## 2023-10-16 PROCEDURE — 90834 PSYTX W PT 45 MINUTES: CPT | Performed by: SOCIAL WORKER

## 2023-10-16 NOTE — TELEPHONE ENCOUNTER
Incoming Refill Request  {Tip  DIRECTIONS Type Rx details below. Pend Rx from patient's med list if dosage and other details match request. Jump to Medication Section:    Medication requested (name and dose):     DULOXETINE (CYMBALTA) 30 MG CAPSULE     PRAZOSIN (MINIPRESS) 2 MG CAPSULE     TRAZODONE (DESYREL) 50 MG TABLET     Pharmacy where request should be sent:     [facility]     Additional details provided by patient:     PATIENT IS OUT OF MEDICATION, DOES HAVE UPCOMING APPT ON 10/19/23    Best call back number: [phone]    Does the patient have less than a 3 day supply:  [x] Yes  [] No    Trice Mayo Rep  10/16/23, 10:05 EDT        {Tip  DIRECTIONS Send the encounter HIGH priority, If patient has less than a 3 day supply. If the patient will run out of medication over the weekend add that information to the additional details line. Send this encounter to the clinical pool:[id number]
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
## 2023-10-17 RX ORDER — TRAZODONE HYDROCHLORIDE 50 MG/1
TABLET ORAL
Qty: 30 TABLET | Refills: 0 | Status: SHIPPED | OUTPATIENT
Start: 2023-10-17 | End: 2023-10-19 | Stop reason: SDUPTHER

## 2023-10-17 RX ORDER — DULOXETIN HYDROCHLORIDE 30 MG/1
30 CAPSULE, DELAYED RELEASE ORAL DAILY
Qty: 4 CAPSULE | Refills: 0 | Status: SHIPPED | OUTPATIENT
Start: 2023-10-17 | End: 2023-10-19 | Stop reason: DRUGHIGH

## 2023-10-17 RX ORDER — PRAZOSIN HYDROCHLORIDE 2 MG/1
CAPSULE ORAL
Qty: 30 CAPSULE | Refills: 0 | Status: SHIPPED | OUTPATIENT
Start: 2023-10-17 | End: 2023-10-19 | Stop reason: SDUPTHER

## 2023-10-17 NOTE — ADDENDUM NOTE
Addended by: CHARLOTTE LAY on: 10/17/2023 06:54 PM     Modules accepted: Orders     CHIEF COMPLAINT:  Chief Complaint   Patient presents with    Right Hand - Follow-up       SUBJECTIVE:  Manohar Herrera is a 5y o  year oldfemale who presents for follow-up regarding a one year follow up of right hand bone lesions suspected Melorheostosis  Patient stated that she has been doing well  She stated that she has no pain or symptoms about the right hand  PAST MEDICAL HISTORY:  Past Medical History:   Diagnosis Date    Acne     Eczema        PAST SURGICAL HISTORY:  History reviewed  No pertinent surgical history  FAMILY HISTORY:  Family History   Problem Relation Age of Onset    Eczema Mother     Acne Father     Acne Brother     Arthritis Paternal Uncle        SOCIAL HISTORY:  Social History     Tobacco Use    Smoking status: Never Smoker    Smokeless tobacco: Never Used   Substance Use Topics    Alcohol use: No    Drug use: No       MEDICATIONS:    Current Outpatient Medications:     Pediatric Multiple Vitamins (CHILDRENS MULTI-VITAMINS PO), Take by mouth, Disp: , Rfl:     Adapalene 0 3 % gel, Apply a pea-sized amount topically to entire face 1 hour before bed (Patient not taking: Reported on 7/12/2021), Disp: 59 g, Rfl: 6    clindamycin (CLEOCIN T) 1 % external solution, Apply topically to entire face in the AM (Patient not taking: Reported on 7/12/2021), Disp: 60 mL, Rfl: 6    ALLERGIES:  Allergies   Allergen Reactions    Other      Seasonal       REVIEW OF SYSTEMS:  Review of Systems   Constitutional: Negative for chills and fever  HENT: Negative for ear pain and sore throat  Eyes: Negative for pain and visual disturbance  Respiratory: Negative for cough and shortness of breath  Cardiovascular: Negative for chest pain and palpitations  Gastrointestinal: Negative for abdominal pain and vomiting  Genitourinary: Negative for dysuria and hematuria  Musculoskeletal: Negative for back pain and gait problem  Skin: Negative for color change and rash     Neurological: Negative for seizures and syncope  All other systems reviewed and are negative  VITALS:  Vitals:    07/12/21 1103   BP: 106/68   Pulse: 70       LABS:  HgA1c: No results found for: HGBA1C  BMP: No results found for: GLUCOSE, CALCIUM, NA, K, CO2, CL, BUN, CREATININE    _____________________________________________________  PHYSICAL EXAMINATION:  General: well developed and well nourished, alert, oriented times 3 and appears comfortable  Psychiatric: Normal  HEENT: Trachea Midline, No torticollis  Pulmonary: No audible wheezing or respiratory distress   Skin: No masses, erythema, lacerations, fluctation, ulcerations  Neurovascular: Sensation Intact to the Median, Ulnar, Radial Nerve, Motor Intact to the Median, Ulnar, Radial Nerve and Pulses Intact    MUSCULOSKELETAL EXAMINATION:  Right Hand:   Skin intact   No obvious swelling   No erythema or ecchymosis   Small deformity noted on radial aspect of DIP joint   Full fist formation without rotational deformity noted  Sensation intact   Brisk capillary refill   Palpable distal radial pulse     ___________________________________________________  STUDIES REVIEWED:  Images obtained today of the right hand  3 views demonstrate No significant radiographic changes  Note diagnosis from previous x-ray 6/22/2021 was nonaggressive mixed lytic/sclerotic lesions in the right 3rd metacarpal, 3rd proximal phalanx and 3rd middle phalanx consistent with enchondromas  PROCEDURES PERFORMED:  Procedures  No Procedures performed today    _____________________________________________________  ASSESSMENT/PLAN:    5 y o  female with right hand bone lesions-suspected melorheostosis vs osteochondroma Currently asymptomatic  -I discussed that due to her x-rays showing no changes from last year and being asymptomatic she does not need to continue with follow ups at this time    -I discussed that she does not have any restrictions with activities     -I will see her back in office on an as needed basis if symptoms worsen or fail to improve  Diagnoses and all orders for this visit:    Finger lesion  -     XR hand 3+ vw right; Future      Follow Up:  Return if symptoms worsen or fail to improve  Work/school status:  No restrictions     To Do Next Visit:   PRN   :    Scribe Attestation    I,:  Bessy Noe am acting as a scribe while in the presence of the attending physician :       I,:  Airam Johnson MD personally performed the services described in this documentation    as scribed in my presence :           Portions of the record may have been created with voice recognition software  Occasional wrong word or "sound a like" substitutions may have occurred due to the inherent limitations of voice recognition software  Read the chart carefully and recognize, using context, where substitutions have occurred

## 2023-10-18 ENCOUNTER — TELEPHONE (OUTPATIENT)
Dept: ORTHOPEDIC SURGERY | Facility: CLINIC | Age: 63
End: 2023-10-18
Payer: MEDICARE

## 2023-10-18 DIAGNOSIS — M25.511 ACUTE PAIN OF RIGHT SHOULDER: ICD-10-CM

## 2023-10-18 DIAGNOSIS — Z98.890 S/P RIGHT ROTATOR CUFF REPAIR: ICD-10-CM

## 2023-10-18 RX ORDER — OXYCODONE AND ACETAMINOPHEN 10; 325 MG/1; MG/1
1 TABLET ORAL EVERY 12 HOURS PRN
Qty: 14 TABLET | Refills: 0 | Status: SHIPPED | OUTPATIENT
Start: 2023-10-18 | End: 2023-10-25

## 2023-10-18 NOTE — TELEPHONE ENCOUNTER
"  Caller: Santi Souza Sr. \"Alonso Ellis\"    Relationship: Self    Best call back number: [phone]    Requested Prescriptions:   OXYCODONE 10      Pharmacy where request should be sent:  [facility]    Last office visit with prescribing clinician: 8/22/2023   Last telemedicine visit with prescribing clinician: Visit date not found   Next office visit with prescribing clinician: 11/28/2023       Does the patient have less than a 3 day supply:  [x] Yes  [] No    Would you like a call back once the refill request has been completed: [x] Yes [] No    If the office needs to give you a call back, can they leave a voicemail: [x] Yes [] No    Trice Schmitz Rep   10/18/23 13:39 EDT     "

## 2023-10-19 ENCOUNTER — OFFICE VISIT (OUTPATIENT)
Dept: BEHAVIORAL HEALTH | Facility: CLINIC | Age: 63
End: 2023-10-19
Payer: MEDICARE

## 2023-10-19 VITALS
SYSTOLIC BLOOD PRESSURE: 140 MMHG | HEIGHT: 72 IN | HEART RATE: 65 BPM | WEIGHT: 169 LBS | DIASTOLIC BLOOD PRESSURE: 78 MMHG | BODY MASS INDEX: 22.89 KG/M2 | OXYGEN SATURATION: 98 %

## 2023-10-19 DIAGNOSIS — F43.10 POST TRAUMATIC STRESS DISORDER (PTSD): ICD-10-CM

## 2023-10-19 DIAGNOSIS — F41.1 GENERALIZED ANXIETY DISORDER: ICD-10-CM

## 2023-10-19 DIAGNOSIS — F33.1 MODERATE EPISODE OF RECURRENT MAJOR DEPRESSIVE DISORDER: ICD-10-CM

## 2023-10-19 DIAGNOSIS — G47.20 CIRCADIAN RHYTHM SLEEP DISORDER, UNSPECIFIED TYPE: ICD-10-CM

## 2023-10-19 NOTE — PROGRESS NOTES
Follow Up Office Visit      Patient Name: Santi Souza Sr.  : 1960   MRN: [id number]     Referring Provider: Jordan Heart APRN    Chief Complaint:      ICD-10-CM ICD-9-CM   1. Generalized anxiety disorder  F41.1 300.02   2. Post traumatic stress disorder (PTSD)  F43.10 309.81   3. Circadian rhythm sleep disorder, unspecified type  G47.20 327.30   4. Moderate episode of recurrent major depressive disorder  F33.1 296.32        History of Present Illness:   Santi Souza Sr. is a 63 y.o. male who is here today for follow up with psychiatric medications.    Subjective      Patient Reports:   I get my teeth today, at least something is going right.  Had my shoulder surgery and started physical therapy today.    Have been more emotional and I need something to help me with that.  Dealing with by brother who has to drive me around because my truck broke down.  Worry about not having money hardly for groceries and utilities, I have had so many medical bills coming in too.        Review of Systems:   Review of Systems   Psychiatric/Behavioral:  Positive for agitation, depressed mood and stress.        Screening Scores:   PHQ-9 : 17  GARETT-7 : 13    RISK ASSESSMENT:  Patient denies any high risk factors today.    Medications:     Current Outpatient Medications:     prazosin (MINIPRESS) 2 MG capsule, Take 1 capsule by mouth at bedtime., Disp: 30 capsule, Rfl: 1    traZODone (DESYREL) 50 MG tablet, Take 1 tablet by mouth every day at bedtime., Disp: 30 tablet, Rfl: 1    aspirin 81 MG chewable tablet, Chew 1 tablet., Disp: , Rfl:     atorvastatin (LIPITOR) 80 MG tablet, Take 1 tablet by mouth Daily., Disp: 90 tablet, Rfl: 1    baclofen (LIORESAL) 10 MG tablet, Take 1 tablet by mouth 3 (Three) Times a Day., Disp: 90 tablet, Rfl: 0    diclofenac (VOLTAREN) 75 MG EC tablet, TAKE 1 TABLET BY MOUTH TWICE DAILY *DO  NOT  COMBINE  WITH  MOBIC*, Disp: 60 tablet, Rfl: 0    donepezil (ARICEPT) 10 MG tablet,  Take 1 tablet by mouth Daily With Breakfast., Disp: , Rfl:     DULoxetine (CYMBALTA) 60 MG capsule, Take 1 tablet by mouth each morning., Disp: 30 capsule, Rfl: 1    empagliflozin (Jardiance) 25 MG tablet tablet, Take 1 tablet by mouth Daily., Disp: 90 tablet, Rfl: 1    erythromycin (ROMYCIN) 5 MG/GM ophthalmic ointment, Will start when have cataract surgery, Disp: , Rfl:     levocetirizine (XYZAL) 5 MG tablet, Take 1 tablet by mouth Every Evening. (Patient taking differently: Take 1 tablet by mouth As Needed for Allergies.), Disp: 90 tablet, Rfl: 1    Magnesium 400 MG tablet, Take 400 mg by mouth Daily., Disp: , Rfl:     methocarbamol (ROBAXIN) 500 MG tablet, TAKE 1 TABLET BY MOUTH THREE TIMES DAILY AS NEEDED FOR  MUSCLE  SPASMS FOR UP TO 14 DAYS, Disp: 42 tablet, Rfl: 0    nitroglycerin (NITROLINGUAL) 0.4 MG/SPRAY spray, Place 1 spray by translingual route on or under the tongue at the first sign of an attack, no more than 3 sprays / 15-minute. (Patient taking differently: 1 spray. Place 1 spray by translingual route on or under the tongue at the first sign of an attack, no more than 3 sprays / 15-minute.), Disp: 1 each, Rfl: 0    omeprazole (priLOSEC) 20 MG capsule, Take 1 capsule by mouth Daily., Disp: 90 capsule, Rfl: 1    oxyCODONE-acetaminophen (PERCOCET)  MG per tablet, Take 1 tablet by mouth Every 12 (Twelve) Hours As Needed for Moderate Pain for up to 7 days., Disp: 14 tablet, Rfl: 0    SITagliptin (JANUVIA) 100 MG tablet, Take 1 tablet by mouth Daily., Disp: 90 tablet, Rfl: 1    valACYclovir (VALTREX) 500 MG tablet, Pt does not recognize this, Disp: , Rfl:     Medication Considerations:  JAMARI reviewed and appropriate.      Allergies:   Allergies   Allergen Reactions    Hydrocodone Itching    Penicillins Swelling and Provider Review Needed     Entire body swelled as a child     Sulfa Antibiotics Shortness Of Breath, Rash and Provider Review Needed    Codeine Nausea And Vomiting, Confusion and  "Provider Review Needed    Oxycontin [Oxycodone] Itching       Results Reviewed:   screeners     Objective     Physical Exam:  Vital Signs:   Vitals:    10/19/23 1424   BP: 140/78   Pulse: 65   SpO2: 98%   Weight: 76.7 kg (169 lb)   Height: 182.9 cm (72\")     Body mass index is 22.92 kg/m².     Mental Status Exam:   Hygiene:   good  Cooperation:  Cooperative  Eye Contact:  Good  Psychomotor Behavior:  Appropriate  Affect:   animated  Mood: depressed, anxious, and irritable  Speech:  Normal  Thought Process:  Tangential  Thought Content:  Mood congruent  Suicidal:  None  Homicidal:  None  Hallucinations:  None  Delusion:  None  Memory:  Intact  Orientation:  Grossly intact  Reliability:  good  Insight:  Good  Judgement:  Fair  Impulse Control:  Fair  Physical/Medical Issues:  Yes recent surgery on rt shoulder      Assessment / Plan      Visit Diagnosis/Orders Placed This Visit:  Diagnoses and all orders for this visit:    1. Generalized anxiety disorder  -     prazosin (MINIPRESS) 2 MG capsule; Take 1 capsule by mouth at bedtime.  Dispense: 30 capsule; Refill: 1  -     traZODone (DESYREL) 50 MG tablet; Take 1 tablet by mouth every day at bedtime.  Dispense: 30 tablet; Refill: 1  -     DULoxetine (CYMBALTA) 60 MG capsule; Take 1 tablet by mouth each morning.  Dispense: 30 capsule; Refill: 1    2. Post traumatic stress disorder (PTSD)  -     prazosin (MINIPRESS) 2 MG capsule; Take 1 capsule by mouth at bedtime.  Dispense: 30 capsule; Refill: 1    3. Circadian rhythm sleep disorder, unspecified type  -     prazosin (MINIPRESS) 2 MG capsule; Take 1 capsule by mouth at bedtime.  Dispense: 30 capsule; Refill: 1  -     traZODone (DESYREL) 50 MG tablet; Take 1 tablet by mouth every day at bedtime.  Dispense: 30 tablet; Refill: 1    4. Moderate episode of recurrent major depressive disorder  -     traZODone (DESYREL) 50 MG tablet; Take 1 tablet by mouth every day at bedtime.  Dispense: 30 tablet; Refill: 1  -     DULoxetine " (CYMBALTA) 60 MG capsule; Take 1 tablet by mouth each morning.  Dispense: 30 capsule; Refill: 1         Functional Status: Mild impairment     Prognosis: Good with Ongoing Treatment     Impression/Formulation:  Patient appeared alert and oriented. Patient is receptive to assistance with maintaining a stable lifestyle.  Patient presents with history of     ICD-10-CM ICD-9-CM   1. Generalized anxiety disorder  F41.1 300.02   2. Post traumatic stress disorder (PTSD)  F43.10 309.81   3. Circadian rhythm sleep disorder, unspecified type  G47.20 327.30   4. Moderate episode of recurrent major depressive disorder  F33.1 296.32   .    Patient dealing with multiple stressors and a recent surgery.  Continuing with previous plan to increase duloxetine.  Patient has recently started psychotherapy with Duc Dixon.     Treatment Plan:   Continue prazosin,   Increase duloxetine  Start trazodone prn  Continue therapy with Duc Dixon  Patient will continue supportive psychotherapy efforts and medications as indicated. Clinic will obtain release of information for current treatment team for continuity of care as needed. Patient will contact this office, call 911 or present to the nearest emergency room should suicidal or homicidal ideations occur.  Discussed medication options and treatment plan of prescribed medication(s) as well as the risks, benefits, and potential side effects. Patient ackowledged and verbally consented to continue with current treatment plan and was educated on the importance of compliance with treatment and follow-up appointments.     Follow Up:   Return in about 4 weeks (around 11/16/2023) for Med Check.        JESSICA Nur, BRIEN-BC  [facility]     This is electronically signed by JESSICA Nur, ANGELO  [unfilled] 15:06 EDT

## 2023-10-20 RX ORDER — PRAZOSIN HYDROCHLORIDE 2 MG/1
CAPSULE ORAL
Qty: 30 CAPSULE | Refills: 1 | Status: SHIPPED | OUTPATIENT
Start: 2023-10-20

## 2023-10-20 RX ORDER — DULOXETIN HYDROCHLORIDE 60 MG/1
CAPSULE, DELAYED RELEASE ORAL
Qty: 30 CAPSULE | Refills: 1 | Status: SHIPPED | OUTPATIENT
Start: 2023-10-20

## 2023-10-20 RX ORDER — TRAZODONE HYDROCHLORIDE 50 MG/1
TABLET ORAL
Qty: 30 TABLET | Refills: 1 | Status: SHIPPED | OUTPATIENT
Start: 2023-10-20

## 2023-10-23 ENCOUNTER — OFFICE VISIT (OUTPATIENT)
Dept: BEHAVIORAL HEALTH | Facility: CLINIC | Age: 63
End: 2023-10-23
Payer: MEDICARE

## 2023-10-23 DIAGNOSIS — F43.10 PTSD (POST-TRAUMATIC STRESS DISORDER): ICD-10-CM

## 2023-10-23 DIAGNOSIS — F41.9 ANXIETY AND DEPRESSION: Primary | ICD-10-CM

## 2023-10-23 DIAGNOSIS — F32.A ANXIETY AND DEPRESSION: Primary | ICD-10-CM

## 2023-10-23 PROCEDURE — 90834 PSYTX W PT 45 MINUTES: CPT | Performed by: SOCIAL WORKER

## 2023-10-23 NOTE — PROGRESS NOTES
"     Follow Up Adult Note     Date:10/23/2023   Patient Name: Santi Souza Sr.  : 1960   MRN: [id number]   Time IN: 1:00 pm    Time OUT: 1:45 pm     Referring Provider: Jordan Heart APRN    Chief Complaint:      ICD-10-CM ICD-9-CM   1. Anxiety and depression  F41.9 300.00    F32.A 311   2. PTSD (post-traumatic stress disorder)  F43.10 309.81        History of Present Illness:   Santi Souza Sr. is a 63 y.o. male who is being seen today for Psychotherapy session. \"I've been about the same. Up and down. Always something. \" Dealing with financial concerns: car shield, and outstanding state taxes. He does not qualify for state medicaid because he makes 63.00 dollars too much on medicare. He does not qualify for  subsidies. He refuses to engage in \"[employer]\" to qualify for  benefits. Client states he has too many mental health issues to work. \"I have no family except his brother, who helps out as much as he can.\"     What keeps me  in the whole is my financials - not being able to pay co-pays, car is broken down, low on groceries, and currently unemployed. \"I can't get resources. I'm turned away every time I reach out for help. Can't get no help from the government.\"  He said, \"[employer] destroyed my home [place], and told me I am not allowed back in the store.\" He said he thinks his civil rights are being violated: age, disability, and mental health.      Subjective     PHQ-9 Depression Screening: No update    GARETT-7 Anxiety Screening: No update     Patient's Support Network Includes:    comrades    Functional Status: Moderate impairment     Progress toward goal: Not at goal    Prognosis: Fair with Ongoing Treatment     Medications:     Current Outpatient Medications:     aspirin 81 MG chewable tablet, Chew 1 tablet., Disp: , Rfl:     atorvastatin (LIPITOR) 80 MG tablet, Take 1 tablet by mouth Daily., Disp: 90 tablet, Rfl: 1    baclofen (LIORESAL) 10 MG " tablet, Take 1 tablet by mouth 3 (Three) Times a Day., Disp: 90 tablet, Rfl: 0    diclofenac (VOLTAREN) 75 MG EC tablet, TAKE 1 TABLET BY MOUTH TWICE DAILY *DO  NOT  COMBINE  WITH  MOBIC*, Disp: 60 tablet, Rfl: 0    donepezil (ARICEPT) 10 MG tablet, Take 1 tablet by mouth Daily With Breakfast., Disp: , Rfl:     DULoxetine (CYMBALTA) 60 MG capsule, Take 1 tablet by mouth each morning., Disp: 30 capsule, Rfl: 1    empagliflozin (Jardiance) 25 MG tablet tablet, Take 1 tablet by mouth Daily., Disp: 90 tablet, Rfl: 1    erythromycin (ROMYCIN) 5 MG/GM ophthalmic ointment, Will start when have cataract surgery, Disp: , Rfl:     levocetirizine (XYZAL) 5 MG tablet, Take 1 tablet by mouth Every Evening. (Patient taking differently: Take 1 tablet by mouth As Needed for Allergies.), Disp: 90 tablet, Rfl: 1    Magnesium 400 MG tablet, Take 400 mg by mouth Daily., Disp: , Rfl:     methocarbamol (ROBAXIN) 500 MG tablet, TAKE 1 TABLET BY MOUTH THREE TIMES DAILY AS NEEDED FOR  MUSCLE  SPASMS FOR UP TO 14 DAYS, Disp: 42 tablet, Rfl: 0    nitroglycerin (NITROLINGUAL) 0.4 MG/SPRAY spray, Place 1 spray by translingual route on or under the tongue at the first sign of an attack, no more than 3 sprays / 15-minute. (Patient taking differently: 1 spray. Place 1 spray by translingual route on or under the tongue at the first sign of an attack, no more than 3 sprays / 15-minute.), Disp: 1 each, Rfl: 0    omeprazole (priLOSEC) 20 MG capsule, Take 1 capsule by mouth Daily., Disp: 90 capsule, Rfl: 1    oxyCODONE-acetaminophen (PERCOCET)  MG per tablet, Take 1 tablet by mouth Every 12 (Twelve) Hours As Needed for Moderate Pain for up to 7 days., Disp: 14 tablet, Rfl: 0    prazosin (MINIPRESS) 2 MG capsule, Take 1 capsule by mouth at bedtime., Disp: 30 capsule, Rfl: 1    SITagliptin (JANUVIA) 100 MG tablet, Take 1 tablet by mouth Daily., Disp: 90 tablet, Rfl: 1    traZODone (DESYREL) 50 MG tablet, Take 1 tablet by mouth every day at  bedtime., Disp: 30 tablet, Rfl: 1    valACYclovir (VALTREX) 500 MG tablet, Pt does not recognize this, Disp: , Rfl:     Allergies:   Allergies   Allergen Reactions    Hydrocodone Itching    Penicillins Swelling and Provider Review Needed     Entire body swelled as a child     Sulfa Antibiotics Shortness Of Breath, Rash and Provider Review Needed    Codeine Nausea And Vomiting, Confusion and Provider Review Needed    Oxycontin [Oxycodone] Itching       Objective     Mental Status Exam:   MENTAL STATUS EXAM   General Appearance:  Cleanly groomed and dressed  Eye Contact:  Good eye contact  Motor Activity:  Normal gait, posture  Muscle Strength:  Normal  Speech:  Normal rate, tone, volume  Language:  Spontaneous  Mood and affect:  Irritable  Hopelessness:  3 and 4  Thought Process:  Logical and goal-directed  Associations/ Thought Content:  No delusions  Hallucinations:  None  Suicidal Ideations:  Not present  Homicidal Ideation:  Not present  Sensorium:  Alert and clear  Orientation:  Person, place, time and situation  Immediate Recall, Recent, and Remote Memory:  Intact  Attention Span/ Concentration:  Good  Fund of Knowledge:  Appropriate for age and educational level  Intellectual Functioning:  Average range  Insight:  Fair  Judgement:  Fair  Reliability:  Fair  Impulse Control:  Fair    Assessment / Plan      Visit Diagnosis/Orders Placed This Visit:    ICD-10-CM ICD-9-CM   1. Anxiety and depression  F41.9 300.00    F32.A 311   2. PTSD (post-traumatic stress disorder)  F43.10 309.81        PLAN:  Safety: No acute safety concerns  Risk Assessment: Risk of self-harm acutely is low. Risk of self-harm chronically is also low, but could be further elevated in the event of treatment noncompliance and/or AODA.    Treatment Plan/Goals: Continue supportive psychotherapy efforts and medications as indicated. Treatment and medication options discussed during today's visit. Patient ackowledged and verbally consented to  continue with current treatment plan and was educated on the importance of compliance with treatment and follow-up appointments. Patient seems reasonably able to adhere to treatment plan.      Assisted Patient in processing above session content; acknowledged and normalized patient’s thoughts, feelings, and concerns.  Rationalized patient thought process regarding venting frustrations about his finances, former employer, and mistreatments from various people and entities. Provided solution focused therapy / problem solving. Provided client with a flyer for Medicare open house in [place], [place] on Thursday November 16, 2023 @ 10:00 am. Managing bite size pieces.     Allowed Patient to freely discuss issues  without interruption or judgement with unconditional positive regard, active listening skills, and empathy. Therapist provided a safe, confidential environment to facilitate the development of a positive therapeutic relationship and encouraged open, honest communication. Assisted Patient in identifying risk factors which would indicate the need for higher level of care including thoughts to harm self or others and/or self-harming behavior and encouraged Patient to contact this office, call 911, or present to the nearest emergency room should any of these events occur. Discussed crisis intervention services and means to access. Patient adamantly and convincingly denies current suicidal or homicidal ideation or perceptual disturbance. Assisted Patient in processing session content; acknowledged and normalized Patient’s thoughts, feelings, and concerns by utilizing a person-centered approach in efforts to build appropriate rapport and a positive therapeutic relationship with open and honest communication. .     Follow Up:   Return in about 1 week (around 10/30/2023) for Psychoterapy.    Duc Dixon LCSW, [facility]

## 2023-10-24 ENCOUNTER — PATIENT OUTREACH (OUTPATIENT)
Dept: CASE MANAGEMENT | Facility: OTHER | Age: 63
End: 2023-10-24
Payer: MEDICARE

## 2023-10-24 DIAGNOSIS — I10 BENIGN ESSENTIAL HTN: Primary | ICD-10-CM

## 2023-10-24 DIAGNOSIS — E11.65 TYPE 2 DIABETES MELLITUS WITH HYPERGLYCEMIA, WITHOUT LONG-TERM CURRENT USE OF INSULIN: ICD-10-CM

## 2023-10-24 NOTE — OUTREACH NOTE
"AMBULATORY CASE MANAGEMENT NOTE    Name and Relationship of Patient/Support Person: Santi Souza Willie Sr. \"Alonso Rhonda\" - Self    CCM Interim Update    Discussed patient's recent right shoulder surgery. Patient states he is doing well. His pain is currently manageable. He reports it is 4/10 on pain scale. He was given nerve block after surgery. He had some shortness of breath after surgery and went to ER for support. He has not had issues since incident. Patient is currently receiving PT. He states PT is helping with range of motion and pain management. He is currently sleeping recliner to help with arm pain. Patient encouraged to keep attending physical therapy. Also educated patient to avoid strenuous activity and to take pain medicine before pain is out of control. Patient agreed.     Discussed diabetes management. Patient states he continues to check blood sugar daily. His blood sugar ranges from 110 to 167. He states surgery did not compromise blood sugar levels. He continues to monitor carb intake and exercise as tolerated.      Patient states he is struggling with paying medical bills. Sent referral to social care services to identify resource for financial aid.     Adult Patient Profile  Questions/Answers      Flowsheet Row Most Recent Value   Symptoms/Conditions Managed at Home musculoskeletal   Barriers to Managing Health stress of chronic illness   Musculoskeletal Symptoms/Conditions other (see comments)  [Right shoulder surgery]   Musculoskeletal Management Strategies adequate rest, medication therapy, exercise   Identifying Health Goals keep illness under control            Education Documentation  Unresolved/Worsening Symptoms, taught by Adrienne Carrizales RN at 10/24/2023  2:16 PM.  Learner: Patient  Readiness: Acceptance  Method: Explanation  Response: Verbalizes Understanding  Comment: Discuss patient's chronic and acute pain. Educated patient on when to seek ememrgency care.    Signs of Medication " Tolerance, taught by Adrienne Carrizales RN at 10/24/2023  2:16 PM.  Learner: Patient  Readiness: Acceptance  Method: Explanation  Response: Verbalizes Understanding  Comment: Discuss patient's chronic and acute pain. Educated patient on when to seek ememrgency care.    Sleep/Rest, taught by Adrienne Carrizales RN at 10/24/2023  2:16 PM.  Learner: Patient  Readiness: Acceptance  Method: Explanation  Response: Verbalizes Understanding  Comment: Discuss patient's chronic and acute pain. Educated patient on when to seek ememrgency care.    Pain Self-Advocacy, taught by Adrienne Carrizales RN at 10/24/2023  2:16 PM.  Learner: Patient  Readiness: Acceptance  Method: Explanation  Response: Verbalizes Understanding  Comment: Discuss patient's chronic and acute pain. Educated patient on when to seek ememrgency care.    Nonpharmacologic Pain Management, taught by Adrienne Carrizales RN at 10/24/2023  2:16 PM.  Learner: Patient  Readiness: Acceptance  Method: Explanation  Response: Verbalizes Understanding  Comment: Discuss patient's chronic and acute pain. Educated patient on when to seek ememrgency care.    Medication Management, taught by Adrienne Carrizales RN at 10/24/2023  2:16 PM.  Learner: Patient  Readiness: Acceptance  Method: Explanation  Response: Verbalizes Understanding  Comment: Discuss patient's chronic and acute pain. Educated patient on when to seek ememrgency care.    Coping Strategies, taught by Adrienne Carrizales RN at 10/24/2023  2:16 PM.  Learner: Patient  Readiness: Acceptance  Method: Explanation  Response: Verbalizes Understanding  Comment: Discuss patient's chronic and acute pain. Educated patient on when to seek ememrgency care.    Activity, taught by Adrienne Carrizales RN at 10/24/2023  2:16 PM.  Learner: Patient  Readiness: Acceptance  Method: Explanation  Response: Verbalizes Understanding  Comment: Discuss patient's chronic and acute pain. Educated patient on when to seek ememrgency care.    Signs/Symptoms,  taught by Adrienne Carrizales, RN at 10/24/2023  2:16 PM.  Learner: Patient  Readiness: Acceptance  Method: Explanation  Response: Verbalizes Understanding  Comment: Discuss patient's chronic and acute pain. Educated patient on when to seek ememrgency care.    Risk Factors, taught by Adrienne Carrizales, RN at 10/24/2023  2:16 PM.  Learner: Patient  Readiness: Acceptance  Method: Explanation  Response: Verbalizes Understanding  Comment: Discuss patient's chronic and acute pain. Educated patient on when to seek ememrgency care.          Adrienne LEONE  Ambulatory Case Management    10/24/2023, 14:17 EDT

## 2023-10-26 ENCOUNTER — PATIENT OUTREACH (OUTPATIENT)
Dept: CASE MANAGEMENT | Facility: CLINIC | Age: 63
End: 2023-10-26
Payer: MEDICARE

## 2023-10-26 NOTE — OUTREACH NOTE
SW received referral via RN-ACM re: financial concerns for medical bills. SW called and spoke to patient. Patient currently at Physical Therapy and request this SW call back tomorrow. Outreach scheduled for tomorrow 10/27.     Becka BUITRAGO -   Ambulatory Case Management    10/26/2023, 14:57 EDT

## 2023-10-30 ENCOUNTER — OFFICE VISIT (OUTPATIENT)
Dept: BEHAVIORAL HEALTH | Facility: CLINIC | Age: 63
End: 2023-10-30
Payer: MEDICARE

## 2023-10-30 ENCOUNTER — PATIENT OUTREACH (OUTPATIENT)
Dept: CASE MANAGEMENT | Facility: OTHER | Age: 63
End: 2023-10-30
Payer: MEDICARE

## 2023-10-30 DIAGNOSIS — E11.65 TYPE 2 DIABETES MELLITUS WITH HYPERGLYCEMIA, WITHOUT LONG-TERM CURRENT USE OF INSULIN: ICD-10-CM

## 2023-10-30 DIAGNOSIS — F32.A ANXIETY AND DEPRESSION: Primary | ICD-10-CM

## 2023-10-30 DIAGNOSIS — F41.9 ANXIETY AND DEPRESSION: Primary | ICD-10-CM

## 2023-10-30 DIAGNOSIS — I10 BENIGN ESSENTIAL HTN: Primary | ICD-10-CM

## 2023-10-30 DIAGNOSIS — F43.10 PTSD (POST-TRAUMATIC STRESS DISORDER): ICD-10-CM

## 2023-10-30 PROCEDURE — 90834 PSYTX W PT 45 MINUTES: CPT | Performed by: SOCIAL WORKER
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
## 2023-10-30 NOTE — OUTREACH NOTE
CCM End of Month Documentation    This Chronic Medical Management Care Plan for Santi Souza Sr., 63 y.o. male, has been monitored and managed and a new plan of care implemented for the month of October.  A cumulative time of 27  minutes was spent on this patient record this month, including phone call with patient; chart review.    Regarding the patient's problems: has Benign essential HTN; Mixed hyperlipidemia; Type 2 diabetes mellitus with diabetic polyneuropathy, without long-term current use of insulin; Anxiety and depression; Allergic rhinitis; Arthralgia; Vitamin deficiency; Memory deficits; PTSD (post-traumatic stress disorder); Screening for prostate cancer; Vitamin D deficiency; Encounter for long-term (current) use of other medications; Initial Medicare annual wellness visit; Advanced directives, counseling/discussion; Screening for colon cancer; Screening for lung cancer; GERD (gastroesophageal reflux disease); Insomnia; CAD (coronary artery disease); Cerebrovascular disease; Chronic midline low back pain; Need for hepatitis C screening test; Mild cognitive impairment; Labile blood glucose; Acute pain of right shoulder; Neck pain; Acute midline thoracic back pain; Cognitive impairment; Calcific tendinitis of right shoulder; Tear of right supraspinatus tendon; Traumatic complete tear of right rotator cuff; Pseudoparalysis; Traumatic complete tear of right rotator cuff, initial encounter; Smoking greater than 30 pack years; and Post-traumatic stiffness of right shoulder joint on their problem list., the following items were addressed: medical records and any changes can be found within the plan section of the note.  A detailed listing of time spent for chronic care management is tracked within each outreach encounter.  Current medications include:  has a current medication list which includes the following prescription(s): aspirin, atorvastatin, baclofen, diclofenac, donepezil, duloxetine,  empagliflozin, erythromycin, levocetirizine, magnesium, methocarbamol, nitroglycerin, omeprazole, prazosin, sitagliptin, trazodone, and valacyclovir. and the patient is reported to be patient is compliant with medication protocol,  Medications are reported to be effective.    All notes on chart for PCP to review.    The patient was monitored remotely for pain; blood glucose.    The patient's physical needs include:  physical healthcare.     The patient's mental support needs include:  continued support    The patient's cognitive support needs include:  health care    The patient's psychosocial support needs include:  not applicable    The patient's functional needs include: physical healthcare, Patient recently had surgery on right shoulder for torn rotator cuff    The patient's environmental needs include:  not applicable    Care Plan overall comments:  No data recorded    Refer to previous outreach notes for more information on the areas listed above.    Monthly Billing Diagnoses  (I10) Benign essential HTN    (E11.65) Type 2 diabetes mellitus with hyperglycemia, without long-term current use of insulin    Medications   Medications have been reconciled    Care Plan progress this month:      Recently Modified Care Plans Updates made since 9/29/2023 12:00 AM      No recently modified care plans.                Instructions   Patient was provided an electronic copy of care plan  CCM services were explained and offered and patient has accepted these services.  Patient has given their written consent to receive CCM services and understands that this includes the authorization of electronic communication of medical information with the other treating providers.  Patient understands that they may stop CCM services at any time and these changes will be effective at the end of the calendar month and will effectively revocate the agreement of CCM services.  Patient understands that only one practitioner can furnish and be  paid for CCM services during one calendar month.  Patient also understands that there may be co-payment or deductible fees in association with CCM services.  Patient will continue with at least monthly follow-up calls with the Ambulatory .    Adrienne LEONE  Ambulatory Case Management    10/30/2023, 09:07 EDT

## 2023-11-03 ENCOUNTER — TELEPHONE (OUTPATIENT)
Dept: ORTHOPEDIC SURGERY | Facility: CLINIC | Age: 63
End: 2023-11-03
Payer: MEDICARE

## 2023-11-03 NOTE — TELEPHONE ENCOUNTER
Patient called request refill on oxycodone.  Patient has been out for a few days and is still experiencing pain especially with PT.    Please advise     Pharmacy:  [facility] in [place]- [phone]

## 2023-11-06 ENCOUNTER — OFFICE VISIT (OUTPATIENT)
Dept: BEHAVIORAL HEALTH | Facility: CLINIC | Age: 63
End: 2023-11-06
Payer: MEDICARE

## 2023-11-06 DIAGNOSIS — Z98.890 S/P RIGHT ROTATOR CUFF REPAIR: Primary | ICD-10-CM

## 2023-11-06 DIAGNOSIS — F41.9 ANXIETY AND DEPRESSION: Primary | ICD-10-CM

## 2023-11-06 DIAGNOSIS — F32.A ANXIETY AND DEPRESSION: Primary | ICD-10-CM

## 2023-11-06 DIAGNOSIS — F43.10 PTSD (POST-TRAUMATIC STRESS DISORDER): ICD-10-CM

## 2023-11-06 PROCEDURE — 90834 PSYTX W PT 45 MINUTES: CPT | Performed by: SOCIAL WORKER

## 2023-11-06 RX ORDER — OXYCODONE AND ACETAMINOPHEN 7.5; 325 MG/1; MG/1
1 TABLET ORAL EVERY 12 HOURS PRN
Qty: 14 TABLET | Refills: 0 | Status: SHIPPED | OUTPATIENT
Start: 2023-11-06 | End: 2023-11-13
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
## 2023-11-14 ENCOUNTER — OFFICE VISIT (OUTPATIENT)
Dept: NEUROLOGY | Facility: CLINIC | Age: 63
End: 2023-11-14
Payer: MEDICARE

## 2023-11-14 ENCOUNTER — LAB (OUTPATIENT)
Dept: LAB | Facility: HOSPITAL | Age: 63
End: 2023-11-14
Payer: MEDICARE

## 2023-11-14 VITALS
SYSTOLIC BLOOD PRESSURE: 128 MMHG | BODY MASS INDEX: 23.03 KG/M2 | DIASTOLIC BLOOD PRESSURE: 58 MMHG | WEIGHT: 170 LBS | HEART RATE: 75 BPM | OXYGEN SATURATION: 97 % | HEIGHT: 72 IN

## 2023-11-14 DIAGNOSIS — R41.3 MEMORY LOSS: Primary | ICD-10-CM

## 2023-11-14 DIAGNOSIS — R41.3 MEMORY LOSS: ICD-10-CM

## 2023-11-14 DIAGNOSIS — R26.81 GAIT INSTABILITY: ICD-10-CM

## 2023-11-14 PROCEDURE — 1160F RVW MEDS BY RX/DR IN RCRD: CPT

## 2023-11-14 PROCEDURE — 1159F MED LIST DOCD IN RCRD: CPT

## 2023-11-14 PROCEDURE — 99214 OFFICE O/P EST MOD 30 MIN: CPT

## 2023-11-14 PROCEDURE — 3078F DIAST BP <80 MM HG: CPT

## 2023-11-14 PROCEDURE — 3074F SYST BP LT 130 MM HG: CPT

## 2023-11-14 NOTE — PROGRESS NOTES
Neuro Office Visit      Encounter Date: 2023   Patient Name: Santi Souza Sr.  : 1960   MRN: [id number]   PCP:  Jordan Heart APRN     Chief Complaint:    Chief Complaint   Patient presents with    Memory Loss       History of Present Illness: Santi Souza Sr. is a 63 y.o. male who is here today in Neurology for  memory loss    PMH of CAD, chronic back pain, depression insulin-dependent diabetes, GERD, hyperlipidemia, hypertension, neuropathy, osteoarthritis, rotator cuff syndrome, CVA in  with short-term memory and difficulty with balance, vitamin D deficiency, glasses, hearing aid use    Patient was referred by primary care after visit on 2023 for evaluation of cerebrovascular disease, memory deficits, cognitive impairment, and PTSD.  He reported at that visit that due to this history he has trouble doing daily life skills in the home such as planning and taking care of things such as bills.  He has been seeing neurology Trisha PEGUERO at [facility] and would like a second opinion.  Feels that his memory and cognitive impairment is worsening.  He is following with psychiatry now with [facility] and states this has been very helpful for him.  MRI brain performed in 2022 for memory loss which per impression stated ventricles and sulci normal in size.  Mild amount of scattered T2 hyperintensities predominantly in the subcortical regions of the frontal lobes, indicating chronic small vessel disease.  Enlarged perivascular spaces are incidentally noted in left cerebral peduncle of the midbrain.  No abnormal parenchymal susceptibility artifact or restricted diffusion.  No midline shift, hydrocephalus, or extra-axial fluid collection is evident.  He does follow with [facility] and was last seen in their office on 2023 with MARIELENA Gorman.  Donepezil was increased to 10 mg daily.  He was assessed to have mild cognitive  impairment due to vascular disease and PTSD.    In clinic today he reports that memory changes have progressively worsened, he has trouble remembering appointments, trouble remembering steps through making a sandwich, trouble with conversations - he often will lose train of thought mid conversation. [facility] he describes as feeling [facility]/needing mental health help - he denies SI, he called crisis hotline yesterday, reports that he feels better [facility], he reports history of trauma. He notes emotional stress from co-pays and family concerns. He reports that he had CVA previously that was seen on prior MRI - per report from [facility] no stroke was mentioned, will repeat MRI brain to further assess, currently taking ASA 81 mg plus Atorvastatin 80 mg. Currently taking Donepezil 10 mg daily. He reports gait unsteadiness which was previously assessed by Dr. Gonsalez and felt to be d/t peripheral neuropathy.     Subjective      Review of Systems   Constitutional: Negative.    HENT: Negative.     Eyes: Negative.    Respiratory: Negative.     Cardiovascular: Negative.    Gastrointestinal: Negative.    Musculoskeletal:  Positive for gait problem.   Skin: Negative.    Allergic/Immunologic: Negative.    Neurological:  Positive for numbness.        Memory loss   Psychiatric/Behavioral:  Positive for agitation, confusion and sleep disturbance. The patient is nervous/anxious.           Past Medical History:   Past Medical History:   Diagnosis Date    CAD (coronary artery disease) 11/03/2017    Cataracta     Chronic back pain 11/03/2017    Depression     Diabetes mellitus--Insulin Dependant 11/03/2017    IDDM    GERD (gastroesophageal reflux disease) 11/03/2017    Hyperlipidemia 11/03/2017    Hypertension 11/03/2017    Neuropathy     Osteoarthritis     Rotator cuff syndrome 6/23    Should be on my chart    Stroke 2022    short term memory and difficulty balance    Vitamin D deficiency      Wears glasses     Wears hearing aid in both ears        Past Surgical History:   Past Surgical History:   Procedure Laterality Date    BACK SURGERY      4 lumbar surgeries    CARDIAC CATHETERIZATION      total of 7 stents    COLONOSCOPY      LUMBAR SPINE SURGERY  1998,     SHOULDER ARTHROSCOPY W/ ROTATOR CUFF REPAIR Right 2023    Procedure: SHOULDER ARTHROSCOPY WITH ROTATOR CUFF REPAIR, BICEP TENDONESIS, SUBACROMIAL DECOMPRESSION- RIGHT;  Surgeon: Geoffrey Francis MD;  Location: [facility];  Service: Orthopedics;  Laterality: Right;    TONSILLECTOMY         Family History:   Family History   Problem Relation Age of Onset    Diabetes Father     Cancer Maternal Grandfather        Social History:   Social History     Socioeconomic History    Marital status: Single   Tobacco Use    Smoking status: Some Days     Packs/day: 0.25     Years: 32.00     Additional pack years: 0.00     Total pack years: 8.00     Types: Cigarettes     Start date: 1974     Last attempt to quit: 2022     Years since quittin.8     Passive exposure: Current    Smokeless tobacco: Never   Vaping Use    Vaping Use: Never used   Substance and Sexual Activity    Alcohol use: Never    Drug use: Yes     Frequency: 7.0 times per week     Types: Marijuana     Comment: vape every night or smoke    Sexual activity: Defer     Partners: Female     Birth control/protection: Condom       Medications:     Current Outpatient Medications:     aspirin 81 MG chewable tablet, Chew 1 tablet., Disp: , Rfl:     atorvastatin (LIPITOR) 80 MG tablet, Take 1 tablet by mouth Daily., Disp: 90 tablet, Rfl: 1    diclofenac (VOLTAREN) 75 MG EC tablet, TAKE 1 TABLET BY MOUTH TWICE DAILY *DO  NOT  COMBINE  WITH  MOBIC*, Disp: 60 tablet, Rfl: 0    donepezil (ARICEPT) 10 MG tablet, Take 1 tablet by mouth Daily With Breakfast., Disp: , Rfl:     DULoxetine (CYMBALTA) 60 MG capsule, Take 1 tablet by mouth each morning., Disp: 30 capsule, Rfl: 1     "empagliflozin (Jardiance) 25 MG tablet tablet, Take 1 tablet by mouth Daily., Disp: 90 tablet, Rfl: 1    levocetirizine (XYZAL) 5 MG tablet, Take 1 tablet by mouth Every Evening. (Patient taking differently: Take 1 tablet by mouth As Needed for Allergies.), Disp: 90 tablet, Rfl: 1    Magnesium 400 MG tablet, Take 400 mg by mouth Daily., Disp: , Rfl:     methocarbamol (ROBAXIN) 500 MG tablet, TAKE 1 TABLET BY MOUTH THREE TIMES DAILY AS NEEDED FOR  MUSCLE  SPASMS FOR UP TO 14 DAYS, Disp: 42 tablet, Rfl: 0    nitroglycerin (NITROLINGUAL) 0.4 MG/SPRAY spray, Place 1 spray by translingual route on or under the tongue at the first sign of an attack, no more than 3 sprays / 15-minute. (Patient taking differently: 1 spray. Place 1 spray by translingual route on or under the tongue at the first sign of an attack, no more than 3 sprays / 15-minute.), Disp: 1 each, Rfl: 0    omeprazole (priLOSEC) 20 MG capsule, Take 1 capsule by mouth Daily., Disp: 90 capsule, Rfl: 1    prazosin (MINIPRESS) 2 MG capsule, Take 1 capsule by mouth at bedtime., Disp: 30 capsule, Rfl: 1    SITagliptin (JANUVIA) 100 MG tablet, Take 1 tablet by mouth Daily., Disp: 90 tablet, Rfl: 1    traZODone (DESYREL) 50 MG tablet, Take 1 tablet by mouth every day at bedtime., Disp: 30 tablet, Rfl: 1    baclofen (LIORESAL) 10 MG tablet, Take 1 tablet by mouth 3 (Three) Times a Day. (Patient not taking: Reported on 11/14/2023), Disp: 90 tablet, Rfl: 0    erythromycin (ROMYCIN) 5 MG/GM ophthalmic ointment, Will start when have cataract surgery, Disp: , Rfl:     Allergies:   Allergies   Allergen Reactions    Hydrocodone Itching    Penicillins Swelling and Provider Review Needed     Entire body swelled as a child     Sulfa Antibiotics Shortness Of Breath, Rash and Provider Review Needed    Codeine Nausea And Vomiting, Confusion and Provider Review Needed    Oxycontin [Oxycodone] Itching     Objective     Objective:    /58   Pulse 75   Ht 182.9 cm (72\")   Wt " 77.1 kg (170 lb)   SpO2 97%   BMI 23.06 kg/m²   Body mass index is 23.06 kg/m².    Physical Exam  Vitals reviewed.   Constitutional:       Appearance: Normal appearance.   HENT:      Head: Normocephalic and atraumatic.      Mouth/Throat:      Mouth: Mucous membranes are moist.      Pharynx: Oropharynx is clear.   Pulmonary:      Effort: Pulmonary effort is normal. No respiratory distress.   Skin:     General: Skin is warm and dry.   Neurological:      Mental Status: He is alert.          Neurology Exam:    General apperance: NAD.     Mental status: Alert, awake and oriented to time place and person.    Fund of knowledge:  Normal.     Language and Speech: No aphasia or dysarthria.    Naming , Repitition and Comprehension:  Can name objects, repeat a sentence and follow commands. Speech is clear and fluent with good repetition, comprehension, and naming.    Cranial Nerves:   CN II: Visual fields are full. Intact. Pupils - PERRLA  CN III, IV and VI: Extraocular movements are intact. Normal saccades.   CN V: Facial sensation is intact.   CN VII: Muscles of facial expression reveal no asymmetry. Intact.   CN VIII: Hearing is intact.   CN IX and X: Palate elevates symmetrically. Intact  CN XI: Shoulder shrug is intact.   CN XII: Tongue is midline without evidence of atrophy or fasciculation.     Motor:  Right UE muscle strength 5/5. Normal tone.     Left UE muscle strength 5/5. Normal tone.      Right LE muscle strength 5/5. Normal tone.     Left LE muscle strength 5/5. Normal tone.      Sensory: Normal light touch sensation bilaterally.    DTRs: 2+ bilaterally in upper and lower extremities.    Coordination: Normal finger-to-nose    Gait: No assistive device, unsteady, leaning to alternating sides while walking.    MMSE 29/30 with 2/3 for word recall      Results:   Imaging:   CT Angiogram Chest    Result Date: 9/16/2023  Impression: No evidence of pulmonary embolism. Subcutaneous fat stranding and scattered soft  tissue air at the right shoulder presumably reflecting sequelae of recent surgery per provided clinical history. Mild dependent streaky atelectasis or scarring at the right lung base, without evidence of focal consolidation or active infectious/inflammatory process. Electronically Signed: Emmett Bradford MD  9/16/2023 1:08 PM EDT  Workstation ID: [id number]    XR Chest 1 View    Result Date: 9/16/2023  Impression: Vague reticulonodular interstitial opacities in the right lower lobe compatible with atypical/viral infection or possibly aspiration in the correct clinical context. Electronically Signed: Emmett Bradford MD  9/16/2023 11:53 AM EDT  Workstation ID: [id number]    MRI Shoulder Right Without Contrast    Result Date: 7/31/2023  Impression: Rotator cuff tendinopathy with focal full-thickness tear of the distal anterior supraspinatus tendon. No focal muscular atrophy. Calcific tendinopathy of infraspinatus. Moderate acromioclavicular and mild glenohumeral degenerative changes. Degeneration and tearing of the labrum with more focal discrete tear inferiorly. Electronically Signed: Vahe Nicole  7/31/2023 12:34 PM EDT  Workstation ID: [id number]       Labs:       Assessment / Plan      Assessment/Plan:   Diagnoses and all orders for this visit:    1. Memory loss (Primary)  -     MRI Brain Without Contrast; Future  -     TSH; Future  -     T4, free; Future  -     Vitamin B12 & Folate; Future  -     Methylmalonic Acid, Serum; Future  -     RPR; Future  -     Ambulatory Referral to Speech Therapy    2. Gait instability       Santi Souza Sr. is in neurology clinic to establish care for memory loss and gait instability, MMSE today 29/30 with 2/3 for word recall. He was previously treated at [facility] by Trisha PEGUERO/Dr. Gonsalez and wanted second opinion today. He has been taking Donepezil 10 mg daily and tolerating this well, he feels that his memory continues to decline. I would recommend  continuation of Donepezil for cognitive protection. He also has persistent gait instability. Will assess baseline lab work today to look for any reversible causes of memory loss plus MRI brain to compare to prior imaging/look for progression. Patient also reports concern for history of CVA, I did not see this mentioned on prior MRI brain but will of course look for any evidence of prior infarct on new imaging. He is currently taking ASA 81 mg plus Atorvastatin 80 mg daily. I have also referred him to speech therapy for cognitive therapy as one of his more bothersome memory changes is difficulty with carrying on a conversation. We did also discuss PT for gait/balance training, he did not wish to have this referral today, will also look at cerebellum of brain on MRI along with B12. Unfortunately his mental health continues to be a big challenge for him, he did share today continued concern over PTSD along with multiple life stressors - he is following with behavioral health, denies any SI today. I encouraged continued follow up appointments with Behavioral Health as I do think that his mental health is also negatively affecting his memory. Will plan to see Santi back in clinic in about 3 months or sooner for any questions or concerns.     Patient Education:       Reviewed medications, potential side effects and signs and symptoms to report. Discussed risk versus benefits of treatment plan with patient and/or family-including medications, labs and radiology that may be ordered. Addressed questions and concerns during visit. Patient and/or family verbalized understanding and agree with plan. Instructed to call the office with any questions and report to ER with any life-threatening symptoms.     Follow Up:   Return in about 3 months (around 2/14/2024).    I spent 45 minutes face to face with the patient. I personally spent 50 percent of this time counseling and discussing diagnosis, diagnostic testing, evaluation,  treatment options, and management .       During this visit the following were done:  Labs Reviewed []    Labs Ordered [x]    Radiology Reports Reviewed [x]    Radiology Ordered [x]    PCP Records Reviewed [x]    Referring Provider Records Reviewed []    ER Records Reviewed []    Hospital Records Reviewed []    History Obtained From Family []    Radiology Images Reviewed []    Other Reviewed [x]  Neurology notes per [facility]  Records Requested []      JESSICA Watson  [facility] NEUROLOGY  [address] [address] [phone]

## 2023-11-20 ENCOUNTER — TELEPHONE (OUTPATIENT)
Dept: BEHAVIORAL HEALTH | Facility: CLINIC | Age: 63
End: 2023-11-20

## 2023-11-20 ENCOUNTER — TELEPHONE (OUTPATIENT)
Dept: NEUROLOGY | Facility: CLINIC | Age: 63
End: 2023-11-20
Payer: MEDICARE

## 2023-11-20 ENCOUNTER — PATIENT OUTREACH (OUTPATIENT)
Dept: CASE MANAGEMENT | Facility: CLINIC | Age: 63
End: 2023-11-20
Payer: MEDICARE

## 2023-11-20 LAB
FOLATE SERPL-MCNC: >20 NG/ML
METHYLMALONATE SERPL-SCNC: 273 NMOL/L (ref 0–378)
RPR SER QL: NON REACTIVE
T4 FREE SERPL-MCNC: 1.6 NG/DL (ref 0.82–1.77)
TSH SERPL DL<=0.005 MIU/L-ACNC: 0.71 UIU/ML (ref 0.45–4.5)
VIT B12 SERPL-MCNC: 1466 PG/ML (ref 232–1245)

## 2023-11-20 NOTE — TELEPHONE ENCOUNTER
----- Message from JESSICA Watson sent at 11/20/2023  8:22 AM EST -----  Vitamin B12 is high at 1,466 which is okay given his memory concern, folate is normal, RPR is non reactive (normal), methylmalonic acid is normal, thyroid function is normal.

## 2023-11-20 NOTE — TELEPHONE ENCOUNTER
Spoke with Alonso Ellis and he is wanting to defer MRI due to mental h ealth.  He needs help and cannot seem to get any.  He has a lot going off with neighbors and family.

## 2023-11-20 NOTE — TELEPHONE ENCOUNTER
CALLED PATIENT TO RESCHEDULE APPT WITH [facility] DUE TO PROVIDER BEING OUT OF THE OFFICE    ALSO LEFT MESSAGE TO RESCHEDULE APPT WITH LILI MONTEIRO DUE TO PROVIDER BEING OUT OF OFFICE THIS AFTERNOON    LEFT DETAILED MESSAGE

## 2023-11-20 NOTE — OUTREACH NOTE
Patient Outreach    SW called and spoke to patient via telephone. Patient reports problems / concerns with neighbors that are affecting his physical and mental health. SW noted patient has appt with Behavioral Health today and patient confirmed he plans to attend appt. Patient reports he has financial balance with  for past medical bills that he is unable to afford. Patient is unable to take down information at this time and was agreeable for this SW to send  financial assistance information to patient MyChart. Next outreach scheduled x 1 week to ensure patient received information for financial assistance.     Becka BUITRAGO -   Ambulatory Case Management    11/20/2023, 08:46 EST

## 2023-11-20 NOTE — TELEPHONE ENCOUNTER
Incoming Refill Request      Medication requested (name and dose):     PARZOSIN (MINIPRESS) 2 MG CAPSULE    TRAZODONE (DESYREL) 50 MG TABLET     Pharmacy where request should be sent:     [facility] - [place]     Additional details provided by patient:     PATIENT HAS BEEN RESCHEDULED TO 01/17/24 DUE TO PROVIDER BEING OUT OF THE OFFICE.    Best call back number: [phone]    Does the patient have less than a 3 day supply:  [] Yes  [] No    Trice Mayo Rep  11/20/23, 09:16 EST        {Tip  DIRECTIONS Send the encounter HIGH priority, If patient has less than a 3 day supply. If the patient will run out of medication over the weekend add that information to the additional details line. Send this encounter to the clinical pool:[id number]

## 2023-11-21 NOTE — TELEPHONE ENCOUNTER
"I relayed to Alonso Ellis that if he feels that he is in \"peril\" [facility].  He stated that he found out that he does not have [facility] with [facility].  He was watching news today and a story regarding the SRT (special response team) is causing flashbacks of his son and his suicide.  He would like to check hisself in somewhere but he has nowhere to put his emotional support animal Max.  He called [address] [place] [address] again about seeing they can help him get some help with the drama going on with his neighbors. He gave me permission to call Behavioral Health and see about getting him an earlier appointment or if there is any help they can get him.    *Spoke with Lakhwinder at she got him scheduled with Duc at 9:30 tomorrow.    **Notified Alonso Ellis that he has appt tomorrow and to make sure he goes.  He was very appreciative.  "

## 2023-11-22 ENCOUNTER — OFFICE VISIT (OUTPATIENT)
Dept: BEHAVIORAL HEALTH | Facility: CLINIC | Age: 63
End: 2023-11-22
Payer: MEDICARE

## 2023-11-22 ENCOUNTER — TELEPHONE (OUTPATIENT)
Dept: BEHAVIORAL HEALTH | Facility: CLINIC | Age: 63
End: 2023-11-22
Payer: MEDICARE

## 2023-11-22 DIAGNOSIS — F32.A ANXIETY AND DEPRESSION: Primary | ICD-10-CM

## 2023-11-22 DIAGNOSIS — F43.10 PTSD (POST-TRAUMATIC STRESS DISORDER): ICD-10-CM

## 2023-11-22 DIAGNOSIS — F41.9 ANXIETY AND DEPRESSION: Primary | ICD-10-CM

## 2023-11-22 NOTE — TELEPHONE ENCOUNTER
PATIENT CALLED STATING HE WAS GOING TO [facility]. GOING TO ADMIT HIMSELF. HE HAS BEEN SEEKING HELP, CLAIMS NO ONE WILL HELP HIM.
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
## 2023-11-27 ENCOUNTER — OFFICE VISIT (OUTPATIENT)
Dept: BEHAVIORAL HEALTH | Facility: CLINIC | Age: 63
End: 2023-11-27
Payer: MEDICARE

## 2023-11-27 DIAGNOSIS — F32.A ANXIETY AND DEPRESSION: ICD-10-CM

## 2023-11-27 DIAGNOSIS — F41.9 ANXIETY AND DEPRESSION: ICD-10-CM

## 2023-11-27 DIAGNOSIS — F43.10 PTSD (POST-TRAUMATIC STRESS DISORDER): Primary | ICD-10-CM

## 2023-11-27 PROCEDURE — 90834 PSYTX W PT 45 MINUTES: CPT | Performed by: SOCIAL WORKER
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
## 2023-11-28 ENCOUNTER — OFFICE VISIT (OUTPATIENT)
Dept: ORTHOPEDIC SURGERY | Facility: CLINIC | Age: 63
End: 2023-11-28
Payer: MEDICARE

## 2023-11-28 ENCOUNTER — PATIENT OUTREACH (OUTPATIENT)
Dept: CASE MANAGEMENT | Facility: CLINIC | Age: 63
End: 2023-11-28
Payer: MEDICARE

## 2023-11-28 DIAGNOSIS — F17.210 SMOKING GREATER THAN 30 PACK YEARS: ICD-10-CM

## 2023-11-28 DIAGNOSIS — M75.31 CALCIFIC TENDINITIS OF RIGHT SHOULDER: ICD-10-CM

## 2023-11-28 DIAGNOSIS — M25.511 ACUTE PAIN OF RIGHT SHOULDER: ICD-10-CM

## 2023-11-28 DIAGNOSIS — Z98.890 S/P RIGHT ROTATOR CUFF REPAIR: Primary | ICD-10-CM

## 2023-11-28 DIAGNOSIS — E11.42 TYPE 2 DIABETES MELLITUS WITH DIABETIC POLYNEUROPATHY, WITHOUT LONG-TERM CURRENT USE OF INSULIN: ICD-10-CM

## 2023-11-28 PROCEDURE — 99024 POSTOP FOLLOW-UP VISIT: CPT | Performed by: ORTHOPAEDIC SURGERY

## 2023-11-28 RX ORDER — METHOCARBAMOL 500 MG/1
500 TABLET, FILM COATED ORAL 3 TIMES DAILY PRN
Qty: 42 TABLET | Refills: 0 | Status: SHIPPED | OUTPATIENT
Start: 2023-11-28 | End: 2023-12-12
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
## 2023-11-28 NOTE — OUTREACH NOTE
Care Coordination    SW to discharge as resources have been provided. Please re consult SW if additional needs arise in the future.     Becka BUITRAGO -   Ambulatory Case Management    11/28/2023, 11:03 EST

## 2023-11-29 ENCOUNTER — OFFICE VISIT (OUTPATIENT)
Dept: BEHAVIORAL HEALTH | Facility: CLINIC | Age: 63
End: 2023-11-29
Payer: MEDICARE

## 2023-11-29 DIAGNOSIS — F43.10 PTSD (POST-TRAUMATIC STRESS DISORDER): Primary | ICD-10-CM

## 2023-11-29 DIAGNOSIS — F32.A ANXIETY AND DEPRESSION: ICD-10-CM

## 2023-11-29 DIAGNOSIS — F41.9 ANXIETY AND DEPRESSION: ICD-10-CM
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
## 2023-12-06 ENCOUNTER — OFFICE VISIT (OUTPATIENT)
Dept: BEHAVIORAL HEALTH | Facility: CLINIC | Age: 63
End: 2023-12-06
Payer: MEDICARE

## 2023-12-06 DIAGNOSIS — F43.10 PTSD (POST-TRAUMATIC STRESS DISORDER): Primary | ICD-10-CM

## 2023-12-06 DIAGNOSIS — F32.A ANXIETY AND DEPRESSION: ICD-10-CM

## 2023-12-06 DIAGNOSIS — F41.9 ANXIETY AND DEPRESSION: ICD-10-CM
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
## 2023-12-08 ENCOUNTER — OFFICE VISIT (OUTPATIENT)
Dept: BEHAVIORAL HEALTH | Facility: CLINIC | Age: 63
End: 2023-12-08
Payer: MEDICARE

## 2023-12-08 DIAGNOSIS — F43.10 PTSD (POST-TRAUMATIC STRESS DISORDER): Primary | ICD-10-CM

## 2023-12-08 PROCEDURE — 1160F RVW MEDS BY RX/DR IN RCRD: CPT | Performed by: SOCIAL WORKER

## 2023-12-08 PROCEDURE — 1159F MED LIST DOCD IN RCRD: CPT | Performed by: SOCIAL WORKER

## 2023-12-08 PROCEDURE — 90834 PSYTX W PT 45 MINUTES: CPT | Performed by: SOCIAL WORKER
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
## 2023-12-12 ENCOUNTER — OFFICE VISIT (OUTPATIENT)
Dept: BEHAVIORAL HEALTH | Facility: CLINIC | Age: 63
End: 2023-12-12
Payer: MEDICARE

## 2023-12-12 VITALS
HEART RATE: 96 BPM | OXYGEN SATURATION: 99 % | HEIGHT: 68 IN | WEIGHT: 157 LBS | SYSTOLIC BLOOD PRESSURE: 150 MMHG | DIASTOLIC BLOOD PRESSURE: 96 MMHG | BODY MASS INDEX: 23.79 KG/M2

## 2023-12-12 DIAGNOSIS — F41.1 GENERALIZED ANXIETY DISORDER: ICD-10-CM

## 2023-12-12 DIAGNOSIS — F39 MOOD DISORDER: Primary | ICD-10-CM

## 2023-12-12 DIAGNOSIS — F33.1 MODERATE EPISODE OF RECURRENT MAJOR DEPRESSIVE DISORDER: ICD-10-CM

## 2023-12-12 DIAGNOSIS — G47.20 CIRCADIAN RHYTHM SLEEP DISORDER, UNSPECIFIED TYPE: ICD-10-CM

## 2023-12-12 DIAGNOSIS — F43.10 POST TRAUMATIC STRESS DISORDER (PTSD): ICD-10-CM

## 2023-12-12 PROCEDURE — 99214 OFFICE O/P EST MOD 30 MIN: CPT | Performed by: NURSE PRACTITIONER

## 2023-12-12 PROCEDURE — 1160F RVW MEDS BY RX/DR IN RCRD: CPT | Performed by: NURSE PRACTITIONER

## 2023-12-12 PROCEDURE — 1159F MED LIST DOCD IN RCRD: CPT | Performed by: NURSE PRACTITIONER

## 2023-12-12 PROCEDURE — 3080F DIAST BP >= 90 MM HG: CPT | Performed by: NURSE PRACTITIONER

## 2023-12-12 PROCEDURE — 3077F SYST BP >= 140 MM HG: CPT | Performed by: NURSE PRACTITIONER

## 2023-12-12 RX ORDER — TRAZODONE HYDROCHLORIDE 50 MG/1
TABLET ORAL
Qty: 30 TABLET | Refills: 1 | Status: SHIPPED | OUTPATIENT
Start: 2023-12-12

## 2023-12-12 RX ORDER — BREXPIPRAZOLE 1 MG/1
1 TABLET ORAL DAILY
Qty: 30 TABLET | Refills: 1 | Status: SHIPPED | OUTPATIENT
Start: 2023-12-12

## 2023-12-12 RX ORDER — DULOXETIN HYDROCHLORIDE 60 MG/1
CAPSULE, DELAYED RELEASE ORAL
Qty: 30 CAPSULE | Refills: 1 | Status: SHIPPED | OUTPATIENT
Start: 2023-12-12

## 2023-12-12 RX ORDER — PRAZOSIN HYDROCHLORIDE 2 MG/1
CAPSULE ORAL
Qty: 30 CAPSULE | Refills: 1 | Status: SHIPPED | OUTPATIENT
Start: 2023-12-12
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
## 2023-12-15 ENCOUNTER — OFFICE VISIT (OUTPATIENT)
Dept: BEHAVIORAL HEALTH | Facility: CLINIC | Age: 63
End: 2023-12-15
Payer: MEDICARE

## 2023-12-15 DIAGNOSIS — F43.10 POST TRAUMATIC STRESS DISORDER (PTSD): ICD-10-CM

## 2023-12-15 DIAGNOSIS — F32.A ANXIETY AND DEPRESSION: Primary | ICD-10-CM

## 2023-12-15 DIAGNOSIS — F41.9 ANXIETY AND DEPRESSION: Primary | ICD-10-CM
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
## 2023-12-21 ENCOUNTER — OFFICE VISIT (OUTPATIENT)
Dept: BEHAVIORAL HEALTH | Facility: CLINIC | Age: 63
End: 2023-12-21
Payer: MEDICARE

## 2023-12-21 DIAGNOSIS — F32.A ANXIETY AND DEPRESSION: ICD-10-CM

## 2023-12-21 DIAGNOSIS — F41.9 ANXIETY AND DEPRESSION: ICD-10-CM

## 2023-12-21 DIAGNOSIS — F43.10 POST TRAUMATIC STRESS DISORDER (PTSD): Primary | ICD-10-CM
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
## 2023-12-27 ENCOUNTER — TELEPHONE (OUTPATIENT)
Dept: FAMILY MEDICINE CLINIC | Facility: CLINIC | Age: 63
End: 2023-12-27
Payer: MEDICARE

## 2023-12-27 DIAGNOSIS — M25.50 ARTHRALGIA, UNSPECIFIED JOINT: ICD-10-CM

## 2023-12-27 DIAGNOSIS — E78.2 MIXED HYPERLIPIDEMIA: ICD-10-CM

## 2023-12-27 DIAGNOSIS — K21.9 GASTROESOPHAGEAL REFLUX DISEASE, UNSPECIFIED WHETHER ESOPHAGITIS PRESENT: ICD-10-CM

## 2023-12-27 DIAGNOSIS — G89.29 CHRONIC RIGHT SHOULDER PAIN: ICD-10-CM

## 2023-12-27 DIAGNOSIS — M25.511 CHRONIC RIGHT SHOULDER PAIN: ICD-10-CM

## 2023-12-27 DIAGNOSIS — J30.9 ALLERGIC RHINITIS, UNSPECIFIED SEASONALITY, UNSPECIFIED TRIGGER: ICD-10-CM

## 2023-12-27 RX ORDER — LEVOCETIRIZINE DIHYDROCHLORIDE 5 MG/1
5 TABLET, FILM COATED ORAL EVERY EVENING
Qty: 30 TABLET | Refills: 0 | Status: SHIPPED | OUTPATIENT
Start: 2023-12-27

## 2023-12-27 RX ORDER — OMEPRAZOLE 20 MG/1
20 CAPSULE, DELAYED RELEASE ORAL DAILY
Qty: 90 CAPSULE | Refills: 1 | Status: SHIPPED | OUTPATIENT
Start: 2023-12-27

## 2023-12-27 RX ORDER — DICLOFENAC SODIUM 75 MG/1
75 TABLET, DELAYED RELEASE ORAL 2 TIMES DAILY
Qty: 60 TABLET | Refills: 0 | Status: SHIPPED | OUTPATIENT
Start: 2023-12-27

## 2023-12-27 RX ORDER — NITROGLYCERIN 400 UG/1
SPRAY ORAL
Qty: 1 EACH | Refills: 0 | Status: SHIPPED | OUTPATIENT
Start: 2023-12-27

## 2023-12-27 RX ORDER — ATORVASTATIN CALCIUM 80 MG/1
80 TABLET, FILM COATED ORAL DAILY
Qty: 120 TABLET | Refills: 0 | Status: SHIPPED | OUTPATIENT
Start: 2023-12-27

## 2023-12-27 RX ORDER — BACLOFEN 10 MG/1
10 TABLET ORAL 3 TIMES DAILY
Qty: 90 TABLET | Refills: 0 | Status: SHIPPED | OUTPATIENT
Start: 2023-12-27

## 2023-12-27 NOTE — TELEPHONE ENCOUNTER
Incoming Refill Request      Medication requested (name and dose): Omeprazole 20 MG Cap  Nitroglycerin 0.4 MG Spray  Levocetrizine 5 MG Tab  Baclofen 10 MG Tab  Atorvastatin 80 MG Tab  Diclofenac 75 MG Tab    Pharmacy where request should be sent: [facility] in [place], [place]    Additional details provided by patient: He says this is a life or death situation     Best call back number: [phone]    Does the patient have less than a 3 day supply:  [x] Yes  [] No    Trice Capellan Rep  12/27/23, 12:15 EST

## 2024-01-03 ENCOUNTER — TELEPHONE (OUTPATIENT)
Dept: NEUROLOGY | Facility: CLINIC | Age: 64
End: 2024-01-03

## 2024-01-08 ENCOUNTER — TELEPHONE (OUTPATIENT)
Dept: BEHAVIORAL HEALTH | Facility: CLINIC | Age: 64
End: 2024-01-08
Payer: MEDICARE

## 2024-01-09 ENCOUNTER — OFFICE VISIT (OUTPATIENT)
Dept: FAMILY MEDICINE CLINIC | Facility: CLINIC | Age: 64
End: 2024-01-09
Payer: MEDICARE

## 2024-01-09 VITALS
HEIGHT: 68 IN | WEIGHT: 172.06 LBS | HEART RATE: 96 BPM | OXYGEN SATURATION: 97 % | BODY MASS INDEX: 26.08 KG/M2 | SYSTOLIC BLOOD PRESSURE: 138 MMHG | DIASTOLIC BLOOD PRESSURE: 62 MMHG

## 2024-01-09 DIAGNOSIS — G89.29 CHRONIC MIDLINE LOW BACK PAIN, UNSPECIFIED WHETHER SCIATICA PRESENT: ICD-10-CM

## 2024-01-09 DIAGNOSIS — F43.10 POST TRAUMATIC STRESS DISORDER (PTSD): ICD-10-CM

## 2024-01-09 DIAGNOSIS — R41.3 MEMORY DEFICITS: ICD-10-CM

## 2024-01-09 DIAGNOSIS — M25.50 ARTHRALGIA, UNSPECIFIED JOINT: ICD-10-CM

## 2024-01-09 DIAGNOSIS — J30.9 ALLERGIC RHINITIS, UNSPECIFIED SEASONALITY, UNSPECIFIED TRIGGER: ICD-10-CM

## 2024-01-09 DIAGNOSIS — I67.9 CEREBROVASCULAR DISEASE: ICD-10-CM

## 2024-01-09 DIAGNOSIS — K21.9 GASTROESOPHAGEAL REFLUX DISEASE, UNSPECIFIED WHETHER ESOPHAGITIS PRESENT: ICD-10-CM

## 2024-01-09 DIAGNOSIS — F32.A ANXIETY AND DEPRESSION: ICD-10-CM

## 2024-01-09 DIAGNOSIS — F39 MOOD DISORDER: ICD-10-CM

## 2024-01-09 DIAGNOSIS — M54.50 CHRONIC MIDLINE LOW BACK PAIN, UNSPECIFIED WHETHER SCIATICA PRESENT: ICD-10-CM

## 2024-01-09 DIAGNOSIS — Z79.899 ENCOUNTER FOR LONG-TERM (CURRENT) USE OF OTHER MEDICATIONS: ICD-10-CM

## 2024-01-09 DIAGNOSIS — I10 BENIGN ESSENTIAL HTN: ICD-10-CM

## 2024-01-09 DIAGNOSIS — F33.1 MODERATE EPISODE OF RECURRENT MAJOR DEPRESSIVE DISORDER: ICD-10-CM

## 2024-01-09 DIAGNOSIS — E78.2 MIXED HYPERLIPIDEMIA: Primary | ICD-10-CM

## 2024-01-09 DIAGNOSIS — F41.9 ANXIETY AND DEPRESSION: ICD-10-CM

## 2024-01-09 DIAGNOSIS — F41.1 GENERALIZED ANXIETY DISORDER: ICD-10-CM

## 2024-01-09 DIAGNOSIS — Z12.5 SCREENING FOR PROSTATE CANCER: ICD-10-CM

## 2024-01-09 DIAGNOSIS — I25.10 CORONARY ARTERY DISEASE INVOLVING NATIVE CORONARY ARTERY OF NATIVE HEART WITHOUT ANGINA PECTORIS: ICD-10-CM

## 2024-01-09 DIAGNOSIS — E11.42 TYPE 2 DIABETES MELLITUS WITH DIABETIC POLYNEUROPATHY, WITHOUT LONG-TERM CURRENT USE OF INSULIN: ICD-10-CM

## 2024-01-09 RX ORDER — DICLOFENAC SODIUM 75 MG/1
75 TABLET, DELAYED RELEASE ORAL 2 TIMES DAILY PRN
Qty: 60 TABLET | Refills: 0 | Status: SHIPPED | OUTPATIENT
Start: 2024-01-09

## 2024-01-09 RX ORDER — OMEPRAZOLE 20 MG/1
20 CAPSULE, DELAYED RELEASE ORAL DAILY
Qty: 90 CAPSULE | Refills: 1 | Status: SHIPPED | OUTPATIENT
Start: 2024-01-09

## 2024-01-09 RX ORDER — LEVOCETIRIZINE DIHYDROCHLORIDE 5 MG/1
5 TABLET, FILM COATED ORAL EVERY EVENING
Qty: 90 TABLET | Refills: 1 | Status: SHIPPED | OUTPATIENT
Start: 2024-01-09

## 2024-01-09 RX ORDER — BACLOFEN 10 MG/1
10 TABLET ORAL 3 TIMES DAILY
Qty: 90 TABLET | Refills: 0 | Status: SHIPPED | OUTPATIENT
Start: 2024-01-09

## 2024-01-09 RX ORDER — ATORVASTATIN CALCIUM 80 MG/1
80 TABLET, FILM COATED ORAL DAILY
Qty: 90 TABLET | Refills: 1 | Status: SHIPPED | OUTPATIENT
Start: 2024-01-09

## 2024-01-09 NOTE — PROGRESS NOTES
"Chief Complaint  Diabetes and Hypertension    Subjective          Santi Souza Sr. presents to [facility]  Diabetes  Hypoglycemia symptoms include nervousness/anxiousness. Pertinent negatives for hypoglycemia include no dizziness. Pertinent negatives for diabetes include no fatigue and no weakness.   Hypertension  Pertinent negatives include no shortness of breath.       Patient presents for med refills.  History of hypertension hyperlipidemia coronary artery disease type 2 diabetes GERD PTSD anxiety depression arthralgias chronic low back pain history of a CVA mild cognitive impairment.  He does continue to smoke.  He states he continues to follow-up with his specialist including psychiatrist with [facility], neurology with James LOPEZ, and endocrinology regarding his diabetes.  He also states that he speaks with his therapist almost weekly.  He states he is currently under a lot of stress as he is possibly facing eviction just with issues with his neighbors.  No thoughts of suicide or hurting himself or anyone else.  He is keeping close contact with his psychiatrist and counselor.  States he is currently doing well on medications with no issues or side effects.  Today he is requesting refills of omeprazole levocetirizine baclofen atorvastatin and diclofenac.  No dizziness no headache no chest pain or chest pressure no shortness of breath no trouble breathing no urinary or bowel issues.    Due to his cardiac history he was seeing cardiology Dr. Wong but states it has been over a year since he saw him.  He is asking for referral to cardiology in [facility] for an overall cardiac evaluation and to establish care with them.    Objective   Vital Signs:   /62   Pulse 96   Ht 172.7 cm (68\")   Wt 78 kg (172 lb 1 oz)   SpO2 97%   BMI 26.16 kg/m²     Body mass index is 26.16 kg/m².    Review of Systems   Constitutional:  Negative for chills, fatigue and fever. "   HENT:  Negative for congestion.    Eyes:  Negative for visual disturbance.   Respiratory:  Negative for cough, shortness of breath and wheezing.    Cardiovascular: Negative.    Gastrointestinal:  Negative for abdominal pain, constipation, diarrhea, nausea and vomiting.   Genitourinary:  Negative for decreased urine volume, dysuria, frequency, hematuria and urgency.   Musculoskeletal:  Negative for back pain.   Neurological:  Positive for memory problem. Negative for dizziness, weakness, light-headedness and headache.   Psychiatric/Behavioral:  Positive for stress. Negative for agitation, behavioral problems, decreased concentration, dysphoric mood, hallucinations, self-injury, sleep disturbance, suicidal ideas, negative for hyperactivity and depressed mood. The patient is nervous/anxious.        Past History:  Medical History: has a past medical history of CAD (coronary artery disease) (11/03/2017), Cataracta, Chronic back pain (11/03/2017), Depression, Diabetes mellitus--Insulin Dependant (11/03/2017), GERD (gastroesophageal reflux disease) (11/03/2017), Hyperlipidemia (11/03/2017), Hypertension (11/03/2017), Neuropathy, Osteoarthritis, Rotator cuff syndrome (6/23), Stroke (2022), Vitamin D deficiency, Wears glasses, and Wears hearing aid in both ears.   Surgical History: has a past surgical history that includes Lumbar spine surgery (1998); Back surgery (1999); Colonoscopy; Cardiac catheterization; Tonsillectomy; and Shoulder arthroscopy w/ rotator cuff repair (Right, 9/13/2023).   Family History: family history includes Cancer in his maternal grandfather; Diabetes in his father.   Social History: reports that he has been smoking cigarettes. He started smoking about 50 years ago. He has a 8.00 pack-year smoking history. He has been exposed to tobacco smoke. He has never used smokeless tobacco. He reports current drug use. Frequency: 7.00 times per week. Drug: Marijuana. He reports that he does not drink  alcohol.    PHQ-2 Depression Screening  Little interest or pleasure in doing things?     Feeling down, depressed, or hopeless?     PHQ-2 Total Score          PHQ-9 Depression Screening  Little interest or pleasure in doing things?     Feeling down, depressed, or hopeless?     Trouble falling or staying asleep, or sleeping too much?     Feeling tired or having little energy?     Poor appetite or overeating?     Feeling bad about yourself - or that you are a failure or have let yourself or your family down?     Trouble concentrating on things, such as reading the newspaper or watching television?     Moving or speaking so slowly that other people could have noticed? Or the opposite - being so fidgety or restless that you have been moving around a lot more than usual?     Thoughts that you would be better off dead, or of hurting yourself in some way?     PHQ-9 Total Score     If you checked off any problems, how difficult have these problems made it for you to do your work, take care of things at home, or get along with other people?       PHQ-9 Total Score:        Patient screened positive for depression based on a PHQ-9 score of 17 on 10/19/2023. Follow-up recommendations include:          Current Outpatient Medications:     aspirin 81 MG chewable tablet, Chew 1 tablet., Disp: , Rfl:     atorvastatin (LIPITOR) 80 MG tablet, Take 1 tablet by mouth Daily., Disp: 90 tablet, Rfl: 1    baclofen (LIORESAL) 10 MG tablet, Take 1 tablet by mouth 3 (Three) Times a Day., Disp: 90 tablet, Rfl: 0    Brexpiprazole (Rexulti) 1 MG tablet, Take 1 mg by mouth Daily., Disp: 30 tablet, Rfl: 1    diclofenac (VOLTAREN) 75 MG EC tablet, Take 1 tablet by mouth 2 (Two) Times a Day As Needed (back pain. arthralgia)., Disp: 60 tablet, Rfl: 0    donepezil (ARICEPT) 10 MG tablet, Take 1 tablet by mouth Daily With Breakfast., Disp: , Rfl:     empagliflozin (Jardiance) 25 MG tablet tablet, Take 1 tablet by mouth Daily., Disp: 90 tablet, Rfl: 1     levocetirizine (XYZAL) 5 MG tablet, Take 1 tablet by mouth Every Evening., Disp: 90 tablet, Rfl: 1    Magnesium 400 MG tablet, Take 400 mg by mouth Daily., Disp: , Rfl:     nitroglycerin (NITROLINGUAL) 0.4 MG/SPRAY spray, Place 1 spray by translingual route on or under the tongue at the first sign of an attack, no more than 3 sprays / 15-minute., Disp: 1 each, Rfl: 0    omeprazole (priLOSEC) 20 MG capsule, Take 1 capsule by mouth Daily., Disp: 90 capsule, Rfl: 1    SITagliptin (JANUVIA) 100 MG tablet, Take 1 tablet by mouth Daily., Disp: 90 tablet, Rfl: 1    traZODone (DESYREL) 50 MG tablet, Take 1 tablet by mouth every day at bedtime., Disp: 30 tablet, Rfl: 1    DULoxetine (CYMBALTA) 60 MG capsule, Take 1 tablet by mouth each morning., Disp: 30 capsule, Rfl: 1    prazosin (MINIPRESS) 2 MG capsule, Take 1 capsule by mouth at bedtime. (Patient not taking: Reported on 1/9/2024), Disp: 30 capsule, Rfl: 1   (Not in a hospital admission)     Allergies: Hydrocodone, Penicillins, Sulfa antibiotics, Codeine, and Oxycontin [oxycodone]    Physical Exam  Constitutional:       Appearance: Normal appearance.   Eyes:      Conjunctiva/sclera: Conjunctivae normal.      Pupils: Pupils are equal, round, and reactive to light.   Cardiovascular:      Rate and Rhythm: Normal rate and regular rhythm.      Heart sounds: Normal heart sounds.   Pulmonary:      Effort: Pulmonary effort is normal.      Breath sounds: Normal breath sounds.   Neurological:      General: No focal deficit present.      Mental Status: He is alert and oriented to person, place, and time. Mental status is at baseline.      Sensory: Sensation is intact.      Motor: Motor function is intact.      Coordination: Coordination is intact.      Gait: Gait is intact.   Psychiatric:         Attention and Perception: Attention and perception normal.         Mood and Affect: Mood and affect normal.         Speech: Speech normal.         Behavior: Behavior normal. Behavior is  cooperative.         Thought Content: Thought content normal. Thought content does not include homicidal or suicidal ideation. Thought content does not include homicidal or suicidal plan.         Cognition and Memory: Cognition and memory normal.         Judgment: Judgment normal.          Result Review :                   Assessment and Plan    Diagnoses and all orders for this visit:    1. Mixed hyperlipidemia (Primary)  -     Lipid Panel  -     Ambulatory Referral to Cardiology  -     atorvastatin (LIPITOR) 80 MG tablet; Take 1 tablet by mouth Daily.  Dispense: 90 tablet; Refill: 1    2. Benign essential HTN  -     TSH Rfx On Abnormal To Free T4  -     Ambulatory Referral to Cardiology    3. Gastroesophageal reflux disease, unspecified whether esophagitis present  -     omeprazole (priLOSEC) 20 MG capsule; Take 1 capsule by mouth Daily.  Dispense: 90 capsule; Refill: 1    4. Type 2 diabetes mellitus with diabetic polyneuropathy, without long-term current use of insulin  -     Hemoglobin A1c    5. Screening for prostate cancer  -     PSA Screen    6. Encounter for long-term (current) use of other medications  -     CBC & Differential  -     Comprehensive Metabolic Panel  -     Magnesium    7. Post traumatic stress disorder (PTSD)    8. Mood disorder    9. Moderate episode of recurrent major depressive disorder    10. Generalized anxiety disorder    11. Anxiety and depression    12. Chronic midline low back pain, unspecified whether sciatica present    13. Memory deficits    14. Cerebrovascular disease  -     Ambulatory Referral to Cardiology    15. Coronary artery disease involving native coronary artery of native heart without angina pectoris  -     Ambulatory Referral to Cardiology    16. Arthralgia, unspecified joint  -     baclofen (LIORESAL) 10 MG tablet; Take 1 tablet by mouth 3 (Three) Times a Day.  Dispense: 90 tablet; Refill: 0  -     diclofenac (VOLTAREN) 75 MG EC tablet; Take 1 tablet by mouth 2 (Two)  Times a Day As Needed (back pain. arthralgia).  Dispense: 60 tablet; Refill: 0    17. Allergic rhinitis, unspecified seasonality, unspecified trigger  -     levocetirizine (XYZAL) 5 MG tablet; Take 1 tablet by mouth Every Evening.  Dispense: 90 tablet; Refill: 1              Labs drawn.  Goal blood pressure less than 140/90.  Let me know if gets to or above that.  No thoughts of suicide or hurting himself or anyone else.  Will send refills of omeprazole levocetirizine baclofen (monitor for sedation) atorvastatin and diclofenac (to take PRN arthralgia).  Risk of meds discussed and understood.  Proper diet and exercise plan discussed and encouraged.  Smoking cessation discussed and encouraged.  He needs to have a cardiologist on his team so I will place referral for cardio in [place].  He will continue to follow-up with all specialist including his psychiatrist and neurologist endocrinologist and his therapist.  Annual dental and eye exams encouraged.  Check feet daily.  Risk of meds discussed and understood.  Education provided.  States he will discuss all preventative measures including immunizations at a later time.  Return to clinic or ED with any issues or concerns.       Follow Up   Return in about 3 months (around 4/9/2024).  Patient was given instructions and counseling regarding his condition or for health maintenance advice. Please see specific information pulled into the AVS if appropriate.     JESSICA Chandler

## 2024-01-10 LAB
ALBUMIN SERPL-MCNC: 4.7 G/DL (ref 3.9–4.9)
ALBUMIN/GLOB SERPL: 1.9 {RATIO} (ref 1.2–2.2)
ALP SERPL-CCNC: 132 IU/L (ref 44–121)
ALT SERPL-CCNC: 23 IU/L (ref 0–44)
AST SERPL-CCNC: 23 IU/L (ref 0–40)
BASOPHILS # BLD AUTO: 0.1 X10E3/UL (ref 0–0.2)
BASOPHILS NFR BLD AUTO: 1 %
BILIRUB SERPL-MCNC: 0.5 MG/DL (ref 0–1.2)
BUN SERPL-MCNC: 16 MG/DL (ref 8–27)
BUN/CREAT SERPL: 19 (ref 10–24)
CALCIUM SERPL-MCNC: 10 MG/DL (ref 8.6–10.2)
CHLORIDE SERPL-SCNC: 103 MMOL/L (ref 96–106)
CHOLEST SERPL-MCNC: 184 MG/DL (ref 100–199)
CO2 SERPL-SCNC: 21 MMOL/L (ref 20–29)
CREAT SERPL-MCNC: 0.84 MG/DL (ref 0.76–1.27)
EGFRCR SERPLBLD CKD-EPI 2021: 97 ML/MIN/1.73
EOSINOPHIL # BLD AUTO: 0.2 X10E3/UL (ref 0–0.4)
EOSINOPHIL NFR BLD AUTO: 2 %
ERYTHROCYTE [DISTWIDTH] IN BLOOD BY AUTOMATED COUNT: 12.3 % (ref 11.6–15.4)
GLOBULIN SER CALC-MCNC: 2.5 G/DL (ref 1.5–4.5)
GLUCOSE SERPL-MCNC: 179 MG/DL (ref 70–99)
HBA1C MFR BLD: 8.6 % (ref 4.8–5.6)
HCT VFR BLD AUTO: 46.9 % (ref 37.5–51)
HDLC SERPL-MCNC: 49 MG/DL
HGB BLD-MCNC: 16.1 G/DL (ref 13–17.7)
IMM GRANULOCYTES # BLD AUTO: 0 X10E3/UL (ref 0–0.1)
IMM GRANULOCYTES NFR BLD AUTO: 0 %
LDLC SERPL CALC-MCNC: 107 MG/DL (ref 0–99)
LYMPHOCYTES # BLD AUTO: 2.5 X10E3/UL (ref 0.7–3.1)
LYMPHOCYTES NFR BLD AUTO: 27 %
MAGNESIUM SERPL-MCNC: 2.1 MG/DL (ref 1.6–2.3)
MCH RBC QN AUTO: 32.7 PG (ref 26.6–33)
MCHC RBC AUTO-ENTMCNC: 34.3 G/DL (ref 31.5–35.7)
MCV RBC AUTO: 95 FL (ref 79–97)
MONOCYTES # BLD AUTO: 0.9 X10E3/UL (ref 0.1–0.9)
MONOCYTES NFR BLD AUTO: 9 %
NEUTROPHILS # BLD AUTO: 5.7 X10E3/UL (ref 1.4–7)
NEUTROPHILS NFR BLD AUTO: 61 %
PLATELET # BLD AUTO: 182 X10E3/UL (ref 150–450)
POTASSIUM SERPL-SCNC: 4.4 MMOL/L (ref 3.5–5.2)
PROT SERPL-MCNC: 7.2 G/DL (ref 6–8.5)
PSA SERPL-MCNC: 0.8 NG/ML (ref 0–4)
RBC # BLD AUTO: 4.93 X10E6/UL (ref 4.14–5.8)
SODIUM SERPL-SCNC: 142 MMOL/L (ref 134–144)
TRIGL SERPL-MCNC: 158 MG/DL (ref 0–149)
TSH SERPL DL<=0.005 MIU/L-ACNC: 1.56 UIU/ML (ref 0.45–4.5)
VLDLC SERPL CALC-MCNC: 28 MG/DL (ref 5–40)
WBC # BLD AUTO: 9.3 X10E3/UL (ref 3.4–10.8)

## 2024-01-12 ENCOUNTER — TELEPHONE (OUTPATIENT)
Dept: CASE MANAGEMENT | Facility: OTHER | Age: 64
End: 2024-01-12
Payer: MEDICARE

## 2024-01-12 ENCOUNTER — PATIENT OUTREACH (OUTPATIENT)
Dept: CASE MANAGEMENT | Facility: OTHER | Age: 64
End: 2024-01-12
Payer: MEDICARE

## 2024-01-12 DIAGNOSIS — I10 BENIGN ESSENTIAL HTN: Primary | ICD-10-CM

## 2024-01-12 DIAGNOSIS — E11.65 TYPE 2 DIABETES MELLITUS WITH HYPERGLYCEMIA, WITHOUT LONG-TERM CURRENT USE OF INSULIN: ICD-10-CM
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
## 2024-01-17 ENCOUNTER — HOSPITAL ENCOUNTER (OUTPATIENT)
Dept: MRI IMAGING | Facility: HOSPITAL | Age: 64
Discharge: HOME | End: 2024-01-17
Payer: MEDICARE

## 2024-01-17 DIAGNOSIS — R41.3 MEMORY LOSS: ICD-10-CM

## 2024-01-17 PROCEDURE — 70551 MRI BRAIN STEM W/O DYE: CPT

## 2024-01-18 ENCOUNTER — TELEPHONE (OUTPATIENT)
Dept: NEUROLOGY | Facility: CLINIC | Age: 64
End: 2024-01-18
Payer: MEDICARE

## 2024-01-18 NOTE — TELEPHONE ENCOUNTER
----- Message from JESSICA Watson sent at 1/17/2024  3:31 PM EST -----  Please notify Alonso Ellis that his MRI brain shows chronic changes, no acute stroke was appreciated by radiology. We can further review at his upcoming appointment, please let me know if he has any questions or concerns in the meantime.

## 2024-01-19 ENCOUNTER — TELEPHONE (OUTPATIENT)
Dept: FAMILY MEDICINE CLINIC | Facility: CLINIC | Age: 64
End: 2024-01-19
Payer: MEDICARE

## 2024-01-19 NOTE — TELEPHONE ENCOUNTER
Pt would like to get freestyle ruperto as his finger have become very sore from testing sugar so often. Can we please send this in? Please let pt know.

## 2024-01-30 ENCOUNTER — OFFICE VISIT (OUTPATIENT)
Dept: ORTHOPEDIC SURGERY | Facility: CLINIC | Age: 64
End: 2024-01-30
Payer: MEDICARE

## 2024-01-30 ENCOUNTER — PATIENT OUTREACH (OUTPATIENT)
Dept: CASE MANAGEMENT | Facility: OTHER | Age: 64
End: 2024-01-30
Payer: MEDICARE

## 2024-01-30 VITALS
SYSTOLIC BLOOD PRESSURE: 162 MMHG | DIASTOLIC BLOOD PRESSURE: 68 MMHG | BODY MASS INDEX: 26.43 KG/M2 | HEIGHT: 68 IN | WEIGHT: 174.4 LBS

## 2024-01-30 DIAGNOSIS — E11.42 TYPE 2 DIABETES MELLITUS WITH DIABETIC POLYNEUROPATHY, WITHOUT LONG-TERM CURRENT USE OF INSULIN: ICD-10-CM

## 2024-01-30 DIAGNOSIS — Z98.890 S/P RIGHT ROTATOR CUFF REPAIR: Primary | ICD-10-CM

## 2024-01-30 DIAGNOSIS — M25.611 POST-TRAUMATIC STIFFNESS OF RIGHT SHOULDER JOINT: ICD-10-CM

## 2024-01-30 DIAGNOSIS — F41.1 GENERALIZED ANXIETY DISORDER: ICD-10-CM

## 2024-01-30 DIAGNOSIS — R29.818 PSEUDOPARALYSIS: ICD-10-CM

## 2024-01-30 DIAGNOSIS — E11.65 TYPE 2 DIABETES MELLITUS WITH HYPERGLYCEMIA, WITHOUT LONG-TERM CURRENT USE OF INSULIN: ICD-10-CM

## 2024-01-30 DIAGNOSIS — I10 BENIGN ESSENTIAL HTN: Primary | ICD-10-CM

## 2024-01-30 DIAGNOSIS — F33.1 MODERATE EPISODE OF RECURRENT MAJOR DEPRESSIVE DISORDER: ICD-10-CM

## 2024-01-30 DIAGNOSIS — S46.011A TRAUMATIC COMPLETE TEAR OF RIGHT ROTATOR CUFF, INITIAL ENCOUNTER: ICD-10-CM

## 2024-01-30 PROCEDURE — 3078F DIAST BP <80 MM HG: CPT | Performed by: ORTHOPAEDIC SURGERY

## 2024-01-30 PROCEDURE — 3077F SYST BP >= 140 MM HG: CPT | Performed by: ORTHOPAEDIC SURGERY

## 2024-01-30 PROCEDURE — 99212 OFFICE O/P EST SF 10 MIN: CPT | Performed by: ORTHOPAEDIC SURGERY

## 2024-01-30 NOTE — OUTREACH NOTE
CCM End of Month Documentation    This Chronic Medical Management Care Plan for Santi Souza Sr., 63 y.o. male, has been monitored and managed; reviewed and a new plan of care implemented for the month of January.  A cumulative time of 26  minutes was spent on this patient record this month, including phone call with patient; chart review; phone call with other providers.    Regarding the patient's problems: has Benign essential HTN; Mixed hyperlipidemia; Type 2 diabetes mellitus with diabetic polyneuropathy, without long-term current use of insulin; Anxiety and depression; Allergic rhinitis; Arthralgia; Vitamin deficiency; Memory deficits; Post traumatic stress disorder (PTSD); Screening for prostate cancer; Vitamin D deficiency; Encounter for long-term (current) use of other medications; Initial Medicare annual wellness visit; Advanced directives, counseling/discussion; Screening for colon cancer; Screening for lung cancer; GERD (gastroesophageal reflux disease); Insomnia; CAD (coronary artery disease); Cerebrovascular disease; Chronic midline low back pain; Need for hepatitis C screening test; Mild cognitive impairment; Labile blood glucose; Acute pain of right shoulder; Neck pain; Acute midline thoracic back pain; Cognitive impairment; Calcific tendinitis of right shoulder; Tear of right supraspinatus tendon; Traumatic complete tear of right rotator cuff; Pseudoparalysis; Traumatic complete tear of right rotator cuff, initial encounter; Smoking greater than 30 pack years; Post-traumatic stiffness of right shoulder joint; Mood disorder; Generalized anxiety disorder; Circadian rhythm sleep disorder, unspecified type; and Moderate episode of recurrent major depressive disorder on their problem list., the following items were addressed: medical records and any changes can be found within the plan section of the note.  A detailed listing of time spent for chronic care management is tracked within each outreach  encounter.  Current medications include:  has a current medication list which includes the following prescription(s): aspirin, atorvastatin, baclofen, rexulti, diclofenac, donepezil, duloxetine, empagliflozin, levocetirizine, magnesium, nitroglycerin, omeprazole, prazosin, sitagliptin, and trazodone. and the patient is reported to be patient is compliant with medication protocol,  Medications are reported to be non-effective in controlling symptoms and changes have been made to the medication protocol. All notes on chart for PCP to review.    The patient was monitored remotely for blood glucose.    The patient's physical needs include:  physical healthcare.     The patient's mental support needs include:  continued support    The patient's cognitive support needs include:  health care; coordination of community providers    The patient's psychosocial support needs include:  not applicable    The patient's functional needs include: physical healthcare    The patient's environmental needs include:  not applicable    Care Plan overall comments:  No data recorded    Refer to previous outreach notes for more information on the areas listed above.    Monthly Billing Diagnoses  (I10) Benign essential HTN    (E11.65) Type 2 diabetes mellitus with hyperglycemia, without long-term current use of insulin    Medications   Medications have been reconciled    Care Plan progress this month:      Recently Modified Care Plans Updates made since 12/30/2023 12:00 AM      No recently modified care plans.              Instructions   Patient was provided an electronic copy of care plan  CCM services were explained and offered and patient has accepted these services.  Patient has given their written consent to receive CCM services and understands that this includes the authorization of electronic communication of medical information with the other treating providers.  Patient understands that they may stop CCM services at any time and  these changes will be effective at the end of the calendar month and will effectively revocate the agreement of CCM services.  Patient understands that only one practitioner can furnish and be paid for CCM services during one calendar month.  Patient also understands that there may be co-payment or deductible fees in association with CCM services.  Patient will continue with at least monthly follow-up calls with the Ambulatory .    Adrienne LEONE  Ambulatory Case Management    1/30/2024, 08:50 EST

## 2024-01-30 NOTE — TELEPHONE ENCOUNTER
Rx Refill Note    Requested Prescriptions     Pending Prescriptions Disp Refills    DULoxetine (CYMBALTA) 60 MG capsule [Pharmacy Med Name: DULoxetine HCl 60 MG Oral Capsule Delayed Release Particles] 30 capsule 0     Sig: Take 1 capsule by mouth in the morning        Last office visit with prescribing clinician: 12/12/2023      Next office visit with prescribing clinician: 3/5/2024   Last labs:   Last refill: 12/12/2023   Pharmacy (be sure to add in Epic). correct
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
## 2024-02-01 RX ORDER — DULOXETIN HYDROCHLORIDE 60 MG/1
CAPSULE, DELAYED RELEASE ORAL
Qty: 30 CAPSULE | Refills: 0 | Status: SHIPPED | OUTPATIENT
Start: 2024-02-01

## 2024-02-19 ENCOUNTER — TELEPHONE (OUTPATIENT)
Dept: BEHAVIORAL HEALTH | Facility: CLINIC | Age: 64
End: 2024-02-19
Payer: MEDICARE

## 2024-02-19 NOTE — TELEPHONE ENCOUNTER
Incoming Refill Request      Medication requested (name and dose):     PRAZOSIN (MINIPRESS) 2 MG CAPSULE     BREXPIPRAZOLE (REXULTI) 1 MG TABLET     TRAZODONE (DESYREL) 50 MG TABLET        Pharmacy where request should be sent:     [facility] [place]     Additional details provided by patient:     PATIENT IS OUT OF MEDICATION     Best call back number: [phone]    Does the patient have less than a 3 day supply:  [x] Yes  [] No    Trice Mayo Rep  02/19/24, 16:22 EST        {Tip  DIRECTIONS Send the encounter HIGH priority, If patient has less than a 3 day supply. If the patient will run out of medication over the weekend add that information to the additional details line. Send this encounter to the clinical pool:[id number]

## 2024-02-21 ENCOUNTER — TELEPHONE (OUTPATIENT)
Dept: BEHAVIORAL HEALTH | Facility: CLINIC | Age: 64
End: 2024-02-21
Payer: MEDICARE

## 2024-02-21 ENCOUNTER — OFFICE VISIT (OUTPATIENT)
Dept: NEUROLOGY | Facility: CLINIC | Age: 64
End: 2024-02-21
Payer: MEDICARE

## 2024-02-21 VITALS
BODY MASS INDEX: 26.37 KG/M2 | DIASTOLIC BLOOD PRESSURE: 68 MMHG | WEIGHT: 174 LBS | HEIGHT: 68 IN | HEART RATE: 61 BPM | OXYGEN SATURATION: 96 % | SYSTOLIC BLOOD PRESSURE: 122 MMHG

## 2024-02-21 DIAGNOSIS — R41.3 MEMORY LOSS: Primary | ICD-10-CM

## 2024-02-21 DIAGNOSIS — F41.1 GENERALIZED ANXIETY DISORDER: ICD-10-CM

## 2024-02-21 DIAGNOSIS — F33.1 MODERATE EPISODE OF RECURRENT MAJOR DEPRESSIVE DISORDER: ICD-10-CM

## 2024-02-21 DIAGNOSIS — G47.20 CIRCADIAN RHYTHM SLEEP DISORDER, UNSPECIFIED TYPE: ICD-10-CM

## 2024-02-21 DIAGNOSIS — F39 MOOD DISORDER: ICD-10-CM

## 2024-02-21 DIAGNOSIS — F43.10 POST TRAUMATIC STRESS DISORDER (PTSD): ICD-10-CM

## 2024-02-21 RX ORDER — BREXPIPRAZOLE 1 MG/1
1 TABLET ORAL DAILY
Qty: 30 TABLET | Refills: 0 | Status: SHIPPED | OUTPATIENT
Start: 2024-02-21

## 2024-02-21 RX ORDER — PRAZOSIN HYDROCHLORIDE 2 MG/1
CAPSULE ORAL
Qty: 30 CAPSULE | Refills: 0 | Status: SHIPPED | OUTPATIENT
Start: 2024-02-21

## 2024-02-21 RX ORDER — DONEPEZIL HYDROCHLORIDE 10 MG/1
10 TABLET, FILM COATED ORAL
Qty: 30 TABLET | Refills: 2 | Status: SHIPPED | OUTPATIENT
Start: 2024-02-21 | End: 2024-05-21

## 2024-02-21 RX ORDER — MEMANTINE HYDROCHLORIDE 5 MG/1
5 TABLET ORAL DAILY
Qty: 30 TABLET | Refills: 2 | Status: SHIPPED | OUTPATIENT
Start: 2024-02-21 | End: 2025-02-20

## 2024-02-21 RX ORDER — TRAZODONE HYDROCHLORIDE 50 MG/1
TABLET ORAL
Qty: 30 TABLET | Refills: 0 | Status: SHIPPED | OUTPATIENT
Start: 2024-02-21

## 2024-02-21 NOTE — PROGRESS NOTES
Neuro Office Visit      Encounter Date: 2024   Patient Name: Santi Souza Sr.  : 1960   MRN: [id number]   PCP:  Jordan Heart APRN     Chief Complaint:    Chief Complaint   Patient presents with    Memory Loss       History of Present Illness: Santi Souza Sr. is a 63 y.o. male who is here today in Neurology for  memory loss    Initial visit 2023:  Santi Souza Sr. is a 63 y.o. male who is here today in Neurology for  memory loss     PMH of CAD, chronic back pain, depression insulin-dependent diabetes, GERD, hyperlipidemia, hypertension, neuropathy, osteoarthritis, rotator cuff syndrome, CVA in  with short-term memory and difficulty with balance, vitamin D deficiency, glasses, hearing aid use     Patient was referred by primary care after visit on 2023 for evaluation of cerebrovascular disease, memory deficits, cognitive impairment, and PTSD.  He reported at that visit that due to this history he has trouble doing daily life skills in the home such as planning and taking care of things such as bills.  He has been seeing neurology Trisha PEGUERO at [facility] and would like a second opinion.  Feels that his memory and cognitive impairment is worsening.  He is following with psychiatry now with [facility] and states this has been very helpful for him.  MRI brain performed in 2022 for memory loss which per impression stated ventricles and sulci normal in size.  Mild amount of scattered T2 hyperintensities predominantly in the subcortical regions of the frontal lobes, indicating chronic small vessel disease.  Enlarged perivascular spaces are incidentally noted in left cerebral peduncle of the midbrain.  No abnormal parenchymal susceptibility artifact or restricted diffusion.  No midline shift, hydrocephalus, or extra-axial fluid collection is evident.  He does follow with [facility] neuroscience [place] and was last seen in their office on  4/20/2023 with MARIELENA Gorman.  Donepezil was increased to 10 mg daily.  He was assessed to have mild cognitive impairment due to vascular disease and PTSD.     In clinic today he reports that memory changes have progressively worsened, he has trouble remembering appointments, trouble remembering steps through making a sandwich, trouble with conversations - he often will lose train of thought mid conversation. [facility] he describes as feeling [facility]/needing mental health help - he denies SI, he called crisis hotline yesterday, reports that [facility] feels better [facility], he reports history of trauma. He notes emotional stress from co-pays and family concerns. He reports that he had CVA previously that was seen on prior MRI - per report from [facility] no stroke was mentioned, will repeat MRI brain to further assess, currently taking ASA 81 mg plus Atorvastatin 80 mg. Currently taking Donepezil 10 mg daily. He reports gait unsteadiness which was previously assessed by Dr. Gonsalez and felt to be d/t peripheral neuropathy.     Current visit 2/21/2024:  Alonso Ellis returns to neurology clinic for continued evaluation of memory loss and gait instability. MRI brain performed on 1/17/2024 showed findings compatible with chronic microvascular ischemic change, no restricted diffusion to suggest acute infarct.  He also has been following with behavioral health, Derrick LOPEZ for bipolar 2 disorder, generalized anxiety disorder, depression, PTSD, circadian rhythm sleep disorder. RPR nonreactive, methylmalonic acid 273, vitamin B-12 1,466, folate >20, Thyroid function was normal. Recently evicted from his home, moved into senior community, [facility] in [place]. He feels much safer where he is currently living. He has difficulty with short term memory. He feels that his memory lately has continued to decline - he does acknowledge much mental health stress in that time which he believes has  contributed to his memory loss. He is not driving.       Subjective      Review of Systems   Constitutional: Negative.    HENT: Negative.     Eyes: Negative.    Cardiovascular: Negative.    Gastrointestinal: Negative.    Endocrine: Negative.    Genitourinary: Negative.    Musculoskeletal:  Positive for gait problem.   Skin: Negative.    Allergic/Immunologic: Negative.    Neurological:         Memory loss   Psychiatric/Behavioral:  Positive for agitation and sleep disturbance. The patient is nervous/anxious.           Past Medical History:   Past Medical History:   Diagnosis Date    CAD (coronary artery disease) 11/03/2017    Cataracta     Chronic back pain 11/03/2017    Depression     Diabetes mellitus--Insulin Dependant 11/03/2017    IDDM    GERD (gastroesophageal reflux disease) 11/03/2017    Hyperlipidemia 11/03/2017    Hypertension 11/03/2017    Neuropathy     Osteoarthritis     Rotator cuff syndrome 6/23    Should be on my chart    Stroke 2022    short term memory and difficulty balance    Vitamin D deficiency     Wears glasses     Wears hearing aid in both ears        Past Surgical History:   Past Surgical History:   Procedure Laterality Date    BACK SURGERY  1999    4 lumbar surgeries    CARDIAC CATHETERIZATION      total of 7 stents    COLONOSCOPY      LUMBAR SPINE SURGERY  1998 2006, 2013    SHOULDER ARTHROSCOPY W/ ROTATOR CUFF REPAIR Right 9/13/2023    Procedure: SHOULDER ARTHROSCOPY WITH ROTATOR CUFF REPAIR, BICEP TENDONESIS, SUBACROMIAL DECOMPRESSION- RIGHT;  Surgeon: Geoffrey Francis MD;  Location: [facility];  Service: Orthopedics;  Laterality: Right;    TONSILLECTOMY         Family History:   Family History   Problem Relation Age of Onset    Diabetes Father     Cancer Maternal Grandfather        Social History:   Social History     Socioeconomic History    Marital status: Single   Tobacco Use    Smoking status: Some Days     Packs/day: 0.25     Years: 32.00     Additional pack years: 0.00      Total pack years: 8.00     Types: Cigarettes     Start date: 1974     Last attempt to quit: 2022     Years since quittin.1     Passive exposure: Current    Smokeless tobacco: Never   Vaping Use    Vaping Use: Never used   Substance and Sexual Activity    Alcohol use: Never    Drug use: Yes     Frequency: 7.0 times per week     Types: Marijuana     Comment: vape every night or smoke    Sexual activity: Defer     Partners: Female     Birth control/protection: Condom       Medications:     Current Outpatient Medications:     aspirin 81 MG chewable tablet, Chew 1 tablet., Disp: , Rfl:     atorvastatin (LIPITOR) 80 MG tablet, Take 1 tablet by mouth Daily., Disp: 90 tablet, Rfl: 1    baclofen (LIORESAL) 10 MG tablet, Take 1 tablet by mouth 3 (Three) Times a Day., Disp: 90 tablet, Rfl: 0    diclofenac (VOLTAREN) 75 MG EC tablet, Take 1 tablet by mouth 2 (Two) Times a Day As Needed (back pain. arthralgia)., Disp: 60 tablet, Rfl: 0    donepezil (ARICEPT) 10 MG tablet, Take 1 tablet by mouth Daily With Breakfast for 90 days., Disp: 30 tablet, Rfl: 2    DULoxetine (CYMBALTA) 60 MG capsule, Take 1 capsule by mouth in the morning, Disp: 30 capsule, Rfl: 0    empagliflozin (Jardiance) 25 MG tablet tablet, Take 1 tablet by mouth Daily., Disp: 90 tablet, Rfl: 1    levocetirizine (XYZAL) 5 MG tablet, Take 1 tablet by mouth Every Evening., Disp: 90 tablet, Rfl: 1    Magnesium 400 MG tablet, Take 400 mg by mouth Daily., Disp: , Rfl:     nitroglycerin (NITROLINGUAL) 0.4 MG/SPRAY spray, Place 1 spray by translingual route on or under the tongue at the first sign of an attack, no more than 3 sprays / 15-minute., Disp: 1 each, Rfl: 0    omeprazole (priLOSEC) 20 MG capsule, Take 1 capsule by mouth Daily., Disp: 90 capsule, Rfl: 1    SITagliptin (JANUVIA) 100 MG tablet, Take 1 tablet by mouth Daily., Disp: 90 tablet, Rfl: 1    Brexpiprazole (Rexulti) 1 MG tablet, Take 1 mg by mouth Daily., Disp: 30 tablet, Rfl: 0    memantine  "(NAMENDA) 5 MG tablet, Take 1 tablet by mouth Daily., Disp: 30 tablet, Rfl: 2    prazosin (MINIPRESS) 2 MG capsule, Take 1 capsule by mouth at bedtime., Disp: 30 capsule, Rfl: 0    traZODone (DESYREL) 50 MG tablet, Take 1 tablet by mouth every day at bedtime., Disp: 30 tablet, Rfl: 0    Allergies:   Allergies   Allergen Reactions    Hydrocodone Itching    Penicillins Swelling and Provider Review Needed     Entire body swelled as a child     Sulfa Antibiotics Shortness Of Breath, Rash and Provider Review Needed    Codeine Nausea And Vomiting, Confusion and Provider Review Needed    Oxycontin [Oxycodone] Itching         STEADI Fall Risk Assessment has not been completed.    Objective     Objective:    /68   Pulse 61   Ht 172.7 cm (68\")   Wt 78.9 kg (174 lb)   SpO2 96%   BMI 26.46 kg/m²   Body mass index is 26.46 kg/m².    Physical Exam  Vitals reviewed.   Constitutional:       Appearance: Normal appearance.   HENT:      Head: Normocephalic and atraumatic.      Mouth/Throat:      Mouth: Mucous membranes are moist.      Pharynx: Oropharynx is clear.   Pulmonary:      Effort: Pulmonary effort is normal. No respiratory distress.   Musculoskeletal:      Right lower leg: No edema.      Left lower leg: No edema.   Skin:     General: Skin is warm and dry.   Neurological:      Mental Status: He is alert.          Neurology Exam:    General apperance: NAD.     Mental status: Alert, awake and oriented to time place and person.    Fund of knowledge:  Normal.     Language and Speech: No aphasia or dysarthria.    Naming , Repitition and Comprehension:  Can name objects, repeat a sentence and follow commands. Speech is clear and fluent with good repetition, comprehension, and naming.    Cranial Nerves:   CN II: Visual fields are full. Intact.  Pupils - PERRLA  CN III, IV and VI: Extraocular movements are intact. Normal saccades.   CN V: Facial sensation is intact.   CN VII: Muscles of facial expression reveal no " asymmetry. Intact.   CN VIII: Hearing is intact.   CN IX and X: Palate elevates symmetrically. Intact  CN XI: Shoulder shrug is intact.   CN XII: Tongue is midline without evidence of atrophy or fasciculation.     Motor:  Right UE muscle strength 5/5. Normal tone.     Left UE muscle strength 5/5. Normal tone.      Right LE muscle strength 5/5. Normal tone.     Left LE muscle strength 5/5. Normal tone.      No resting tremor  No action tremor    Sensory: Normal light touch sensation bilaterally.    DTRs: 2+ bilaterally in upper and lower extremities.    Coordination: Normal finger-to-nose    Gait: Slow to start gait, overall steady once walking, no assistive device.     MMSE 27/30, recall 0/3    Results:   Imaging:   MRI Brain Without Contrast    Result Date: 1/17/2024  1.Findings compatible with chronic microvascular ischemic change. 2.No diffusion restriction is identified to suggest acute infarct. Electronically Signed: Rony Henson MD  1/17/2024 10:33 AM EST  Workstation ID: [id number]       Labs:   Lab Results   Component Value Date    GLUCOSE 179 (H) 01/09/2024    BUN 16 01/09/2024    CREATININE 0.84 01/09/2024    EGFRRESULT 97 01/09/2024    EGFR 102.0 09/16/2023    BCR 19 01/09/2024    K 4.4 01/09/2024    CO2 21 01/09/2024    CALCIUM 10.0 01/09/2024    PROTENTOTREF 7.2 01/09/2024    ALBUMIN 4.7 01/09/2024    BILITOT 0.5 01/09/2024    AST 23 01/09/2024    ALT 23 01/09/2024     WBC   Date Value Ref Range Status   01/09/2024 9.3 3.4 - 10.8 x10E3/uL Final   11/22/2023 9.34 3.70 - 10.30 10*3/uL Final     RBC   Date Value Ref Range Status   01/09/2024 4.93 4.14 - 5.80 x10E6/uL Final   11/22/2023 5.10 4.60 - 6.10 10*6/uL Final     Hemoglobin   Date Value Ref Range Status   01/09/2024 16.1 13.0 - 17.7 g/dL Final   11/22/2023 16.7 13.7 - 17.5 g/dL Final     Hematocrit   Date Value Ref Range Status   01/09/2024 46.9 37.5 - 51.0 % Final   11/22/2023 48.4 40.0 - 51.0 % Final     MCV   Date Value Ref Range Status    01/09/2024 95 79 - 97 fL Final   11/22/2023 95 79 - 98 fL Final     MCH   Date Value Ref Range Status   01/09/2024 32.7 26.6 - 33.0 pg Final   11/22/2023 32.7 (H) 26.0 - 32.0 pg Final     MCHC   Date Value Ref Range Status   01/09/2024 34.3 31.5 - 35.7 g/dL Final   11/22/2023 34.5 30.7 - 35.5 g/dL Final     RDW   Date Value Ref Range Status   01/09/2024 12.3 11.6 - 15.4 % Final   11/22/2023 12.5 11.5 - 14.5 % Final     RDW-SD   Date Value Ref Range Status   09/16/2023 43.2 37.0 - 54.0 fl Final     MPV   Date Value Ref Range Status   11/22/2023 9.9 8.8 - 12.5 fL Final     Platelets   Date Value Ref Range Status   01/09/2024 182 150 - 450 x10E3/uL Final   11/22/2023 199 155 - 369 10*3/uL Final     Neutrophil Rel %   Date Value Ref Range Status   01/09/2024 61 Not Estab. % Final   09/17/2022 60.0 % Final     Neutrophil %   Date Value Ref Range Status   09/16/2023 65.7 42.7 - 76.0 % Final     Lymphocyte Rel %   Date Value Ref Range Status   01/09/2024 27 Not Estab. % Final   09/17/2022 27.0 % Final     Lymphocyte %   Date Value Ref Range Status   09/16/2023 23.1 19.6 - 45.3 % Final     Monocyte Rel %   Date Value Ref Range Status   01/09/2024 9 Not Estab. % Final   09/17/2022 10.0 % Final     Monocyte %   Date Value Ref Range Status   09/16/2023 8.7 5.0 - 12.0 % Final     Eosinophil Rel %   Date Value Ref Range Status   01/09/2024 2 Not Estab. % Final     Eosinophil %   Date Value Ref Range Status   09/16/2023 1.5 0.3 - 6.2 % Final   09/17/2022 2.0 % Final     Basophil Rel %   Date Value Ref Range Status   01/09/2024 1 Not Estab. % Final   09/17/2022 1.0 % Final     Basophil %   Date Value Ref Range Status   09/16/2023 0.6 0.0 - 1.5 % Final     Immature Grans %   Date Value Ref Range Status   09/16/2023 0.4 0.0 - 0.5 % Final   09/17/2022 0.0 % Final     Neutrophils Absolute   Date Value Ref Range Status   01/09/2024 5.7 1.4 - 7.0 x10E3/uL Final   09/17/2022 4.59 1.60 - 6.10 10*3/uL Final     Neutrophils, Absolute    Date Value Ref Range Status   09/16/2023 5.24 1.70 - 7.00 10*3/mm3 Final     Lymphocytes Absolute   Date Value Ref Range Status   01/09/2024 2.5 0.7 - 3.1 x10E3/uL Final   09/17/2022 2.05 1.20 - 3.90 10*3/uL Final     Lymphocytes, Absolute   Date Value Ref Range Status   09/16/2023 1.84 0.70 - 3.10 10*3/mm3 Final     Monocytes Absolute   Date Value Ref Range Status   01/09/2024 0.9 0.1 - 0.9 x10E3/uL Final   09/17/2022 0.75 0.30 - 0.90 10*3/uL Final     Monocytes, Absolute   Date Value Ref Range Status   09/16/2023 0.69 0.10 - 0.90 10*3/mm3 Final     Eosinophils Absolute   Date Value Ref Range Status   01/09/2024 0.2 0.0 - 0.4 x10E3/uL Final   09/17/2022 0.17 0.00 - 0.50 10*3/uL Final     Eosinophils, Absolute   Date Value Ref Range Status   09/16/2023 0.12 0.00 - 0.40 10*3/mm3 Final     Basophils Absolute   Date Value Ref Range Status   01/09/2024 0.1 0.0 - 0.2 x10E3/uL Final   09/17/2022 0.05 0.00 - 0.10 10*3/uL Final     Basophils, Absolute   Date Value Ref Range Status   09/16/2023 0.05 0.00 - 0.20 10*3/mm3 Final     Immature Grans, Absolute   Date Value Ref Range Status   09/16/2023 0.03 0.00 - 0.05 10*3/mm3 Final   09/17/2022 0.02 0.00 - 0.06 10*3/uL Final     nRBC   Date Value Ref Range Status   11/22/2023 0.0 <=0.0 per 100 WBCs Final   09/16/2023 0.0 0.0 - 0.2 /100 WBC Final         Assessment / Plan      Assessment/Plan:   Diagnoses and all orders for this visit:    1. Memory loss (Primary)  -     memantine (NAMENDA) 5 MG tablet; Take 1 tablet by mouth Daily.  Dispense: 30 tablet; Refill: 2  -     donepezil (ARICEPT) 10 MG tablet; Take 1 tablet by mouth Daily With Breakfast for 90 days.  Dispense: 30 tablet; Refill: 2       Alonso Ellis returns to neurology clinic for continued evaluation of memory loss and gait instability. MRI brain performed on 1/17/2024 showed findings compatible with chronic microvascular ischemic change, no restricted diffusion to suggest acute infarct.  He also has been following with  behavioral health, Derrick LOPEZ for bipolar 2 disorder, generalized anxiety disorder, depression, PTSD, circadian rhythm sleep disorder. RPR nonreactive, methylmalonic acid 273, vitamin B-12 1,466, folate >20, Thyroid function was normal.  He is currently taking donepezil 10 mg nightly and feels that despite taking donepezil his memory continues to decline.  MMSE today 27 out of 30, prior MMSE was 29 out of 30, we have elected to trial low-dose Namenda given his concern for memory loss that is out of proportion to testing shown in clinic, if no significant benefit from Namenda we will consider discontinuing this medication.  I do also believe that his health is significantly affecting his cognition, which he also agrees to, he is following with psychiatry and reports a much more stable home situation recently.  I am hopeful that decrease stress at home will also improve his cognition and memory.  We did discuss consideration for cognitive therapy, he did not wish to pursue this currently.  Will plan to see patient back in clinic in approximately 3 months or sooner for any questions or concerns.    Patient Education:       Reviewed medications, potential side effects and signs and symptoms to report. Discussed risk versus benefits of treatment plan with patient and/or family-including medications, labs and radiology that may be ordered. Addressed questions and concerns during visit. Patient and/or family verbalized understanding and agree with plan. Instructed to call the office with any questions and report to ER with any life-threatening symptoms.     Follow Up:   Return in about 3 months (around 5/21/2024).    I spent  38  minutes in the care of this patient. I personally spent 50 percent of this time counseling and discussing diagnosis, diagnostic testing, evaluation, treatment options, and management.       During this visit the following were done:  Labs Reviewed [x]    Labs Ordered []    Radiology  Reports Reviewed [x]    Radiology Ordered []    PCP Records Reviewed []    Referring Provider Records Reviewed []    ER Records Reviewed []    Hospital Records Reviewed []    History Obtained From Family []    Radiology Images Reviewed [x]    Other Reviewed []    Records Requested []      JESSICA Watson  [facility]  [address] [facility] [address]  [facility] [address] [phone]

## 2024-02-21 NOTE — TELEPHONE ENCOUNTER
Incoming Refill Request      Medication requested (name and dose):     PRAZOSIN (MINIPRESS) 2 MG CAPSULE     BREXPIPRAZOLE (REXULTI) 1 MG TABLET     TRAZODONE (DESYREL) 50 MG TABLET     Pharmacy where request should be sent:     [facility] [place]     Additional details provided by patient:     PATIENT IS OUT OF MEDICATION     Best call back number: [phone]    Does the patient have less than a 3 day supply:  [x] Yes  [] No    Trice Mayo Rep  02/21/24, 10:45 EST        {Tip  DIRECTIONS Send the encounter HIGH priority, If patient has less than a 3 day supply. If the patient will run out of medication over the weekend add that information to the additional details line. Send this encounter to the clinical pool:[id number]

## 2024-02-24 DIAGNOSIS — F39 MOOD DISORDER: ICD-10-CM

## 2024-02-24 DIAGNOSIS — F33.1 MODERATE EPISODE OF RECURRENT MAJOR DEPRESSIVE DISORDER: ICD-10-CM

## 2024-02-26 RX ORDER — BREXPIPRAZOLE 1 MG/1
1 TABLET ORAL DAILY
Qty: 30 TABLET | Refills: 0 | OUTPATIENT
Start: 2024-02-26

## 2024-03-05 ENCOUNTER — OFFICE VISIT (OUTPATIENT)
Dept: BEHAVIORAL HEALTH | Facility: CLINIC | Age: 64
End: 2024-03-05
Payer: MEDICARE

## 2024-03-05 VITALS
WEIGHT: 175 LBS | HEART RATE: 71 BPM | SYSTOLIC BLOOD PRESSURE: 124 MMHG | BODY MASS INDEX: 26.52 KG/M2 | OXYGEN SATURATION: 98 % | HEIGHT: 68 IN | DIASTOLIC BLOOD PRESSURE: 80 MMHG

## 2024-03-05 DIAGNOSIS — G47.20 CIRCADIAN RHYTHM SLEEP DISORDER, UNSPECIFIED TYPE: ICD-10-CM

## 2024-03-05 DIAGNOSIS — F33.1 MODERATE EPISODE OF RECURRENT MAJOR DEPRESSIVE DISORDER: Primary | ICD-10-CM

## 2024-03-05 DIAGNOSIS — F43.10 POST TRAUMATIC STRESS DISORDER (PTSD): ICD-10-CM

## 2024-03-05 DIAGNOSIS — F41.1 GENERALIZED ANXIETY DISORDER: ICD-10-CM

## 2024-03-05 DIAGNOSIS — F39 MOOD DISORDER: ICD-10-CM

## 2024-03-05 RX ORDER — DULOXETIN HYDROCHLORIDE 60 MG/1
CAPSULE, DELAYED RELEASE ORAL
Qty: 30 CAPSULE | Refills: 1 | Status: SHIPPED | OUTPATIENT
Start: 2024-03-05

## 2024-03-05 RX ORDER — TRAZODONE HYDROCHLORIDE 50 MG/1
TABLET ORAL
Qty: 30 TABLET | Refills: 1 | Status: SHIPPED | OUTPATIENT
Start: 2024-03-05

## 2024-03-05 RX ORDER — PRAZOSIN HYDROCHLORIDE 2 MG/1
CAPSULE ORAL
Qty: 30 CAPSULE | Refills: 1 | Status: SHIPPED | OUTPATIENT
Start: 2024-03-05

## 2024-03-05 RX ORDER — BREXPIPRAZOLE 2 MG/1
2 TABLET ORAL DAILY
Qty: 30 TABLET | Refills: 1 | Status: SHIPPED | OUTPATIENT
Start: 2024-03-05

## 2024-03-05 NOTE — PROGRESS NOTES
Follow Up Office Visit      Patient Name: Santi Souza Sr.  : 1960   MRN: [id number]     Referring Provider: Jordan Heart APRN    Chief Complaint:      ICD-10-CM ICD-9-CM   1. Moderate episode of recurrent major depressive disorder  F33.1 296.32   2. Mood disorder  F39 296.90   3. Generalized anxiety disorder  F41.1 300.02   4. Circadian rhythm sleep disorder, unspecified type  G47.20 327.30   5. Post traumatic stress disorder (PTSD)  F43.10 309.81        History of Present Illness:   Santi Souza Sr. is a 63 y.o. male who is here today for follow up with psychiatric medications.    Subjective      Patient Reports:   Lots of changes lately.  Got put out of my place.  I am in the [place] now.  [employer] helped me move and paid my deposit and rent.  May help me for 2 years.  Still doing therapy.  Still having trouble with food money.  Some days I feel pretty decent but a lot of the days I just dont want to get off the couch or do much.  No energy at all.    Review of Systems:   Review of Systems   Psychiatric/Behavioral:  Positive for stress.      Screening Scores:   PHQ-9 : 21  GARETT-7 : 15    RISK ASSESSMENT:  Patient denies any high risk factors today.    Medications:     Current Outpatient Medications:     DULoxetine (CYMBALTA) 60 MG capsule, Take 1 capsule by mouth in the morning, Disp: 30 capsule, Rfl: 1    prazosin (MINIPRESS) 2 MG capsule, Take 1 capsule by mouth at bedtime., Disp: 30 capsule, Rfl: 1    traZODone (DESYREL) 50 MG tablet, Take 1 tablet by mouth every day at bedtime., Disp: 30 tablet, Rfl: 1    aspirin 81 MG chewable tablet, Chew 1 tablet., Disp: , Rfl:     atorvastatin (LIPITOR) 80 MG tablet, Take 1 tablet by mouth Daily., Disp: 90 tablet, Rfl: 1    baclofen (LIORESAL) 10 MG tablet, Take 1 tablet by mouth 3 (Three) Times a Day., Disp: 90 tablet, Rfl: 0    Brexpiprazole (Rexulti) 2 MG tablet, Take 1 tablet by mouth Daily., Disp: 30 tablet,  "Rfl: 1    diclofenac (VOLTAREN) 75 MG EC tablet, Take 1 tablet by mouth 2 (Two) Times a Day As Needed (back pain. arthralgia)., Disp: 60 tablet, Rfl: 0    donepezil (ARICEPT) 10 MG tablet, Take 1 tablet by mouth Daily With Breakfast for 90 days., Disp: 30 tablet, Rfl: 2    empagliflozin (Jardiance) 25 MG tablet tablet, Take 1 tablet by mouth Daily., Disp: 90 tablet, Rfl: 1    levocetirizine (XYZAL) 5 MG tablet, Take 1 tablet by mouth Every Evening., Disp: 90 tablet, Rfl: 1    Magnesium 400 MG tablet, Take 400 mg by mouth Daily., Disp: , Rfl:     memantine (NAMENDA) 5 MG tablet, Take 1 tablet by mouth Daily., Disp: 30 tablet, Rfl: 2    nitroglycerin (NITROLINGUAL) 0.4 MG/SPRAY spray, Place 1 spray by translingual route on or under the tongue at the first sign of an attack, no more than 3 sprays / 15-minute., Disp: 1 each, Rfl: 0    omeprazole (priLOSEC) 20 MG capsule, Take 1 capsule by mouth Daily., Disp: 90 capsule, Rfl: 1    SITagliptin (JANUVIA) 100 MG tablet, Take 1 tablet by mouth Daily., Disp: 90 tablet, Rfl: 1    Medication Considerations:  JAMARI reviewed and appropriate.      Allergies:   Allergies   Allergen Reactions    Hydrocodone Itching    Penicillins Swelling and Provider Review Needed     Entire body swelled as a child     Sulfa Antibiotics Shortness Of Breath, Rash and Provider Review Needed    Codeine Nausea And Vomiting, Confusion and Provider Review Needed    Oxycontin [Oxycodone] Itching       Results Reviewed:   screeners     Objective     Physical Exam:  Vital Signs:   Vitals:    03/05/24 1132   BP: 124/80   Pulse: 71   SpO2: 98%   Weight: 79.4 kg (175 lb)   Height: 172.7 cm (68\")     Body mass index is 26.61 kg/m².     Mental Status Exam:   Hygiene:   good  Cooperation:  Cooperative  Eye Contact:  Good  Psychomotor Behavior:  Appropriate  Affect:  Appropriate  Mood: depressed  Speech:  Normal  Thought Process:  Goal directed and Linear  Thought Content:  Mood congruent  Suicidal:  " None  Homicidal:  None  Hallucinations:  None  Delusion:  None  Memory:  Intact  Orientation:  Grossly intact  Reliability:  fair  Insight:  Fair  Judgement:  Fair  Impulse Control:  Fair  Physical/Medical Issues:   shoulder pain      Assessment / Plan      Visit Diagnosis/Orders Placed This Visit:  Diagnoses and all orders for this visit:    1. Moderate episode of recurrent major depressive disorder (Primary)  -     traZODone (DESYREL) 50 MG tablet; Take 1 tablet by mouth every day at bedtime.  Dispense: 30 tablet; Refill: 1  -     DULoxetine (CYMBALTA) 60 MG capsule; Take 1 capsule by mouth in the morning  Dispense: 30 capsule; Refill: 1  -     Brexpiprazole (Rexulti) 2 MG tablet; Take 1 tablet by mouth Daily.  Dispense: 30 tablet; Refill: 1    2. Mood disorder  -     Brexpiprazole (Rexulti) 2 MG tablet; Take 1 tablet by mouth Daily.  Dispense: 30 tablet; Refill: 1    3. Generalized anxiety disorder  -     prazosin (MINIPRESS) 2 MG capsule; Take 1 capsule by mouth at bedtime.  Dispense: 30 capsule; Refill: 1  -     traZODone (DESYREL) 50 MG tablet; Take 1 tablet by mouth every day at bedtime.  Dispense: 30 tablet; Refill: 1  -     DULoxetine (CYMBALTA) 60 MG capsule; Take 1 capsule by mouth in the morning  Dispense: 30 capsule; Refill: 1    4. Circadian rhythm sleep disorder, unspecified type  -     prazosin (MINIPRESS) 2 MG capsule; Take 1 capsule by mouth at bedtime.  Dispense: 30 capsule; Refill: 1  -     traZODone (DESYREL) 50 MG tablet; Take 1 tablet by mouth every day at bedtime.  Dispense: 30 tablet; Refill: 1    5. Post traumatic stress disorder (PTSD)  -     prazosin (MINIPRESS) 2 MG capsule; Take 1 capsule by mouth at bedtime.  Dispense: 30 capsule; Refill: 1  -     Brexpiprazole (Rexulti) 2 MG tablet; Take 1 tablet by mouth Daily.  Dispense: 30 tablet; Refill: 1         Functional Status: Moderate impairment     Prognosis: Guarded with Ongoing Treatment    Impression/Formulation:  Patient appeared alert  and oriented. Patient is receptive to assistance with maintaining a stable lifestyle.  Patient presents with history of     ICD-10-CM ICD-9-CM   1. Moderate episode of recurrent major depressive disorder  F33.1 296.32   2. Mood disorder  F39 296.90   3. Generalized anxiety disorder  F41.1 300.02   4. Circadian rhythm sleep disorder, unspecified type  G47.20 327.30   5. Post traumatic stress disorder (PTSD)  F43.10 309.81   .   Switched therapists to Steafn Tran, patient reports is going well.  Continues to struggle with low energy and depressive symptoms several days per week.  Recently moved to a new home, receiving help from [employer].  Patient struggles financially and is pursuing disability although states he would like to work but doesn't feel he can handle it physically or mentally.  Patient is less irritable and in more stable mood, quieter, calmer voice today.  More thoughtful insights.    Treatment Plan:   Increase rexulti for depression/mood.  Continue trazodone, prazosin, cymbalta    Patient will continue supportive psychotherapy efforts and medications as indicated. Clinic will obtain release of information for current treatment team for continuity of care as needed. Patient will contact this office, call 911 or present to the nearest emergency room should suicidal or homicidal ideations occur.  Discussed medication options and treatment plan of prescribed medication(s) as well as the risks, benefits, and potential side effects. Patient ackowledged and verbally consented to continue with current treatment plan and was educated on the importance of compliance with treatment and follow-up appointments.     Follow Up:   Return in about 2 months (around 5/5/2024) for Med Check.        JESSICA Nur, BRIEN-BC  [facility]     This is electronically signed by JESSICA Nur, ANGELO  [unfilled] 18:28 EST

## 2024-03-05 NOTE — PROGRESS NOTES
"      Follow Up Adult Note     Date:2024   Patient Name: Santi Souza Sr.  : 1960   MRN: [id number]   Time IN: 10:30 am    Time OUT: 11:25 am     Referring Provider: Jordan Heart APRN    Chief Complaint:      ICD-10-CM ICD-9-CM   1. Moderate episode of recurrent major depressive disorder  F33.1 296.32   2. Generalized anxiety disorder  F41.1 300.02   3. Post traumatic stress disorder (PTSD)  F43.10 309.81        History of Present Illness:   Santi Souza Sr. is a 63 y.o., single, unemployed (collects disability)  male who is being seen today for Psychotherapy session. Patient was previously seen at this location by another provider and requested transfer. Mood reported as \"don't really know right now honestly\" with intermittently sad and anxious affect. Patient presented with some anxiety but remained overall calm, cooperative, engaged, and pleasant with provider. Patient denied any history of or current SI/HI/AVH. Patient reports feeling sad and down while endorsing depressive and anxiety symptom burdens consisting of little interest in pleasurable activities, feeling helpless and hopeless, isolating, trouble concentrating, worrying about different things, anxious and nervous, all causing significant distress and social/occupational impairment in his daily life. Patient reports he \"just doesn't feel like I'm happy or how I want to feel\" as he shared a brief history of presenting concerns and stressors consisting of financial stress, minimal supports and minimal interaction with peers, family conflicts, and medical concerns.       Subjective     PHQ-9 Depression Screening  PHQ-9 Total Score: 22     GARETT-7  Feeling nervous, anxious or on edge: More than half the days  Not being able to stop or control worrying: Nearly every day  Worrying too much about different things: Nearly every day  Trouble Relaxing: Several days  Being so restless that it is hard to sit still: Several " "days  Feeling afraid as if something awful might happen: Nearly every day  Becoming easily annoyed or irritable: More than half the days  GARETT 7 Total Score: 15  If you checked any problems, how difficult have these problems made it for you to do your work, take care of things at home, or get along with other people: Extremely difficult    Patient's Support Network Includes:   \"Honestly no one right now\"    Functional Status: Moderate impairment     Progress toward goal: Not at goal    Prognosis: Fair with Ongoing Treatment     Medications:     Current Outpatient Medications:     aspirin 81 MG chewable tablet, Chew 1 tablet., Disp: , Rfl:     atorvastatin (LIPITOR) 80 MG tablet, Take 1 tablet by mouth Daily., Disp: 90 tablet, Rfl: 1    baclofen (LIORESAL) 10 MG tablet, Take 1 tablet by mouth 3 (Three) Times a Day., Disp: 90 tablet, Rfl: 0    Brexpiprazole (Rexulti) 2 MG tablet, Take 1 tablet by mouth Daily., Disp: 30 tablet, Rfl: 1    diclofenac (VOLTAREN) 75 MG EC tablet, Take 1 tablet by mouth 2 (Two) Times a Day As Needed (back pain. arthralgia)., Disp: 60 tablet, Rfl: 0    donepezil (ARICEPT) 10 MG tablet, Take 1 tablet by mouth Daily With Breakfast for 90 days., Disp: 30 tablet, Rfl: 2    DULoxetine (CYMBALTA) 60 MG capsule, Take 1 capsule by mouth in the morning, Disp: 30 capsule, Rfl: 1    empagliflozin (Jardiance) 25 MG tablet tablet, Take 1 tablet by mouth Daily., Disp: 90 tablet, Rfl: 1    levocetirizine (XYZAL) 5 MG tablet, Take 1 tablet by mouth Every Evening., Disp: 90 tablet, Rfl: 1    Magnesium 400 MG tablet, Take 400 mg by mouth Daily., Disp: , Rfl:     memantine (NAMENDA) 5 MG tablet, Take 1 tablet by mouth Daily., Disp: 30 tablet, Rfl: 2    nitroglycerin (NITROLINGUAL) 0.4 MG/SPRAY spray, Place 1 spray by translingual route on or under the tongue at the first sign of an attack, no more than 3 sprays / 15-minute., Disp: 1 each, Rfl: 0    omeprazole (priLOSEC) 20 MG capsule, Take 1 capsule by mouth " "Daily., Disp: 90 capsule, Rfl: 1    prazosin (MINIPRESS) 2 MG capsule, Take 1 capsule by mouth at bedtime., Disp: 30 capsule, Rfl: 1    SITagliptin (JANUVIA) 100 MG tablet, Take 1 tablet by mouth Daily., Disp: 90 tablet, Rfl: 1    traZODone (DESYREL) 50 MG tablet, Take 1 tablet by mouth every day at bedtime., Disp: 30 tablet, Rfl: 1    Allergies:   Allergies   Allergen Reactions    Hydrocodone Itching    Penicillins Swelling and Provider Review Needed     Entire body swelled as a child     Sulfa Antibiotics Shortness Of Breath, Rash and Provider Review Needed    Codeine Nausea And Vomiting, Confusion and Provider Review Needed    Oxycontin [Oxycodone] Itching       Objective     Mental Status Exam:   MENTAL STATUS EXAM   General Appearance:  Cleanly groomed and dressed and well developed  Eye Contact:  Fair  Attitude:  Cooperative and polite  Motor Activity:  Normal gait, posture  Muscle Strength:  Abnormal  Speech:  Normal rate, tone, volume  Language:  Spontaneous  Mood and affect:  Other  Other Comment:  Mood reported as \"don't really know right now honestly\" with intermittently sad and anxious affect  Hopelessness:  5  Thought Process:  Goal-directed  Associations/ Thought Content:  No delusions  Hallucinations:  None  Suicidal Ideations:  Not present  Homicidal Ideation:  Not present  Sensorium:  Alert and clear  Orientation:  Place, situation, time and person  Immediate Recall, Recent, and Remote Memory:  Intact  Attention Span/ Concentration:  Good  Fund of Knowledge:  Appropriate for age and educational level  Intellectual Functioning:  Average range  Insight:  Limited  Judgement:  Limited  Reliability:  Fair  Impulse Control:  Fair       Assessment / Plan      Visit Diagnosis/Orders Placed This Visit:    ICD-10-CM ICD-9-CM   1. Moderate episode of recurrent major depressive disorder  F33.1 296.32   2. Generalized anxiety disorder  F41.1 300.02   3. Post traumatic stress disorder (PTSD)  F43.10 309.81    "     PLAN:  Safety: No acute safety concerns  Risk Assessment: Risk of self-harm acutely is low. Risk of self-harm chronically is also low, but could be further elevated in the event of treatment noncompliance and/or AODA.    Treatment Plan/Goals: Continue supportive psychotherapy efforts and medications as indicated. Treatment and medication options discussed during today's visit. Patient ackowledged and verbally consented to continue with current treatment plan and was educated on the importance of compliance with treatment and follow-up appointments. Patient seems reasonably able to adhere to treatment plan.      Assisted Patient in processing above session content; acknowledged and normalized patient’s thoughts, feelings, and concerns.  Rationalized patient thought process regarding past and current symptom burdens and thoughts related to starting psychotherapy. Engaged patient in open ended and specific questioning to gather information/history of presenting concerns to assess for strengths, weaknesses, trauma, and risks. Collaborated with patient to formulate a treatment plan and identify goals of reducing and managing symptoms related to anxiety and depression along with reducing frequency and intensity of traumatic memories through improving emotional regulation and interpersonal relationships. Provided psychoeducation on presenting symptom burdens and discussed expectations of treatment. Patient remained engaged and receptive to therapeutic feedback while voicing overall motivation for change..      Allowed Patient to freely discuss issues  without interruption or judgement with unconditional positive regard, active listening skills, and empathy. Therapist provided a safe, confidential environment to facilitate the development of a positive therapeutic relationship and encouraged open, honest communication. Assisted Patient in identifying risk factors which would indicate the need for higher level of care  including thoughts to harm self or others and/or self-harming behavior and encouraged Patient to contact this office, call 911, or present to the nearest emergency room should any of these events occur. Discussed crisis intervention services and means to access. Patient adamantly and convincingly denies current suicidal or homicidal ideation or perceptual disturbance. Assisted Patient in processing session content; acknowledged and normalized Patient’s thoughts, feelings, and concerns by utilizing a person-centered approach in efforts to build appropriate rapport and a positive therapeutic relationship with open and honest communication. .     Follow Up:   Return in about 1 week (around 3/12/2024) for Therapy session.    Stefan Tran, [facility]  [facility]

## 2024-03-08 DIAGNOSIS — F41.1 GENERALIZED ANXIETY DISORDER: ICD-10-CM

## 2024-03-08 DIAGNOSIS — F43.10 POST TRAUMATIC STRESS DISORDER (PTSD): ICD-10-CM

## 2024-03-08 DIAGNOSIS — G47.20 CIRCADIAN RHYTHM SLEEP DISORDER, UNSPECIFIED TYPE: ICD-10-CM

## 2024-03-08 RX ORDER — PRAZOSIN HYDROCHLORIDE 2 MG/1
CAPSULE ORAL
Qty: 30 CAPSULE | Refills: 0 | OUTPATIENT
Start: 2024-03-08

## 2024-03-12 ENCOUNTER — OFFICE VISIT (OUTPATIENT)
Dept: BEHAVIORAL HEALTH | Facility: CLINIC | Age: 64
End: 2024-03-12
Payer: MEDICARE

## 2024-03-12 DIAGNOSIS — F33.1 MODERATE EPISODE OF RECURRENT MAJOR DEPRESSIVE DISORDER: Primary | ICD-10-CM

## 2024-03-12 DIAGNOSIS — F39 MOOD DISORDER: ICD-10-CM

## 2024-03-12 DIAGNOSIS — F41.1 GENERALIZED ANXIETY DISORDER: ICD-10-CM

## 2024-03-12 DIAGNOSIS — F43.10 POST TRAUMATIC STRESS DISORDER (PTSD): ICD-10-CM

## 2024-03-12 NOTE — PROGRESS NOTES
"     Follow Up Adult Note     Date:2024   Patient Name: Santi Souza Sr.  : 1960   MRN: [id number]   Time IN: 9:02 am    Time OUT: 9:56 am     Referring Provider: Jordan Heart APRN    Chief Complaint:      ICD-10-CM ICD-9-CM   1. Moderate episode of recurrent major depressive disorder  F33.1 296.32   2. Mood disorder  F39 296.90   3. Generalized anxiety disorder  F41.1 300.02   4. Post traumatic stress disorder (PTSD)  F43.10 309.81        History of Present Illness:   Santi Souza Sr. is a 63 y.o., single, unemployed (collects disability)  male who is being seen today for scheduled Psychotherapy session. Mood reported as \"kind of in a stalemate mood\" with initially dysphoric but improved affect as session progressed. Patient presented as somewhat tired and down but showed improvement as session progressed and remained engaged, calm, cooperative, and pleasant with provider. Patient denied any current SI/HI/AVH. Patient reported feeling sad and down with depressive symptoms and stated, \"I just never really have anyone to talk to or do anything with\". Patient reports ongoing stress related to recent events that led him to be evicted from a previous residence, but did voice that he enjoys his new residence and stated, \"I just feel down right now, but I know I am blessed to have what I have and I want to feel better\". Patient endorsed anxiety over life situations such as finances, health, and having a close friend that he no longer sees as he wishes. Patient began to discuss past trauma related to his childhood and recent events that include negative family dynamics between him and his son.       Subjective     PHQ-9 Depression Screening  PHQ-9 Total Score:  Not completed at this time     GARETT-7   Not completed at this time    Patient's Support Network Includes:   \"Honestly just you all and the [facility] right now\"    Functional Status: Moderate impairment     Progress toward goal: Not " at goal    Prognosis: Fair with Ongoing Treatment     Medications:     Current Outpatient Medications:     aspirin 81 MG chewable tablet, Chew 1 tablet., Disp: , Rfl:     atorvastatin (LIPITOR) 80 MG tablet, Take 1 tablet by mouth Daily., Disp: 90 tablet, Rfl: 1    baclofen (LIORESAL) 10 MG tablet, Take 1 tablet by mouth 3 (Three) Times a Day., Disp: 90 tablet, Rfl: 0    Brexpiprazole (Rexulti) 2 MG tablet, Take 1 tablet by mouth Daily., Disp: 30 tablet, Rfl: 1    diclofenac (VOLTAREN) 75 MG EC tablet, Take 1 tablet by mouth 2 (Two) Times a Day As Needed (back pain. arthralgia)., Disp: 60 tablet, Rfl: 0    donepezil (ARICEPT) 10 MG tablet, Take 1 tablet by mouth Daily With Breakfast for 90 days., Disp: 30 tablet, Rfl: 2    DULoxetine (CYMBALTA) 60 MG capsule, Take 1 capsule by mouth in the morning, Disp: 30 capsule, Rfl: 1    empagliflozin (Jardiance) 25 MG tablet tablet, Take 1 tablet by mouth Daily., Disp: 90 tablet, Rfl: 1    levocetirizine (XYZAL) 5 MG tablet, Take 1 tablet by mouth Every Evening., Disp: 90 tablet, Rfl: 1    Magnesium 400 MG tablet, Take 400 mg by mouth Daily., Disp: , Rfl:     memantine (NAMENDA) 5 MG tablet, Take 1 tablet by mouth Daily., Disp: 30 tablet, Rfl: 2    nitroglycerin (NITROLINGUAL) 0.4 MG/SPRAY spray, Place 1 spray by translingual route on or under the tongue at the first sign of an attack, no more than 3 sprays / 15-minute., Disp: 1 each, Rfl: 0    omeprazole (priLOSEC) 20 MG capsule, Take 1 capsule by mouth Daily., Disp: 90 capsule, Rfl: 1    prazosin (MINIPRESS) 2 MG capsule, Take 1 capsule by mouth at bedtime., Disp: 30 capsule, Rfl: 1    SITagliptin (JANUVIA) 100 MG tablet, Take 1 tablet by mouth Daily., Disp: 90 tablet, Rfl: 1    traZODone (DESYREL) 50 MG tablet, Take 1 tablet by mouth every day at bedtime., Disp: 30 tablet, Rfl: 1    Allergies:   Allergies   Allergen Reactions    Hydrocodone Itching    Penicillins Swelling and Provider Review Needed     Entire body swelled  "as a child     Sulfa Antibiotics Shortness Of Breath, Rash and Provider Review Needed    Codeine Nausea And Vomiting, Confusion and Provider Review Needed    Oxycontin [Oxycodone] Itching       Objective     Mental Status Exam:   MENTAL STATUS EXAM   General Appearance:  Cleanly groomed and dressed and well developed  Eye Contact:  Good eye contact  Attitude:  Cooperative and polite  Motor Activity:  Normal gait, posture  Muscle Strength:  Normal  Speech:  Normal rate, tone, volume  Language:  Spontaneous  Mood and affect:  Other  Other Comment:  Mood reported as \"kind of in a stalemate mood\" with initially dysphoric but improved affect as session progressed.  Hopelessness:  4  Loneliness: 7  Thought Process:  Goal-directed and logical  Associations/ Thought Content:  No delusions  Hallucinations:  None  Suicidal Ideations:  Not present  Homicidal Ideation:  Not present  Sensorium:  Confused, clear and alert  Orientation:  Person, situation, time and place  Immediate Recall, Recent, and Remote Memory:  Intact  Attention Span/ Concentration:  Good  Fund of Knowledge:  Appropriate for age and educational level  Intellectual Functioning:  Average range  Insight:  Limited  Judgement:  Fair  Reliability:  Fair  Impulse Control:  Fair       Assessment / Plan      Visit Diagnosis/Orders Placed This Visit:    ICD-10-CM ICD-9-CM   1. Moderate episode of recurrent major depressive disorder  F33.1 296.32   2. Mood disorder  F39 296.90   3. Generalized anxiety disorder  F41.1 300.02   4. Post traumatic stress disorder (PTSD)  F43.10 309.81        PLAN:  Safety: No acute safety concerns  Risk Assessment: Risk of self-harm acutely is low. Risk of self-harm chronically is also low, but could be further elevated in the event of treatment noncompliance and/or AODA.    Treatment Plan/Goals: Continue supportive psychotherapy efforts and medications as indicated. Treatment and medication options discussed during today's visit. Patient " ackowledged and verbally consented to continue with current treatment plan and was educated on the importance of compliance with treatment and follow-up appointments. Patient seems reasonably able to adhere to treatment plan.      Assisted Patient in processing above session content; acknowledged and normalized patient’s thoughts, feelings, and concerns.  Rationalized patient thought process regarding recent stressors to include ongoing depressive symptoms, feeling lonely and sad, and anxiety related to finances and future worries. Engaged patient in identifying recent successes and current strengths and how to continue utilizing these to accomplish further goals. Utilized Socratic and CBT techniques to challenge presenting negative thoughts/beliefs and assisted patient in re-framing thoughts in a more positive direction. Provided psychoeducation on presenting symptom burdens and the benefits of engaging with peers while collaborating on further coping techniques. Patient was receptive to therapeutic feedback.       Allowed Patient to freely discuss issues  without interruption or judgement with unconditional positive regard, active listening skills, and empathy. Therapist provided a safe, confidential environment to facilitate the development of a positive therapeutic relationship and encouraged open, honest communication. Assisted Patient in identifying risk factors which would indicate the need for higher level of care including thoughts to harm self or others and/or self-harming behavior and encouraged Patient to contact this office, call 911, or present to the nearest emergency room should any of these events occur. Discussed crisis intervention services and means to access. Patient adamantly and convincingly denies current suicidal or homicidal ideation or perceptual disturbance. Assisted Patient in processing session content; acknowledged and normalized Patient’s thoughts, feelings, and concerns by utilizing  a person-centered approach in efforts to build appropriate rapport and a positive therapeutic relationship with open and honest communication. .     Follow Up:   Return in about 1 week (around 3/19/2024) for Therapy session.    Stefan Tran LCSW  [facility]

## 2024-03-15 ENCOUNTER — TELEPHONE (OUTPATIENT)
Dept: CASE MANAGEMENT | Facility: OTHER | Age: 64
End: 2024-03-15
Payer: MEDICARE

## 2024-03-19 ENCOUNTER — OFFICE VISIT (OUTPATIENT)
Dept: BEHAVIORAL HEALTH | Facility: CLINIC | Age: 64
End: 2024-03-19
Payer: MEDICARE

## 2024-03-19 ENCOUNTER — PATIENT OUTREACH (OUTPATIENT)
Dept: CASE MANAGEMENT | Facility: OTHER | Age: 64
End: 2024-03-19
Payer: MEDICARE

## 2024-03-19 DIAGNOSIS — E11.65 TYPE 2 DIABETES MELLITUS WITH HYPERGLYCEMIA, WITHOUT LONG-TERM CURRENT USE OF INSULIN: Primary | ICD-10-CM

## 2024-03-19 DIAGNOSIS — I10 BENIGN ESSENTIAL HTN: ICD-10-CM

## 2024-03-19 DIAGNOSIS — F43.10 POST TRAUMATIC STRESS DISORDER (PTSD): ICD-10-CM

## 2024-03-19 DIAGNOSIS — F39 MOOD DISORDER: ICD-10-CM

## 2024-03-19 DIAGNOSIS — F41.1 GENERALIZED ANXIETY DISORDER: ICD-10-CM

## 2024-03-19 DIAGNOSIS — F33.1 MODERATE EPISODE OF RECURRENT MAJOR DEPRESSIVE DISORDER: Primary | ICD-10-CM

## 2024-03-19 NOTE — PROGRESS NOTES
"     Follow Up Adult Note     Date:2024   Patient Name: Santi Souza Sr.  : 1960   MRN: [id number]   Time IN: 1:00 pm    Time OUT: 1:54 pm     Referring Provider: Jordan Heart APRN    Chief Complaint:      ICD-10-CM ICD-9-CM   1. Moderate episode of recurrent major depressive disorder  F33.1 296.32   2. Mood disorder  F39 296.90   3. Post traumatic stress disorder (PTSD)  F43.10 309.81   4. Generalized anxiety disorder  F41.1 300.02        History of Present Illness:   Santi Souza Sr. is a 63 y.o., single, unemployed  male who is being seen today for scheduled Psychotherapy session. Mood reported as \"up and down\" with initially depressed but overall cooperative affect. Patient presented with some sadness but remained calm, cooperative, engaged, and pleasant with provider. Patient denied any current SI/HI/AVH. Again, patient reports depressive symptoms continue with sadness and worrying and decreased motivation and stated, \"I just wake up every morning thinking about the negative that's that have happened to me and get down\". Patient reports less energy at this time and ongoing irritability and agitation. Patient reports ongoing symptoms of PTSD and voiced some anxiety related to future and financial concerns. Patient reports minimal interaction with others at this time. Patient did report that he is receiving financial assistance for housing from [employer].       Subjective     PHQ-9 Depression Screening  PHQ-9 Total Score:  Not completed at this time     GARETT-7   Not completed at this time    Patient's Support Network Includes:   \"no one really. Some of my brothers I guess ( friends), but I rarely see them anymore\"    Functional Status: Moderate impairment     Progress toward goal: Not at goal    Prognosis: Fair with Ongoing Treatment     Medications:     Current Outpatient Medications:     aspirin 81 MG chewable tablet, Chew 1 tablet., Disp: , Rfl:     atorvastatin " (LIPITOR) 80 MG tablet, Take 1 tablet by mouth Daily., Disp: 90 tablet, Rfl: 1    baclofen (LIORESAL) 10 MG tablet, Take 1 tablet by mouth 3 (Three) Times a Day., Disp: 90 tablet, Rfl: 0    Brexpiprazole (Rexulti) 2 MG tablet, Take 1 tablet by mouth Daily., Disp: 30 tablet, Rfl: 1    diclofenac (VOLTAREN) 75 MG EC tablet, Take 1 tablet by mouth 2 (Two) Times a Day As Needed (back pain. arthralgia)., Disp: 60 tablet, Rfl: 0    donepezil (ARICEPT) 10 MG tablet, Take 1 tablet by mouth Daily With Breakfast for 90 days., Disp: 30 tablet, Rfl: 2    DULoxetine (CYMBALTA) 60 MG capsule, Take 1 capsule by mouth in the morning, Disp: 30 capsule, Rfl: 1    empagliflozin (Jardiance) 25 MG tablet tablet, Take 1 tablet by mouth Daily., Disp: 90 tablet, Rfl: 1    levocetirizine (XYZAL) 5 MG tablet, Take 1 tablet by mouth Every Evening., Disp: 90 tablet, Rfl: 1    Magnesium 400 MG tablet, Take 400 mg by mouth Daily., Disp: , Rfl:     memantine (NAMENDA) 5 MG tablet, Take 1 tablet by mouth Daily., Disp: 30 tablet, Rfl: 2    nitroglycerin (NITROLINGUAL) 0.4 MG/SPRAY spray, Place 1 spray by translingual route on or under the tongue at the first sign of an attack, no more than 3 sprays / 15-minute., Disp: 1 each, Rfl: 0    omeprazole (priLOSEC) 20 MG capsule, Take 1 capsule by mouth Daily., Disp: 90 capsule, Rfl: 1    prazosin (MINIPRESS) 2 MG capsule, Take 1 capsule by mouth at bedtime., Disp: 30 capsule, Rfl: 1    SITagliptin (JANUVIA) 100 MG tablet, Take 1 tablet by mouth Daily., Disp: 90 tablet, Rfl: 1    traZODone (DESYREL) 50 MG tablet, Take 1 tablet by mouth every day at bedtime., Disp: 30 tablet, Rfl: 1    Allergies:   Allergies   Allergen Reactions    Hydrocodone Itching    Penicillins Swelling and Provider Review Needed     Entire body swelled as a child     Sulfa Antibiotics Shortness Of Breath, Rash and Provider Review Needed    Codeine Nausea And Vomiting, Confusion and Provider Review Needed    Oxycontin [Oxycodone]  "Itching       Objective     Mental Status Exam:   MENTAL STATUS EXAM   General Appearance:  Cleanly groomed and dressed and well developed  Eye Contact:  Fair  Attitude:  Cooperative and polite  Motor Activity:  Normal gait, posture  Muscle Strength:  Normal  Speech:  Normal rate, tone, volume  Language:  Spontaneous  Mood and affect:  Other  Other Comment:  Mood reported as \"up and down\" with initially depressed but overall cooperative affect.  Hopelessness:  6  Loneliness: 7  Thought Process:  Linear  Associations/ Thought Content:  No delusions  Hallucinations:  None  Suicidal Ideations:  Not present  Homicidal Ideation:  Not present  Sensorium:  Alert and clear  Orientation:  Person, situation, time and place  Immediate Recall, Recent, and Remote Memory:  Intact  Attention Span/ Concentration:  Good  Fund of Knowledge:  Appropriate for age and educational level  Intellectual Functioning:  Average range  Insight:  Limited  Judgement:  Fair  Reliability:  Fair  Impulse Control:  Fair       Assessment / Plan      Visit Diagnosis/Orders Placed This Visit:    ICD-10-CM ICD-9-CM   1. Moderate episode of recurrent major depressive disorder  F33.1 296.32   2. Mood disorder  F39 296.90   3. Post traumatic stress disorder (PTSD)  F43.10 309.81   4. Generalized anxiety disorder  F41.1 300.02        PLAN:  Safety: No acute safety concerns  Risk Assessment: Risk of self-harm acutely is low. Risk of self-harm chronically is also low, but could be further elevated in the event of treatment noncompliance and/or AODA.    Treatment Plan/Goals: Continue supportive psychotherapy efforts and medications as indicated. Treatment and medication options discussed during today's visit. Patient ackowledged and verbally consented to continue with current treatment plan and was educated on the importance of compliance with treatment and follow-up appointments. Patient seems reasonably able to adhere to treatment plan.      Assisted Patient " in processing above session content; acknowledged and normalized patient’s thoughts, feelings, and concerns.  Rationalized patient thought process regarding ongoing depressive symptoms, lack of motivation and peer to peer interactions. Engaged patient in beginning to process childhood to get a better understanding of patient and his experiences. Session focus was on problem solving and coping with depressive symptoms while assisting patient in increasing insight and understanding of presenting concerns. Patient was receptive to therapeutic feedback.       Allowed Patient to freely discuss issues  without interruption or judgement with unconditional positive regard, active listening skills, and empathy. Therapist provided a safe, confidential environment to facilitate the development of a positive therapeutic relationship and encouraged open, honest communication. Assisted Patient in identifying risk factors which would indicate the need for higher level of care including thoughts to harm self or others and/or self-harming behavior and encouraged Patient to contact this office, call 911, or present to the nearest emergency room should any of these events occur. Discussed crisis intervention services and means to access. Patient adamantly and convincingly denies current suicidal or homicidal ideation or perceptual disturbance. Assisted Patient in processing session content; acknowledged and normalized Patient’s thoughts, feelings, and concerns by utilizing a person-centered approach in efforts to build appropriate rapport and a positive therapeutic relationship with open and honest communication. .     Follow Up:   Return in about 1 week (around 3/26/2024) for Therapy session.    Stefan Tran [facility]  [facility]

## 2024-03-19 NOTE — OUTREACH NOTE
"AMBULATORY CASE MANAGEMENT NOTE    Name and Relationship of Patient/Support Person: Santi Souza Sr. \"Alonso Ellis\" - Self    CCM Interim Update    ACM called patient for CCM outreach. Discussed blood sugars, patient reports blood sugar are 130 to 150. Reports he has not checked his BS in a couple of weeks because he just moved into a new place and is still in the process of unpacking. Will place patient in monitoring as goals have been met.       Education Documentation  No documentation found.        Adrienne LEONE  Ambulatory Case Management    3/19/2024, 11:47 EDT  "

## 2024-03-22 ENCOUNTER — TELEPHONE (OUTPATIENT)
Dept: NEUROLOGY | Facility: CLINIC | Age: 64
End: 2024-03-22
Payer: MEDICARE

## 2024-03-22 DIAGNOSIS — F33.1 MODERATE EPISODE OF RECURRENT MAJOR DEPRESSIVE DISORDER: ICD-10-CM

## 2024-03-22 DIAGNOSIS — F39 MOOD DISORDER: ICD-10-CM

## 2024-03-22 RX ORDER — BREXPIPRAZOLE 1 MG/1
1 TABLET ORAL DAILY
Qty: 30 TABLET | Refills: 0 | OUTPATIENT
Start: 2024-03-22

## 2024-03-25 ENCOUNTER — OFFICE VISIT (OUTPATIENT)
Dept: BEHAVIORAL HEALTH | Facility: CLINIC | Age: 64
End: 2024-03-25
Payer: MEDICARE

## 2024-03-25 DIAGNOSIS — F39 MOOD DISORDER: ICD-10-CM

## 2024-03-25 DIAGNOSIS — F33.1 MODERATE EPISODE OF RECURRENT MAJOR DEPRESSIVE DISORDER: Primary | ICD-10-CM

## 2024-03-25 DIAGNOSIS — F43.10 POST TRAUMATIC STRESS DISORDER (PTSD): ICD-10-CM

## 2024-03-25 DIAGNOSIS — F41.1 GENERALIZED ANXIETY DISORDER: ICD-10-CM

## 2024-03-25 NOTE — PROGRESS NOTES
"     Follow Up Adult Note     Date:2024   Patient Name: Santi Souza Sr.  : 1960   MRN: [id number]   Time IN: 2:52 pm    Time OUT: 2:40 pm     Referring Provider: Jordan Heart APRN    Chief Complaint:      ICD-10-CM ICD-9-CM   1. Moderate episode of recurrent major depressive disorder  F33.1 296.32   2. Mood disorder  F39 296.90   3. Generalized anxiety disorder  F41.1 300.02   4. Post traumatic stress disorder (PTSD)  F43.10 309.81        History of Present Illness:   Santi Souza Sr. is a 63 y.o., single, disabled  male who is being seen today for scheduled Psychotherapy session. Mood reported as \"leveled out today-not horrible I guess\" with somewhat sad but overall cooperative affect. Patient presented as tired and a little under the weather but remained overall calm, cooperative, engaged, and pleasant with provider. Patient denied any current SI/HI/AVH. Patient reports ongoing sadness, decreased motivation, and depressive symptoms and stated, \"I just continue to struggle mentally after pretty much losing a friend of 20 plus years\" as he processed and described how he and this other individual fell out of contact do to reported conflict between the patient and other individuals daughter and wife. Patient discussed ongoing anxiety symptoms mostly around health, feeling lonely, and \"just always feeling like I'm playing catch-up in life\", but did later share that he \"has been trying to stay more busy taking care of the things I have let get behind\".       Subjective     PHQ-9 Depression Screening  PHQ-9 Total Score:  Not completed at this time     GARETT-7   Not completed at this time    Patient's Support Network Includes:   Patient reports minimal supports at this time but is currently working with the [employer] and \"some of my brothers\" referencing other veterans.    Functional Status: Moderate impairment     Progress toward goal: Not at goal    Prognosis: Good with Ongoing " Treatment     Medications:     Current Outpatient Medications:     aspirin 81 MG chewable tablet, Chew 1 tablet., Disp: , Rfl:     atorvastatin (LIPITOR) 80 MG tablet, Take 1 tablet by mouth Daily., Disp: 90 tablet, Rfl: 1    baclofen (LIORESAL) 10 MG tablet, Take 1 tablet by mouth 3 (Three) Times a Day., Disp: 90 tablet, Rfl: 0    Brexpiprazole (Rexulti) 2 MG tablet, Take 1 tablet by mouth Daily., Disp: 30 tablet, Rfl: 1    diclofenac (VOLTAREN) 75 MG EC tablet, Take 1 tablet by mouth 2 (Two) Times a Day As Needed (back pain. arthralgia)., Disp: 60 tablet, Rfl: 0    donepezil (ARICEPT) 10 MG tablet, Take 1 tablet by mouth Daily With Breakfast for 90 days., Disp: 30 tablet, Rfl: 2    DULoxetine (CYMBALTA) 60 MG capsule, Take 1 capsule by mouth in the morning, Disp: 30 capsule, Rfl: 1    empagliflozin (Jardiance) 25 MG tablet tablet, Take 1 tablet by mouth Daily., Disp: 90 tablet, Rfl: 1    levocetirizine (XYZAL) 5 MG tablet, Take 1 tablet by mouth Every Evening., Disp: 90 tablet, Rfl: 1    Magnesium 400 MG tablet, Take 400 mg by mouth Daily., Disp: , Rfl:     memantine (NAMENDA) 5 MG tablet, Take 1 tablet by mouth Daily., Disp: 30 tablet, Rfl: 2    nitroglycerin (NITROLINGUAL) 0.4 MG/SPRAY spray, Place 1 spray by translingual route on or under the tongue at the first sign of an attack, no more than 3 sprays / 15-minute., Disp: 1 each, Rfl: 0    omeprazole (priLOSEC) 20 MG capsule, Take 1 capsule by mouth Daily., Disp: 90 capsule, Rfl: 1    prazosin (MINIPRESS) 2 MG capsule, Take 1 capsule by mouth at bedtime., Disp: 30 capsule, Rfl: 1    SITagliptin (JANUVIA) 100 MG tablet, Take 1 tablet by mouth Daily., Disp: 90 tablet, Rfl: 1    traZODone (DESYREL) 50 MG tablet, Take 1 tablet by mouth every day at bedtime., Disp: 30 tablet, Rfl: 1    Allergies:   Allergies   Allergen Reactions    Hydrocodone Itching    Penicillins Swelling and Provider Review Needed     Entire body swelled as a child     Sulfa Antibiotics  "Shortness Of Breath, Rash and Provider Review Needed    Codeine Nausea And Vomiting, Confusion and Provider Review Needed    Oxycontin [Oxycodone] Itching       Objective     Mental Status Exam:   MENTAL STATUS EXAM   General Appearance:  Cleanly groomed and dressed and well developed  Eye Contact:  Fair  Attitude:  Cooperative and polite  Motor Activity:  Slow  Muscle Strength:  Abnormal  Speech:  Normal rate, tone, volume  Language:  Spontaneous  Mood and affect:  Other  Other Comment:  Mood reported as \"leveled out today-not horrible I guess\" with somewhat sad but overall cooperative affect  Hopelessness:  4  Loneliness: 6  Thought Process:  Logical and goal-directed  Associations/ Thought Content:  No delusions  Hallucinations:  None  Suicidal Ideations:  Not present  Homicidal Ideation:  Not present  Sensorium:  Alert and clear  Orientation:  Place, situation, time and person  Immediate Recall, Recent, and Remote Memory:  Intact  Attention Span/ Concentration:  Good  Fund of Knowledge:  Appropriate for age and educational level  Intellectual Functioning:  Average range  Insight:  Fair  Judgement:  Fair  Reliability:  Fair  Impulse Control:  Fair       Assessment / Plan      Visit Diagnosis/Orders Placed This Visit:    ICD-10-CM ICD-9-CM   1. Moderate episode of recurrent major depressive disorder  F33.1 296.32   2. Mood disorder  F39 296.90   3. Generalized anxiety disorder  F41.1 300.02   4. Post traumatic stress disorder (PTSD)  F43.10 309.81        PLAN:  Safety: No acute safety concerns  Risk Assessment: Risk of self-harm acutely is low. Risk of self-harm chronically is also low, but could be further elevated in the event of treatment noncompliance and/or AODA.    Treatment Plan/Goals: Continue supportive psychotherapy efforts and medications as indicated. Treatment and medication options discussed during today's visit. Patient ackowledged and verbally consented to continue with current treatment plan and " was educated on the importance of compliance with treatment and follow-up appointments. Patient seems reasonably able to adhere to treatment plan.      Assisted Patient in processing above session content; acknowledged and normalized patient’s thoughts, feelings, and concerns.  Rationalized patient thought process regarding emotions and feelings towards falling out with long time friend, along with feeling as though he has minimal overall supports. Discussed how patient handles stress and engaged patient in identifying healthy self care activities, habits, and daily routines to include in his day to day life. Discussed ways to improve conflict resolution skills while working on identifying areas of relationships that are satisfying and un-satisfying. Patient was receptive to therapeutic feedback.       Allowed Patient to freely discuss issues  without interruption or judgement with unconditional positive regard, active listening skills, and empathy. Therapist provided a safe, confidential environment to facilitate the development of a positive therapeutic relationship and encouraged open, honest communication. Assisted Patient in identifying risk factors which would indicate the need for higher level of care including thoughts to harm self or others and/or self-harming behavior and encouraged Patient to contact this office, call 911, or present to the nearest emergency room should any of these events occur. Discussed crisis intervention services and means to access. Patient adamantly and convincingly denies current suicidal or homicidal ideation or perceptual disturbance. Assisted Patient in processing session content; acknowledged and normalized Patient’s thoughts, feelings, and concerns by utilizing a person-centered approach in efforts to build appropriate rapport and a positive therapeutic relationship with open and honest communication. .     Follow Up:   Return in about 1 week (around 4/1/2024) for Therapy  session.    Stefan Tran, [facility]  [facility]

## 2024-04-02 ENCOUNTER — OFFICE VISIT (OUTPATIENT)
Dept: BEHAVIORAL HEALTH | Facility: CLINIC | Age: 64
End: 2024-04-02
Payer: MEDICARE

## 2024-04-02 DIAGNOSIS — F43.10 POST TRAUMATIC STRESS DISORDER (PTSD): ICD-10-CM

## 2024-04-02 DIAGNOSIS — F41.1 GENERALIZED ANXIETY DISORDER: ICD-10-CM

## 2024-04-02 DIAGNOSIS — F33.1 MODERATE EPISODE OF RECURRENT MAJOR DEPRESSIVE DISORDER: Primary | ICD-10-CM

## 2024-04-02 NOTE — PROGRESS NOTES
"     Follow Up Adult Note     Date:2024   Patient Name: Santi Souza Sr.  : 1960   MRN: [id number]   Time IN: 1:55 pm    Time OUT: 2:50 pm     Referring Provider: Jordan Herat APRN    Chief Complaint:      ICD-10-CM ICD-9-CM   1. Moderate episode of recurrent major depressive disorder  F33.1 296.32   2. Post traumatic stress disorder (PTSD)  F43.10 309.81   3. Generalized anxiety disorder  F41.1 300.02        History of Present Illness:   Santi Souza Sr. is a 63 y.o., single, disabled  male who is being seen today for scheduled Psychotherapy session. Mood reported as \"maintaining\" with somewhat sad but overall cooperative affect. Patient presented as tired and somewhat sad but remained overall calm, cooperative, engaged, and pleasant with provider. Patient denied any current SI/HI/AVH. Patient reports feeling sad and lonely recently with ongoing depressive symptoms and reported ongoing struggles dealing with past trauma stating, \"I still contemplate and think about the past too much and all the negative\" followed by stating, \"It is hard being by myself\". Patient reports ongoing racing thoughts and sleep disturbance as he voiced anxiety symptoms. Patient began to process past history and relationship with his son reporting that he has not spoken or seen his son in roughly two years.       Subjective     PHQ-9 Depression Screening  PHQ-9 Total Score:  Not completed at this time     GARETT-7   Not completed at this time    Patient's Support Network Includes:   \"a few friends\"    Functional Status: Moderate impairment     Progress toward goal: Not at goal    Prognosis: Fair with Ongoing Treatment     Medications:     Current Outpatient Medications:     aspirin 81 MG chewable tablet, Chew 1 tablet., Disp: , Rfl:     atorvastatin (LIPITOR) 80 MG tablet, Take 1 tablet by mouth Daily., Disp: 90 tablet, Rfl: 1    baclofen (LIORESAL) 10 MG tablet, Take 1 tablet by mouth 3 (Three) Times a " Day., Disp: 90 tablet, Rfl: 0    Brexpiprazole (Rexulti) 2 MG tablet, Take 1 tablet by mouth Daily., Disp: 30 tablet, Rfl: 1    diclofenac (VOLTAREN) 75 MG EC tablet, Take 1 tablet by mouth 2 (Two) Times a Day As Needed (back pain. arthralgia)., Disp: 60 tablet, Rfl: 0    donepezil (ARICEPT) 10 MG tablet, Take 1 tablet by mouth Daily With Breakfast for 90 days., Disp: 30 tablet, Rfl: 2    DULoxetine (CYMBALTA) 60 MG capsule, Take 1 capsule by mouth in the morning, Disp: 30 capsule, Rfl: 1    empagliflozin (Jardiance) 25 MG tablet tablet, Take 1 tablet by mouth Daily., Disp: 90 tablet, Rfl: 1    levocetirizine (XYZAL) 5 MG tablet, Take 1 tablet by mouth Every Evening., Disp: 90 tablet, Rfl: 1    Magnesium 400 MG tablet, Take 400 mg by mouth Daily., Disp: , Rfl:     memantine (NAMENDA) 5 MG tablet, Take 1 tablet by mouth Daily., Disp: 30 tablet, Rfl: 2    nitroglycerin (NITROLINGUAL) 0.4 MG/SPRAY spray, Place 1 spray by translingual route on or under the tongue at the first sign of an attack, no more than 3 sprays / 15-minute., Disp: 1 each, Rfl: 0    omeprazole (priLOSEC) 20 MG capsule, Take 1 capsule by mouth Daily., Disp: 90 capsule, Rfl: 1    prazosin (MINIPRESS) 2 MG capsule, Take 1 capsule by mouth at bedtime., Disp: 30 capsule, Rfl: 1    SITagliptin (JANUVIA) 100 MG tablet, Take 1 tablet by mouth Daily., Disp: 90 tablet, Rfl: 1    traZODone (DESYREL) 50 MG tablet, Take 1 tablet by mouth every day at bedtime., Disp: 30 tablet, Rfl: 1    Allergies:   Allergies   Allergen Reactions    Hydrocodone Itching    Penicillins Swelling and Provider Review Needed     Entire body swelled as a child     Sulfa Antibiotics Shortness Of Breath, Rash and Provider Review Needed    Codeine Nausea And Vomiting, Confusion and Provider Review Needed    Oxycontin [Oxycodone] Itching       Objective     Mental Status Exam:   MENTAL STATUS EXAM   General Appearance:  Cleanly groomed and dressed and well developed  Eye Contact:  Good eye  "contact  Attitude:  Cooperative and polite  Motor Activity:  Normal gait, posture  Muscle Strength:  Normal  Speech:  Normal rate, tone, volume  Language:  Spontaneous  Mood and affect:  Other  Other Comment:  Mood reported as \"maintaining\" with somewhat sad but overall cooperative affect.  Hopelessness:  Optimistic  Loneliness: 6  Thought Process:  Goal-directed and linear  Associations/ Thought Content:  No delusions  Hallucinations:  None  Suicidal Ideations:  Not present  Homicidal Ideation:  Not present  Sensorium:  Clear and alert  Orientation:  Person, situation, time and place  Immediate Recall, Recent, and Remote Memory:  Intact  Attention Span/ Concentration:  Good  Fund of Knowledge:  Appropriate for age and educational level  Intellectual Functioning:  Average range  Insight:  Fair  Judgement:  Fair  Reliability:  Fair  Impulse Control:  Fair       Assessment / Plan      Visit Diagnosis/Orders Placed This Visit:    ICD-10-CM ICD-9-CM   1. Moderate episode of recurrent major depressive disorder  F33.1 296.32   2. Post traumatic stress disorder (PTSD)  F43.10 309.81   3. Generalized anxiety disorder  F41.1 300.02        PLAN:  Safety: No acute safety concerns  Risk Assessment: Risk of self-harm acutely is low. Risk of self-harm chronically is also low, but could be further elevated in the event of treatment noncompliance and/or AODA.    Treatment Plan/Goals: Continue supportive psychotherapy efforts and medications as indicated. Treatment and medication options discussed during today's visit. Patient ackowledged and verbally consented to continue with current treatment plan and was educated on the importance of compliance with treatment and follow-up appointments. Patient seems reasonably able to adhere to treatment plan.      Assisted Patient in processing above session content; acknowledged and normalized patient’s thoughts, feelings, and concerns.  Rationalized patient thought process regarding past " trauma and ongoing depressive symptoms. Engaged patient in developing strategies for thought distraction when ruminating on the past. Assisted patient in identifying and challenging any self-defeating or negative behaviors that may perpetuate depression to include avoidance or self-criticism to develop more adaptive and healthy behaviors. Patient remained receptive to therapeutic feedback.       Allowed Patient to freely discuss issues  without interruption or judgement with unconditional positive regard, active listening skills, and empathy. Therapist provided a safe, confidential environment to facilitate the development of a positive therapeutic relationship and encouraged open, honest communication. Assisted Patient in identifying risk factors which would indicate the need for higher level of care including thoughts to harm self or others and/or self-harming behavior and encouraged Patient to contact this office, call 911, or present to the nearest emergency room should any of these events occur. Discussed crisis intervention services and means to access. Patient adamantly and convincingly denies current suicidal or homicidal ideation or perceptual disturbance. Assisted Patient in processing session content; acknowledged and normalized Patient’s thoughts, feelings, and concerns by utilizing a person-centered approach in efforts to build appropriate rapport and a positive therapeutic relationship with open and honest communication. .     Follow Up:   Return in about 1 week (around 4/9/2024) for Therapy session.    Stefan rTan, LCSW [facility]

## 2024-04-16 ENCOUNTER — OFFICE VISIT (OUTPATIENT)
Dept: BEHAVIORAL HEALTH | Facility: CLINIC | Age: 64
End: 2024-04-16
Payer: MEDICARE

## 2024-04-16 DIAGNOSIS — F39 MOOD DISORDER: ICD-10-CM

## 2024-04-16 DIAGNOSIS — F41.1 GENERALIZED ANXIETY DISORDER: ICD-10-CM

## 2024-04-16 DIAGNOSIS — F33.1 MODERATE EPISODE OF RECURRENT MAJOR DEPRESSIVE DISORDER: Primary | ICD-10-CM

## 2024-04-16 DIAGNOSIS — F43.10 POST TRAUMATIC STRESS DISORDER (PTSD): ICD-10-CM

## 2024-04-16 PROCEDURE — 1159F MED LIST DOCD IN RCRD: CPT

## 2024-04-16 PROCEDURE — 90837 PSYTX W PT 60 MINUTES: CPT

## 2024-04-16 PROCEDURE — 1160F RVW MEDS BY RX/DR IN RCRD: CPT

## 2024-04-16 NOTE — PROGRESS NOTES
"     Follow Up Adult Note     Date:2024   Patient Name: Santi Souza Sr.  : 1960   MRN: [id number]   Time IN: 1:00 pm    Time OUT: 1:58 pm     Referring Provider: Jordan Heart APRN    Chief Complaint:      ICD-10-CM ICD-9-CM   1. Moderate episode of recurrent major depressive disorder  F33.1 296.32   2. Mood disorder  F39 296.90   3. Generalized anxiety disorder  F41.1 300.02   4. Post traumatic stress disorder (PTSD)  F43.10 309.81        History of Present Illness:   Santi Souza Sr. is a 63 y.o., single, disabled   male who is being seen today for scheduled Psychotherapy session. Mood reported as \"little rough but alright I guess\" with intermittently sad but overall cooperative affect. Patient presented with some sadness at times and reported feeling \"a little under the weather\" but was overall calm, cooperative, engaged, and pleasant with provider. Patient denied any current SI/HI/AVH. Patient reports low energy/motivation, feeling sad, feeling helpless and hopeless at times, some grief, with ongoing depressive symptoms. Patient reports some agitation and irritable mood with mood swings. Patient reports racing thoughts and worrying behaviors with ongoing anxiety and stated, \"I have just been down and ruminating about all the negative things that have happened over the past year\". However, patient did report he is \"working on getting into a happier state of mind\" and voiced that he feels he has shown mild improvements. Patient reported that he \"just wants to find someone and others I can spend some good time with\". Patient was happy to share that the A will be assisting with his rent through July.       Subjective     PHQ-9 Depression Screening  PHQ-9 Total Score:  Not completed at this time     GARETT-7   Not completed at this time    Patient's Support Network Includes:   patient reports minimal supports outside of [facility] and \"a few close friends I rarely " "see\"    Functional Status: Moderate impairment     Progress toward goal: Not at goal    Prognosis: Fair with Ongoing Treatment     Medications:     Current Outpatient Medications:     aspirin 81 MG chewable tablet, Chew 1 tablet., Disp: , Rfl:     atorvastatin (LIPITOR) 80 MG tablet, Take 1 tablet by mouth Daily., Disp: 90 tablet, Rfl: 1    baclofen (LIORESAL) 10 MG tablet, Take 1 tablet by mouth 3 (Three) Times a Day., Disp: 90 tablet, Rfl: 0    Brexpiprazole (Rexulti) 2 MG tablet, Take 1 tablet by mouth Daily., Disp: 30 tablet, Rfl: 1    diclofenac (VOLTAREN) 75 MG EC tablet, Take 1 tablet by mouth 2 (Two) Times a Day As Needed (back pain. arthralgia)., Disp: 60 tablet, Rfl: 0    donepezil (ARICEPT) 10 MG tablet, Take 1 tablet by mouth Daily With Breakfast for 90 days., Disp: 30 tablet, Rfl: 2    DULoxetine (CYMBALTA) 60 MG capsule, Take 1 capsule by mouth in the morning, Disp: 30 capsule, Rfl: 1    empagliflozin (Jardiance) 25 MG tablet tablet, Take 1 tablet by mouth Daily., Disp: 90 tablet, Rfl: 1    levocetirizine (XYZAL) 5 MG tablet, Take 1 tablet by mouth Every Evening., Disp: 90 tablet, Rfl: 1    Magnesium 400 MG tablet, Take 400 mg by mouth Daily., Disp: , Rfl:     memantine (NAMENDA) 5 MG tablet, Take 1 tablet by mouth Daily., Disp: 30 tablet, Rfl: 2    nitroglycerin (NITROLINGUAL) 0.4 MG/SPRAY spray, Place 1 spray by translingual route on or under the tongue at the first sign of an attack, no more than 3 sprays / 15-minute., Disp: 1 each, Rfl: 0    omeprazole (priLOSEC) 20 MG capsule, Take 1 capsule by mouth Daily., Disp: 90 capsule, Rfl: 1    prazosin (MINIPRESS) 2 MG capsule, Take 1 capsule by mouth at bedtime., Disp: 30 capsule, Rfl: 1    SITagliptin (JANUVIA) 100 MG tablet, Take 1 tablet by mouth Daily., Disp: 90 tablet, Rfl: 1    traZODone (DESYREL) 50 MG tablet, Take 1 tablet by mouth every day at bedtime., Disp: 30 tablet, Rfl: 1    Allergies:   Allergies   Allergen Reactions    Hydrocodone Itching " "   Penicillins Swelling and Provider Review Needed     Entire body swelled as a child     Sulfa Antibiotics Shortness Of Breath, Rash and Provider Review Needed    Codeine Nausea And Vomiting, Confusion and Provider Review Needed    Oxycontin [Oxycodone] Itching       Objective     Mental Status Exam:   MENTAL STATUS EXAM   General Appearance:  Cleanly groomed and dressed  Eye Contact:  Fair  Attitude:  Cooperative and polite  Motor Activity:  Slow  Muscle Strength:  Abnormal  Speech:  Normal rate, tone, volume  Language:  Spontaneous  Mood and affect:  Other  Other Comment:  Mood reported as \"little rough but alright I guess\" with intermittently sad but overall cooperative affect.  Hopelessness:  Optimistic  Loneliness: 5  Thought Process:  Goal-directed and linear  Associations/ Thought Content:  No delusions  Hallucinations:  None  Suicidal Ideations:  Not present  Homicidal Ideation:  Not present  Sensorium:  Alert and clear  Orientation:  Place, situation, time and person  Immediate Recall, Recent, and Remote Memory:  Intact  Attention Span/ Concentration:  Good  Fund of Knowledge:  Appropriate for age and educational level  Intellectual Functioning:  Average range  Insight:  Fair  Judgement:  Fair  Reliability:  Fair  Impulse Control:  Fair       Assessment / Plan      Visit Diagnosis/Orders Placed This Visit:    ICD-10-CM ICD-9-CM   1. Moderate episode of recurrent major depressive disorder  F33.1 296.32   2. Mood disorder  F39 296.90   3. Generalized anxiety disorder  F41.1 300.02   4. Post traumatic stress disorder (PTSD)  F43.10 309.81        PLAN:  Safety: No acute safety concerns  Risk Assessment: Risk of self-harm acutely is low. Risk of self-harm chronically is also low, but could be further elevated in the event of treatment noncompliance and/or AODA.    Treatment Plan/Goals: Continue supportive psychotherapy efforts and medications as indicated. Treatment and medication options discussed during " today's visit. Patient ackowledged and verbally consented to continue with current treatment plan and was educated on the importance of compliance with treatment and follow-up appointments. Patient seems reasonably able to adhere to treatment plan.      Assisted Patient in processing above session content; acknowledged and normalized patient’s thoughts, feelings, and concerns.  Rationalized patient thought process regarding feeling down and sad with ongoing ruminating thoughts about the past. Engaged patient in identifying current strengths while utilizing motivational interviewing techniques to assist the patient in finding the motivation to make a positive behavior change. Discussed the importance and benefits of engaging with others while suggesting the patient compile a list of activities and places he would like to visit. Patient was receptive to therapeutic feedback.     Allowed Patient to freely discuss issues  without interruption or judgement with unconditional positive regard, active listening skills, and empathy. Therapist provided a safe, confidential environment to facilitate the development of a positive therapeutic relationship and encouraged open, honest communication. Assisted Patient in identifying risk factors which would indicate the need for higher level of care including thoughts to harm self or others and/or self-harming behavior and encouraged Patient to contact this office, call 911, or present to the nearest emergency room should any of these events occur. Discussed crisis intervention services and means to access. Patient adamantly and convincingly denies current suicidal or homicidal ideation or perceptual disturbance. Assisted Patient in processing session content; acknowledged and normalized Patient’s thoughts, feelings, and concerns by utilizing a person-centered approach in efforts to build appropriate rapport and a positive therapeutic relationship with open and honest communication.  .     Follow Up:   Return in about 1 week (around 4/23/2024) for Therapy session.    Stefan Tran, [facility]  [facility]

## 2024-04-29 ENCOUNTER — OFFICE VISIT (OUTPATIENT)
Dept: BEHAVIORAL HEALTH | Facility: CLINIC | Age: 64
End: 2024-04-29
Payer: MEDICARE

## 2024-04-29 DIAGNOSIS — F33.1 MODERATE EPISODE OF RECURRENT MAJOR DEPRESSIVE DISORDER: ICD-10-CM

## 2024-04-29 DIAGNOSIS — F41.1 GENERALIZED ANXIETY DISORDER: Primary | ICD-10-CM

## 2024-04-29 DIAGNOSIS — F43.10 POST TRAUMATIC STRESS DISORDER (PTSD): ICD-10-CM

## 2024-04-29 DIAGNOSIS — F39 MOOD DISORDER: ICD-10-CM

## 2024-04-29 PROCEDURE — 1159F MED LIST DOCD IN RCRD: CPT

## 2024-04-29 PROCEDURE — 90837 PSYTX W PT 60 MINUTES: CPT

## 2024-04-29 PROCEDURE — 1160F RVW MEDS BY RX/DR IN RCRD: CPT

## 2024-04-29 NOTE — PROGRESS NOTES
"     Follow Up Adult Note     Date:2024   Patient Name: Santi Souza Sr.  : 1960   MRN: [id number]   Time IN: 1:00 pm    Time OUT: 1:54 pm     Referring Provider: Jordan Heart APRN    Chief Complaint:      ICD-10-CM ICD-9-CM   1. Generalized anxiety disorder  F41.1 300.02   2. Moderate episode of recurrent major depressive disorder  F33.1 296.32   3. Mood disorder  F39 296.90   4. Post traumatic stress disorder (PTSD)  F43.10 309.81        History of Present Illness:   Santi Souza Sr. is a 63 y.o., single, disabled  male who is being seen today for scheduled Psychotherapy session. Mood reported as \"better today, but have had some rough days\" with somewhat down but overall cooperative affect. Patient presented with some sadness and appeared tired but remained overall calm, cooperative, engaged, and pleasant with provider. Patient denied any current SI/HI/AVH. Patient reports ongoing excessive worrying about \"different things\" to include finances, health, and past trauma, fatigue and decreased motivation, loss of interest in activities, decreased appetite, emotional distress, ruminating thoughts, feeling sadness and helpless and hopeless at times with ongoing anxiety and depressive symptoms. Patient reports ongoing mood that fluctuates between happy and sad along with agitation and irritable mood, social isolation, hypervigilance, mistrust, and emotional detachment with ongoing mood and PTSD symptom burdens.     \"Mood has been up and down the past few weeks\"  \"I don't do well when I'm alone and just think about the past more than I want to\"  \"I stress over finances feeling like I'm living day to day\"  \"I'm just anxious and worried about everything\"  \"The depression has eased up a little and gotten better but I know I still have a long way to go to feel better and where I want to be mentally\"  \"I want to socialize more and met a new friend in my apartment complex\"  \"The [facility] has " "helped a lot with the apartment and finances\"      Subjective     PHQ-9 Depression Screening  PHQ-9 Total Score:  Not completed at this time     GARETT-7   Not completed at this time    Patient's Support Network Includes:   \"a few good friends\"    Functional Status: Moderate impairment     Progress toward goal: Not at goal    Prognosis:  Improving with treatment    Medications:     Current Outpatient Medications:     aspirin 81 MG chewable tablet, Chew 1 tablet., Disp: , Rfl:     atorvastatin (LIPITOR) 80 MG tablet, Take 1 tablet by mouth Daily., Disp: 90 tablet, Rfl: 1    baclofen (LIORESAL) 10 MG tablet, Take 1 tablet by mouth 3 (Three) Times a Day., Disp: 90 tablet, Rfl: 0    Brexpiprazole (Rexulti) 2 MG tablet, Take 1 tablet by mouth Daily., Disp: 30 tablet, Rfl: 1    diclofenac (VOLTAREN) 75 MG EC tablet, Take 1 tablet by mouth 2 (Two) Times a Day As Needed (back pain. arthralgia)., Disp: 60 tablet, Rfl: 0    donepezil (ARICEPT) 10 MG tablet, Take 1 tablet by mouth Daily With Breakfast for 90 days., Disp: 30 tablet, Rfl: 2    DULoxetine (CYMBALTA) 60 MG capsule, Take 1 capsule by mouth in the morning, Disp: 30 capsule, Rfl: 1    empagliflozin (Jardiance) 25 MG tablet tablet, Take 1 tablet by mouth Daily., Disp: 90 tablet, Rfl: 1    levocetirizine (XYZAL) 5 MG tablet, Take 1 tablet by mouth Every Evening., Disp: 90 tablet, Rfl: 1    Magnesium 400 MG tablet, Take 400 mg by mouth Daily., Disp: , Rfl:     memantine (NAMENDA) 5 MG tablet, Take 1 tablet by mouth Daily., Disp: 30 tablet, Rfl: 2    nitroglycerin (NITROLINGUAL) 0.4 MG/SPRAY spray, Place 1 spray by translingual route on or under the tongue at the first sign of an attack, no more than 3 sprays / 15-minute., Disp: 1 each, Rfl: 0    omeprazole (priLOSEC) 20 MG capsule, Take 1 capsule by mouth Daily., Disp: 90 capsule, Rfl: 1    prazosin (MINIPRESS) 2 MG capsule, Take 1 capsule by mouth at bedtime., Disp: 30 capsule, Rfl: 1    SITagliptin (JANUVIA) 100 MG " "tablet, Take 1 tablet by mouth Daily., Disp: 90 tablet, Rfl: 1    traZODone (DESYREL) 50 MG tablet, Take 1 tablet by mouth every day at bedtime., Disp: 30 tablet, Rfl: 1    Allergies:   Allergies   Allergen Reactions    Hydrocodone Itching    Penicillins Swelling and Provider Review Needed     Entire body swelled as a child     Sulfa Antibiotics Shortness Of Breath, Rash and Provider Review Needed    Codeine Nausea And Vomiting, Confusion and Provider Review Needed    Oxycontin [Oxycodone] Itching       Objective     Mental Status Exam:   MENTAL STATUS EXAM   General Appearance:  Cleanly groomed and dressed  Eye Contact:  Fair  Attitude:  Cooperative and polite  Motor Activity:  Slow  Muscle Strength:  Abnormal  Speech:  Normal rate, tone, volume  Language:  Spontaneous  Mood and affect:  Other  Other Comment:  Mood reported as \"better today, but have had some rough days\" with somewhat down but overall cooperative affect.  Hopelessness:  Optimistic  Loneliness: 5  Thought Process:  Goal-directed and linear  Associations/ Thought Content:  No delusions  Hallucinations:  None  Suicidal Ideations:  Not present  Homicidal Ideation:  Not present  Sensorium:  Alert and clear  Orientation:  Place, situation, time and person  Immediate Recall, Recent, and Remote Memory:  Intact  Attention Span/ Concentration:  Good  Fund of Knowledge:  Appropriate for age and educational level  Intellectual Functioning:  Average range  Insight:  Fair  Judgement:  Fair  Reliability:  Good  Impulse Control:  Fair       Assessment / Plan      Visit Diagnosis/Orders Placed This Visit:    ICD-10-CM ICD-9-CM   1. Generalized anxiety disorder  F41.1 300.02   2. Moderate episode of recurrent major depressive disorder  F33.1 296.32   3. Mood disorder  F39 296.90   4. Post traumatic stress disorder (PTSD)  F43.10 309.81        PLAN:  Safety: No acute safety concerns  Risk Assessment: Risk of self-harm acutely is low. Risk of self-harm chronically is " also low, but could be further elevated in the event of treatment noncompliance and/or AODA.    Treatment Plan/Goals: Continue supportive psychotherapy efforts and medications as indicated. Treatment and medication options discussed during today's visit. Patient ackowledged and verbally consented to continue with current treatment plan and was educated on the importance of compliance with treatment and follow-up appointments. Patient seems reasonably able to adhere to treatment plan.      Assisted Patient in processing above session content; acknowledged and normalized patient’s thoughts, feelings, and concerns.  Rationalized patient thought process regarding concerns with finances, decreased interest and motivation, and feeling lonely. Engaged patient in processing recent events to identify regular routine while utilizing Socratic Questioning techniques to assist the patient with identifying ways he can improve his isolating behaviors by engaging with others and activities he may enjoy. Discussed staying active physically and eating a well balanced diet and how this will contribute to overall mental health needs. Engaged patient in identifying self care behaviors and discussed ways to practice healthy habits and utilizing positive self talk in his daily routines. Patient was receptive to therapeutic feedback.     Allowed Patient to freely discuss issues  without interruption or judgement with unconditional positive regard, active listening skills, and empathy. Therapist provided a safe, confidential environment to facilitate the development of a positive therapeutic relationship and encouraged open, honest communication. Assisted Patient in identifying risk factors which would indicate the need for higher level of care including thoughts to harm self or others and/or self-harming behavior and encouraged Patient to contact this office, call 911, or present to the nearest emergency room should any of these events  occur. Discussed crisis intervention services and means to access. Patient adamantly and convincingly denies current suicidal or homicidal ideation or perceptual disturbance. Assisted Patient in processing session content; acknowledged and normalized Patient’s thoughts, feelings, and concerns by utilizing a person-centered approach in efforts to build appropriate rapport and a positive therapeutic relationship with open and honest communication. .     Follow Up:   Return in about 1 week (around 5/6/2024) for Therapy session.    Stefan Tran, RALPHW  [facility]

## 2024-04-30 ENCOUNTER — OFFICE VISIT (OUTPATIENT)
Age: 64
End: 2024-04-30
Payer: MEDICARE

## 2024-04-30 ENCOUNTER — TELEPHONE (OUTPATIENT)
Dept: NEUROLOGY | Facility: CLINIC | Age: 64
End: 2024-04-30
Payer: MEDICARE

## 2024-04-30 VITALS
WEIGHT: 173.2 LBS | BODY MASS INDEX: 26.25 KG/M2 | HEIGHT: 68 IN | DIASTOLIC BLOOD PRESSURE: 58 MMHG | SYSTOLIC BLOOD PRESSURE: 132 MMHG

## 2024-04-30 DIAGNOSIS — S46.011A TRAUMATIC COMPLETE TEAR OF RIGHT ROTATOR CUFF, INITIAL ENCOUNTER: ICD-10-CM

## 2024-04-30 DIAGNOSIS — R41.3 MEMORY LOSS: ICD-10-CM

## 2024-04-30 DIAGNOSIS — Z98.890 S/P RIGHT ROTATOR CUFF REPAIR: Primary | ICD-10-CM

## 2024-04-30 PROCEDURE — 99212 OFFICE O/P EST SF 10 MIN: CPT | Performed by: ORTHOPAEDIC SURGERY

## 2024-04-30 PROCEDURE — 3078F DIAST BP <80 MM HG: CPT | Performed by: ORTHOPAEDIC SURGERY

## 2024-04-30 PROCEDURE — 3075F SYST BP GE 130 - 139MM HG: CPT | Performed by: ORTHOPAEDIC SURGERY

## 2024-04-30 RX ORDER — MEMANTINE HYDROCHLORIDE 5 MG/1
5 TABLET ORAL DAILY
Qty: 30 TABLET | Refills: 2 | Status: SHIPPED | OUTPATIENT
Start: 2024-04-30 | End: 2025-04-30

## 2024-04-30 RX ORDER — DONEPEZIL HYDROCHLORIDE 10 MG/1
10 TABLET, FILM COATED ORAL
Qty: 30 TABLET | Refills: 2 | Status: SHIPPED | OUTPATIENT
Start: 2024-04-30 | End: 2024-07-29
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
## 2024-04-30 NOTE — TELEPHONE ENCOUNTER
Rx Refill Note  Requested Prescriptions     Signed Prescriptions Disp Refills    donepezil (ARICEPT) 10 MG tablet 30 tablet 2     Sig: Take 1 tablet by mouth Daily With Breakfast for 90 days.     Authorizing Provider: DANIELLE RAJAN     Ordering User: CHARMAINE COLE    memantine (NAMENDA) 5 MG tablet 30 tablet 2     Sig: Take 1 tablet by mouth Daily.     Authorizing Provider: DANIELLE RAJAN     Ordering User: CHARMAINE COLE      Last filled:  Last office visit with prescribing clinician: 2/21/2024      Next office visit with prescribing clinician: 8/30/2024     CHARMAINE COLE  04/30/24, 13:50 EDT    Sent in to pharmacy after verbal request vis phone while rescheduling appointment.

## 2024-05-07 ENCOUNTER — TELEPHONE (OUTPATIENT)
Dept: BEHAVIORAL HEALTH | Facility: CLINIC | Age: 64
End: 2024-05-07

## 2024-05-09 ENCOUNTER — OFFICE VISIT (OUTPATIENT)
Dept: BEHAVIORAL HEALTH | Facility: CLINIC | Age: 64
End: 2024-05-09
Payer: MEDICARE

## 2024-05-09 VITALS
HEIGHT: 68 IN | DIASTOLIC BLOOD PRESSURE: 70 MMHG | OXYGEN SATURATION: 96 % | HEART RATE: 54 BPM | WEIGHT: 176 LBS | BODY MASS INDEX: 26.67 KG/M2 | SYSTOLIC BLOOD PRESSURE: 128 MMHG

## 2024-05-09 DIAGNOSIS — F39 MOOD DISORDER: ICD-10-CM

## 2024-05-09 DIAGNOSIS — F43.10 POST TRAUMATIC STRESS DISORDER (PTSD): ICD-10-CM

## 2024-05-09 DIAGNOSIS — G47.20 CIRCADIAN RHYTHM SLEEP DISORDER, UNSPECIFIED TYPE: ICD-10-CM

## 2024-05-09 DIAGNOSIS — F41.1 GENERALIZED ANXIETY DISORDER: Primary | ICD-10-CM

## 2024-05-09 DIAGNOSIS — F33.1 MODERATE EPISODE OF RECURRENT MAJOR DEPRESSIVE DISORDER: ICD-10-CM

## 2024-05-09 RX ORDER — DULOXETIN HYDROCHLORIDE 60 MG/1
CAPSULE, DELAYED RELEASE ORAL
Qty: 30 CAPSULE | Refills: 2 | Status: SHIPPED | OUTPATIENT
Start: 2024-05-09

## 2024-05-09 RX ORDER — PRAZOSIN HYDROCHLORIDE 2 MG/1
CAPSULE ORAL
Qty: 30 CAPSULE | Refills: 2 | Status: SHIPPED | OUTPATIENT
Start: 2024-05-09

## 2024-05-09 RX ORDER — TRAZODONE HYDROCHLORIDE 50 MG/1
TABLET ORAL
Qty: 30 TABLET | Refills: 2 | Status: SHIPPED | OUTPATIENT
Start: 2024-05-09

## 2024-05-09 RX ORDER — BREXPIPRAZOLE 2 MG/1
2 TABLET ORAL DAILY
Qty: 30 TABLET | Refills: 2 | Status: SHIPPED | OUTPATIENT
Start: 2024-05-09

## 2024-05-14 ENCOUNTER — OFFICE VISIT (OUTPATIENT)
Dept: BEHAVIORAL HEALTH | Facility: CLINIC | Age: 64
End: 2024-05-14
Payer: MEDICARE

## 2024-05-14 DIAGNOSIS — F39 MOOD DISORDER: ICD-10-CM

## 2024-05-14 DIAGNOSIS — F33.1 MODERATE EPISODE OF RECURRENT MAJOR DEPRESSIVE DISORDER: ICD-10-CM

## 2024-05-14 DIAGNOSIS — F43.10 POST TRAUMATIC STRESS DISORDER (PTSD): ICD-10-CM

## 2024-05-14 DIAGNOSIS — F41.1 GENERALIZED ANXIETY DISORDER: Primary | ICD-10-CM

## 2024-05-14 PROCEDURE — 1160F RVW MEDS BY RX/DR IN RCRD: CPT

## 2024-05-14 PROCEDURE — 1159F MED LIST DOCD IN RCRD: CPT

## 2024-05-14 PROCEDURE — 90834 PSYTX W PT 45 MINUTES: CPT

## 2024-05-14 NOTE — PROGRESS NOTES
"     Follow Up Adult Note     Date:2024   Patient Name: Santi Souza Sr.  : 1960   MRN: [id number]   Time IN: 1:05 pm    Time OUT: 1:50 pm     Referring Provider: Jordan Heart APRN    Chief Complaint:      ICD-10-CM ICD-9-CM   1. Generalized anxiety disorder  F41.1 300.02   2. Moderate episode of recurrent major depressive disorder  F33.1 296.32   3. Mood disorder  F39 296.90   4. Post traumatic stress disorder (PTSD)  F43.10 309.81        History of Present Illness:   Santi Souza Sr. is a 63 y.o., single, disabled  male who is being seen today for scheduled Psychotherapy session. Mood reported as \"I don't know, about the same and tired\" with somewhat anxious and lethargic but overall cooperative affect. Patient presented with some anxiety and appeared lethargic at times but remained overall calm, cooperative, engaged, and pleasant with provider. Patient denied any current SI/HI/AVH. Patient reports stressors related to financial concerns, issues with his vehicle and having reliable transportation, physical health, and \"just feeling lonely\" with symptoms of excessive worrying, being restless and difficulties relaxing, feeling on edge with irritability and agitation, sadness, feeling helpless at times, fatigue, and loss of pleasure in activities with ongoing anxiety and depressive symptom burdens.     \"I have a lot going on and feeling overwhelmed like my head is spinning and I'm just not getting where I want and need to be in life\"  \"I don't have many friends, and not having any money I feel like all I can really do is sit at home and I really want to get out\"  \"I don't always keep the best outlook on life and know I can be negative sometimes\"  \"I have been spending time with a neighbor and that has been nice but I got pretty anxious and angry yesterday just thinking about negative past experiences and I was kind of rude to him and need to apologize to him\"  \"I'm still " "working with the [employer] and they are helping with rent\"      Subjective     PHQ-9 Depression Screening  PHQ-9 Total Score:  Not completed at this time     GARETT-7   Not completed at this time    Patient's Support Network Includes:   \"A few people I know but I rarely see anyone anymore. I guess the [employer]\"    Functional Status: Moderate impairment     Progress toward goal: Not at goal    Prognosis: Fair with Ongoing Treatment     Medications:     Current Outpatient Medications:     aspirin 81 MG chewable tablet, Chew 1 tablet., Disp: , Rfl:     atorvastatin (LIPITOR) 80 MG tablet, Take 1 tablet by mouth Daily., Disp: 90 tablet, Rfl: 1    baclofen (LIORESAL) 10 MG tablet, Take 1 tablet by mouth 3 (Three) Times a Day., Disp: 90 tablet, Rfl: 0    Brexpiprazole (Rexulti) 2 MG tablet, Take 1 tablet by mouth Daily., Disp: 30 tablet, Rfl: 2    diclofenac (VOLTAREN) 75 MG EC tablet, Take 1 tablet by mouth 2 (Two) Times a Day As Needed (back pain. arthralgia)., Disp: 60 tablet, Rfl: 0    donepezil (ARICEPT) 10 MG tablet, Take 1 tablet by mouth Daily With Breakfast for 90 days., Disp: 30 tablet, Rfl: 2    DULoxetine (CYMBALTA) 60 MG capsule, Take 1 capsule by mouth in the morning, Disp: 30 capsule, Rfl: 2    empagliflozin (Jardiance) 25 MG tablet tablet, Take 1 tablet by mouth Daily., Disp: 90 tablet, Rfl: 1    levocetirizine (XYZAL) 5 MG tablet, Take 1 tablet by mouth Every Evening., Disp: 90 tablet, Rfl: 1    Magnesium 400 MG tablet, Take 400 mg by mouth Daily., Disp: , Rfl:     memantine (NAMENDA) 5 MG tablet, Take 1 tablet by mouth Daily., Disp: 30 tablet, Rfl: 2    nitroglycerin (NITROLINGUAL) 0.4 MG/SPRAY spray, Place 1 spray by translingual route on or under the tongue at the first sign of an attack, no more than 3 sprays / 15-minute., Disp: 1 each, Rfl: 0    omeprazole (priLOSEC) 20 MG capsule, Take 1 capsule by mouth Daily., Disp: 90 capsule, Rfl: 1    prazosin (MINIPRESS) 2 MG capsule, Take 1 capsule by mouth at bedtime., " "Disp: 30 capsule, Rfl: 2    SITagliptin (JANUVIA) 100 MG tablet, Take 1 tablet by mouth Daily., Disp: 90 tablet, Rfl: 1    traZODone (DESYREL) 50 MG tablet, Take 1 tablet by mouth every day at bedtime., Disp: 30 tablet, Rfl: 2    Allergies:   Allergies   Allergen Reactions    Hydrocodone Itching    Penicillins Swelling and Provider Review Needed     Entire body swelled as a child     Sulfa Antibiotics Shortness Of Breath, Rash and Provider Review Needed    Codeine Nausea And Vomiting, Confusion and Provider Review Needed    Oxycontin [Oxycodone] Itching       Objective     Mental Status Exam:   MENTAL STATUS EXAM   General Appearance:  Cleanly groomed and dressed  Eye Contact:  Fair  Attitude:  Cooperative and polite  Motor Activity:  Normal gait, posture and slow  Muscle Strength:  Normal  Speech:  Normal rate, tone, volume  Language:  Spontaneous  Mood and affect:  Other  Other Comment:  Mood reported as \"I don't know, about the same and tired\" with somewhat anxious and lethargic but overall cooperative affect.  Hopelessness:  Optimistic  Loneliness: 6  Thought Process:  Goal-directed and linear  Associations/ Thought Content:  No delusions  Hallucinations:  None  Suicidal Ideations:  Not present  Homicidal Ideation:  Not present  Sensorium:  Alert and clear  Orientation:  Person, place, time and situation  Immediate Recall, Recent, and Remote Memory:  Intact  Attention Span/ Concentration:  Good  Fund of Knowledge:  Appropriate for age and educational level  Intellectual Functioning:  Average range  Insight:  Limited  Judgement:  Fair  Reliability:  Fair  Impulse Control:  Fair       Assessment / Plan      Visit Diagnosis/Orders Placed This Visit:    ICD-10-CM ICD-9-CM   1. Generalized anxiety disorder  F41.1 300.02   2. Moderate episode of recurrent major depressive disorder  F33.1 296.32   3. Mood disorder  F39 296.90   4. Post traumatic stress disorder (PTSD)  F43.10 309.81        PLAN:  Safety: No acute " safety concerns  Risk Assessment: Risk of self-harm acutely is low. Risk of self-harm chronically is also low, but could be further elevated in the event of treatment noncompliance and/or AODA.    Treatment Plan/Goals: Continue supportive psychotherapy efforts and medications as indicated. Treatment and medication options discussed during today's visit. Patient ackowledged and verbally consented to continue with current treatment plan and was educated on the importance of compliance with treatment and follow-up appointments. Patient seems reasonably able to adhere to treatment plan.      Assisted Patient in processing above session content; acknowledged and normalized patient’s thoughts, feelings, and concerns.  Rationalized patient thought process regarding ongoing anxiety and depressive symptoms along with ongoing social stressors and feeling lonely. Utilized CBT techniques to challenge negative thoughts and beliefs that contribute to negative moods while assisting the patient with identifying strengths, values, and goals. Continued to develop and practice problem solving skills to cope with challenging situations and moods and discussed the importance of self care. Patient reported he intends to attend a local gym and park more often. Patient was receptive to therapeutic feedback.       Allowed Patient to freely discuss issues  without interruption or judgement with unconditional positive regard, active listening skills, and empathy. Therapist provided a safe, confidential environment to facilitate the development of a positive therapeutic relationship and encouraged open, honest communication. Assisted Patient in identifying risk factors which would indicate the need for higher level of care including thoughts to harm self or others and/or self-harming behavior and encouraged Patient to contact this office, call 911, or present to the nearest emergency room should any of these events occur. Discussed crisis  intervention services and means to access. Patient adamantly and convincingly denies current suicidal or homicidal ideation or perceptual disturbance. Assisted Patient in processing session content; acknowledged and normalized Patient’s thoughts, feelings, and concerns by utilizing a person-centered approach in efforts to build appropriate rapport and a positive therapeutic relationship with open and honest communication. .     Follow Up:   Return in about 1 week (around 5/21/2024) for Therapy session.    Stefan Tran, [facility]  [facility]

## 2024-05-16 DIAGNOSIS — M25.50 ARTHRALGIA, UNSPECIFIED JOINT: ICD-10-CM

## 2024-05-16 DIAGNOSIS — K21.9 GASTROESOPHAGEAL REFLUX DISEASE, UNSPECIFIED WHETHER ESOPHAGITIS PRESENT: ICD-10-CM

## 2024-05-16 RX ORDER — DICLOFENAC SODIUM 75 MG/1
75 TABLET, DELAYED RELEASE ORAL 2 TIMES DAILY PRN
Qty: 60 TABLET | Refills: 0 | Status: SHIPPED | OUTPATIENT
Start: 2024-05-16

## 2024-05-16 RX ORDER — OMEPRAZOLE 20 MG/1
20 CAPSULE, DELAYED RELEASE ORAL DAILY
Qty: 90 CAPSULE | Refills: 1 | Status: SHIPPED | OUTPATIENT
Start: 2024-05-16

## 2024-05-16 NOTE — TELEPHONE ENCOUNTER
"    Caller: Santi Souza . \"Alonso Rhonda\"    Relationship: Self    Best call back number: [phone]     Requested Prescriptions:   Requested Prescriptions     Pending Prescriptions Disp Refills    omeprazole (priLOSEC) 20 MG capsule 90 capsule 1     Sig: Take 1 capsule by mouth Daily.    diclofenac (VOLTAREN) 75 MG EC tablet 60 tablet 0     Sig: Take 1 tablet by mouth 2 (Two) Times a Day As Needed (back pain. arthralgia).        Pharmacy where request should be sent: [address] [phone] [facility] [phone] [facility]     Last office visit with prescribing clinician: 1/9/2024   Last telemedicine visit with prescribing clinician: Visit date not found   Next office visit with prescribing clinician: Visit date not found     Additional details provided by patient: IS OUT. PLEASE SEND IN BEFORE THE WEEKEND, IF POSSIBLE. PLEASE CALL PATIENT TO ADVISE ONCE SENT.  THANK YOU    Does the patient have less than a 3 day supply:  [x] Yes  [] No    Would you like a call back once the refill request has been completed: [x] Yes [] No    If the office needs to give you a call back, can they leave a voicemail: [x] Yes [] No    Trice Dotson   05/16/24 13:06 EDT         "

## 2024-05-21 ENCOUNTER — OFFICE VISIT (OUTPATIENT)
Dept: FAMILY MEDICINE CLINIC | Facility: CLINIC | Age: 64
End: 2024-05-21
Payer: MEDICARE

## 2024-05-21 ENCOUNTER — OFFICE VISIT (OUTPATIENT)
Dept: BEHAVIORAL HEALTH | Facility: CLINIC | Age: 64
End: 2024-05-21
Payer: MEDICARE

## 2024-05-21 VITALS
WEIGHT: 177.19 LBS | HEART RATE: 65 BPM | BODY MASS INDEX: 26.85 KG/M2 | OXYGEN SATURATION: 97 % | SYSTOLIC BLOOD PRESSURE: 112 MMHG | DIASTOLIC BLOOD PRESSURE: 60 MMHG | HEIGHT: 68 IN

## 2024-05-21 DIAGNOSIS — F33.1 MODERATE EPISODE OF RECURRENT MAJOR DEPRESSIVE DISORDER: Primary | ICD-10-CM

## 2024-05-21 DIAGNOSIS — D49.2 SKIN GROWTH: ICD-10-CM

## 2024-05-21 DIAGNOSIS — F41.1 GENERALIZED ANXIETY DISORDER: ICD-10-CM

## 2024-05-21 DIAGNOSIS — F39 MOOD DISORDER: ICD-10-CM

## 2024-05-21 DIAGNOSIS — R25.2 SPASM: ICD-10-CM

## 2024-05-21 DIAGNOSIS — M25.50 ARTHRALGIA, UNSPECIFIED JOINT: ICD-10-CM

## 2024-05-21 DIAGNOSIS — E78.2 MIXED HYPERLIPIDEMIA: Primary | ICD-10-CM

## 2024-05-21 DIAGNOSIS — J30.9 ALLERGIC RHINITIS, UNSPECIFIED SEASONALITY, UNSPECIFIED TRIGGER: ICD-10-CM

## 2024-05-21 DIAGNOSIS — K21.9 GASTROESOPHAGEAL REFLUX DISEASE, UNSPECIFIED WHETHER ESOPHAGITIS PRESENT: ICD-10-CM

## 2024-05-21 PROCEDURE — 3074F SYST BP LT 130 MM HG: CPT | Performed by: NURSE PRACTITIONER

## 2024-05-21 PROCEDURE — 1159F MED LIST DOCD IN RCRD: CPT | Performed by: NURSE PRACTITIONER

## 2024-05-21 PROCEDURE — 1160F RVW MEDS BY RX/DR IN RCRD: CPT | Performed by: NURSE PRACTITIONER

## 2024-05-21 PROCEDURE — 3078F DIAST BP <80 MM HG: CPT | Performed by: NURSE PRACTITIONER

## 2024-05-21 PROCEDURE — 99214 OFFICE O/P EST MOD 30 MIN: CPT | Performed by: NURSE PRACTITIONER

## 2024-05-21 PROCEDURE — 3052F HG A1C>EQUAL 8.0%<EQUAL 9.0%: CPT | Performed by: NURSE PRACTITIONER

## 2024-05-21 RX ORDER — ATORVASTATIN CALCIUM 80 MG/1
80 TABLET, FILM COATED ORAL DAILY
Qty: 90 TABLET | Refills: 1 | Status: SHIPPED | OUTPATIENT
Start: 2024-05-21

## 2024-05-21 RX ORDER — OMEPRAZOLE 20 MG/1
20 CAPSULE, DELAYED RELEASE ORAL DAILY
Qty: 90 CAPSULE | Refills: 1 | Status: SHIPPED | OUTPATIENT
Start: 2024-05-21

## 2024-05-21 RX ORDER — LEVOCETIRIZINE DIHYDROCHLORIDE 5 MG/1
5 TABLET, FILM COATED ORAL EVERY EVENING
Qty: 90 TABLET | Refills: 1 | Status: SHIPPED | OUTPATIENT
Start: 2024-05-21

## 2024-05-21 RX ORDER — TRIAMCINOLONE ACETONIDE 1 MG/G
1 CREAM TOPICAL 2 TIMES DAILY PRN
Qty: 45 G | Refills: 0 | Status: SHIPPED | OUTPATIENT
Start: 2024-05-21

## 2024-05-21 RX ORDER — CYCLOBENZAPRINE HCL 5 MG
5 TABLET ORAL 3 TIMES DAILY PRN
Qty: 30 TABLET | Refills: 1 | Status: SHIPPED | OUTPATIENT
Start: 2024-05-21

## 2024-05-21 NOTE — PROGRESS NOTES
"Chief Complaint  Bumps on Back    Subjective          Santi Souza Sr. presents to [facility]  History of Present Illness    Patient states couple days ago he noticed a raised itchy spot on the right side of his back.  States initially there was a skin tag type area that has since fallen off.  Continues to itch.  Believes he may need a referral to dermatology.  No fever no chills no body aches.  No discharge.  He is unsure exactly how long it has been there.    Also requesting refill of atorvastatin diclofenac levocetirizine and omeprazole.  States doing well on these medications with no issues or side effects.  He also states he has been off baclofen for a long time.  Would like to get started back on some type of muscle relaxer such as possibly Flexeril as he states it worked well for him in the past.    He states he plans on following up in the near future within the next couple of weeks for his yearly Medicare wellness exam and states he wants to wait and do blood work at that appointment.    He states he has moved into a new place so his stress levels are much better now and he is much happier.  Continues to follow-up with all of his specialist.    Objective   Vital Signs:   /60   Pulse 65   Ht 172.7 cm (68\")   Wt 80.4 kg (177 lb 3 oz)   SpO2 97%   BMI 26.94 kg/m²     Body mass index is 26.94 kg/m².    Review of Systems   Constitutional:  Negative for chills, fatigue and fever.   HENT:  Negative for congestion.    Eyes:  Negative for visual disturbance.   Respiratory:  Negative for cough, shortness of breath and wheezing.    Cardiovascular:  Negative for chest pain, palpitations and leg swelling.   Gastrointestinal:  Negative for abdominal pain, constipation, diarrhea, nausea and vomiting.   Genitourinary:  Negative for decreased urine volume, dysuria, frequency, hematuria and urgency.   Musculoskeletal:  Negative for back pain.   Neurological:  Negative for " dizziness, weakness, light-headedness and headache.   Psychiatric/Behavioral:  Negative for suicidal ideas.        Past History:  Medical History: has a past medical history of CAD (coronary artery disease) (11/03/2017), Cataracta, Chronic back pain (11/03/2017), Depression, Diabetes mellitus--Insulin Dependant (11/03/2017), GERD (gastroesophageal reflux disease) (11/03/2017), Hyperlipidemia (11/03/2017), Hypertension (11/03/2017), Neuropathy, Osteoarthritis, Rotator cuff syndrome (6/23), Stroke (2022), Vitamin D deficiency, Wears glasses, and Wears hearing aid in both ears.   Surgical History: has a past surgical history that includes Lumbar spine surgery (1998); Back surgery (1999); Colonoscopy; Cardiac catheterization; Tonsillectomy; and Shoulder arthroscopy w/ rotator cuff repair (Right, 9/13/2023).   Family History: family history includes Cancer in his maternal grandfather; Diabetes in his father.   Social History: reports that he has been smoking cigarettes. He started smoking about 50 years ago. He has a 12 pack-year smoking history. He has been exposed to tobacco smoke. He has never used smokeless tobacco. He reports current drug use. Frequency: 7.00 times per week. Drug: Marijuana. He reports that he does not drink alcohol.    PHQ-2 Depression Screening  Little interest or pleasure in doing things? 0-->not at all   Feeling down, depressed, or hopeless? 0-->not at all   PHQ-2 Total Score 0        PHQ-9 Depression Screening  Little interest or pleasure in doing things? 0-->not at all   Feeling down, depressed, or hopeless? 0-->not at all   Trouble falling or staying asleep, or sleeping too much?     Feeling tired or having little energy?     Poor appetite or overeating?     Feeling bad about yourself - or that you are a failure or have let yourself or your family down?     Trouble concentrating on things, such as reading the newspaper or watching television?     Moving or speaking so slowly that other people  could have noticed? Or the opposite - being so fidgety or restless that you have been moving around a lot more than usual?     Thoughts that you would be better off dead, or of hurting yourself in some way?     PHQ-9 Total Score 0   If you checked off any problems, how difficult have these problems made it for you to do your work, take care of things at home, or get along with other people?       PHQ-9 Total Score: 0      Patient screened positive for depression based on a PHQ-9 score of 0 on 5/21/2024. Follow-up recommendations include:          Current Outpatient Medications:     aspirin 81 MG chewable tablet, Chew 1 tablet., Disp: , Rfl:     atorvastatin (LIPITOR) 80 MG tablet, Take 1 tablet by mouth Daily., Disp: 90 tablet, Rfl: 1    Brexpiprazole (Rexulti) 2 MG tablet, Take 1 tablet by mouth Daily., Disp: 30 tablet, Rfl: 2    diclofenac (VOLTAREN) 75 MG EC tablet, Take 1 tablet by mouth 2 (Two) Times a Day As Needed (back pain. arthralgia)., Disp: 60 tablet, Rfl: 0    donepezil (ARICEPT) 10 MG tablet, Take 1 tablet by mouth Daily With Breakfast for 90 days., Disp: 30 tablet, Rfl: 2    DULoxetine (CYMBALTA) 60 MG capsule, Take 1 capsule by mouth in the morning, Disp: 30 capsule, Rfl: 2    empagliflozin (Jardiance) 25 MG tablet tablet, Take 1 tablet by mouth Daily., Disp: 90 tablet, Rfl: 1    levocetirizine (XYZAL) 5 MG tablet, Take 1 tablet by mouth Every Evening., Disp: 90 tablet, Rfl: 1    Magnesium 400 MG tablet, Take 400 mg by mouth Daily., Disp: , Rfl:     memantine (NAMENDA) 5 MG tablet, Take 1 tablet by mouth Daily., Disp: 30 tablet, Rfl: 2    nitroglycerin (NITROLINGUAL) 0.4 MG/SPRAY spray, Place 1 spray by translingual route on or under the tongue at the first sign of an attack, no more than 3 sprays / 15-minute., Disp: 1 each, Rfl: 0    omeprazole (priLOSEC) 20 MG capsule, Take 1 capsule by mouth Daily., Disp: 90 capsule, Rfl: 1    prazosin (MINIPRESS) 2 MG capsule, Take 1 capsule by mouth at bedtime.,  Disp: 30 capsule, Rfl: 2    SITagliptin (JANUVIA) 100 MG tablet, Take 1 tablet by mouth Daily., Disp: 90 tablet, Rfl: 1    traZODone (DESYREL) 50 MG tablet, Take 1 tablet by mouth every day at bedtime., Disp: 30 tablet, Rfl: 2    cyclobenzaprine (FLEXERIL) 5 MG tablet, Take 1 tablet by mouth 3 (Three) Times a Day As Needed for Muscle Spasms., Disp: 30 tablet, Rfl: 1    triamcinolone (KENALOG) 0.1 % cream, Apply 1 Application topically to the appropriate area as directed 2 (Two) Times a Day As Needed for Rash., Disp: 45 g, Rfl: 0   (Not in a hospital admission)     Allergies: Hydrocodone, Penicillins, Sulfa antibiotics, Codeine, and Oxycontin [oxycodone]    Physical Exam  Constitutional:       Appearance: Normal appearance.   Cardiovascular:      Rate and Rhythm: Normal rate and regular rhythm.      Heart sounds: Normal heart sounds.   Pulmonary:      Effort: Pulmonary effort is normal.      Breath sounds: Normal breath sounds.   Skin:            Comments: Small palpable mass present under skin.  Nontender.  Patient states it itches.  No redness no warmth no swelling.  No discharge.  No color change.    He also has random moles on his back.  Will place dermatology referral to have these evaluated further.   Neurological:      General: No focal deficit present.      Mental Status: He is alert and oriented to person, place, and time. Mental status is at baseline.   Psychiatric:         Mood and Affect: Mood normal.         Behavior: Behavior normal.         Thought Content: Thought content normal.         Judgment: Judgment normal.          Result Review :                   Assessment and Plan    Diagnoses and all orders for this visit:    1. Mixed hyperlipidemia (Primary)  -     atorvastatin (LIPITOR) 80 MG tablet; Take 1 tablet by mouth Daily.  Dispense: 90 tablet; Refill: 1    2. Arthralgia, unspecified joint    3. Gastroesophageal reflux disease, unspecified whether esophagitis present  -     omeprazole (priLOSEC)  20 MG capsule; Take 1 capsule by mouth Daily.  Dispense: 90 capsule; Refill: 1    4. Allergic rhinitis, unspecified seasonality, unspecified trigger  -     levocetirizine (XYZAL) 5 MG tablet; Take 1 tablet by mouth Every Evening.  Dispense: 90 tablet; Refill: 1    5. Skin growth  -     triamcinolone (KENALOG) 0.1 % cream; Apply 1 Application topically to the appropriate area as directed 2 (Two) Times a Day As Needed for Rash.  Dispense: 45 g; Refill: 0  -     Ambulatory Referral to Dermatology    6. Spasm  -     cyclobenzaprine (FLEXERIL) 5 MG tablet; Take 1 tablet by mouth 3 (Three) Times a Day As Needed for Muscle Spasms.  Dispense: 30 tablet; Refill: 1      Requested medications refilled.  Patient states he will schedule his Medicare wellness exam within the next couple of weeks and states he wants to wait and do blood work at that appointment.  Proper diet and exercise plan discussed and encouraged.  He will continue to follow-up with all of his specialist including endocrinology psychiatry and neurology and cardiology.  Risk of meds discussed and understood.  Will place dermatology referral for further evaluation of the mass on the right side of his back and for an overall skin evaluation.  He can use steroid cream on the itching spot as needed.  He states Flexeril worked well in the past for his muscle spasms and low back pain so I will call that in for him to take as needed.  Monitor for sedation.  He has a follow-up appointment scheduled on 6/10/2024 that he will keep.  We will discuss all preventative measures and he states he will do blood work at that appointment.  Return to clinic or ED with any issues or concerns.                    Follow Up   Return in about 4 weeks (around 6/18/2024), or if symptoms worsen or fail to improve, for Medicare Wellness.  Patient was given instructions and counseling regarding his condition or for health maintenance advice. Please see specific information pulled into the  AVS if appropriate.     JESSICA Chandler

## 2024-05-21 NOTE — PROGRESS NOTES
"     Follow Up Adult Note     Date:2024   Patient Name: Santi Souza Sr.  : 1960   MRN: [id number]   Time IN: 12:54 pm    Time OUT: 1:48 pm     Referring Provider: Jordan Heart APRN    Chief Complaint:      ICD-10-CM ICD-9-CM   1. Moderate episode of recurrent major depressive disorder  F33.1 296.32   2. Mood disorder  F39 296.90   3. Generalized anxiety disorder  F41.1 300.02        History of Present Illness:   Santi Souza Sr. is a 63 y.o., single, disabled  male who is being seen today for scheduled Psychotherapy session. Mood reported as \"crappy and not too good\" with somewhat dysphoric affect. Patient presented as more down and tired than usual on this date but remained calm, cooperative, mostly engaged, and pleasant with provider. Patient denied any current SI/HI/AVH. Patient reports financial stressors \"are my biggest concern\" at this time. Patient reports feeling down, lower energy/motivation, and moments of sadness with ongoing depressive symptoms along with mood swings \"feeling up and down, just depends on the day\", and ongoing worrying, agitation and irritability, with ongoing anxiety symptom burdens.     \"Finances are still tough and I don't get paid until the first\"  \"[employer] is helping me find a job and sent me a few different things I'm looking into\"  \"I did apologize to my neighbor and we are getting along\"  \"I just feel down and sad thinking this is all I have in life right now\"  \"I just spend most of my days at the apartment-don't really have anything to do and no money for gas to travel anywhere\"  \"The apartment I live in came up with a new policy that we can't have grills and I'm pissed about that\".       Subjective     PHQ-9 Depression Screening  PHQ-9 Total Score:  Not completed at this time     GARETT-7   Not completed at this time    Patient's Support Network Includes:   \"a few friends\"    Functional Status: Mild impairment     Progress toward goal: Not at " goal    Prognosis: Fair with Ongoing Treatment     Medications:     Current Outpatient Medications:     aspirin 81 MG chewable tablet, Chew 1 tablet., Disp: , Rfl:     atorvastatin (LIPITOR) 80 MG tablet, Take 1 tablet by mouth Daily., Disp: 90 tablet, Rfl: 1    baclofen (LIORESAL) 10 MG tablet, Take 1 tablet by mouth 3 (Three) Times a Day., Disp: 90 tablet, Rfl: 0    Brexpiprazole (Rexulti) 2 MG tablet, Take 1 tablet by mouth Daily., Disp: 30 tablet, Rfl: 2    diclofenac (VOLTAREN) 75 MG EC tablet, Take 1 tablet by mouth 2 (Two) Times a Day As Needed (back pain. arthralgia)., Disp: 60 tablet, Rfl: 0    donepezil (ARICEPT) 10 MG tablet, Take 1 tablet by mouth Daily With Breakfast for 90 days., Disp: 30 tablet, Rfl: 2    DULoxetine (CYMBALTA) 60 MG capsule, Take 1 capsule by mouth in the morning, Disp: 30 capsule, Rfl: 2    empagliflozin (Jardiance) 25 MG tablet tablet, Take 1 tablet by mouth Daily., Disp: 90 tablet, Rfl: 1    levocetirizine (XYZAL) 5 MG tablet, Take 1 tablet by mouth Every Evening., Disp: 90 tablet, Rfl: 1    Magnesium 400 MG tablet, Take 400 mg by mouth Daily., Disp: , Rfl:     memantine (NAMENDA) 5 MG tablet, Take 1 tablet by mouth Daily., Disp: 30 tablet, Rfl: 2    nitroglycerin (NITROLINGUAL) 0.4 MG/SPRAY spray, Place 1 spray by translingual route on or under the tongue at the first sign of an attack, no more than 3 sprays / 15-minute., Disp: 1 each, Rfl: 0    omeprazole (priLOSEC) 20 MG capsule, Take 1 capsule by mouth Daily., Disp: 90 capsule, Rfl: 1    prazosin (MINIPRESS) 2 MG capsule, Take 1 capsule by mouth at bedtime., Disp: 30 capsule, Rfl: 2    SITagliptin (JANUVIA) 100 MG tablet, Take 1 tablet by mouth Daily., Disp: 90 tablet, Rfl: 1    traZODone (DESYREL) 50 MG tablet, Take 1 tablet by mouth every day at bedtime., Disp: 30 tablet, Rfl: 2    Allergies:   Allergies   Allergen Reactions    Hydrocodone Itching    Penicillins Swelling and Provider Review Needed     Entire body swelled as a  "child     Sulfa Antibiotics Shortness Of Breath, Rash and Provider Review Needed    Codeine Nausea And Vomiting, Confusion and Provider Review Needed    Oxycontin [Oxycodone] Itching       Objective     Mental Status Exam:   MENTAL STATUS EXAM   General Appearance:  Cleanly groomed and dressed  Eye Contact:  Fair  Attitude:  Cooperative and polite  Motor Activity:  Normal gait, posture  Muscle Strength:  Normal  Speech:  Normal rate, tone, volume  Language:  Spontaneous  Mood and affect:  Other  Other Comment:  Mood reported as \"crappy and not too good\" with somewhat dysphoric affect.  Hopelessness:  4  Loneliness: 5  Thought Process:  Linear  Associations/ Thought Content:  No delusions  Hallucinations:  None  Suicidal Ideations:  Not present  Homicidal Ideation:  Not present  Sensorium:  Alert and clear  Orientation:  Person, place and situation  Immediate Recall, Recent, and Remote Memory:  Intact  Attention Span/ Concentration:  Good  Fund of Knowledge:  Appropriate for age and educational level  Intellectual Functioning:  Average range  Insight:  Fair  Judgement:  Fair  Reliability:  Fair  Impulse Control:  Fair       Assessment / Plan      Visit Diagnosis/Orders Placed This Visit:    ICD-10-CM ICD-9-CM   1. Moderate episode of recurrent major depressive disorder  F33.1 296.32   2. Mood disorder  F39 296.90   3. Generalized anxiety disorder  F41.1 300.02        PLAN:  Safety: No acute safety concerns  Risk Assessment: Risk of self-harm acutely is low. Risk of self-harm chronically is also low, but could be further elevated in the event of treatment noncompliance and/or AODA.    Treatment Plan/Goals: Continue supportive psychotherapy efforts and medications as indicated. Treatment and medication options discussed during today's visit. Patient ackowledged and verbally consented to continue with current treatment plan and was educated on the importance of compliance with treatment and follow-up appointments. " Patient seems reasonably able to adhere to treatment plan.      Assisted Patient in processing above session content; acknowledged and normalized patient’s thoughts, feelings, and concerns.  Rationalized patient thought process regarding ongoing worrying and emotions/feelings related to financial stressors and feeling lonely. Discussed motivation for obtaining a job with the patient voicing he is interested in finding employment to supplement his income. Encouraged patient to attend local senior center that is within a mile of his residence to engage with other peers and assist with daily meals (provided by center). Utilized CBT techniques to challenge negative thoughts and beliefs while developing and practicing positive self talk to manage negative thoughts and emotions that contribute to depression. Discussed the need and importance of engaging in social activities. Patient was receptive to therapeutic feedback.      Allowed Patient to freely discuss issues  without interruption or judgement with unconditional positive regard, active listening skills, and empathy. Therapist provided a safe, confidential environment to facilitate the development of a positive therapeutic relationship and encouraged open, honest communication. Assisted Patient in identifying risk factors which would indicate the need for higher level of care including thoughts to harm self or others and/or self-harming behavior and encouraged Patient to contact this office, call 911, or present to the nearest emergency room should any of these events occur. Discussed crisis intervention services and means to access. Patient adamantly and convincingly denies current suicidal or homicidal ideation or perceptual disturbance. Assisted Patient in processing session content; acknowledged and normalized Patient’s thoughts, feelings, and concerns by utilizing a person-centered approach in efforts to build appropriate rapport and a positive therapeutic  relationship with open and honest communication. .     Follow Up:   Return in about 1 week (around 5/28/2024) for Therapy session.    Stefan Tran, RALPHW  [facility]

## 2024-06-04 ENCOUNTER — OFFICE VISIT (OUTPATIENT)
Dept: BEHAVIORAL HEALTH | Facility: CLINIC | Age: 64
End: 2024-06-04
Payer: MEDICARE

## 2024-06-04 DIAGNOSIS — F41.1 GENERALIZED ANXIETY DISORDER: Primary | ICD-10-CM

## 2024-06-04 DIAGNOSIS — F33.1 MODERATE EPISODE OF RECURRENT MAJOR DEPRESSIVE DISORDER: ICD-10-CM

## 2024-06-04 DIAGNOSIS — F39 MOOD DISORDER: ICD-10-CM

## 2024-06-04 PROCEDURE — 1160F RVW MEDS BY RX/DR IN RCRD: CPT

## 2024-06-04 PROCEDURE — 90832 PSYTX W PT 30 MINUTES: CPT

## 2024-06-04 PROCEDURE — 1159F MED LIST DOCD IN RCRD: CPT

## 2024-06-04 NOTE — PROGRESS NOTES
"     Follow Up Adult Note     Date:2024   Patient Name: Santi Souza Sr.  : 1960   MRN: [id number]   Time IN: 1:00 pm     Time OUT: 1:35 pm     Referring Provider: Jordan Heart APRN    Chief Complaint:      ICD-10-CM ICD-9-CM   1. Generalized anxiety disorder  F41.1 300.02   2. Moderate episode of recurrent major depressive disorder  F33.1 296.32   3. Mood disorder  F39 296.90        History of Present Illness:   Santi Souza Sr. is a 63 y.o., single, disabled male who is being seen today for scheduled Psychotherapy session. Mood reported as \"about the same, maybe a little better\" with mildly lethargic but overall cooperative affect. Patient presented as somewhat tired and down at times but showed improvements as session progressed and remained overall calm, cooperative, engaged, and pleasant with provider. Patient denied any current SI/HI/AVH.    \"Just been ripping and running with different doctors appointments\"  \"I'm tired and wornout all the time and my energy is low\"  \"I'm still working with the [employer] and they are helping with resources and looking into possibly doing a hyperbaric chamber treatment\"  \"Money is always tight, causing me stress and anxiety-I am working with about $1700.00 per month\"  \"Still putting in job applications so when my disability roles over to  I can work to make extra income\"  \"Been going to the [employer] center and making some friends there and enjoying it more than I thought I would\"  \"I still have the racing thoughts about all the negative stuff from my past, but doing better\"      Subjective     PHQ-9 Depression Screening  PHQ-9 Total Score:  Not completed at this time     GARETT-7   Not completed at this time    Patient's Support Network Includes:   some friends and the [employer]    Functional Status: Moderate impairment     Progress toward goal: Not at goal    Prognosis: Fair with Ongoing Treatment     Medications:     Current Outpatient Medications:     " aspirin 81 MG chewable tablet, Chew 1 tablet., Disp: , Rfl:     atorvastatin (LIPITOR) 80 MG tablet, Take 1 tablet by mouth Daily., Disp: 90 tablet, Rfl: 1    Brexpiprazole (Rexulti) 2 MG tablet, Take 1 tablet by mouth Daily., Disp: 30 tablet, Rfl: 2    cyclobenzaprine (FLEXERIL) 5 MG tablet, Take 1 tablet by mouth 3 (Three) Times a Day As Needed for Muscle Spasms., Disp: 30 tablet, Rfl: 1    diclofenac (VOLTAREN) 75 MG EC tablet, Take 1 tablet by mouth 2 (Two) Times a Day As Needed (back pain. arthralgia)., Disp: 60 tablet, Rfl: 0    donepezil (ARICEPT) 10 MG tablet, Take 1 tablet by mouth Daily With Breakfast for 90 days., Disp: 30 tablet, Rfl: 2    DULoxetine (CYMBALTA) 60 MG capsule, Take 1 capsule by mouth in the morning, Disp: 30 capsule, Rfl: 2    empagliflozin (Jardiance) 25 MG tablet tablet, Take 1 tablet by mouth Daily., Disp: 90 tablet, Rfl: 1    levocetirizine (XYZAL) 5 MG tablet, Take 1 tablet by mouth Every Evening., Disp: 90 tablet, Rfl: 1    Magnesium 400 MG tablet, Take 400 mg by mouth Daily., Disp: , Rfl:     memantine (NAMENDA) 5 MG tablet, Take 1 tablet by mouth Daily., Disp: 30 tablet, Rfl: 2    nitroglycerin (NITROLINGUAL) 0.4 MG/SPRAY spray, Place 1 spray by translingual route on or under the tongue at the first sign of an attack, no more than 3 sprays / 15-minute., Disp: 1 each, Rfl: 0    omeprazole (priLOSEC) 20 MG capsule, Take 1 capsule by mouth Daily., Disp: 90 capsule, Rfl: 1    prazosin (MINIPRESS) 2 MG capsule, Take 1 capsule by mouth at bedtime., Disp: 30 capsule, Rfl: 2    SITagliptin (JANUVIA) 100 MG tablet, Take 1 tablet by mouth Daily., Disp: 90 tablet, Rfl: 1    traZODone (DESYREL) 50 MG tablet, Take 1 tablet by mouth every day at bedtime., Disp: 30 tablet, Rfl: 2    triamcinolone (KENALOG) 0.1 % cream, Apply 1 Application topically to the appropriate area as directed 2 (Two) Times a Day As Needed for Rash., Disp: 45 g, Rfl: 0    Allergies:   Allergies   Allergen Reactions     "Hydrocodone Itching    Penicillins Swelling and Provider Review Needed     Entire body swelled as a child     Sulfa Antibiotics Shortness Of Breath, Rash and Provider Review Needed    Codeine Nausea And Vomiting, Confusion and Provider Review Needed    Oxycontin [Oxycodone] Itching       Objective     Mental Status Exam:   MENTAL STATUS EXAM   General Appearance:  Cleanly groomed and dressed  Eye Contact:  Fair  Attitude:  Cooperative and polite  Motor Activity:  Normal gait, posture  Muscle Strength:  Normal  Speech:  Normal rate, tone, volume  Language:  Spontaneous  Mood and affect:  Other  Other Comment:  Mood reported as \"about the same, maybe a little better\" with mildly lethargic but overall cooperative affect.  Hopelessness:  Optimistic  Loneliness: 5  Thought Process:  Goal-directed and linear  Associations/ Thought Content:  No delusions  Hallucinations:  None  Suicidal Ideations:  Not present  Homicidal Ideation:  Not present  Sensorium:  Alert and clear  Orientation:  Person, place, time and situation  Immediate Recall, Recent, and Remote Memory:  Intact  Attention Span/ Concentration:  Good  Fund of Knowledge:  Appropriate for age and educational level  Intellectual Functioning:  Average range  Insight:  Fair  Judgement:  Fair  Reliability:  Fair  Impulse Control:  Good       Assessment / Plan      Visit Diagnosis/Orders Placed This Visit:    ICD-10-CM ICD-9-CM   1. Generalized anxiety disorder  F41.1 300.02   2. Moderate episode of recurrent major depressive disorder  F33.1 296.32   3. Mood disorder  F39 296.90        PLAN:  Safety: No acute safety concerns  Risk Assessment: Risk of self-harm acutely is low. Risk of self-harm chronically is also low, but could be further elevated in the event of treatment noncompliance and/or AODA.    Treatment Plan/Goals: Continue supportive psychotherapy efforts and medications as indicated. Treatment and medication options discussed during today's visit. Patient " ackowledged and verbally consented to continue with current treatment plan and was educated on the importance of compliance with treatment and follow-up appointments. Patient seems reasonably able to adhere to treatment plan.      Assisted Patient in processing above session content; acknowledged and normalized patient’s thoughts, feelings, and concerns.  Rationalized patient thought process regarding recent concerns to include finances, past negative memories, and engaging with peers. Utilized Socratic Questioning techniques to assist patient in identifying current stressors while also identifying recent successes and ongoing strengths. Utilized CBT techniques to challenge any negative beliefs/thoughts while assisting patient in re-framing situations in a more logical viewpoint. Provided positive feedback and recognition to patient for engaging with learned coping techniques and ongoing motivation for change. Patient remained receptive to therapeutic feedback.     Allowed Patient to freely discuss issues  without interruption or judgement with unconditional positive regard, active listening skills, and empathy. Therapist provided a safe, confidential environment to facilitate the development of a positive therapeutic relationship and encouraged open, honest communication. Assisted Patient in identifying risk factors which would indicate the need for higher level of care including thoughts to harm self or others and/or self-harming behavior and encouraged Patient to contact this office, call 911, or present to the nearest emergency room should any of these events occur. Discussed crisis intervention services and means to access. Patient adamantly and convincingly denies current suicidal or homicidal ideation or perceptual disturbance. Assisted Patient in processing session content; acknowledged and normalized Patient’s thoughts, feelings, and concerns by utilizing a person-centered approach in efforts to build  appropriate rapport and a positive therapeutic relationship with open and honest communication. .     Follow Up:   Return in about 1 week (around 6/11/2024) for Therapy session.    Stefan Tran LCSW  [facility]

## 2024-06-09 DIAGNOSIS — M25.50 ARTHRALGIA, UNSPECIFIED JOINT: ICD-10-CM

## 2024-06-10 RX ORDER — DICLOFENAC SODIUM 75 MG/1
TABLET, DELAYED RELEASE ORAL
Qty: 60 TABLET | Refills: 0 | Status: SHIPPED | OUTPATIENT
Start: 2024-06-10

## 2024-06-11 ENCOUNTER — OFFICE VISIT (OUTPATIENT)
Dept: BEHAVIORAL HEALTH | Facility: CLINIC | Age: 64
End: 2024-06-11
Payer: MEDICARE

## 2024-06-11 DIAGNOSIS — F39 MOOD DISORDER: ICD-10-CM

## 2024-06-11 DIAGNOSIS — F33.1 MODERATE EPISODE OF RECURRENT MAJOR DEPRESSIVE DISORDER: Primary | ICD-10-CM

## 2024-06-11 DIAGNOSIS — F41.1 GENERALIZED ANXIETY DISORDER: ICD-10-CM

## 2024-06-11 PROCEDURE — 1159F MED LIST DOCD IN RCRD: CPT

## 2024-06-11 PROCEDURE — 1160F RVW MEDS BY RX/DR IN RCRD: CPT

## 2024-06-11 PROCEDURE — 90837 PSYTX W PT 60 MINUTES: CPT

## 2024-06-11 NOTE — PROGRESS NOTES
"     Follow Up Adult Note     Date:2024   Patient Name: Santi Souza Sr.  : 1960   MRN: [id number]   Time IN: 12:55 pm    Time OUT: 1:55 pm     Referring Provider: Jordan Heart APRN    Chief Complaint:      ICD-10-CM ICD-9-CM   1. Moderate episode of recurrent major depressive disorder  F33.1 296.32   2. Generalized anxiety disorder  F41.1 300.02   3. Mood disorder  F39 296.90        History of Present Illness:   Santi Souza Sr. is a 63 y.o., single, disabled male who is being seen today for scheduled Psychotherapy session. Mood reported as \"pretty decent I guess\" with intermittently sad but overall cooperative affect. Patient presented with some sadness when speaking/processing past stressors/trauma related to his relationship with his son but remained overall calm, cooperative, engaged, and pleasant with provider. Patient denied any current SI/HI/AVH.    \"I'm still going to the [facility] pretty much everyday and staying there until after lunch, around 1-2 pm, and it's helped with my mood a lot\"  \"It has been hard to find any motivation when I'm at home in the evenings-I have the good friend that comes over and we talk, but I just can't sleep and my mind starts racing about the past when I'm alone\"  \"I think about my son, it's just hard with everything he did to really want to work on that relationship\"  \"I'm still waiting on hearing back from the [facility] on the chamber treatment\"  \"I think overall I've improved, it's just working on being home alone and that's when I get to feeling the worse\"      Subjective     PHQ-9 Depression Screening  PHQ-9 Total Score:  Not completed at this time     GARETT-7   Not completed at this time    Patient's Support Network Includes:   Friend in apartment complex, A    Functional Status: Mild impairment     Progress toward goal: Not at goal    Prognosis: Good with Ongoing Treatment     Medications:     Current Outpatient Medications:     aspirin 81 MG " chewable tablet, Chew 1 tablet., Disp: , Rfl:     atorvastatin (LIPITOR) 80 MG tablet, Take 1 tablet by mouth Daily., Disp: 90 tablet, Rfl: 1    Brexpiprazole (Rexulti) 2 MG tablet, Take 1 tablet by mouth Daily., Disp: 30 tablet, Rfl: 2    cyclobenzaprine (FLEXERIL) 5 MG tablet, Take 1 tablet by mouth 3 (Three) Times a Day As Needed for Muscle Spasms., Disp: 30 tablet, Rfl: 1    diclofenac (VOLTAREN) 75 MG EC tablet, TAKE 1 TABLET BY MOUTH TWICE DAILY AS NEEDED FOR  BACK  PAIN,  JOINT  PAIN, Disp: 60 tablet, Rfl: 0    donepezil (ARICEPT) 10 MG tablet, Take 1 tablet by mouth Daily With Breakfast for 90 days., Disp: 30 tablet, Rfl: 2    DULoxetine (CYMBALTA) 60 MG capsule, Take 1 capsule by mouth in the morning, Disp: 30 capsule, Rfl: 2    empagliflozin (Jardiance) 25 MG tablet tablet, Take 1 tablet by mouth Daily., Disp: 90 tablet, Rfl: 1    levocetirizine (XYZAL) 5 MG tablet, Take 1 tablet by mouth Every Evening., Disp: 90 tablet, Rfl: 1    Magnesium 400 MG tablet, Take 400 mg by mouth Daily., Disp: , Rfl:     memantine (NAMENDA) 5 MG tablet, Take 1 tablet by mouth Daily., Disp: 30 tablet, Rfl: 2    nitroglycerin (NITROLINGUAL) 0.4 MG/SPRAY spray, Place 1 spray by translingual route on or under the tongue at the first sign of an attack, no more than 3 sprays / 15-minute., Disp: 1 each, Rfl: 0    omeprazole (priLOSEC) 20 MG capsule, Take 1 capsule by mouth Daily., Disp: 90 capsule, Rfl: 1    prazosin (MINIPRESS) 2 MG capsule, Take 1 capsule by mouth at bedtime., Disp: 30 capsule, Rfl: 2    SITagliptin (JANUVIA) 100 MG tablet, Take 1 tablet by mouth Daily., Disp: 90 tablet, Rfl: 1    traZODone (DESYREL) 50 MG tablet, Take 1 tablet by mouth every day at bedtime., Disp: 30 tablet, Rfl: 2    triamcinolone (KENALOG) 0.1 % cream, Apply 1 Application topically to the appropriate area as directed 2 (Two) Times a Day As Needed for Rash., Disp: 45 g, Rfl: 0    Allergies:   Allergies   Allergen Reactions    Hydrocodone Itching  "   Penicillins Swelling and Provider Review Needed     Entire body swelled as a child     Sulfa Antibiotics Shortness Of Breath, Rash and Provider Review Needed    Codeine Nausea And Vomiting, Confusion and Provider Review Needed    Oxycontin [Oxycodone] Itching       Objective     Mental Status Exam:   MENTAL STATUS EXAM   General Appearance:  Cleanly groomed and dressed  Eye Contact:  Good eye contact  Attitude:  Cooperative and polite  Motor Activity:  Normal gait, posture  Muscle Strength:  Normal  Speech:  Normal rate, tone, volume  Language:  Spontaneous  Mood and affect:  Other  Other Comment:  Mood reported as \"pretty decent I guess\" with intermittently sad but overall cooperative affect.  Hopelessness:  Optimistic  Loneliness: 5  Thought Process:  Goal-directed and linear  Associations/ Thought Content:  No delusions  Hallucinations:  None  Suicidal Ideations:  Not present  Homicidal Ideation:  Not present  Sensorium:  Alert and clear  Orientation:  Person, place, time and situation  Immediate Recall, Recent, and Remote Memory:  Intact  Attention Span/ Concentration:  Good  Fund of Knowledge:  Appropriate for age and educational level  Intellectual Functioning:  Average range  Insight:  Fair  Judgement:  Fair  Reliability:  Fair  Impulse Control:  Fair       Assessment / Plan      Visit Diagnosis/Orders Placed This Visit:    ICD-10-CM ICD-9-CM   1. Moderate episode of recurrent major depressive disorder  F33.1 296.32   2. Generalized anxiety disorder  F41.1 300.02   3. Mood disorder  F39 296.90        PLAN:  Safety: No acute safety concerns  Risk Assessment: Risk of self-harm acutely is low. Risk of self-harm chronically is also low, but could be further elevated in the event of treatment noncompliance and/or AODA.    Treatment Plan/Goals: Continue supportive psychotherapy efforts and medications as indicated. Treatment and medication options discussed during today's visit. Patient ackowledged and verbally " consented to continue with current treatment plan and was educated on the importance of compliance with treatment and follow-up appointments. Patient seems reasonably able to adhere to treatment plan.      Assisted Patient in processing above session content; acknowledged and normalized patient’s thoughts, feelings, and concerns. Assisted patient in processing recent stressors/emotions/feelings and utilized CBT techniques to assist patient with challenging negative/irrational thoughts and beliefs and replacing them with rational ones while also utilizing Socratic Questioning techniques and open ended questions to encourage reflection. Discussed recent progress as patient reports improved mood with engagement at local [employer] and discussed ways and activities to address depressive symptoms that increase at night time when he is alone. Patient remained engaged and receptive to therapeutic feedback.      Allowed Patient to freely discuss issues  without interruption or judgement with unconditional positive regard, active listening skills, and empathy. Therapist provided a safe, confidential environment to facilitate the development of a positive therapeutic relationship and encouraged open, honest communication. Assisted Patient in identifying risk factors which would indicate the need for higher level of care including thoughts to harm self or others and/or self-harming behavior and encouraged Patient to contact this office, call 911, or present to the nearest emergency room should any of these events occur. Discussed crisis intervention services and means to access. Patient adamantly and convincingly denies current suicidal or homicidal ideation or perceptual disturbance. Assisted Patient in processing session content; acknowledged and normalized Patient’s thoughts, feelings, and concerns by utilizing a person-centered approach in efforts to build appropriate rapport and a positive therapeutic  relationship with open and honest communication. .     Follow Up:   Return in about 1 week (around 6/18/2024) for Therapy session.    Stefan Tran, RALPHW  [facility]

## 2024-06-15 DIAGNOSIS — D49.2 SKIN GROWTH: ICD-10-CM

## 2024-06-17 RX ORDER — TRIAMCINOLONE ACETONIDE 1 MG/G
CREAM TOPICAL
Qty: 45 G | Refills: 0 | Status: SHIPPED | OUTPATIENT
Start: 2024-06-17

## 2024-06-18 ENCOUNTER — OFFICE VISIT (OUTPATIENT)
Dept: BEHAVIORAL HEALTH | Facility: CLINIC | Age: 64
End: 2024-06-18
Payer: MEDICARE

## 2024-06-18 DIAGNOSIS — F33.1 MODERATE EPISODE OF RECURRENT MAJOR DEPRESSIVE DISORDER: Primary | ICD-10-CM

## 2024-06-18 DIAGNOSIS — F41.1 GENERALIZED ANXIETY DISORDER: ICD-10-CM

## 2024-06-18 PROCEDURE — 90834 PSYTX W PT 45 MINUTES: CPT

## 2024-06-18 PROCEDURE — 1160F RVW MEDS BY RX/DR IN RCRD: CPT

## 2024-06-18 PROCEDURE — 1159F MED LIST DOCD IN RCRD: CPT

## 2024-06-18 NOTE — PROGRESS NOTES
"     Follow Up Adult Note     Date:2024   Patient Name: Santi Souza Sr.  : 1960   MRN: [id number]   Time IN: 12:54 pm    Time OUT: 1:42 pm     Referring Provider: Jordan Heart APRN    Chief Complaint:      ICD-10-CM ICD-9-CM   1. Moderate episode of recurrent major depressive disorder  F33.1 296.32   2. Generalized anxiety disorder  F41.1 300.02        History of Present Illness:   Santi Souza Sr. is a 63 y.o., single, disabled  male who is being seen today for scheduled Psychotherapy session. Mood reported as \"blah-I'm just tired and getting by\" with somewhat dysphoric and tired affect. Patient presented as somewhat tired and more lethargic than recently but remained overall calm, cooperative, engaged, and pleasant with provider. Patient denied any current SI/HI/AVH.     \"My sleep has been rough and my sinuses have been acting up-just not sleeping well\"  \"I'm still going to the center ([employer] [facility]) but I get bored and lonely in the evenings and don't really have much motivation or finances to get out\"  \"My anxiety has been on and off and a few days ago it was over the top\"  \"I worry and get down about my health-I know I ain't getting any younger\"  \"I've had some trouble remember a few things, like appointments and stuff, so I try to keep as much of a routine as possible\"  \"I have that chamber treatment (hyperbaric chamber) next week and the [facility] is helping out with that\"  \"I have to start paying half my rent in July-still looking for a job\"        Subjective     PHQ-9 Depression Screening  PHQ-9 Total Score:  Not completed at this time     GARETT-7   Not completed at this time    Patient's Support Network Includes:   \"a neighbor and the [facility]\"    Functional Status: Moderate impairment     Progress toward goal: Not at goal    Prognosis: Fair with Ongoing Treatment     Medications:     Current Outpatient Medications:     aspirin 81 MG chewable tablet, Chew 1 tablet., " Disp: , Rfl:     atorvastatin (LIPITOR) 80 MG tablet, Take 1 tablet by mouth Daily., Disp: 90 tablet, Rfl: 1    Brexpiprazole (Rexulti) 2 MG tablet, Take 1 tablet by mouth Daily., Disp: 30 tablet, Rfl: 2    cyclobenzaprine (FLEXERIL) 5 MG tablet, Take 1 tablet by mouth 3 (Three) Times a Day As Needed for Muscle Spasms., Disp: 30 tablet, Rfl: 1    diclofenac (VOLTAREN) 75 MG EC tablet, TAKE 1 TABLET BY MOUTH TWICE DAILY AS NEEDED FOR  BACK  PAIN,  JOINT  PAIN, Disp: 60 tablet, Rfl: 0    donepezil (ARICEPT) 10 MG tablet, Take 1 tablet by mouth Daily With Breakfast for 90 days., Disp: 30 tablet, Rfl: 2    DULoxetine (CYMBALTA) 60 MG capsule, Take 1 capsule by mouth in the morning, Disp: 30 capsule, Rfl: 2    empagliflozin (Jardiance) 25 MG tablet tablet, Take 1 tablet by mouth Daily., Disp: 90 tablet, Rfl: 1    levocetirizine (XYZAL) 5 MG tablet, Take 1 tablet by mouth Every Evening., Disp: 90 tablet, Rfl: 1    Magnesium 400 MG tablet, Take 400 mg by mouth Daily., Disp: , Rfl:     memantine (NAMENDA) 5 MG tablet, Take 1 tablet by mouth Daily., Disp: 30 tablet, Rfl: 2    nitroglycerin (NITROLINGUAL) 0.4 MG/SPRAY spray, Place 1 spray by translingual route on or under the tongue at the first sign of an attack, no more than 3 sprays / 15-minute., Disp: 1 each, Rfl: 0    omeprazole (priLOSEC) 20 MG capsule, Take 1 capsule by mouth Daily., Disp: 90 capsule, Rfl: 1    prazosin (MINIPRESS) 2 MG capsule, Take 1 capsule by mouth at bedtime., Disp: 30 capsule, Rfl: 2    SITagliptin (JANUVIA) 100 MG tablet, Take 1 tablet by mouth Daily., Disp: 90 tablet, Rfl: 1    traZODone (DESYREL) 50 MG tablet, Take 1 tablet by mouth every day at bedtime., Disp: 30 tablet, Rfl: 2    triamcinolone (KENALOG) 0.1 % cream, APPLY  CREAM EXTERNALLY TO AFFECTED AREA AS DIRECTED TWICE DAILY AS NEEDED FOR  RASH, Disp: 45 g, Rfl: 0    Allergies:   Allergies   Allergen Reactions    Hydrocodone Itching    Penicillins Swelling and Provider Review Needed      "Entire body swelled as a child     Sulfa Antibiotics Shortness Of Breath, Rash and Provider Review Needed    Codeine Nausea And Vomiting, Confusion and Provider Review Needed    Oxycontin [Oxycodone] Itching       Objective     Mental Status Exam:   MENTAL STATUS EXAM   General Appearance:  Cleanly groomed and dressed  Eye Contact:  Fair  Attitude:  Cooperative and polite  Motor Activity:  Normal gait, posture  Muscle Strength:  Normal  Speech:  Normal rate, tone, volume  Language:  Spontaneous  Mood and affect:  Other  Other Comment:  Mood reported as \"blah-I'm just tired and getting by\" with somewhat dysphoric and tired affect.  Hopelessness:  Optimistic  Loneliness: 4  Thought Process:  Goal-directed and linear  Associations/ Thought Content:  No delusions  Hallucinations:  None  Suicidal Ideations:  Not present  Homicidal Ideation:  Not present  Sensorium:  Other  Other Comment:  Somewhat drowsy but overall alert and clear  Orientation:  Person, place, time and situation  Immediate Recall, Recent, and Remote Memory:  Other  Other Comment:  Fair  Attention Span/ Concentration:  Good  Fund of Knowledge:  Appropriate for age and educational level  Intellectual Functioning:  Average range  Insight:  Fair  Judgement:  Good  Reliability:  Fair  Impulse Control:  Good       Assessment / Plan      Visit Diagnosis/Orders Placed This Visit:    ICD-10-CM ICD-9-CM   1. Moderate episode of recurrent major depressive disorder  F33.1 296.32   2. Generalized anxiety disorder  F41.1 300.02        PLAN:  Safety: No acute safety concerns  Risk Assessment: Risk of self-harm acutely is low. Risk of self-harm chronically is also low, but could be further elevated in the event of treatment noncompliance and/or AODA.    Treatment Plan/Goals: Continue supportive psychotherapy efforts and medications as indicated. Treatment and medication options discussed during today's visit. Patient ackowledged and verbally consented to continue with " current treatment plan and was educated on the importance of compliance with treatment and follow-up appointments. Patient seems reasonably able to adhere to treatment plan.      Assisted Patient in processing above session content; acknowledged and normalized patient’s thoughts, feelings, and concerns. Assisted patient in processing recent stressors/emotions/feelings and utilized CBT techniques to assist patient with challenging negative/irrational thoughts and beliefs and replacing them with rational ones while also utilizing Socratic Questioning techniques and open ended questions to encourage reflection. Discussed overall motivation to complete tasks and encouraged patient to identify at minimum one achievable goal per day to complete related to personal life and assess his own ability to achieve the goal and how it affects his overall mood. Patient was receptive to therapeutic feedback.     Allowed Patient to freely discuss issues  without interruption or judgement with unconditional positive regard, active listening skills, and empathy. Therapist provided a safe, confidential environment to facilitate the development of a positive therapeutic relationship and encouraged open, honest communication. Assisted Patient in identifying risk factors which would indicate the need for higher level of care including thoughts to harm self or others and/or self-harming behavior and encouraged Patient to contact this office, call 911, or present to the nearest emergency room should any of these events occur. Discussed crisis intervention services and means to access. Patient adamantly and convincingly denies current suicidal or homicidal ideation or perceptual disturbance. Assisted Patient in processing session content; acknowledged and normalized Patient’s thoughts, feelings, and concerns by utilizing a person-centered approach in efforts to build appropriate rapport and a positive therapeutic relationship with open and  honest communication. .     Follow Up:   Return in about 1 week (around 6/25/2024) for Therapy session.    Stefan Tran, [facility]  [facility]

## 2024-07-09 ENCOUNTER — OFFICE VISIT (OUTPATIENT)
Dept: BEHAVIORAL HEALTH | Facility: CLINIC | Age: 64
End: 2024-07-09
Payer: MEDICARE

## 2024-07-09 DIAGNOSIS — F41.1 GENERALIZED ANXIETY DISORDER: ICD-10-CM

## 2024-07-09 DIAGNOSIS — F33.1 MODERATE EPISODE OF RECURRENT MAJOR DEPRESSIVE DISORDER: Primary | ICD-10-CM

## 2024-07-09 NOTE — PROGRESS NOTES
"     Follow Up Adult Note     Date:2024   Patient Name: Santi Souza Sr.  : 1960   MRN: [id number]   Time IN: 1:00 pm    Time OUT: 1:50 pm     Referring Provider: Jordan Heart APRN    Chief Complaint:      ICD-10-CM ICD-9-CM   1. Moderate episode of recurrent major depressive disorder  F33.1 296.32   2. Generalized anxiety disorder  F41.1 300.02        History of Present Illness:   Santi Souza Sr. is a 63 y.o., , disabled male who is being seen today for scheduled Psychotherapy session. Mood reported as \"Kind of incognito but doing okay I guess\" with somewhat irritable and lethargic affect. Patient presented as somewhat tired and lethargic at times but remained overall calm, cooperative, engaged, and pleasant with provider. Patient denied any current SI/HI/AVH.     \"I am still going to the [facility] and that is helping with my mood being around people\"  \"I did have a pretty rough week last week-thinking about the negative stuff from my past\"  \"I have just felt down, angry, and frustrated\"  \"No news on the hyperbaric chamber treatment-the darian just ghosted me I guess\"  \"My birthday is coming up and a friend I met at the [facility] invited me out to lunch-looking forward to that\"  \"I still haven't really unpacked everything at the new apartment-my motivation to is just low man\"  \"I still stress about finances-don't know how this is going to work with my disability transferring over to social security now that I'm turning 64\"  \"I am having some issues with my short term memory-just remembering things I need to do\"  \"The [facility] is going to help me with finances to get my truck running like it should-new tires and a few things\"      Subjective     PHQ-9 Depression Screening  PHQ-9 Total Score:  Not completed at this time     GARETT-7   Not completed at this time    Patient's Support Network Includes:   \"some friends at the [facility] and a neighbor\"    Functional Status: " Moderate impairment     Progress toward goal: Not at goal    Prognosis: Fair with Ongoing Treatment     Medications:     Current Outpatient Medications:     aspirin 81 MG chewable tablet, Chew 1 tablet., Disp: , Rfl:     atorvastatin (LIPITOR) 80 MG tablet, Take 1 tablet by mouth Daily., Disp: 90 tablet, Rfl: 1    Brexpiprazole (Rexulti) 2 MG tablet, Take 1 tablet by mouth Daily., Disp: 30 tablet, Rfl: 2    cyclobenzaprine (FLEXERIL) 5 MG tablet, Take 1 tablet by mouth 3 (Three) Times a Day As Needed for Muscle Spasms., Disp: 30 tablet, Rfl: 1    diclofenac (VOLTAREN) 75 MG EC tablet, TAKE 1 TABLET BY MOUTH TWICE DAILY AS NEEDED FOR  BACK  PAIN,  JOINT  PAIN, Disp: 60 tablet, Rfl: 0    donepezil (ARICEPT) 10 MG tablet, Take 1 tablet by mouth Daily With Breakfast for 90 days., Disp: 30 tablet, Rfl: 2    DULoxetine (CYMBALTA) 60 MG capsule, Take 1 capsule by mouth in the morning, Disp: 30 capsule, Rfl: 2    empagliflozin (Jardiance) 25 MG tablet tablet, Take 1 tablet by mouth Daily., Disp: 90 tablet, Rfl: 1    levocetirizine (XYZAL) 5 MG tablet, Take 1 tablet by mouth Every Evening., Disp: 90 tablet, Rfl: 1    Magnesium 400 MG tablet, Take 400 mg by mouth Daily., Disp: , Rfl:     memantine (NAMENDA) 5 MG tablet, Take 1 tablet by mouth Daily., Disp: 30 tablet, Rfl: 2    nitroglycerin (NITROLINGUAL) 0.4 MG/SPRAY spray, Place 1 spray by translingual route on or under the tongue at the first sign of an attack, no more than 3 sprays / 15-minute., Disp: 1 each, Rfl: 0    omeprazole (priLOSEC) 20 MG capsule, Take 1 capsule by mouth Daily., Disp: 90 capsule, Rfl: 1    prazosin (MINIPRESS) 2 MG capsule, Take 1 capsule by mouth at bedtime., Disp: 30 capsule, Rfl: 2    SITagliptin (JANUVIA) 100 MG tablet, Take 1 tablet by mouth Daily., Disp: 90 tablet, Rfl: 1    traZODone (DESYREL) 50 MG tablet, Take 1 tablet by mouth every day at bedtime., Disp: 30 tablet, Rfl: 2    triamcinolone (KENALOG) 0.1 % cream, APPLY  CREAM EXTERNALLY  "TO AFFECTED AREA AS DIRECTED TWICE DAILY AS NEEDED FOR  RASH, Disp: 45 g, Rfl: 0    Allergies:   Allergies   Allergen Reactions    Hydrocodone Itching    Penicillins Swelling and Provider Review Needed     Entire body swelled as a child     Sulfa Antibiotics Shortness Of Breath, Rash and Provider Review Needed    Codeine Nausea And Vomiting, Confusion and Provider Review Needed    Oxycontin [Oxycodone] Itching       Objective     Mental Status Exam:   MENTAL STATUS EXAM   General Appearance:  Cleanly groomed and dressed  Eye Contact:  Fair  Attitude:  Cooperative and polite  Motor Activity:  Normal gait, posture  Muscle Strength:  Normal  Speech:  Normal rate, tone, volume  Language:  Spontaneous  Mood and affect:  Other  Other Comment:  Mood reported as \"Kind of incognito but doing okay I guess\" with somewhat irritable and lethargic affect.  Hopelessness:  Optimistic  Loneliness: 5  Thought Process:  Goal-directed and linear  Associations/ Thought Content:  No delusions  Hallucinations:  None  Suicidal Ideations:  Not present  Homicidal Ideation:  Not present  Sensorium:  Alert and clear  Orientation:  Person, place, time and situation  Immediate Recall, Recent, and Remote Memory:  Deficit noted  Attention Span/ Concentration:  Good  Fund of Knowledge:  Appropriate for age and educational level  Intellectual Functioning:  Average range  Insight:  Fair  Judgement:  Fair  Reliability:  Fair  Impulse Control:  Fair       Assessment / Plan      Visit Diagnosis/Orders Placed This Visit:    ICD-10-CM ICD-9-CM   1. Moderate episode of recurrent major depressive disorder  F33.1 296.32   2. Generalized anxiety disorder  F41.1 300.02        PLAN:  Safety: No acute safety concerns  Risk Assessment: Risk of self-harm acutely is low. Risk of self-harm chronically is also low, but could be further elevated in the event of treatment noncompliance and/or AODA.    Treatment Plan/Goals: Continue supportive psychotherapy efforts and " "medications as indicated. Treatment and medication options discussed during today's visit. Patient ackowledged and verbally consented to continue with current treatment plan and was educated on the importance of compliance with treatment and follow-up appointments. Patient seems reasonably able to adhere to treatment plan.      Assisted Patient in processing above session content; acknowledged and normalized patient’s thoughts, feelings, and concerns. Assisted patient in processing recent stressors/emotions/feelings and utilized CBT techniques to assist patient with challenging negative/irrational thoughts and beliefs and replacing them with rational ones while also utilizing Socratic Questioning techniques and open ended questions to encourage reflection. Continued to process recent moods and \"feeling alone and down\" while reviewing progress made with engaging with peers at local senior center. Discussed stress related to finances while engaging patient in overall motivation to find employment to assist with income deficit. Collaborated on ideas to help with short-term memory and daily schedules. Patient was receptive to therapeutic feedback.     Allowed Patient to freely discuss issues  without interruption or judgement with unconditional positive regard, active listening skills, and empathy. Therapist provided a safe, confidential environment to facilitate the development of a positive therapeutic relationship and encouraged open, honest communication. Assisted Patient in identifying risk factors which would indicate the need for higher level of care including thoughts to harm self or others and/or self-harming behavior and encouraged Patient to contact this office, call 911, or present to the nearest emergency room should any of these events occur. Discussed crisis intervention services and means to access. Patient adamantly and convincingly denies current suicidal or homicidal ideation or perceptual " disturbance. Assisted Patient in processing session content; acknowledged and normalized Patient’s thoughts, feelings, and concerns by utilizing a person-centered approach in efforts to build appropriate rapport and a positive therapeutic relationship with open and honest communication. .     Follow Up:   Return in about 1 week (around 7/16/2024) for Therapy session.    Stefan Tran, [facility]  [facility]

## 2024-07-16 ENCOUNTER — OFFICE VISIT (OUTPATIENT)
Dept: BEHAVIORAL HEALTH | Facility: CLINIC | Age: 64
End: 2024-07-16
Payer: MEDICARE

## 2024-07-16 DIAGNOSIS — G31.84 MILD COGNITIVE IMPAIRMENT: ICD-10-CM

## 2024-07-16 DIAGNOSIS — F33.1 MODERATE EPISODE OF RECURRENT MAJOR DEPRESSIVE DISORDER: Primary | ICD-10-CM

## 2024-07-16 DIAGNOSIS — F41.1 GENERALIZED ANXIETY DISORDER: ICD-10-CM

## 2024-07-16 PROCEDURE — 1159F MED LIST DOCD IN RCRD: CPT

## 2024-07-16 PROCEDURE — 1160F RVW MEDS BY RX/DR IN RCRD: CPT

## 2024-07-16 PROCEDURE — 90837 PSYTX W PT 60 MINUTES: CPT

## 2024-07-16 NOTE — PROGRESS NOTES
"     Follow Up Adult Note     Date:2024   Patient Name: Santi Souza Sr.  : 1960   MRN: [id number]   Time IN: 12:55 pm    Time OUT: 1:54 pm     Referring Provider: Jordan Heart APRN    Chief Complaint:      ICD-10-CM ICD-9-CM   1. Moderate episode of recurrent major depressive disorder  F33.1 296.32   2. Generalized anxiety disorder  F41.1 300.02   3. Mild cognitive impairment  G31.84 331.83        History of Present Illness:   Santi Souza Sr. is a 64 y.o., single, disabled male who is being seen today for scheduled Psychotherapy session. Mood reported as \"feeling a little more stable today\" with somewhat lethargic but overall cooperative affect. Patient presented as somewhat lethargic and down at times but remained overall calm, cooperative, engaged, and pleasant with provider. Patient denied any current SI/HI/AVH.     \"Still some frustration going on with my fiances-I just feel down being so broke\"  \"I just feel worn down physically and mentally\"  \"Always feeling blah lately-nothing to look forward to at times\"  \"Still thinking about the past-my relationship with my son and mom\"  \"I just really have nobody other than the friends I've made at the [employer] and my apartment\"  \"Still stressing over my vehicle and needing to get work done on it\"        Subjective     PHQ-9 Depression Screening  PHQ-9 Total Score:  Not completed at this time     GARETT-7   Not completed at this time    Patient's Support Network Includes:   \"some friends at the [employer] and a darian at the apartment complex I live in\"    Functional Status: Moderate impairment     Progress toward goal: Not at goal    Prognosis: Fair with Ongoing Treatment     Medications:     Current Outpatient Medications:     aspirin 81 MG chewable tablet, Chew 1 tablet., Disp: , Rfl:     atorvastatin (LIPITOR) 80 MG tablet, Take 1 tablet by mouth Daily., Disp: 90 tablet, Rfl: 1    Brexpiprazole (Rexulti) 2 MG tablet, Take 1 tablet " by mouth Daily., Disp: 30 tablet, Rfl: 2    cyclobenzaprine (FLEXERIL) 5 MG tablet, Take 1 tablet by mouth 3 (Three) Times a Day As Needed for Muscle Spasms., Disp: 30 tablet, Rfl: 1    diclofenac (VOLTAREN) 75 MG EC tablet, TAKE 1 TABLET BY MOUTH TWICE DAILY AS NEEDED FOR  BACK  PAIN,  JOINT  PAIN, Disp: 60 tablet, Rfl: 0    donepezil (ARICEPT) 10 MG tablet, Take 1 tablet by mouth Daily With Breakfast for 90 days., Disp: 30 tablet, Rfl: 2    DULoxetine (CYMBALTA) 60 MG capsule, Take 1 capsule by mouth in the morning, Disp: 30 capsule, Rfl: 2    empagliflozin (Jardiance) 25 MG tablet tablet, Take 1 tablet by mouth Daily., Disp: 90 tablet, Rfl: 1    levocetirizine (XYZAL) 5 MG tablet, Take 1 tablet by mouth Every Evening., Disp: 90 tablet, Rfl: 1    Magnesium 400 MG tablet, Take 400 mg by mouth Daily., Disp: , Rfl:     memantine (NAMENDA) 5 MG tablet, Take 1 tablet by mouth Daily., Disp: 30 tablet, Rfl: 2    nitroglycerin (NITROLINGUAL) 0.4 MG/SPRAY spray, Place 1 spray by translingual route on or under the tongue at the first sign of an attack, no more than 3 sprays / 15-minute., Disp: 1 each, Rfl: 0    omeprazole (priLOSEC) 20 MG capsule, Take 1 capsule by mouth Daily., Disp: 90 capsule, Rfl: 1    prazosin (MINIPRESS) 2 MG capsule, Take 1 capsule by mouth at bedtime., Disp: 30 capsule, Rfl: 2    SITagliptin (JANUVIA) 100 MG tablet, Take 1 tablet by mouth Daily., Disp: 90 tablet, Rfl: 1    traZODone (DESYREL) 50 MG tablet, Take 1 tablet by mouth every day at bedtime., Disp: 30 tablet, Rfl: 2    triamcinolone (KENALOG) 0.1 % cream, APPLY  CREAM EXTERNALLY TO AFFECTED AREA AS DIRECTED TWICE DAILY AS NEEDED FOR  RASH, Disp: 45 g, Rfl: 0    Allergies:   Allergies   Allergen Reactions    Hydrocodone Itching    Penicillins Swelling and Provider Review Needed     Entire body swelled as a child     Sulfa Antibiotics Shortness Of Breath, Rash and Provider Review Needed    Codeine Nausea And Vomiting, Confusion and Provider  "Review Needed    Oxycontin [Oxycodone] Itching       Objective     Mental Status Exam:   MENTAL STATUS EXAM   General Appearance:  Cleanly groomed and dressed  Eye Contact:  Fair  Attitude:  Cooperative and polite  Motor Activity:  Normal gait, posture  Muscle Strength:  Abnormal  Speech:  Normal rate, tone, volume  Language:  Spontaneous  Mood and affect:  Other  Other Comment:  Mood reported as \"feeling a little more stable today\" with somewhat lethargic but overall cooperative affect.  Hopelessness:  Optimistic  Loneliness: 6  Thought Process:  Goal-directed and linear  Associations/ Thought Content:  No delusions  Hallucinations:  None  Suicidal Ideations:  Not present  Homicidal Ideation:  Not present  Sensorium:  Other  Other Comment:  Somewhat lethargic at times but overall alert and clear  Orientation:  Person, place, time and situation  Immediate Recall, Recent, and Remote Memory:  Deficit noted  Attention Span/ Concentration:  Good  Fund of Knowledge:  Appropriate for age and educational level  Intellectual Functioning:  Average range  Insight:  Fair  Judgement:  Fair  Reliability:  Fair  Impulse Control:  Good       Assessment / Plan      Visit Diagnosis/Orders Placed This Visit:    ICD-10-CM ICD-9-CM   1. Moderate episode of recurrent major depressive disorder  F33.1 296.32   2. Generalized anxiety disorder  F41.1 300.02   3. Mild cognitive impairment  G31.84 331.83        PLAN:  Safety: No acute safety concerns  Risk Assessment: Risk of self-harm acutely is low. Risk of self-harm chronically is also low, but could be further elevated in the event of treatment noncompliance and/or AODA.    Treatment Plan/Goals: Continue supportive psychotherapy efforts and medications as indicated. Treatment and medication options discussed during today's visit. Patient ackowledged and verbally consented to continue with current treatment plan and was educated on the importance of compliance with treatment and follow-up " "appointments. Patient seems reasonably able to adhere to treatment plan.      Assisted Patient in processing above session content; acknowledged and normalized patient’s thoughts, feelings, and concerns. Assisted patient in processing recent stressors/emotions/feelings and utilized Socratic Questioning techniques and open ended questions to encourage reflection. Engaged patient in discussing past history of interpersonal relationships with specific family members and his thoughts on \"making peace and forgiving\" them. Discussed short term memory concerns while continuing to collaborate on techniques to assist with this concern. Discussed the importance of maintaining engagement in social activities  and increasing social contact and developing and using positive self talk to manage negative thoughts and emotions that may contribute to depression. Patient was receptive to therapeutic feedback.      Allowed Patient to freely discuss issues  without interruption or judgement with unconditional positive regard, active listening skills, and empathy. Therapist provided a safe, confidential environment to facilitate the development of a positive therapeutic relationship and encouraged open, honest communication. Assisted Patient in identifying risk factors which would indicate the need for higher level of care including thoughts to harm self or others and/or self-harming behavior and encouraged Patient to contact this office, call 911, or present to the nearest emergency room should any of these events occur. Discussed crisis intervention services and means to access. Patient adamantly and convincingly denies current suicidal or homicidal ideation or perceptual disturbance. Assisted Patient in processing session content; acknowledged and normalized Patient’s thoughts, feelings, and concerns by utilizing a person-centered approach in efforts to build appropriate rapport and a positive therapeutic relationship with open and " honest communication. .     Follow Up:   Return in about 1 week (around 7/23/2024) for Therapy session.    Stefan Tran, [facility]  [facility]

## 2024-08-07 DIAGNOSIS — G47.20 CIRCADIAN RHYTHM SLEEP DISORDER, UNSPECIFIED TYPE: ICD-10-CM

## 2024-08-07 DIAGNOSIS — F39 MOOD DISORDER: ICD-10-CM

## 2024-08-07 DIAGNOSIS — F33.1 MODERATE EPISODE OF RECURRENT MAJOR DEPRESSIVE DISORDER: ICD-10-CM

## 2024-08-07 DIAGNOSIS — F43.10 POST TRAUMATIC STRESS DISORDER (PTSD): ICD-10-CM

## 2024-08-07 DIAGNOSIS — F41.1 GENERALIZED ANXIETY DISORDER: ICD-10-CM

## 2024-08-08 ENCOUNTER — OFFICE VISIT (OUTPATIENT)
Dept: BEHAVIORAL HEALTH | Facility: CLINIC | Age: 64
End: 2024-08-08
Payer: MEDICARE

## 2024-08-08 DIAGNOSIS — G47.20 CIRCADIAN RHYTHM SLEEP DISORDER, UNSPECIFIED TYPE: ICD-10-CM

## 2024-08-08 DIAGNOSIS — F33.1 MODERATE EPISODE OF RECURRENT MAJOR DEPRESSIVE DISORDER: Primary | ICD-10-CM

## 2024-08-08 DIAGNOSIS — F39 MOOD DISORDER: ICD-10-CM

## 2024-08-08 DIAGNOSIS — F43.10 POST TRAUMATIC STRESS DISORDER (PTSD): ICD-10-CM

## 2024-08-08 DIAGNOSIS — F41.1 GENERALIZED ANXIETY DISORDER: ICD-10-CM

## 2024-08-08 RX ORDER — BREXPIPRAZOLE 2 MG/1
1 TABLET ORAL DAILY
Qty: 30 TABLET | Refills: 0 | OUTPATIENT
Start: 2024-08-08

## 2024-08-08 RX ORDER — DULOXETIN HYDROCHLORIDE 60 MG/1
CAPSULE, DELAYED RELEASE ORAL
Qty: 30 CAPSULE | Refills: 2 | Status: SHIPPED | OUTPATIENT
Start: 2024-08-08 | End: 2024-08-08

## 2024-08-08 RX ORDER — DULOXETIN HYDROCHLORIDE 60 MG/1
CAPSULE, DELAYED RELEASE ORAL
Qty: 90 CAPSULE | Refills: 0 | Status: SHIPPED | OUTPATIENT
Start: 2024-08-08

## 2024-08-08 RX ORDER — DULOXETIN HYDROCHLORIDE 60 MG/1
CAPSULE, DELAYED RELEASE ORAL
Qty: 30 CAPSULE | Refills: 0 | OUTPATIENT
Start: 2024-08-08

## 2024-08-08 RX ORDER — BREXPIPRAZOLE 2 MG/1
2 TABLET ORAL DAILY
Qty: 30 TABLET | Refills: 2 | Status: SHIPPED | OUTPATIENT
Start: 2024-08-08 | End: 2024-08-08

## 2024-08-08 RX ORDER — TRAZODONE HYDROCHLORIDE 50 MG/1
TABLET ORAL
Qty: 90 TABLET | Refills: 0 | Status: SHIPPED | OUTPATIENT
Start: 2024-08-08

## 2024-08-08 RX ORDER — PRAZOSIN HYDROCHLORIDE 2 MG/1
CAPSULE ORAL
Qty: 30 CAPSULE | Refills: 2 | Status: SHIPPED | OUTPATIENT
Start: 2024-08-08 | End: 2024-08-08

## 2024-08-08 RX ORDER — BREXPIPRAZOLE 2 MG/1
2 TABLET ORAL DAILY
Qty: 90 TABLET | Refills: 0 | Status: SHIPPED | OUTPATIENT
Start: 2024-08-08

## 2024-08-08 RX ORDER — TRAZODONE HYDROCHLORIDE 50 MG/1
TABLET ORAL
Qty: 30 TABLET | Refills: 2 | Status: SHIPPED | OUTPATIENT
Start: 2024-08-08 | End: 2024-08-08

## 2024-08-08 RX ORDER — PRAZOSIN HYDROCHLORIDE 2 MG/1
CAPSULE ORAL
Qty: 90 CAPSULE | Refills: 0 | Status: SHIPPED | OUTPATIENT
Start: 2024-08-08

## 2024-08-08 RX ORDER — TRAZODONE HYDROCHLORIDE 50 MG/1
TABLET ORAL
Qty: 30 TABLET | Refills: 0 | OUTPATIENT
Start: 2024-08-08

## 2024-08-08 RX ORDER — PRAZOSIN HYDROCHLORIDE 2 MG/1
CAPSULE ORAL
Qty: 30 CAPSULE | Refills: 0 | OUTPATIENT
Start: 2024-08-08

## 2024-08-08 NOTE — PROGRESS NOTES
Follow Up Office Visit      Patient Name: Santi Souza Sr.  : 1960   MRN: [id number]     Referring Provider: Jordan Heart APRN    Chief Complaint:      ICD-10-CM ICD-9-CM   1. Moderate episode of recurrent major depressive disorder  F33.1 296.32   2. Mood disorder  F39 296.90   3. Post traumatic stress disorder (PTSD)  F43.10 309.81   4. Generalized anxiety disorder  F41.1 300.02   5. Circadian rhythm sleep disorder, unspecified type  G47.20 327.30        History of Present Illness:   Santi Souza Sr. is a 64 y.o. male who is here today for follow up with psychiatric medications.    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  Was feeling claustrophobic staying home all the time.  Sleep is pretty decent right now.  Still have some days where I get down but that is just life.  I been using THC drops and gummies at bedtime for sleep.      Review of Systems:   Review of Systems   Psychiatric/Behavioral:  Positive for dysphoric mood and stress. The patient is nervous/anxious.        Screening Scores:   PHQ-9 : 16  GARETT-7 : 11    RISK ASSESSMENT:  Patient denies any high risk factors today.    Medications:     Current Outpatient Medications:     Brexpiprazole (Rexulti) 2 MG tablet, Take 1 tablet by mouth Daily., Disp: 90 tablet, Rfl: 0    DULoxetine (CYMBALTA) 60 MG capsule, Take 1 capsule by mouth in the morning, Disp: 90 capsule, Rfl: 0    prazosin (MINIPRESS) 2 MG capsule, Take 1 capsule by mouth at bedtime., Disp: 90 capsule, Rfl: 0    traZODone (DESYREL) 50 MG tablet, Take 1 tablet by mouth every day at bedtime., Disp: 90 tablet, Rfl: 0    aspirin 81 MG chewable tablet, Chew 1 tablet., Disp: , Rfl:     atorvastatin (LIPITOR) 80 MG tablet, Take 1 tablet by mouth Daily., Disp: 90 tablet, Rfl: 1    cyclobenzaprine (FLEXERIL) 5 MG tablet, Take 1 tablet by mouth 3 (Three) Times a Day As Needed for Muscle  Spasms., Disp: 30 tablet, Rfl: 1    diclofenac (VOLTAREN) 75 MG EC tablet, TAKE 1 TABLET BY MOUTH TWICE DAILY AS NEEDED FOR  BACK  PAIN,  JOINT  PAIN, Disp: 60 tablet, Rfl: 0    donepezil (ARICEPT) 10 MG tablet, Take 1 tablet by mouth Daily With Breakfast for 90 days., Disp: 30 tablet, Rfl: 2    empagliflozin (Jardiance) 25 MG tablet tablet, Take 1 tablet by mouth Daily., Disp: 90 tablet, Rfl: 1    levocetirizine (XYZAL) 5 MG tablet, Take 1 tablet by mouth Every Evening., Disp: 90 tablet, Rfl: 1    Magnesium 400 MG tablet, Take 400 mg by mouth Daily., Disp: , Rfl:     memantine (NAMENDA) 5 MG tablet, Take 1 tablet by mouth Daily., Disp: 30 tablet, Rfl: 2    nitroglycerin (NITROLINGUAL) 0.4 MG/SPRAY spray, Place 1 spray by translingual route on or under the tongue at the first sign of an attack, no more than 3 sprays / 15-minute., Disp: 1 each, Rfl: 0    omeprazole (priLOSEC) 20 MG capsule, Take 1 capsule by mouth Daily., Disp: 90 capsule, Rfl: 1    SITagliptin (JANUVIA) 100 MG tablet, Take 1 tablet by mouth Daily., Disp: 90 tablet, Rfl: 1    triamcinolone (KENALOG) 0.1 % cream, APPLY  CREAM EXTERNALLY TO AFFECTED AREA AS DIRECTED TWICE DAILY AS NEEDED FOR  RASH, Disp: 45 g, Rfl: 0    Medication Considerations:  JAMARI reviewed and appropriate.      Allergies:   Allergies   Allergen Reactions    Hydrocodone Itching    Penicillins Swelling and Provider Review Needed     Entire body swelled as a child     Sulfa Antibiotics Shortness Of Breath, Rash and Provider Review Needed    Codeine Nausea And Vomiting, Confusion and Provider Review Needed    Oxycontin [Oxycodone] Itching       Results Reviewed:   screeners     Objective     Physical Exam:  Vital Signs: There were no vitals filed for this visit.  There is no height or weight on file to calculate BMI.     Mental Status Exam:   Hygiene:   good  Cooperation:  Cooperative  Eye Contact:  Good  Psychomotor Behavior:  Appropriate  Affect:  Appropriate  Mood:  depressed  Speech:  Normal  Thought Process:  Linear  Thought Content:  Mood congruent  Suicidal:  None  Homicidal:  None  Hallucinations:  None  Delusion:  None  Memory:  Deficits  Orientation:  Grossly intact  Reliability:  fair  Insight:  Fair  Judgement:  Fair  Impulse Control:  Fair  Physical/Medical Issues:   back pain      Assessment / Plan      Visit Diagnosis/Orders Placed This Visit:  Diagnoses and all orders for this visit:    1. Moderate episode of recurrent major depressive disorder (Primary)  -     Discontinue: Brexpiprazole (Rexulti) 2 MG tablet; Take 1 tablet by mouth Daily.  Dispense: 30 tablet; Refill: 2  -     Discontinue: DULoxetine (CYMBALTA) 60 MG capsule; Take 1 capsule by mouth in the morning  Dispense: 30 capsule; Refill: 2  -     Discontinue: traZODone (DESYREL) 50 MG tablet; Take 1 tablet by mouth every day at bedtime.  Dispense: 30 tablet; Refill: 2  -     Brexpiprazole (Rexulti) 2 MG tablet; Take 1 tablet by mouth Daily.  Dispense: 90 tablet; Refill: 0  -     DULoxetine (CYMBALTA) 60 MG capsule; Take 1 capsule by mouth in the morning  Dispense: 90 capsule; Refill: 0  -     traZODone (DESYREL) 50 MG tablet; Take 1 tablet by mouth every day at bedtime.  Dispense: 90 tablet; Refill: 0    2. Mood disorder  -     Discontinue: Brexpiprazole (Rexulti) 2 MG tablet; Take 1 tablet by mouth Daily.  Dispense: 30 tablet; Refill: 2  -     Brexpiprazole (Rexulti) 2 MG tablet; Take 1 tablet by mouth Daily.  Dispense: 90 tablet; Refill: 0    3. Post traumatic stress disorder (PTSD)  -     Discontinue: Brexpiprazole (Rexulti) 2 MG tablet; Take 1 tablet by mouth Daily.  Dispense: 30 tablet; Refill: 2  -     Discontinue: prazosin (MINIPRESS) 2 MG capsule; Take 1 capsule by mouth at bedtime.  Dispense: 30 capsule; Refill: 2  -     Brexpiprazole (Rexulti) 2 MG tablet; Take 1 tablet by mouth Daily.  Dispense: 90 tablet; Refill: 0  -     prazosin (MINIPRESS) 2 MG capsule; Take 1 capsule by mouth at bedtime.   Dispense: 90 capsule; Refill: 0    4. Generalized anxiety disorder  -     Discontinue: DULoxetine (CYMBALTA) 60 MG capsule; Take 1 capsule by mouth in the morning  Dispense: 30 capsule; Refill: 2  -     Discontinue: traZODone (DESYREL) 50 MG tablet; Take 1 tablet by mouth every day at bedtime.  Dispense: 30 tablet; Refill: 2  -     Discontinue: prazosin (MINIPRESS) 2 MG capsule; Take 1 capsule by mouth at bedtime.  Dispense: 30 capsule; Refill: 2  -     DULoxetine (CYMBALTA) 60 MG capsule; Take 1 capsule by mouth in the morning  Dispense: 90 capsule; Refill: 0  -     prazosin (MINIPRESS) 2 MG capsule; Take 1 capsule by mouth at bedtime.  Dispense: 90 capsule; Refill: 0  -     traZODone (DESYREL) 50 MG tablet; Take 1 tablet by mouth every day at bedtime.  Dispense: 90 tablet; Refill: 0    5. Circadian rhythm sleep disorder, unspecified type  -     Discontinue: traZODone (DESYREL) 50 MG tablet; Take 1 tablet by mouth every day at bedtime.  Dispense: 30 tablet; Refill: 2  -     Discontinue: prazosin (MINIPRESS) 2 MG capsule; Take 1 capsule by mouth at bedtime.  Dispense: 30 capsule; Refill: 2  -     prazosin (MINIPRESS) 2 MG capsule; Take 1 capsule by mouth at bedtime.  Dispense: 90 capsule; Refill: 0  -     traZODone (DESYREL) 50 MG tablet; Take 1 tablet by mouth every day at bedtime.  Dispense: 90 tablet; Refill: 0         Functional Status: Mild impairment     Prognosis: Guarded with Ongoing Treatment    Impression/Formulation:  Patient appeared alert and oriented. Patient is receptive to assistance with maintaining a stable lifestyle.  Patient presents with history of     ICD-10-CM ICD-9-CM   1. Moderate episode of recurrent major depressive disorder  F33.1 296.32   2. Mood disorder  F39 296.90   3. Post traumatic stress disorder (PTSD)  F43.10 309.81   4. Generalized anxiety disorder  F41.1 300.02   5. Circadian rhythm sleep disorder, unspecified type  G47.20 327.30   Patient mood is stable, having some trouble  with brain fog but is out of aricept and will be seeing his neurologist on Aug 30.  Sleep is improved.  Patient seems to be managing well, applied for part time job.  Socializing at the senior center, has made a few friends in his new neighborhood.  Has some low days but overall patient is satisfied with his medications at this time.  Therapy is going well with Stefan.    Treatment Plan:   Continue current medication regimen  Continue therapy with Stefan Tran    Patient will continue supportive psychotherapy efforts and medications as indicated. Clinic will obtain release of information for current treatment team for continuity of care as needed. Patient will contact this office, call 911 or present to the nearest emergency room should suicidal or homicidal ideations occur.  Discussed medication options and treatment plan of prescribed medication(s) as well as the risks, benefits, and potential side effects. Patient ackowledged and verbally consented to continue with current treatment plan and was educated on the importance of compliance with treatment and follow-up appointments.     Follow Up:   Return in about 3 months (around 11/8/2024) for Med Check.        JESSICA Nur, FREDERICKP-BC  [facility]     This is electronically signed by JESSICA Nur, BRIEN-BC  [unfilled] 13:55 EDT

## 2024-08-10 DIAGNOSIS — R41.3 MEMORY LOSS: ICD-10-CM

## 2024-08-12 ENCOUNTER — TELEPHONE (OUTPATIENT)
Dept: FAMILY MEDICINE CLINIC | Facility: CLINIC | Age: 64
End: 2024-08-12
Payer: MEDICARE

## 2024-08-12 DIAGNOSIS — R25.2 SPASM: ICD-10-CM

## 2024-08-12 RX ORDER — MEMANTINE HYDROCHLORIDE 5 MG/1
5 TABLET ORAL DAILY
Qty: 30 TABLET | Refills: 0 | Status: SHIPPED | OUTPATIENT
Start: 2024-08-12

## 2024-08-12 RX ORDER — DONEPEZIL HYDROCHLORIDE 10 MG/1
TABLET, FILM COATED ORAL
Qty: 30 TABLET | Refills: 0 | Status: SHIPPED | OUTPATIENT
Start: 2024-08-12

## 2024-08-12 NOTE — TELEPHONE ENCOUNTER
Rx Refill Note  Requested Prescriptions     Pending Prescriptions Disp Refills    donepezil (ARICEPT) 10 MG tablet [Pharmacy Med Name: Donepezil HCl 10 MG Oral Tablet] 90 tablet 0     Sig: TAKE 1 TABLET BY MOUTH ONCE DAILY WITH BREAKFAST FOR 90 DAYS    memantine (NAMENDA) 5 MG tablet [Pharmacy Med Name: Memantine HCl 5 MG Oral Tablet] 90 tablet 0     Sig: Take 1 tablet by mouth once daily      Last filled:4/30/24 2 refill  Last office visit with prescribing clinician: 2/21/2024      Next office visit with prescribing clinician: 8/30/2024       Last filled:4/330/24 2 refill  Last office visit with prescribing clinician: 2/21/2024      Next office visit with prescribing clinician: 8/30/2024      CHARMAINE COLE  08/12/24, 08:05 EDT      Sent 30ds 0 refill to pharmacy until upcoming appt

## 2024-08-12 NOTE — TELEPHONE ENCOUNTER
"  Caller: Santi Souza Sr. \"Alonso Ellis\"    Relationship: Self    Best call back number: [phone]    Which medication are you concerned about: PT STATED THAT RX      cyclobenzaprine (FLEXERIL) 5 MG tablet   IS SUPPOSE TO BE CALLED IN FOR 90 DAY SUPPLY, INSTEAD OF 30 DAY SUPPLY BECAUSE HE TAKES IT 3 TIMES A DAY.      [facility] [address] [place] [address] [phone] [facility] [phone] FX      "

## 2024-08-12 NOTE — TELEPHONE ENCOUNTER
This is suppose to be a just as needed medication and not 3 times every day. If having to take it that much does he feel he needs to see a pain specialist?

## 2024-08-13 ENCOUNTER — OFFICE VISIT (OUTPATIENT)
Dept: BEHAVIORAL HEALTH | Facility: CLINIC | Age: 64
End: 2024-08-13
Payer: MEDICARE

## 2024-08-13 DIAGNOSIS — F39 MOOD DISORDER: ICD-10-CM

## 2024-08-13 DIAGNOSIS — F41.1 GENERALIZED ANXIETY DISORDER: ICD-10-CM

## 2024-08-13 DIAGNOSIS — F33.1 MODERATE EPISODE OF RECURRENT MAJOR DEPRESSIVE DISORDER: Primary | ICD-10-CM

## 2024-08-13 DIAGNOSIS — R25.2 SPASM: ICD-10-CM

## 2024-08-13 PROCEDURE — 90837 PSYTX W PT 60 MINUTES: CPT

## 2024-08-13 PROCEDURE — 1159F MED LIST DOCD IN RCRD: CPT

## 2024-08-13 PROCEDURE — 1160F RVW MEDS BY RX/DR IN RCRD: CPT

## 2024-08-13 NOTE — TELEPHONE ENCOUNTER
"Caller: SutherlandSanti arboleda Sr. \"Alonso Rhonda\"    Relationship: Self    Best call back number:       [phone] (Home)     Requested Prescriptions:   Requested Prescriptions     Pending Prescriptions Disp Refills    cyclobenzaprine (FLEXERIL) 5 MG tablet 30 tablet 1     Sig: Take 1 tablet by mouth 3 (Three) Times a Day As Needed for Muscle Spasms.      Pharmacy where request should be sent:     [address] [phone] [facility] [phone]      Last office visit with prescribing clinician: 5/21/2024   Last telemedicine visit with prescribing clinician: Visit date not found   Next office visit with prescribing clinician: 8/20/2024     Additional details provided by patient:     PATIENT STATED HE HAS BEEN OUT OF MEDICATION FOR A COUPLE MONTHS    PATIENT ALSO STATED WITH THE LAST REFILL FOR MEDICATION THE QUANTITY WAS MIXED UP - PATIENT STATED HE RECEIVED 30 TABLETS FOR 90 DAYS INSTEAD OF THE CORRECT DOSAGE/QUANTITY OF 90 TABLETS FOR 30 DAYS (PATIENT TAKES 3 TABLETS DAILY)    PATIENT ALSO REQUESTED A (90) DAY SUPPLY OF THE MEDICATION    Does the patient have less than a 3 day supply:  [x] Yes  [] No    Would you like a call back once the refill request has been completed: [] Yes [] No    If the office needs to give you a call back, can they leave a voicemail: [] Yes [] No    Triec Pickett Rep   08/13/24 10:47 EDT       "

## 2024-08-13 NOTE — TELEPHONE ENCOUNTER
"Name: Santi Souza Sr. \"Alonso Ellis\"    Relationship: Self    Best Callback Number:       [phone] (Home)     HUB PROVIDED THE RELAY MESSAGE FROM THE OFFICE   PATIENT VOICED UNDERSTANDING AND HAS NO FURTHER QUESTIONS AT THIS TIME    ADDITIONAL INFORMATION:     PATIENT STATED HE WOULD LIKE TO SEE A PAIN SPECIALIST  "

## 2024-08-13 NOTE — PROGRESS NOTES
"     Follow Up Adult Note     Date:2024   Patient Name: Santi Souza Sr.  : 1960   MRN: [id number]   Time IN: 1:00 pm    Time OUT: 1:57 pm     Referring Provider: Jordan Heart APRN    Chief Complaint:      ICD-10-CM ICD-9-CM   1. Moderate episode of recurrent major depressive disorder  F33.1 296.32   2. Generalized anxiety disorder  F41.1 300.02   3. Mood disorder  F39 296.90        History of Present Illness:   Santi Souza Sr. is a 64 y.o. male who is being seen today for Psychotherapy session. Mood reported as \"Crappy as can be today\" with somewhat irritable and down affect. Patient initially presented with some lethargy and frustration with life stressors but showed improvement as session progressed and remained overall calm, cooperative, engaged, and pleasant with provider. Patient denied any current SI/HI/AVH. Patient reports ongoing stressors related to financial and resources causing ongoing depressive and anxiety symptom burdens along with mood shifts. Patient showed progress in wanting to consider reconciling interpersonal relationships with family.     \"Still trying to get my truck fixed-needs new tires for sure and I can't get help financially from the [facility] until I get my tags renewed on the truck and I'm getting the run around from the tags place telling me I'm locked out of the system and getting no real answers\"  \"Seems like when things are going good something comes along to mess it all up\"  \"I have been feeling more lonely and would like to have companionship\"  \"I have been thinking about my family more-I don't really have anyone and have thought about making things better\"  \"I had an opportunity for a job at the [employer] but I wouldn't be able to pass the drug test because of the TCH gummies I take at night to help me sleep\"      Subjective     PHQ-9 Depression Screening  PHQ-9 Total Score:  Not completed at this time     GARETT-7   Not completed at this " time    Patient's Support Network Includes:   Friends from local senior center    Functional Status: Moderate impairment     Progress toward goal: Not at goal    Prognosis: Fair with Ongoing Treatment     Medications:     Current Outpatient Medications:     aspirin 81 MG chewable tablet, Chew 1 tablet., Disp: , Rfl:     atorvastatin (LIPITOR) 80 MG tablet, Take 1 tablet by mouth Daily., Disp: 90 tablet, Rfl: 1    Brexpiprazole (Rexulti) 2 MG tablet, Take 1 tablet by mouth Daily., Disp: 90 tablet, Rfl: 0    cyclobenzaprine (FLEXERIL) 5 MG tablet, Take 1 tablet by mouth 3 (Three) Times a Day As Needed for Muscle Spasms., Disp: 30 tablet, Rfl: 1    diclofenac (VOLTAREN) 75 MG EC tablet, TAKE 1 TABLET BY MOUTH TWICE DAILY AS NEEDED FOR  BACK  PAIN,  JOINT  PAIN, Disp: 60 tablet, Rfl: 0    donepezil (ARICEPT) 10 MG tablet, TAKE 1 TABLET BY MOUTH ONCE DAILY WITH BREAKFAST FOR 90 DAYS, Disp: 30 tablet, Rfl: 0    DULoxetine (CYMBALTA) 60 MG capsule, Take 1 capsule by mouth in the morning, Disp: 90 capsule, Rfl: 0    empagliflozin (Jardiance) 25 MG tablet tablet, Take 1 tablet by mouth Daily., Disp: 90 tablet, Rfl: 1    levocetirizine (XYZAL) 5 MG tablet, Take 1 tablet by mouth Every Evening., Disp: 90 tablet, Rfl: 1    Magnesium 400 MG tablet, Take 400 mg by mouth Daily., Disp: , Rfl:     memantine (NAMENDA) 5 MG tablet, Take 1 tablet by mouth once daily, Disp: 30 tablet, Rfl: 0    nitroglycerin (NITROLINGUAL) 0.4 MG/SPRAY spray, Place 1 spray by translingual route on or under the tongue at the first sign of an attack, no more than 3 sprays / 15-minute., Disp: 1 each, Rfl: 0    omeprazole (priLOSEC) 20 MG capsule, Take 1 capsule by mouth Daily., Disp: 90 capsule, Rfl: 1    prazosin (MINIPRESS) 2 MG capsule, Take 1 capsule by mouth at bedtime., Disp: 90 capsule, Rfl: 0    SITagliptin (JANUVIA) 100 MG tablet, Take 1 tablet by mouth Daily., Disp: 90 tablet, Rfl: 1    traZODone (DESYREL) 50 MG tablet, Take 1 tablet by mouth  "every day at bedtime., Disp: 90 tablet, Rfl: 0    triamcinolone (KENALOG) 0.1 % cream, APPLY  CREAM EXTERNALLY TO AFFECTED AREA AS DIRECTED TWICE DAILY AS NEEDED FOR  RASH, Disp: 45 g, Rfl: 0    Allergies:   Allergies   Allergen Reactions    Hydrocodone Itching    Penicillins Swelling and Provider Review Needed     Entire body swelled as a child     Sulfa Antibiotics Shortness Of Breath, Rash and Provider Review Needed    Codeine Nausea And Vomiting, Confusion and Provider Review Needed    Oxycontin [Oxycodone] Itching       Objective     Mental Status Exam:   MENTAL STATUS EXAM   General Appearance:  Cleanly groomed and dressed  Eye Contact:  Fair  Attitude:  Cooperative and polite  Motor Activity:  Normal gait, posture  Muscle Strength:  Normal  Speech:  Normal rate, tone, volume  Language:  Spontaneous  Mood and affect:  Other  Other Comment:  Mood reported as \"Crappy as can be today\" with somewhat irritable and down affect.  Hopelessness:  Optimistic  Loneliness: 5  Thought Process:  Goal-directed and linear  Associations/ Thought Content:  No delusions  Hallucinations:  None  Suicidal Ideations:  Not present  Homicidal Ideation:  Not present  Sensorium:  Other  Other Comment:  Somewhat lethargic but overall alert and clear  Orientation:  Person, place, time and situation  Immediate Recall, Recent, and Remote Memory:  Intact  Attention Span/ Concentration:  Good  Fund of Knowledge:  Appropriate for age and educational level  Intellectual Functioning:  Average range  Insight:  Fair  Judgement:  Fair  Reliability:  Fair  Impulse Control:  Fair       Assessment / Plan      Visit Diagnosis/Orders Placed This Visit:    ICD-10-CM ICD-9-CM   1. Moderate episode of recurrent major depressive disorder  F33.1 296.32   2. Generalized anxiety disorder  F41.1 300.02   3. Mood disorder  F39 296.90        PLAN:  Safety: No acute safety concerns  Risk Assessment: Risk of self-harm acutely is low. Risk of self-harm chronically " is also low, but could be further elevated in the event of treatment noncompliance and/or AODA.    Treatment Plan/Goals: Continue supportive psychotherapy efforts and medications as indicated. Treatment and medication options discussed during today's visit. Patient ackowledged and verbally consented to continue with current treatment plan and was educated on the importance of compliance with treatment and follow-up appointments. Patient seems reasonably able to adhere to treatment plan.      Assisted Patient in processing above session content; acknowledged and normalized patient’s thoughts, feelings, and concerns. Assisted patient in processing recent stressors/emotions/feelings and utilized CBT techniques to assist patient with challenging negative/irrational thoughts and beliefs and replacing them with rational ones while also utilizing Socratic Questioning techniques and open ended questions to encourage reflection. Assisted patient in practicing effective communication skills to improve social  Relationships. Continued to identify sources and triggers of stress while learning and practicing effecting coping strategies to manage stress. Patient was overall receptive to therapeutic feedback.     Allowed Patient to freely discuss issues  without interruption or judgement with unconditional positive regard, active listening skills, and empathy. Therapist provided a safe, confidential environment to facilitate the development of a positive therapeutic relationship and encouraged open, honest communication. Assisted Patient in identifying risk factors which would indicate the need for higher level of care including thoughts to harm self or others and/or self-harming behavior and encouraged Patient to contact this office, call 911, or present to the nearest emergency room should any of these events occur. Discussed crisis intervention services and means to access. Patient adamantly and convincingly denies current  suicidal or homicidal ideation or perceptual disturbance. Assisted Patient in processing session content; acknowledged and normalized Patient’s thoughts, feelings, and concerns by utilizing a person-centered approach in efforts to build appropriate rapport and a positive therapeutic relationship with open and honest communication. .     Follow Up:   Return in about 1 week (around 8/20/2024) for Therapy session.    Stefan Tran, [facility]  [facility]

## 2024-08-14 ENCOUNTER — TELEPHONE (OUTPATIENT)
Dept: FAMILY MEDICINE CLINIC | Facility: CLINIC | Age: 64
End: 2024-08-14
Payer: MEDICARE

## 2024-08-14 RX ORDER — CYCLOBENZAPRINE HCL 5 MG
5 TABLET ORAL 3 TIMES DAILY PRN
Qty: 30 TABLET | Refills: 1 | OUTPATIENT
Start: 2024-08-14

## 2024-08-14 NOTE — TELEPHONE ENCOUNTER
HUB TO RELAY    This is suppose to be a just as needed medication and not 3 times every day. If having to take it that much does he feel he needs to see a pain specialist?

## 2024-08-20 ENCOUNTER — OFFICE VISIT (OUTPATIENT)
Dept: FAMILY MEDICINE CLINIC | Facility: CLINIC | Age: 64
End: 2024-08-20
Payer: MEDICARE

## 2024-08-20 ENCOUNTER — OFFICE VISIT (OUTPATIENT)
Dept: BEHAVIORAL HEALTH | Facility: CLINIC | Age: 64
End: 2024-08-20
Payer: MEDICARE

## 2024-08-20 ENCOUNTER — TELEPHONE (OUTPATIENT)
Dept: NEUROLOGY | Facility: CLINIC | Age: 64
End: 2024-08-20

## 2024-08-20 VITALS
WEIGHT: 173 LBS | HEIGHT: 68 IN | DIASTOLIC BLOOD PRESSURE: 78 MMHG | SYSTOLIC BLOOD PRESSURE: 130 MMHG | BODY MASS INDEX: 26.22 KG/M2 | OXYGEN SATURATION: 97 % | HEART RATE: 58 BPM

## 2024-08-20 DIAGNOSIS — Z79.899 ENCOUNTER FOR LONG-TERM (CURRENT) USE OF OTHER MEDICATIONS: ICD-10-CM

## 2024-08-20 DIAGNOSIS — F33.1 MODERATE EPISODE OF RECURRENT MAJOR DEPRESSIVE DISORDER: Primary | ICD-10-CM

## 2024-08-20 DIAGNOSIS — M25.511 CHRONIC RIGHT SHOULDER PAIN: ICD-10-CM

## 2024-08-20 DIAGNOSIS — E11.42 TYPE 2 DIABETES MELLITUS WITH DIABETIC POLYNEUROPATHY, WITHOUT LONG-TERM CURRENT USE OF INSULIN: ICD-10-CM

## 2024-08-20 DIAGNOSIS — F41.1 GENERALIZED ANXIETY DISORDER: ICD-10-CM

## 2024-08-20 DIAGNOSIS — Z12.11 SCREENING FOR COLON CANCER: ICD-10-CM

## 2024-08-20 DIAGNOSIS — F43.10 POST TRAUMATIC STRESS DISORDER (PTSD): ICD-10-CM

## 2024-08-20 DIAGNOSIS — F33.1 MODERATE EPISODE OF RECURRENT MAJOR DEPRESSIVE DISORDER: ICD-10-CM

## 2024-08-20 DIAGNOSIS — I67.9 CEREBROVASCULAR DISEASE: ICD-10-CM

## 2024-08-20 DIAGNOSIS — Z12.2 SCREENING FOR LUNG CANCER: ICD-10-CM

## 2024-08-20 DIAGNOSIS — M25.50 ARTHRALGIA, UNSPECIFIED JOINT: ICD-10-CM

## 2024-08-20 DIAGNOSIS — E78.2 MIXED HYPERLIPIDEMIA: ICD-10-CM

## 2024-08-20 DIAGNOSIS — F17.210 SMOKING GREATER THAN 30 PACK YEARS: ICD-10-CM

## 2024-08-20 DIAGNOSIS — K21.9 GASTROESOPHAGEAL REFLUX DISEASE, UNSPECIFIED WHETHER ESOPHAGITIS PRESENT: ICD-10-CM

## 2024-08-20 DIAGNOSIS — G89.29 CHRONIC RIGHT SHOULDER PAIN: ICD-10-CM

## 2024-08-20 DIAGNOSIS — Z71.89 ADVANCED DIRECTIVES, COUNSELING/DISCUSSION: ICD-10-CM

## 2024-08-20 DIAGNOSIS — I25.10 CORONARY ARTERY DISEASE INVOLVING NATIVE CORONARY ARTERY OF NATIVE HEART WITHOUT ANGINA PECTORIS: ICD-10-CM

## 2024-08-20 DIAGNOSIS — M75.31 CALCIFIC TENDINITIS OF RIGHT SHOULDER: ICD-10-CM

## 2024-08-20 DIAGNOSIS — E56.9 VITAMIN DEFICIENCY: ICD-10-CM

## 2024-08-20 DIAGNOSIS — E55.9 VITAMIN D DEFICIENCY: ICD-10-CM

## 2024-08-20 DIAGNOSIS — G47.00 INSOMNIA, UNSPECIFIED TYPE: ICD-10-CM

## 2024-08-20 DIAGNOSIS — Z00.00 MEDICARE ANNUAL WELLNESS VISIT, SUBSEQUENT: Primary | ICD-10-CM

## 2024-08-20 DIAGNOSIS — F39 MOOD DISORDER: ICD-10-CM

## 2024-08-20 DIAGNOSIS — I10 BENIGN ESSENTIAL HTN: ICD-10-CM

## 2024-08-20 DIAGNOSIS — Z12.5 SCREENING FOR PROSTATE CANCER: ICD-10-CM

## 2024-08-20 DIAGNOSIS — J30.9 ALLERGIC RHINITIS, UNSPECIFIED SEASONALITY, UNSPECIFIED TRIGGER: ICD-10-CM

## 2024-08-20 DIAGNOSIS — Z23 IMMUNIZATION DUE: ICD-10-CM

## 2024-08-20 PROBLEM — Z11.59 NEED FOR HEPATITIS C SCREENING TEST: Status: RESOLVED | Noted: 2023-04-06 | Resolved: 2024-08-20

## 2024-08-20 PROBLEM — R73.09 LABILE BLOOD GLUCOSE: Status: RESOLVED | Noted: 2023-05-15 | Resolved: 2024-08-20

## 2024-08-20 PROBLEM — M54.6 ACUTE MIDLINE THORACIC BACK PAIN: Status: RESOLVED | Noted: 2023-06-19 | Resolved: 2024-08-20

## 2024-08-20 PROCEDURE — 1159F MED LIST DOCD IN RCRD: CPT

## 2024-08-20 PROCEDURE — 1160F RVW MEDS BY RX/DR IN RCRD: CPT

## 2024-08-20 PROCEDURE — 90834 PSYTX W PT 45 MINUTES: CPT

## 2024-08-20 NOTE — ASSESSMENT & PLAN NOTE
Labs drawn.  Patient was unable to leave a urine sample for UA and micro and states he will come back to leave that.  Goal blood pressure less than 140/90.  Let me know if gets to or above that.  PHQ-9 completed and discussed.  No thoughts of suicide or hurting himself or anyone else.  Proper diet and exercise plan discussed and encouraged.  Check feet daily.  Annual dental and eye exams encouraged.  Continue to follow-up with all specialist including his cardiologist neurologist urologist psychiatrist and endocrinologist.  Continue with current medications.  Patient is alert and oriented x 4.  No hearing impairment.  Does not need help with ADLs.  Risk of meds discussed and understood.  Education provided.  Pneumonia 20 vaccine given today in clinic.  Vaccine information sheet given.  Risk discussed and understood.  Patient tolerated well.  Patient denies further immunizations and states he will discuss all other immunizations including flu tetanus COVID at a later time.  Colonoscopy up-to-date.  AAA screening up-to-date.  Will schedule low-dose lung CT scan.  Smoking cessation discussed and encouraged.  Return to clinic or ED with any issues or concerns.

## 2024-08-20 NOTE — PROGRESS NOTES
"     Follow Up Adult Note     Date:2024   Patient Name: Santi Souza Sr.  : 1960   MRN: [id number]   Time IN: 1:00 pm    Time OUT: 1:48 pm     Referring Provider: Jordan Heart APRN    Chief Complaint:      ICD-10-CM ICD-9-CM   1. Moderate episode of recurrent major depressive disorder  F33.1 296.32   2. Generalized anxiety disorder  F41.1 300.02   3. Post traumatic stress disorder (PTSD)  F43.10 309.81        History of Present Illness:   Santi Souza Sr. is a 64 y.o., single, disabled male who is being seen today for scheduled Psychotherapy session. Mood reported as \"up and down\" with congruent and somewhat lethargic affect. Patient presented as somewhat lethargic and unenergetic but remained overall calm, cooperative, engaged, and pleasant with provider. Patient denied any current SI/HI/AVH. Patient reports increased feelings of lethargy and withdrawn with ongoing depressive symptoms along with anxiety based around daily activities, needs, and finances. Patient reports struggling with \"being alone\" and not having many supports at this time.    \"I've been more down-and when I'm down, I'm down\"  \"I'm not always happy with my life\"  \"I enjoy my space but I do get lonely and would like to meet someone\"  \"I've been in a daze and funk lately\"  \"I'm still getting over to the center everyday and that has helped, but I don't know what to do with myself after I leave there around 1 each day\"  \"I am still waiting on getting my tags for my truck and bike-just frustrated and worried about it all\"      Subjective     PHQ-9 Depression Screening  PHQ-9 Total Score:  Not completed at this time     GARETT-7   Not completed at this time    Patient's Support Network Includes:   friends at local community center    Functional Status: Moderate impairment     Progress toward goal: Not at goal    Prognosis: Fair with Ongoing Treatment     Medications:     Current Outpatient Medications:     aspirin 81 MG " chewable tablet, Chew 1 tablet., Disp: , Rfl:     atorvastatin (LIPITOR) 80 MG tablet, Take 1 tablet by mouth Daily., Disp: 90 tablet, Rfl: 1    Brexpiprazole (Rexulti) 2 MG tablet, Take 1 tablet by mouth Daily., Disp: 90 tablet, Rfl: 0    cyclobenzaprine (FLEXERIL) 5 MG tablet, Take 1 tablet by mouth 3 (Three) Times a Day As Needed for Muscle Spasms. (Patient not taking: Reported on 8/20/2024), Disp: 30 tablet, Rfl: 1    diclofenac (VOLTAREN) 75 MG EC tablet, TAKE 1 TABLET BY MOUTH TWICE DAILY AS NEEDED FOR  BACK  PAIN,  JOINT  PAIN, Disp: 60 tablet, Rfl: 0    donepezil (ARICEPT) 10 MG tablet, TAKE 1 TABLET BY MOUTH ONCE DAILY WITH BREAKFAST FOR 90 DAYS, Disp: 30 tablet, Rfl: 0    DULoxetine (CYMBALTA) 60 MG capsule, Take 1 capsule by mouth in the morning, Disp: 90 capsule, Rfl: 0    empagliflozin (Jardiance) 25 MG tablet tablet, Take 1 tablet by mouth Daily., Disp: 90 tablet, Rfl: 1    levocetirizine (XYZAL) 5 MG tablet, Take 1 tablet by mouth Every Evening., Disp: 90 tablet, Rfl: 1    Magnesium 400 MG tablet, Take 400 mg by mouth Daily., Disp: , Rfl:     memantine (NAMENDA) 5 MG tablet, Take 1 tablet by mouth once daily, Disp: 30 tablet, Rfl: 0    nitroglycerin (NITROLINGUAL) 0.4 MG/SPRAY spray, Place 1 spray by translingual route on or under the tongue at the first sign of an attack, no more than 3 sprays / 15-minute., Disp: 1 each, Rfl: 0    omeprazole (priLOSEC) 20 MG capsule, Take 1 capsule by mouth Daily., Disp: 90 capsule, Rfl: 1    prazosin (MINIPRESS) 2 MG capsule, Take 1 capsule by mouth at bedtime., Disp: 90 capsule, Rfl: 0    SITagliptin (JANUVIA) 100 MG tablet, Take 1 tablet by mouth Daily., Disp: 90 tablet, Rfl: 1    traZODone (DESYREL) 50 MG tablet, Take 1 tablet by mouth every day at bedtime., Disp: 90 tablet, Rfl: 0    triamcinolone (KENALOG) 0.1 % cream, APPLY  CREAM EXTERNALLY TO AFFECTED AREA AS DIRECTED TWICE DAILY AS NEEDED FOR  RASH (Patient not taking: Reported on 8/20/2024), Disp: 45 g,  "Rfl: 0    Allergies:   Allergies   Allergen Reactions    Hydrocodone Itching    Penicillins Swelling and Provider Review Needed     Entire body swelled as a child     Sulfa Antibiotics Shortness Of Breath, Rash and Provider Review Needed    Codeine Nausea And Vomiting, Confusion and Provider Review Needed    Oxycontin [Oxycodone] Itching       Objective     Mental Status Exam:   MENTAL STATUS EXAM   General Appearance:  Cleanly groomed and dressed  Eye Contact:  Fair  Attitude:  Cooperative and polite  Motor Activity:  Normal gait, posture  Muscle Strength:  Normal  Speech:  Normal rate, tone, volume  Language:  Spontaneous  Mood and affect:  Other  Other Comment:  Mood reported as \"up and down\" with congruent and somewhat lethargic affect.  Hopelessness:  Optimistic  Loneliness: 5  Thought Process:  Linear and goal-directed  Associations/ Thought Content:  No delusions  Hallucinations:  None  Suicidal Ideations:  Not present  Homicidal Ideation:  Not present  Sensorium:  Lethargic  Orientation:  Person, place, time and situation  Immediate Recall, Recent, and Remote Memory:  Other  Other Comment:  Fair  Attention Span/ Concentration:  Other  Other Comment:  Fair  Fund of Knowledge:  Appropriate for age and educational level  Intellectual Functioning:  Average range  Insight:  Fair  Judgement:  Fair  Reliability:  Fair  Impulse Control:  Fair       Assessment / Plan      Visit Diagnosis/Orders Placed This Visit:    ICD-10-CM ICD-9-CM   1. Moderate episode of recurrent major depressive disorder  F33.1 296.32   2. Generalized anxiety disorder  F41.1 300.02   3. Post traumatic stress disorder (PTSD)  F43.10 309.81        PLAN:  Safety: No acute safety concerns  Risk Assessment: Risk of self-harm acutely is low. Risk of self-harm chronically is also low, but could be further elevated in the event of treatment noncompliance and/or AODA.    Treatment Plan/Goals: Continue supportive psychotherapy efforts and medications as " "indicated. Treatment and medication options discussed during today's visit. Patient ackowledged and verbally consented to continue with current treatment plan and was educated on the importance of compliance with treatment and follow-up appointments. Patient seems reasonably able to adhere to treatment plan.      Assisted Patient in processing above session content; acknowledged and normalized patient’s thoughts, feelings, and concerns. Assisted patient in processing recent stressors/emotions/feelings and utilized CBT techniques to assist patient with challenging negative/irrational thoughts and beliefs and replacing them with rational ones while also utilizing Socratic Questioning techniques and open ended questions to encourage reflection. Engaged patient in identifying the sources and triggers of recent stress, depression, and anxiety while continuing to practice effective coping strategies. Continued to process family relationships and fears of reaching out to them after isolating himself for \"many years\". Discussed daily routines and ways to stay busy by identifying healthy and enjoyable hobbies and activities. Patient was overall receptive to therapeutic feedback.     Allowed Patient to freely discuss issues  without interruption or judgement with unconditional positive regard, active listening skills, and empathy. Therapist provided a safe, confidential environment to facilitate the development of a positive therapeutic relationship and encouraged open, honest communication. Assisted Patient in identifying risk factors which would indicate the need for higher level of care including thoughts to harm self or others and/or self-harming behavior and encouraged Patient to contact this office, call 911, or present to the nearest emergency room should any of these events occur. Discussed crisis intervention services and means to access. Patient adamantly and convincingly denies current suicidal or homicidal " ideation or perceptual disturbance. Assisted Patient in processing session content; acknowledged and normalized Patient’s thoughts, feelings, and concerns by utilizing a person-centered approach in efforts to build appropriate rapport and a positive therapeutic relationship with open and honest communication. .     Follow Up:   Return in about 1 week (around 8/27/2024) for Therapy session.    Stefan Tran, hospitalsW  [facility]

## 2024-08-20 NOTE — LETTER
[facility]  Vaccine Consent Form    Patient Name:  Santi Souza .  Patient :  1960     Vaccine(s) Ordered    Pneumococcal Conjugate Vaccine 20-Valent (PCV20)        Screening Checklist  The following questions should be completed prior to vaccination. If you answer “yes” to any question, it does not necessarily mean you should not be vaccinated. It just means we may need to clarify or ask more questions. If a question is unclear, please ask your healthcare provider to explain it.    Yes No   Any fever or moderate to severe illness today (mild illness and/or antibiotic treatment are not contraindications)?     Do you have a history of a serious reaction to any previous vaccinations, such as anaphylaxis, encephalopathy within 7 days, Guillain-Bro[place]ton syndrome within 6 weeks, seizure?     Have you received any live vaccine(s) (e.g MMR, GHADA) or any other vaccines in the last month (to ensure duplicate doses aren't given)?     Do you have an anaphylactic allergy to latex (DTaP, DTaP-IPV, Hep A, Hep B, MenB, RV, Td, Tdap), baker’s yeast (Hep B, HPV), polysorbates (RSV, nirsevimab, PCV 20, Rotavirrus, Tdap, Shingrix), or gelatin (GHADA, MMR)?     Do you have an anaphylactic allergy to neomycin (Rabies, GHADA, MMR, IPV, Hep A), polymyxin B (IPV), or streptomycin (IPV)?      Any cancer, leukemia, AIDS, or other immune system disorder? (GHADA, MMR, RV)     Do you have a parent, brother, or sister with an immune system problem (if immune competence of vaccine recipient clinically verified, can proceed)? (MMR, GHADA)     Any recent steroid treatments for >2 weeks, chemotherapy, or radiation treatment? (GHADA, MMR)     Have you received antibody-containing blood transfusions or IVIG in the past 11 months (recommended interval is dependent on product)? (MMR, GHADA)     Have you taken antiviral drugs (acyclovir, famciclovir, valacyclovir for GHADA) in the last 24 or 48 hours, respectively?      Are you pregnant or planning to  "become pregnant within 1 month? (GHADA, MMR, HPV, IPV, MenB, Abrexvy; For Hep B- refer to Engerix-B; For RSV - Abrysvo is indicated for 32-36 weeks of pregnancy from September to January)     For infants, have you ever been told your child has had intussusception or a medical emergency involving obstruction of the intestine (Rotavirus)? If not for an infant, can skip this question.         *Ordering Physicians/APC should be consulted if \"yes\" is checked by the patient or guardian above.  I have received, read, and understand the Vaccine Information Statement (VIS) for each vaccine ordered.  I have considered my or my child's health status as well as the health status of my close contacts.  I have taken the opportunity to discuss my vaccine questions with my or my child's health care provider.   I have requested that the ordered vaccine(s) be given to me or my child.  I understand the benefits and risks of the vaccines.  I understand that I should remain in the clinic for 15 minutes after receiving the vaccine(s).  _________________________________________________________  Signature of Patient or Parent/Legal Guardian ____________________  Date     "

## 2024-08-20 NOTE — PROGRESS NOTES
The ABCs of the Annual Wellness Visit  Subsequent Medicare Wellness Visit    Subjective      Santi Souza Sr. is a 64 y.o. male who presents for a Subsequent Medicare Wellness Visit.    Presents for annual exam.  Is fasting.  Continues to smoke about a pack a day.  No alcohol use.  Tries to watch his diet he does stay active.  Colonoscopy up-to-date.  AAA screening up-to-date.  He is due a low-dose lung CT scan.  Shingles vaccine up-to-date.  He would like a pneumonia vaccine today.  States he will discuss all other immunizations at a later time.    Past history of hypertension hyperlipidemia type 2 diabetes anxiety and depression PTSD chronic low back pain coronary artery disease with stenting stents GERD insomnia and cognitive impairment with a cerebrovascular disease.  States is doing well on current medications.  Continues to follow-up with all specialist including his cardiologist neurologist urologist psychiatrist.  He admits that it is been a while since he has last seen his endocrinologist.    States feet are fine with no cuts or sores.  [facility] annual eye exams up-to-date just recently having 1 completed.  No dizziness no headache no chest pain no chest pressure no shortness of breath no trouble breathing no urinary or bowel issues.  Overall states he is doing well.    The following portions of the patient's history were reviewed and   updated as appropriate: allergies, current medications, past family history, past medical history, past social history, past surgical history, and problem list.    Compared to one year ago, the patient feels his physical   health is the same.    Compared to one year ago, the patient feels his mental   health is the same.    Recent Hospitalizations:  He was not admitted to the hospital during the last year.       Current Medical Providers:  Patient Care Team:  Jordan Heart APRN as PCP - General (Nurse Practitioner)  Derrick Richardson APRN as Nurse Practitioner  (Behavioral Health)  Rosenstein, Kyle S, MD as Consulting Physician (Endocrinology)  Rolo Wong MD as Consulting Physician (Cardiology)  Stefan Tran LCSW as  (Behavioral Health)  Marcos Sosa MD as Consulting Physician (Urology)  Chelsey Ramirez APRN as Nurse Practitioner (Neurology)    Outpatient Medications Prior to Visit   Medication Sig Dispense Refill    aspirin 81 MG chewable tablet Chew 1 tablet.      atorvastatin (LIPITOR) 80 MG tablet Take 1 tablet by mouth Daily. 90 tablet 1    Brexpiprazole (Rexulti) 2 MG tablet Take 1 tablet by mouth Daily. 90 tablet 0    diclofenac (VOLTAREN) 75 MG EC tablet TAKE 1 TABLET BY MOUTH TWICE DAILY AS NEEDED FOR  BACK  PAIN,  JOINT  PAIN 60 tablet 0    donepezil (ARICEPT) 10 MG tablet TAKE 1 TABLET BY MOUTH ONCE DAILY WITH BREAKFAST FOR 90 DAYS 30 tablet 0    DULoxetine (CYMBALTA) 60 MG capsule Take 1 capsule by mouth in the morning 90 capsule 0    empagliflozin (Jardiance) 25 MG tablet tablet Take 1 tablet by mouth Daily. 90 tablet 1    levocetirizine (XYZAL) 5 MG tablet Take 1 tablet by mouth Every Evening. 90 tablet 1    Magnesium 400 MG tablet Take 400 mg by mouth Daily.      memantine (NAMENDA) 5 MG tablet Take 1 tablet by mouth once daily 30 tablet 0    nitroglycerin (NITROLINGUAL) 0.4 MG/SPRAY spray Place 1 spray by translingual route on or under the tongue at the first sign of an attack, no more than 3 sprays / 15-minute. 1 each 0    omeprazole (priLOSEC) 20 MG capsule Take 1 capsule by mouth Daily. 90 capsule 1    prazosin (MINIPRESS) 2 MG capsule Take 1 capsule by mouth at bedtime. 90 capsule 0    SITagliptin (JANUVIA) 100 MG tablet Take 1 tablet by mouth Daily. 90 tablet 1    traZODone (DESYREL) 50 MG tablet Take 1 tablet by mouth every day at bedtime. 90 tablet 0    cyclobenzaprine (FLEXERIL) 5 MG tablet Take 1 tablet by mouth 3 (Three) Times a Day As Needed for Muscle Spasms. (Patient not taking: Reported on 8/20/2024) 30  tablet 1    triamcinolone (KENALOG) 0.1 % cream APPLY  CREAM EXTERNALLY TO AFFECTED AREA AS DIRECTED TWICE DAILY AS NEEDED FOR  RASH (Patient not taking: Reported on 8/20/2024) 45 g 0     No facility-administered medications prior to visit.       No opioid medication identified on active medication list. I have reviewed chart for other potential  high risk medication/s and harmful drug interactions in the elderly.        Aspirin is on active medication list. Aspirin use is indicated based on review of current medical condition/s. Pros and cons of this therapy have been discussed today. Benefits of this medication outweigh potential harm.  Patient has been encouraged to continue taking this medication.  .      Patient Active Problem List   Diagnosis    Benign essential HTN    Mixed hyperlipidemia    Type 2 diabetes mellitus with diabetic polyneuropathy, without long-term current use of insulin    Anxiety and depression    Allergic rhinitis    Arthralgia    Vitamin deficiency    Memory deficits    Post traumatic stress disorder (PTSD)    Screening for prostate cancer    Vitamin D deficiency    Encounter for long-term (current) use of other medications    Advanced directives, counseling/discussion    Screening for colon cancer    Screening for lung cancer    GERD (gastroesophageal reflux disease)    Insomnia    CAD (coronary artery disease)    Cerebrovascular disease    Chronic midline low back pain    Mild cognitive impairment    Neck pain    Cognitive impairment    Calcific tendinitis of right shoulder    Tear of right supraspinatus tendon    Traumatic complete tear of right rotator cuff    Pseudoparalysis    Traumatic complete tear of right rotator cuff, initial encounter    Smoking greater than 30 pack years    Post-traumatic stiffness of right shoulder joint    Mood disorder    Generalized anxiety disorder    Circadian rhythm sleep disorder, unspecified type    Moderate episode of recurrent major depressive  "disorder    Skin growth    Medicare annual wellness visit, subsequent    Chronic right shoulder pain     Advance Care Planning  Advance Directive is not on file.  ACP discussion was held with the patient during this visit. Patient does not have an advance directive, information provided.     Objective    Vitals:    08/20/24 1036   BP: 130/78   Pulse: 58   SpO2: 97%   Weight: 78.5 kg (173 lb)   Height: 172.7 cm (68\")     Estimated body mass index is 26.3 kg/m² as calculated from the following:    Height as of this encounter: 172.7 cm (68\").    Weight as of this encounter: 78.5 kg (173 lb).           Does the patient have evidence of cognitive impairment?   No            Review of Systems   Constitutional: Negative.    HENT: Negative.     Eyes: Negative.    Respiratory: Negative.     Cardiovascular: Negative.    Gastrointestinal: Negative.    Endocrine: Negative for polydipsia, polyphagia and polyuria.   Genitourinary: Negative.    Musculoskeletal: Negative.    Skin: Negative.    Neurological: Negative.    Psychiatric/Behavioral: Negative.          Physical Exam  Constitutional:       Appearance: Normal appearance.   HENT:      Head: Normocephalic.      Right Ear: Tympanic membrane, ear canal and external ear normal.      Left Ear: Tympanic membrane, ear canal and external ear normal.      Nose: Nose normal.      Mouth/Throat:      Mouth: Mucous membranes are moist.      Pharynx: Oropharynx is clear.   Eyes:      Extraocular Movements: Extraocular movements intact.      Conjunctiva/sclera: Conjunctivae normal.      Pupils: Pupils are equal, round, and reactive to light.   Cardiovascular:      Rate and Rhythm: Normal rate and regular rhythm.      Heart sounds: Normal heart sounds.   Pulmonary:      Effort: Pulmonary effort is normal.      Breath sounds: Normal breath sounds.   Abdominal:      General: Abdomen is flat. Bowel sounds are normal.      Palpations: Abdomen is soft.   Genitourinary:     Comments: Denied "   Musculoskeletal:         General: Normal range of motion.      Cervical back: Normal range of motion.   Skin:     General: Skin is warm.   Neurological:      General: No focal deficit present.      Mental Status: He is alert and oriented to person, place, and time. Mental status is at baseline.      Gait: Gait normal.   Psychiatric:         Mood and Affect: Mood normal.         Behavior: Behavior normal.         Thought Content: Thought content normal.         Judgment: Judgment normal.          HEALTH RISK ASSESSMENT    Smoking Status:  Social History     Tobacco Use   Smoking Status Some Days    Current packs/day: 0.25    Average packs/day: 0.3 packs/day for 50.6 years (12.7 ttl pk-yrs)    Types: Cigarettes    Start date: 1974    Passive exposure: Current   Smokeless Tobacco Never     Alcohol Consumption:  Social History     Substance and Sexual Activity   Alcohol Use Never     Fall Risk Screen:    DEBRA Fall Risk Assessment was completed, and patient is at LOW risk for falls.Assessment completed on:2024    Depression Screenin/20/2024    10:35 AM   PHQ-2/PHQ-9 Depression Screening   Little Interest or Pleasure in Doing Things 0-->not at all   Feeling Down, Depressed or Hopeless 0-->not at all   PHQ-9: Brief Depression Severity Measure Score 0       Health Habits and Functional and Cognitive Screenin/20/2024    10:00 AM   Functional & Cognitive Status   Do you have difficulty preparing food and eating? No   Do you have difficulty bathing yourself, getting dressed or grooming yourself? No   Do you have difficulty using the toilet? No   Do you have difficulty moving around from place to place? No   Do you have trouble with steps or getting out of a bed or a chair? No   Current Diet Well Balanced Diet   Dental Exam Not up to date   Eye Exam Up to date   Exercise (times per week) 7 times per week   Current Exercises Include Other        Exercise Comment working out, stretches   Do you need  help using the phone?  No   Are you deaf or do you have serious difficulty hearing?  No   Do you need help to go to places out of walking distance? No   Do you need help shopping? No   Do you need help preparing meals?  No   Do you need help with housework?  No   Do you need help with laundry? No   Do you need help taking your medications? No   Do you need help managing money? No   Do you ever drive or ride in a car without wearing a seat belt? No   Have you felt unusual stress, anger or loneliness in the last month? Yes   Who do you live with? Alone   If you need help, do you have trouble finding someone available to you? No   Have you been bothered in the last four weeks by sexual problems? No   Do you have difficulty concentrating, remembering or making decisions? Yes       Age-appropriate Screening Schedule:  Refer to the list below for future screening recommendations based on patient's age, sex and/or medical conditions. Orders for these recommended tests are listed in the plan section. The patient has been provided with a written plan.    Health Maintenance   Topic Date Due    DIABETIC FOOT EXAM  04/06/2024    URINE MICROALBUMIN  04/06/2024    HEMOGLOBIN A1C  07/09/2024    INFLUENZA VACCINE  08/01/2024    COVID-19 Vaccine (7 - 2023-24 season) 08/22/2024 (Originally 9/1/2023)    TDAP/TD VACCINES (1 - Tdap) 08/20/2025 (Originally 7/12/1979)    LIPID PANEL  01/09/2025    BMI FOLLOWUP  01/30/2025    DIABETIC EYE EXAM  08/13/2025    ANNUAL WELLNESS VISIT  08/20/2025    COLORECTAL CANCER SCREENING  07/22/2030    HEPATITIS C SCREENING  Completed    Pneumococcal Vaccine 65+  Completed    AAA SCREEN (ONE-TIME)  Completed    ZOSTER VACCINE  Completed                CMS Preventative Services Quick Reference  Risk Factors Identified During Encounter:    Immunizations Discussed/Encouraged: Tdap, Hepatitis A Vaccine/Series, Hepatitis B Vaccine/Series, Influenza, Pneumococcal 23, Prevnar 20 (Pneumococcal 20-valent  conjugate), Vaxneuvance (Pneumococcal 15-valent conjugate), Shingrix, COVID19, and RSV (Respiratory Syncytial Virus)  Inactivity/Sedentary: Patient was advised to exercise at least 150 minutes a week per CDC recommendations.  Tobacco Use/Dependance Risk (use dotphrase .tobaccocessation for documentation)  Dental Screening Recommended  Vision Screening Recommended    The above risks/problems have been discussed with the patient.  Pertinent information has been shared with the patient in the After Visit Summary.    Diagnoses and all orders for this visit:    1. Medicare annual wellness visit, subsequent (Primary)  Assessment & Plan:  Labs drawn.  Patient was unable to leave a urine sample for UA and micro and states he will come back to leave that.  Goal blood pressure less than 140/90.  Let me know if gets to or above that.  PHQ-9 completed and discussed.  No thoughts of suicide or hurting himself or anyone else.  Proper diet and exercise plan discussed and encouraged.  Check feet daily.  Annual dental and eye exams encouraged.  Continue to follow-up with all specialist including his cardiologist neurologist urologist psychiatrist and endocrinologist.  Continue with current medications.  Patient is alert and oriented x 4.  No hearing impairment.  Does not need help with ADLs.  Risk of meds discussed and understood.  Education provided.  Pneumonia 20 vaccine given today in clinic.  Vaccine information sheet given.  Risk discussed and understood.  Patient tolerated well.  Patient denies further immunizations and states he will discuss all other immunizations including flu tetanus COVID at a later time.  Colonoscopy up-to-date.  AAA screening up-to-date.  Will schedule low-dose lung CT scan.  Smoking cessation discussed and encouraged.  Return to clinic or ED with any issues or concerns.    Orders:  -     CBC & Differential  -     Comprehensive Metabolic Panel  -     TSH Rfx On Abnormal To Free T4  -     Cancel: POC  Urinalysis Dipstick, Automated; Future  -     Pneumococcal Conjugate Vaccine 20-Valent (PCV20); Future  -     Pneumococcal Conjugate Vaccine 20-Valent (PCV20)    2. Advanced directives, counseling/discussion    3. Allergic rhinitis, unspecified seasonality, unspecified trigger    4. Mixed hyperlipidemia  Assessment & Plan:   Continue to follow-up with cardiology.    Orders:  -     Lipid Panel    5. Coronary artery disease involving native coronary artery of native heart without angina pectoris  Assessment & Plan:   continue to follow-up with cardiology.      6. Benign essential HTN  Assessment & Plan:   continue to follow-up with cardiology.      7. Vitamin deficiency  -     Vitamin B12    8. Vitamin D deficiency  -     Vitamin D,25-Hydroxy    9. Type 2 diabetes mellitus with diabetic polyneuropathy, without long-term current use of insulin  Assessment & Plan:   recommended he follow-up with endocrinology.    Orders:  -     Hemoglobin A1c  -     Cancel: POC Microalbumin; Future    10. Screening for colon cancer    11. Gastroesophageal reflux disease, unspecified whether esophagitis present    12. Screening for prostate cancer  Assessment & Plan:  Continue to follow-up with urology.      13. Encounter for long-term (current) use of other medications  -     Magnesium    14. Post traumatic stress disorder (PTSD)  Assessment & Plan:   continue to follow-up with psychiatry.      15. Mood disorder  Assessment & Plan:   continue to follow-up with psychiatry.      16. Moderate episode of recurrent major depressive disorder  Assessment & Plan:   continue to follow-up with psychiatry.      17. Generalized anxiety disorder  Assessment & Plan:   continue to follow-up with psychiatry.      18. Chronic right shoulder pain    19. Calcific tendinitis of right shoulder    20. Arthralgia, unspecified joint    21. Cerebrovascular disease  Assessment & Plan:  Continue to follow-up with neurology.      22. Insomnia, unspecified  type  Assessment & Plan:  Continue to follow-up with psychiatry.      23. Smoking greater than 30 pack years  -     CT Chest Low Dose Wo; Future    24. Screening for lung cancer  -     CT Chest Low Dose Wo; Future    25. Immunization due  -     Pneumococcal Conjugate Vaccine 20-Valent (PCV20); Future  -     Pneumococcal Conjugate Vaccine 20-Valent (PCV20)        Follow Up:   Next Medicare Wellness visit to be scheduled in 1 year.      An After Visit Summary and PPPS were made available to the patient.    Return in about 3 months (around 11/20/2024), or if symptoms worsen or fail to improve.

## 2024-08-21 LAB
25(OH)D3+25(OH)D2 SERPL-MCNC: 36.1 NG/ML (ref 30–100)
ALBUMIN SERPL-MCNC: 4.7 G/DL (ref 3.9–4.9)
ALP SERPL-CCNC: 144 IU/L (ref 44–121)
ALT SERPL-CCNC: 17 IU/L (ref 0–44)
AST SERPL-CCNC: 17 IU/L (ref 0–40)
BASOPHILS # BLD AUTO: 0.1 X10E3/UL (ref 0–0.2)
BASOPHILS NFR BLD AUTO: 1 %
BILIRUB SERPL-MCNC: 0.7 MG/DL (ref 0–1.2)
BUN SERPL-MCNC: 9 MG/DL (ref 8–27)
BUN/CREAT SERPL: 10 (ref 10–24)
CALCIUM SERPL-MCNC: 9.9 MG/DL (ref 8.6–10.2)
CHLORIDE SERPL-SCNC: 101 MMOL/L (ref 96–106)
CHOLEST SERPL-MCNC: 146 MG/DL (ref 100–199)
CO2 SERPL-SCNC: 25 MMOL/L (ref 20–29)
CREAT SERPL-MCNC: 0.91 MG/DL (ref 0.76–1.27)
EGFRCR SERPLBLD CKD-EPI 2021: 94 ML/MIN/1.73
EOSINOPHIL # BLD AUTO: 0.1 X10E3/UL (ref 0–0.4)
EOSINOPHIL NFR BLD AUTO: 1 %
ERYTHROCYTE [DISTWIDTH] IN BLOOD BY AUTOMATED COUNT: 12.1 % (ref 11.6–15.4)
GLOBULIN SER CALC-MCNC: 2.7 G/DL (ref 1.5–4.5)
GLUCOSE SERPL-MCNC: 149 MG/DL (ref 70–99)
HBA1C MFR BLD: 8.4 % (ref 4.8–5.6)
HCT VFR BLD AUTO: 47.4 % (ref 37.5–51)
HDLC SERPL-MCNC: 38 MG/DL
HGB BLD-MCNC: 16.2 G/DL (ref 13–17.7)
IMM GRANULOCYTES # BLD AUTO: 0 X10E3/UL (ref 0–0.1)
IMM GRANULOCYTES NFR BLD AUTO: 0 %
LDLC SERPL CALC-MCNC: 73 MG/DL (ref 0–99)
LYMPHOCYTES # BLD AUTO: 2.8 X10E3/UL (ref 0.7–3.1)
LYMPHOCYTES NFR BLD AUTO: 22 %
MAGNESIUM SERPL-MCNC: 1.8 MG/DL (ref 1.6–2.3)
MCH RBC QN AUTO: 33.3 PG (ref 26.6–33)
MCHC RBC AUTO-ENTMCNC: 34.2 G/DL (ref 31.5–35.7)
MCV RBC AUTO: 97 FL (ref 79–97)
MONOCYTES # BLD AUTO: 0.9 X10E3/UL (ref 0.1–0.9)
MONOCYTES NFR BLD AUTO: 7 %
NEUTROPHILS # BLD AUTO: 8.8 X10E3/UL (ref 1.4–7)
NEUTROPHILS NFR BLD AUTO: 69 %
PLATELET # BLD AUTO: 186 X10E3/UL (ref 150–450)
POTASSIUM SERPL-SCNC: 4.5 MMOL/L (ref 3.5–5.2)
PROT SERPL-MCNC: 7.4 G/DL (ref 6–8.5)
RBC # BLD AUTO: 4.87 X10E6/UL (ref 4.14–5.8)
SODIUM SERPL-SCNC: 141 MMOL/L (ref 134–144)
TRIGL SERPL-MCNC: 209 MG/DL (ref 0–149)
TSH SERPL DL<=0.005 MIU/L-ACNC: 1.44 UIU/ML (ref 0.45–4.5)
VIT B12 SERPL-MCNC: 1175 PG/ML (ref 232–1245)
VLDLC SERPL CALC-MCNC: 35 MG/DL (ref 5–40)
WBC # BLD AUTO: 12.7 X10E3/UL (ref 3.4–10.8)

## 2024-08-26 DIAGNOSIS — R25.2 SPASM: ICD-10-CM

## 2024-08-26 RX ORDER — CYCLOBENZAPRINE HCL 5 MG
5 TABLET ORAL 3 TIMES DAILY PRN
Qty: 30 TABLET | Refills: 1 | Status: SHIPPED | OUTPATIENT
Start: 2024-08-26

## 2024-08-27 ENCOUNTER — OFFICE VISIT (OUTPATIENT)
Dept: BEHAVIORAL HEALTH | Facility: CLINIC | Age: 64
End: 2024-08-27
Payer: MEDICARE

## 2024-08-27 DIAGNOSIS — F33.1 MODERATE EPISODE OF RECURRENT MAJOR DEPRESSIVE DISORDER: Primary | ICD-10-CM

## 2024-08-27 DIAGNOSIS — F41.1 GENERALIZED ANXIETY DISORDER: ICD-10-CM

## 2024-08-27 PROCEDURE — 1160F RVW MEDS BY RX/DR IN RCRD: CPT

## 2024-08-27 PROCEDURE — 1159F MED LIST DOCD IN RCRD: CPT

## 2024-08-27 PROCEDURE — 90837 PSYTX W PT 60 MINUTES: CPT

## 2024-08-27 NOTE — PROGRESS NOTES
"     Follow Up Adult Note     Date:2024   Patient Name: Santi Souza Sr.  : 1960   MRN: [id number]   Time IN: 1:00 pm    Time OUT: 1:56 pm     Referring Provider: Jodran Heart APRN    Chief Complaint:      ICD-10-CM ICD-9-CM   1. Moderate episode of recurrent major depressive disorder  F33.1 296.32   2. Generalized anxiety disorder  F41.1 300.02        History of Present Illness:   Santi Souza Sr. is a 64 y.o., single, disabled  male who is being seen today for scheduled Psychotherapy session. Mood reported as \"it's better today\" with somewhat down but overall improved and cooperative affect. Patient presented as somewhat tired and down at times but remained overall calm, cooperative, engaged, and pleasant with provider. Patient denied any current SI/HI/AVH. Patient reports some sadness and feeling down with depressive symptoms reporting that he \"tried to contact family but no ne has replied\" and feels \"worn out still dealing with past issues\" but is continuing to report improvements and increased motivation sharing that he has began organizing his apartment. Patient voiced stress and anxiety related to ongoing financial burdens to include living on a fixed wage and issues with his local DM[employer] stating, \"I have paid for my tags and I still haven't gotten anything and I am waiting on that before the [employer] can help with other expenses\". Patient voiced that he would like to find part time employment to help supplement his income.       Subjective     PHQ-9 Depression Screening  PHQ-9 Total Score:  Not completed at this time     GARETT-7   Not completed at this time    Patient's Support Network Includes:   Friends at local community center    Functional Status: Moderate impairment     Progress toward goal: Not at goal    Prognosis: Fair with Ongoing Treatment     Medications:     Current Outpatient Medications:     aspirin 81 MG chewable tablet, Chew 1 tablet., Disp: , Rfl:     " atorvastatin (LIPITOR) 80 MG tablet, Take 1 tablet by mouth Daily., Disp: 90 tablet, Rfl: 1    Brexpiprazole (Rexulti) 2 MG tablet, Take 1 tablet by mouth Daily., Disp: 90 tablet, Rfl: 0    cyclobenzaprine (FLEXERIL) 5 MG tablet, Take 1 tablet by mouth 3 (Three) Times a Day As Needed for Muscle Spasms., Disp: 30 tablet, Rfl: 1    diclofenac (VOLTAREN) 75 MG EC tablet, TAKE 1 TABLET BY MOUTH TWICE DAILY AS NEEDED FOR  BACK  PAIN,  JOINT  PAIN, Disp: 60 tablet, Rfl: 0    donepezil (ARICEPT) 10 MG tablet, TAKE 1 TABLET BY MOUTH ONCE DAILY WITH BREAKFAST FOR 90 DAYS, Disp: 30 tablet, Rfl: 0    DULoxetine (CYMBALTA) 60 MG capsule, Take 1 capsule by mouth in the morning, Disp: 90 capsule, Rfl: 0    empagliflozin (Jardiance) 25 MG tablet tablet, Take 1 tablet by mouth Daily., Disp: 90 tablet, Rfl: 1    levocetirizine (XYZAL) 5 MG tablet, Take 1 tablet by mouth Every Evening., Disp: 90 tablet, Rfl: 1    Magnesium 400 MG tablet, Take 400 mg by mouth Daily., Disp: , Rfl:     memantine (NAMENDA) 5 MG tablet, Take 1 tablet by mouth once daily, Disp: 30 tablet, Rfl: 0    nitroglycerin (NITROLINGUAL) 0.4 MG/SPRAY spray, Place 1 spray by translingual route on or under the tongue at the first sign of an attack, no more than 3 sprays / 15-minute., Disp: 1 each, Rfl: 0    omeprazole (priLOSEC) 20 MG capsule, Take 1 capsule by mouth Daily., Disp: 90 capsule, Rfl: 1    prazosin (MINIPRESS) 2 MG capsule, Take 1 capsule by mouth at bedtime., Disp: 90 capsule, Rfl: 0    SITagliptin (JANUVIA) 100 MG tablet, Take 1 tablet by mouth Daily., Disp: 90 tablet, Rfl: 1    traZODone (DESYREL) 50 MG tablet, Take 1 tablet by mouth every day at bedtime., Disp: 90 tablet, Rfl: 0    triamcinolone (KENALOG) 0.1 % cream, APPLY  CREAM EXTERNALLY TO AFFECTED AREA AS DIRECTED TWICE DAILY AS NEEDED FOR  RASH (Patient not taking: Reported on 8/20/2024), Disp: 45 g, Rfl: 0    Allergies:   Allergies   Allergen Reactions    Hydrocodone Itching    Penicillins  "Swelling and Provider Review Needed     Entire body swelled as a child     Sulfa Antibiotics Shortness Of Breath, Rash and Provider Review Needed    Codeine Nausea And Vomiting, Confusion and Provider Review Needed    Oxycontin [Oxycodone] Itching       Objective     Mental Status Exam:   MENTAL STATUS EXAM   General Appearance:  Cleanly groomed and dressed  Eye Contact:  Fair  Attitude:  Cooperative and polite  Motor Activity:  Normal gait, posture  Muscle Strength:  Abnormal  Speech:  Normal rate, tone, volume and soft spoken  Language:  Spontaneous  Mood and affect:  Other  Other Comment:  Mood reported as \"it's better today\" with somewhat down but overall improved and cooperative affect.  Hopelessness:  4  Loneliness: 5  Thought Process:  Goal-directed and linear  Associations/ Thought Content:  No delusions  Hallucinations:  None  Suicidal Ideations:  Not present  Sensorium:  Other  Other Comment:  Somewhat lethargic but overall alert and clear  Orientation:  Situation, time, place and person  Immediate Recall, Recent, and Remote Memory:  Other  Other Comment:  Fair-reports decreased short term concerns  Attention Span/ Concentration:  Good  Fund of Knowledge:  Appropriate for age and educational level  Intellectual Functioning:  Average range  Insight:  Fair  Judgement:  Fair  Reliability:  Fair  Impulse Control:  Good     Assessment / Plan      Visit Diagnosis/Orders Placed This Visit:    ICD-10-CM ICD-9-CM   1. Moderate episode of recurrent major depressive disorder  F33.1 296.32   2. Generalized anxiety disorder  F41.1 300.02        PLAN:  Safety: No acute safety concerns  Risk Assessment: Risk of self-harm acutely is low. Risk of self-harm chronically is also low, but could be further elevated in the event of treatment noncompliance and/or AODA.    Treatment Plan/Goals: Continue supportive psychotherapy efforts and medications as indicated. Treatment and medication options discussed during today's visit. " "Patient ackowledged and verbally consented to continue with current treatment plan and was educated on the importance of compliance with treatment and follow-up appointments. Patient seems reasonably able to adhere to treatment plan.      Assisted Patient in processing above session content; acknowledged and normalized patient’s thoughts, feelings, and concerns. Assisted patient in processing recent stressors/emotions/feelings and utilized CBT techniques to assist patient with challenging negative/irrational thoughts and beliefs and replacing them with rational ones while also utilizing Socratic Questioning techniques and open ended questions to encourage reflection. Continued to identify stressors while collaborating on healthy coping techniques and solutions to concerns. Continued to process emotions and feelings based around \"feeling lonely\" and having no contact with family members and encouraged patient to continue reaching out to them. Patient was receptive to therapeutic feedback.     Allowed Patient to freely discuss issues  without interruption or judgement with unconditional positive regard, active listening skills, and empathy. Therapist provided a safe, confidential environment to facilitate the development of a positive therapeutic relationship and encouraged open, honest communication. Assisted Patient in identifying risk factors which would indicate the need for higher level of care including thoughts to harm self or others and/or self-harming behavior and encouraged Patient to contact this office, call 911, or present to the nearest emergency room should any of these events occur. Discussed crisis intervention services and means to access. Patient adamantly and convincingly denies current suicidal or homicidal ideation or perceptual disturbance. Assisted Patient in processing session content; acknowledged and normalized Patient’s thoughts, feelings, and concerns by utilizing a person-centered " approach in efforts to build appropriate rapport and a positive therapeutic relationship with open and honest communication. .     Follow Up:   Return in about 1 week (around 9/3/2024) for Therapy session.    Stefan Tran, RALPHW  [facility]

## 2024-08-29 ENCOUNTER — OFFICE VISIT (OUTPATIENT)
Dept: FAMILY MEDICINE CLINIC | Facility: CLINIC | Age: 64
End: 2024-08-29
Payer: MEDICARE

## 2024-08-29 VITALS
WEIGHT: 172.19 LBS | HEIGHT: 68 IN | OXYGEN SATURATION: 97 % | BODY MASS INDEX: 26.1 KG/M2 | SYSTOLIC BLOOD PRESSURE: 122 MMHG | HEART RATE: 62 BPM | DIASTOLIC BLOOD PRESSURE: 60 MMHG

## 2024-08-29 DIAGNOSIS — R74.8 ELEVATED ALKALINE PHOSPHATASE LEVEL: ICD-10-CM

## 2024-08-29 DIAGNOSIS — J30.9 ALLERGIC RHINITIS, UNSPECIFIED SEASONALITY, UNSPECIFIED TRIGGER: ICD-10-CM

## 2024-08-29 DIAGNOSIS — E11.42 TYPE 2 DIABETES MELLITUS WITH DIABETIC POLYNEUROPATHY, WITHOUT LONG-TERM CURRENT USE OF INSULIN: ICD-10-CM

## 2024-08-29 DIAGNOSIS — E78.2 MIXED HYPERLIPIDEMIA: Primary | ICD-10-CM

## 2024-08-29 DIAGNOSIS — D72.829 LEUKOCYTOSIS, UNSPECIFIED TYPE: ICD-10-CM

## 2024-08-29 PROCEDURE — G2211 COMPLEX E/M VISIT ADD ON: HCPCS | Performed by: NURSE PRACTITIONER

## 2024-08-29 PROCEDURE — 3052F HG A1C>EQUAL 8.0%<EQUAL 9.0%: CPT | Performed by: NURSE PRACTITIONER

## 2024-08-29 PROCEDURE — 99214 OFFICE O/P EST MOD 30 MIN: CPT | Performed by: NURSE PRACTITIONER

## 2024-08-29 PROCEDURE — 1160F RVW MEDS BY RX/DR IN RCRD: CPT | Performed by: NURSE PRACTITIONER

## 2024-08-29 PROCEDURE — 3074F SYST BP LT 130 MM HG: CPT | Performed by: NURSE PRACTITIONER

## 2024-08-29 PROCEDURE — 1159F MED LIST DOCD IN RCRD: CPT | Performed by: NURSE PRACTITIONER

## 2024-08-29 PROCEDURE — 3078F DIAST BP <80 MM HG: CPT | Performed by: NURSE PRACTITIONER

## 2024-08-29 RX ORDER — PIOGLITAZONEHYDROCHLORIDE 15 MG/1
15 TABLET ORAL DAILY
Qty: 30 TABLET | Refills: 3 | Status: SHIPPED | OUTPATIENT
Start: 2024-08-29

## 2024-08-29 RX ORDER — FLUTICASONE PROPIONATE 50 MCG
2 SPRAY, SUSPENSION (ML) NASAL DAILY
Qty: 16 G | Refills: 5 | Status: SHIPPED | OUTPATIENT
Start: 2024-08-29

## 2024-08-29 NOTE — ASSESSMENT & PLAN NOTE
Continue atorvastatin.  Discussed recent labs.  Continue to follow-up with cardiology.  Return to clinic or ED with any issues or concerns.

## 2024-08-29 NOTE — PROGRESS NOTES
"Chief Complaint  Abnormal Lab    Subjective          Santi Souza Sr. presents to [facility]  Abnormal Lab  Pertinent negatives include no abdominal pain, chills, congestion, coughing, fatigue, fever, nausea, rash or vomiting.       Presents to discuss recent labs.  Alk phos was elevated at 144.  A1c for diabetes elevated at 8.4.  For diabetes he is currently on Jardiance and Januvia.  Feet are fine with no cuts or sores.     HDL was low at 38.  Triglycerides elevated at 209.  He is taking atorvastatin 80 mg daily.  Tries to watch his diet and he does stay active.    White blood cell count was elevated 12.7.  No signs of infection.    No dizziness no headache no chest pain no chest pressure no shortness of breath no trouble breathing no urinary or bowel issues.    Also requesting refill of Flonase for his allergies.    Objective   Vital Signs:   /60   Pulse 62   Ht 172.7 cm (68\")   Wt 78.1 kg (172 lb 3 oz)   SpO2 97%   BMI 26.18 kg/m²     Body mass index is 26.18 kg/m².    Review of Systems   Constitutional:  Negative for chills, fatigue and fever.   HENT:  Negative for congestion.    Respiratory:  Negative for cough, shortness of breath and wheezing.    Cardiovascular: Negative.    Gastrointestinal:  Negative for abdominal pain, constipation, diarrhea, nausea and vomiting.   Genitourinary:  Negative for decreased urine volume, dysuria, frequency, hematuria and urgency.   Musculoskeletal:  Negative for back pain.   Skin:  Negative for rash, skin lesions and wound.   Neurological:  Negative for headache.   Psychiatric/Behavioral:  Negative for suicidal ideas.        Past History:  Medical History: has a past medical history of CAD (coronary artery disease) (11/03/2017), Cataracta, Chronic back pain (11/03/2017), Depression, Diabetes mellitus--Insulin Dependant (11/03/2017), GERD (gastroesophageal reflux disease) (11/03/2017), Hyperlipidemia (11/03/2017), Hypertension " (11/03/2017), Neuropathy, Osteoarthritis, Rotator cuff syndrome (6/23), Stroke (2022), Vitamin D deficiency, Wears glasses, and Wears hearing aid in both ears.   Surgical History: has a past surgical history that includes Lumbar spine surgery (1998); Back surgery (1999); Colonoscopy; Cardiac catheterization; Tonsillectomy; and Shoulder arthroscopy w/ rotator cuff repair (Right, 9/13/2023).   Family History: family history includes Cancer in his maternal grandfather; Diabetes in his father.   Social History: reports that he has been smoking cigarettes. He started smoking about 50 years ago. He has a 12.7 pack-year smoking history. He has been exposed to tobacco smoke. He has never used smokeless tobacco. He reports current drug use. Frequency: 7.00 times per week. Drug: Marijuana. He reports that he does not drink alcohol.    PHQ-2 Depression Screening  Little interest or pleasure in doing things?     Feeling down, depressed, or hopeless?     PHQ-2 Total Score          PHQ-9 Depression Screening  Little interest or pleasure in doing things?     Feeling down, depressed, or hopeless?     Trouble falling or staying asleep, or sleeping too much?     Feeling tired or having little energy?     Poor appetite or overeating?     Feeling bad about yourself - or that you are a failure or have let yourself or your family down?     Trouble concentrating on things, such as reading the newspaper or watching television?     Moving or speaking so slowly that other people could have noticed? Or the opposite - being so fidgety or restless that you have been moving around a lot more than usual?     Thoughts that you would be better off dead, or of hurting yourself in some way?     PHQ-9 Total Score     If you checked off any problems, how difficult have these problems made it for you to do your work, take care of things at home, or get along with other people?       PHQ-9 Total Score:        Patient screened positive for depression  based on a PHQ-9 score of 0 on 8/20/2024. Follow-up recommendations include:          Current Outpatient Medications:     aspirin 81 MG chewable tablet, Chew 1 tablet., Disp: , Rfl:     atorvastatin (LIPITOR) 80 MG tablet, Take 1 tablet by mouth Daily., Disp: 90 tablet, Rfl: 1    Brexpiprazole (Rexulti) 2 MG tablet, Take 1 tablet by mouth Daily., Disp: 90 tablet, Rfl: 0    cyclobenzaprine (FLEXERIL) 5 MG tablet, Take 1 tablet by mouth 3 (Three) Times a Day As Needed for Muscle Spasms., Disp: 30 tablet, Rfl: 1    diclofenac (VOLTAREN) 75 MG EC tablet, TAKE 1 TABLET BY MOUTH TWICE DAILY AS NEEDED FOR  BACK  PAIN,  JOINT  PAIN, Disp: 60 tablet, Rfl: 0    donepezil (ARICEPT) 10 MG tablet, TAKE 1 TABLET BY MOUTH ONCE DAILY WITH BREAKFAST FOR 90 DAYS, Disp: 30 tablet, Rfl: 0    DULoxetine (CYMBALTA) 60 MG capsule, Take 1 capsule by mouth in the morning, Disp: 90 capsule, Rfl: 0    empagliflozin (Jardiance) 25 MG tablet tablet, Take 1 tablet by mouth Daily., Disp: 90 tablet, Rfl: 1    levocetirizine (XYZAL) 5 MG tablet, Take 1 tablet by mouth Every Evening., Disp: 90 tablet, Rfl: 1    Magnesium 400 MG tablet, Take 400 mg by mouth Daily., Disp: , Rfl:     memantine (NAMENDA) 5 MG tablet, Take 1 tablet by mouth once daily, Disp: 30 tablet, Rfl: 0    nitroglycerin (NITROLINGUAL) 0.4 MG/SPRAY spray, Place 1 spray by translingual route on or under the tongue at the first sign of an attack, no more than 3 sprays / 15-minute., Disp: 1 each, Rfl: 0    omeprazole (priLOSEC) 20 MG capsule, Take 1 capsule by mouth Daily., Disp: 90 capsule, Rfl: 1    prazosin (MINIPRESS) 2 MG capsule, Take 1 capsule by mouth at bedtime., Disp: 90 capsule, Rfl: 0    SITagliptin (JANUVIA) 100 MG tablet, Take 1 tablet by mouth Daily., Disp: 90 tablet, Rfl: 1    traZODone (DESYREL) 50 MG tablet, Take 1 tablet by mouth every day at bedtime., Disp: 90 tablet, Rfl: 0    fluticasone (FLONASE) 50 MCG/ACT nasal spray, 2 sprays into the nostril(s) as directed by  provider Daily., Disp: 16 g, Rfl: 5    pioglitazone (Actos) 15 MG tablet, Take 1 tablet by mouth Daily., Disp: 30 tablet, Rfl: 3   (Not in a hospital admission)     Allergies: Hydrocodone, Penicillins, Sulfa antibiotics, Codeine, and Oxycontin [oxycodone]    Physical Exam  Constitutional:       Appearance: Normal appearance.   Cardiovascular:      Rate and Rhythm: Normal rate and regular rhythm.      Heart sounds: Normal heart sounds.   Pulmonary:      Effort: Pulmonary effort is normal.      Breath sounds: Normal breath sounds.   Neurological:      General: No focal deficit present.      Mental Status: He is alert and oriented to person, place, and time. Mental status is at baseline.   Psychiatric:         Mood and Affect: Mood normal.         Behavior: Behavior normal.         Thought Content: Thought content normal.         Judgment: Judgment normal.          Result Review :                   Assessment and Plan    Diagnoses and all orders for this visit:    1. Mixed hyperlipidemia (Primary)  Assessment & Plan:  Continue atorvastatin.  Discussed recent labs.  Continue to follow-up with cardiology.  Return to clinic or ED with any issues or concerns.      2. Type 2 diabetes mellitus with diabetic polyneuropathy, without long-term current use of insulin  Assessment & Plan:  Continue Jardiance and Januvia.  Will add Actos.  We discussed all recent labs.  Proper diet and exercise plan discussed and encouraged.  Check feet daily.  Annual eye exams encouraged.  Risk of meds discussed understood.  Return to clinic or ED with any issues or concerns.    Orders:  -     pioglitazone (Actos) 15 MG tablet; Take 1 tablet by mouth Daily.  Dispense: 30 tablet; Refill: 3    3. Leukocytosis, unspecified type  Assessment & Plan:  Discussed recent labs.  Recheck CBC today.  Education provided.    Orders:  -     CBC & Differential    4. Elevated alkaline phosphatase level  Assessment & Plan:  Education provided.  Recheck 1 month.   Return to clinic or ED with any issues or concerns.      5. Allergic rhinitis, unspecified seasonality, unspecified trigger  -     fluticasone (FLONASE) 50 MCG/ACT nasal spray; 2 sprays into the nostril(s) as directed by provider Daily.  Dispense: 16 g; Refill: 5                      Follow Up   Return in about 3 months (around 11/29/2024).  Patient was given instructions and counseling regarding his condition or for health maintenance advice. Please see specific information pulled into the AVS if appropriate.     JESSICA Chandler

## 2024-08-29 NOTE — ASSESSMENT & PLAN NOTE
Continue Jardiance and Januvia.  Will add Actos.  We discussed all recent labs.  Proper diet and exercise plan discussed and encouraged.  Check feet daily.  Annual eye exams encouraged.  Risk of meds discussed understood.  Return to clinic or ED with any issues or concerns.

## 2024-08-30 ENCOUNTER — OFFICE VISIT (OUTPATIENT)
Dept: NEUROLOGY | Facility: CLINIC | Age: 64
End: 2024-08-30
Payer: MEDICARE

## 2024-08-30 VITALS
SYSTOLIC BLOOD PRESSURE: 108 MMHG | HEART RATE: 56 BPM | OXYGEN SATURATION: 97 % | DIASTOLIC BLOOD PRESSURE: 58 MMHG | WEIGHT: 172.18 LBS | HEIGHT: 68 IN | BODY MASS INDEX: 26.1 KG/M2

## 2024-08-30 DIAGNOSIS — R26.81 GAIT INSTABILITY: ICD-10-CM

## 2024-08-30 DIAGNOSIS — G31.84 MILD COGNITIVE IMPAIRMENT: Primary | ICD-10-CM

## 2024-08-30 LAB
BASOPHILS # BLD AUTO: 0.1 X10E3/UL (ref 0–0.2)
BASOPHILS NFR BLD AUTO: 1 %
EOSINOPHIL # BLD AUTO: 0.1 X10E3/UL (ref 0–0.4)
EOSINOPHIL NFR BLD AUTO: 1 %
ERYTHROCYTE [DISTWIDTH] IN BLOOD BY AUTOMATED COUNT: 12.3 % (ref 11.6–15.4)
HCT VFR BLD AUTO: 43.7 % (ref 37.5–51)
HGB BLD-MCNC: 14.8 G/DL (ref 13–17.7)
IMM GRANULOCYTES # BLD AUTO: 0 X10E3/UL (ref 0–0.1)
IMM GRANULOCYTES NFR BLD AUTO: 0 %
LYMPHOCYTES # BLD AUTO: 2.5 X10E3/UL (ref 0.7–3.1)
LYMPHOCYTES NFR BLD AUTO: 30 %
MCH RBC QN AUTO: 33.5 PG (ref 26.6–33)
MCHC RBC AUTO-ENTMCNC: 33.9 G/DL (ref 31.5–35.7)
MCV RBC AUTO: 99 FL (ref 79–97)
MONOCYTES # BLD AUTO: 0.6 X10E3/UL (ref 0.1–0.9)
MONOCYTES NFR BLD AUTO: 7 %
NEUTROPHILS # BLD AUTO: 5.2 X10E3/UL (ref 1.4–7)
NEUTROPHILS NFR BLD AUTO: 61 %
PLATELET # BLD AUTO: 160 X10E3/UL (ref 150–450)
RBC # BLD AUTO: 4.42 X10E6/UL (ref 4.14–5.8)
WBC # BLD AUTO: 8.4 X10E3/UL (ref 3.4–10.8)

## 2024-08-30 RX ORDER — MEMANTINE HYDROCHLORIDE 5 MG/1
5 TABLET ORAL 2 TIMES DAILY
Qty: 60 TABLET | Refills: 2 | Status: SHIPPED | OUTPATIENT
Start: 2024-08-30

## 2024-08-30 RX ORDER — DONEPEZIL HYDROCHLORIDE 10 MG/1
10 TABLET, FILM COATED ORAL NIGHTLY
Qty: 30 TABLET | Refills: 2 | Status: SHIPPED | OUTPATIENT
Start: 2024-08-30

## 2024-08-30 NOTE — PROGRESS NOTES
Neuro Office Visit      Encounter Date: 2024   Patient Name: Santi Souza Sr.  : 1960   MRN: [id number]   PCP:  Jordan Heart APRN     Chief Complaint:    Chief Complaint   Patient presents with    Memory Loss       History of Present Illness: Santi Souza Sr. is a 64 y.o. male who is here today in Neurology for  memory loss    Initial visit 2023:  Santi Souza Sr. is a 63 y.o. male who is here today in Neurology for  memory loss     PMH of CAD, chronic back pain, depression insulin-dependent diabetes, GERD, hyperlipidemia, hypertension, neuropathy, osteoarthritis, rotator cuff syndrome, CVA in  with short-term memory and difficulty with balance, vitamin D deficiency, glasses, hearing aid use     Patient was referred by primary care after visit on 2023 for evaluation of cerebrovascular disease, memory deficits, cognitive impairment, and PTSD.  He reported at that visit that due to this history he has trouble doing daily life skills in the home such as planning and taking care of things such as bills.  He has been seeing neurology Trisha PEGUERO at [facility] and would like a second opinion.  Feels that his memory and cognitive impairment is worsening.  He is following with psychiatry now with [facility] and states this has been very helpful for him.  MRI brain performed in 2022 for memory loss which per impression stated ventricles and sulci normal in size.  Mild amount of scattered T2 hyperintensities predominantly in the subcortical regions of the frontal lobes, indicating chronic small vessel disease.  Enlarged perivascular spaces are incidentally noted in left cerebral peduncle of the midbrain.  No abnormal parenchymal susceptibility artifact or restricted diffusion.  No midline shift, hydrocephalus, or extra-axial fluid collection is evident.  He does follow with [facility] neuroscience [place] and was last seen in their office on  4/20/2023 with MARIELENA Gorman.  Donepezil was increased to 10 mg daily.  He was assessed to have mild cognitive impairment due to vascular disease and PTSD.     In clinic today he reports that memory changes have progressively worsened, he has trouble remembering appointments, trouble remembering steps through making a sandwich, trouble with conversations - he often will lose train of thought mid conversation. [facility] describes as feeling [facility]/needing mental health help - he denies SI, he called crisis hotline yesterday, reports that [facility] feels [facility], he reports history of trauma. He notes emotional stress from co-pays and family concerns. He reports that he had CVA previously that was seen on prior MRI - per report from [facility] no stroke was mentioned, will repeat MRI brain to further assess, currently taking ASA 81 mg plus Atorvastatin 80 mg. Currently taking Donepezil 10 mg daily. He reports gait unsteadiness which was previously assessed by Dr. Gonsalez and felt to be d/t peripheral neuropathy.      Follow up visit 2/21/2024:  Alonso Ellis returns to neurology clinic for continued evaluation of memory loss and gait instability. MRI brain performed on 1/17/2024 showed findings compatible with chronic microvascular ischemic change, no restricted diffusion to suggest acute infarct.  He also has been following with behavioral health, Derrick LOPEZ for bipolar 2 disorder, generalized anxiety disorder, depression, PTSD, circadian rhythm sleep disorder. RPR nonreactive, methylmalonic acid 273, vitamin B-12 1,466, folate >20, Thyroid function was normal. Recently evicted from his home, moved into senior community, [facility] in [place]. He feels much safer where he is currently living. He has difficulty with short term memory. He feels that his memory lately has continued to decline - he does acknowledge much mental health stress in that time which he believes  has contributed to his memory loss. He is not driving.        Current visit 8/30/2024  Alonso Ellis returns to neurology clinic for continued evaluation of MCI, he reports that his pharmacy did not supply Donepezil and Memantine for about 2 months and he noted significant change in cognition/focus during that time, he is currently back on Donepezil and Memantine and has been on them for about a month and does note some improvement since resuming these medications. Notes that his short term memory continues to be poor overall. No SE from Donepezil or Namenda. Sleep is okay. He reports that mood comes and goes, does better when he is busy. He manages his medications. He continues to reside at [facility] in [place] and feels safe there. No falls. He reports that he will stagger some while walking, feels that his balance is off at times. Does have intermittent diabetic neuropathy pain, does not bother him all the time. He reports from prior back surgery he has numbness in his right foot as well. He is interested in PT for gait/balance training.       Subjective      Review of Systems   Constitutional: Negative.    HENT: Negative.     Respiratory: Negative.     Cardiovascular: Negative.    Gastrointestinal: Negative.    Endocrine: Negative.    Genitourinary: Negative.    Musculoskeletal:  Positive for gait problem.   Skin: Negative.    Neurological:  Positive for numbness.        Memory loss   Psychiatric/Behavioral:  Positive for sleep disturbance.         Reports that mood can vary, feels stable today          Past Medical History:   Past Medical History:   Diagnosis Date    CAD (coronary artery disease) 11/03/2017    Cataracta     Chronic back pain 11/03/2017    Depression     Diabetes mellitus--Insulin Dependant 11/03/2017    IDDM    GERD (gastroesophageal reflux disease) 11/03/2017    Hyperlipidemia 11/03/2017    Hypertension 11/03/2017    Neuropathy     Osteoarthritis     Rotator cuff syndrome 6/23    Should be on my  chart    Stroke 2022    short term memory and difficulty balance    Vitamin D deficiency     Wears glasses     Wears hearing aid in both ears        Past Surgical History:   Past Surgical History:   Procedure Laterality Date    BACK SURGERY  1999    4 lumbar surgeries    CARDIAC CATHETERIZATION      total of 7 stents    COLONOSCOPY      LUMBAR SPINE SURGERY  1998 2006, 2013    SHOULDER ARTHROSCOPY W/ ROTATOR CUFF REPAIR Right 9/13/2023    Procedure: SHOULDER ARTHROSCOPY WITH ROTATOR CUFF REPAIR, BICEP TENDONESIS, SUBACROMIAL DECOMPRESSION- RIGHT;  Surgeon: Geoffrey Francis MD;  Location: [facility];  Service: Orthopedics;  Laterality: Right;    TONSILLECTOMY         Family History:   Family History   Problem Relation Age of Onset    Diabetes Father     Cancer Maternal Grandfather        Social History:   Social History     Socioeconomic History    Marital status: Single   Tobacco Use    Smoking status: Some Days     Current packs/day: 0.25     Average packs/day: 0.3 packs/day for 50.7 years (12.7 ttl pk-yrs)     Types: Cigarettes     Start date: 1/1/1974     Passive exposure: Current    Smokeless tobacco: Never   Vaping Use    Vaping status: Never Used   Substance and Sexual Activity    Alcohol use: Never    Drug use: Yes     Frequency: 7.0 times per week     Types: Marijuana     Comment: vape every night or smoke    Sexual activity: Defer     Partners: Female     Birth control/protection: Condom       Medications:     Current Outpatient Medications:     aspirin 81 MG chewable tablet, Chew 1 tablet., Disp: , Rfl:     atorvastatin (LIPITOR) 80 MG tablet, Take 1 tablet by mouth Daily., Disp: 90 tablet, Rfl: 1    Brexpiprazole (Rexulti) 2 MG tablet, Take 1 tablet by mouth Daily., Disp: 90 tablet, Rfl: 0    cyclobenzaprine (FLEXERIL) 5 MG tablet, Take 1 tablet by mouth 3 (Three) Times a Day As Needed for Muscle Spasms., Disp: 30 tablet, Rfl: 1    diclofenac (VOLTAREN) 75 MG EC tablet, TAKE 1 TABLET BY MOUTH TWICE  DAILY AS NEEDED FOR  BACK  PAIN,  JOINT  PAIN, Disp: 60 tablet, Rfl: 0    DULoxetine (CYMBALTA) 60 MG capsule, Take 1 capsule by mouth in the morning, Disp: 90 capsule, Rfl: 0    empagliflozin (Jardiance) 25 MG tablet tablet, Take 1 tablet by mouth Daily., Disp: 90 tablet, Rfl: 1    fluticasone (FLONASE) 50 MCG/ACT nasal spray, 2 sprays into the nostril(s) as directed by provider Daily., Disp: 16 g, Rfl: 5    levocetirizine (XYZAL) 5 MG tablet, Take 1 tablet by mouth Every Evening., Disp: 90 tablet, Rfl: 1    Magnesium 400 MG tablet, Take 400 mg by mouth Daily., Disp: , Rfl:     nitroglycerin (NITROLINGUAL) 0.4 MG/SPRAY spray, Place 1 spray by translingual route on or under the tongue at the first sign of an attack, no more than 3 sprays / 15-minute., Disp: 1 each, Rfl: 0    omeprazole (priLOSEC) 20 MG capsule, Take 1 capsule by mouth Daily., Disp: 90 capsule, Rfl: 1    prazosin (MINIPRESS) 2 MG capsule, Take 1 capsule by mouth at bedtime., Disp: 90 capsule, Rfl: 0    SITagliptin (JANUVIA) 100 MG tablet, Take 1 tablet by mouth Daily., Disp: 90 tablet, Rfl: 1    traZODone (DESYREL) 50 MG tablet, Take 1 tablet by mouth every day at bedtime., Disp: 90 tablet, Rfl: 0    donepezil (ARICEPT) 10 MG tablet, Take 1 tablet by mouth Every Night., Disp: 30 tablet, Rfl: 2    memantine (NAMENDA) 5 MG tablet, Take 1 tablet by mouth 2 (Two) Times a Day., Disp: 60 tablet, Rfl: 2    pioglitazone (Actos) 15 MG tablet, Take 1 tablet by mouth Daily., Disp: 30 tablet, Rfl: 3    Allergies:   Allergies   Allergen Reactions    Hydrocodone Itching    Penicillins Swelling and Provider Review Needed     Entire body swelled as a child     Sulfa Antibiotics Shortness Of Breath, Rash and Provider Review Needed    Codeine Nausea And Vomiting, Confusion and Provider Review Needed    Oxycontin [Oxycodone] Itching         KINAADI Fall Risk Assessment was completed, and patient is at LOW risk for falls.Assessment completed on:8/30/2024    Objective  "    Objective:    /58   Pulse 56   Ht 172.7 cm (68\")   Wt 78.1 kg (172 lb 2.9 oz)   SpO2 97%   BMI 26.18 kg/m²   Body mass index is 26.18 kg/m².    Physical Exam  Vitals reviewed.   Constitutional:       Appearance: Normal appearance.   HENT:      Head: Normocephalic and atraumatic.      Mouth/Throat:      Mouth: Mucous membranes are moist.      Pharynx: Oropharynx is clear.   Pulmonary:      Effort: Pulmonary effort is normal. No respiratory distress.   Skin:     General: Skin is warm and dry.   Neurological:      Mental Status: He is alert.          Neurology Exam:    General apperance: NAD.     Mental status: Alert, awake and oriented to person, year, date, day, month, state, county, city, hospital, floor    Fund of knowledge:  Normal.     Language and Speech: No aphasia or dysarthria.    Naming , Repetition and Comprehension:  Can name objects, repeat a sentence and follow commands. Speech is clear and fluent with good repetition, comprehension, and naming.    Cranial Nerves:   CN II: Visual fields are full. Intact. Pupils - PERRLA  CN III, IV and VI: Extraocular movements are intact. Normal saccades.   CN V: Facial sensation is intact.   CN VII: Muscles of facial expression reveal no asymmetry. Intact.   CN VIII: Hearing is intact.   CN IX and X: Palate elevates symmetrically. Intact  CN XI: Shoulder shrug is intact.   CN XII: Tongue is midline without evidence of atrophy or fasciculation.     Motor:  Right UE muscle strength 5/5. Normal tone.     Left UE muscle strength 5/5. Normal tone.      Right LE muscle strength 5/5. Normal tone.     Left LE muscle strength 5/5. Normal tone.      Sensory: Normal light touch sensation bilaterally.    DTRs: 2+ bilaterally in upper and lower extremities.    Coordination: Normal finger-to-nose    Gait: Slight unsteadiness noted in gait, ambulates without assistive device.    MMSE 26/30, recall 1/3          Results:   Imaging:   No Images in the past 120 days " found..     Labs:   Lab Results   Component Value Date    GLUCOSE 149 (H) 08/20/2024    BUN 9 08/20/2024    CREATININE 0.91 08/20/2024     08/20/2024    K 4.5 08/20/2024     08/20/2024    CALCIUM 9.9 08/20/2024    PROTEINTOT 7.3 09/16/2023    ALBUMIN 4.7 08/20/2024    ALT 17 08/20/2024    AST 17 08/20/2024    ALKPHOS 144 (H) 08/20/2024    BILITOT 0.7 08/20/2024    GLOB 3.0 09/16/2023    AGRATIO 1.4 09/16/2023    BCR 10 08/20/2024    ANIONGAP 13.0 09/16/2023    EGFR 102.0 09/16/2023         Assessment / Plan      Assessment/Plan:   Diagnoses and all orders for this visit:    1. Mild cognitive impairment (Primary)  -     donepezil (ARICEPT) 10 MG tablet; Take 1 tablet by mouth Every Night.  Dispense: 30 tablet; Refill: 2  -     memantine (NAMENDA) 5 MG tablet; Take 1 tablet by mouth 2 (Two) Times a Day.  Dispense: 60 tablet; Refill: 2    2. Gait instability  -     Ambulatory Referral to Physical Therapy for Evaluation & Treatment       Santi Souza returns to neurology clinic for continued evaluation of mild cognitive impairment, MMSE today 26 out of 30, recall 1 out of 3.  He has been taking donepezil 10 mg nightly and Namenda 5 mg nightly, he reports that he did not take his medications for approximately 2 months over the summer and noticed subsequent brain fog and change in cognition, he has resumed his medications approximately 1 month ago and does note mild improvement overall.  He continues to follow with psychiatry.  He previously saw memory care with Dr. Gonsalez it was felt that his memory loss was d/t vascular disease and PTSD. Will continue Donepezil 10 mg today and increase Namenda to 5 mg BID. He is to With response to medication adjustment in 1 to 2 weeks.  He continues to note mild gait instability for which we have elected to pursue PT for gait and balance training.  Otherwise we will plan to see Mr. Souza back in clinic in 3 months or sooner for any questions or concerns.    Patient  Education:       Reviewed medications, potential side effects and signs and symptoms to report. Discussed risk versus benefits of treatment plan with patient and/or family-including medications, labs and radiology that may be ordered. Addressed questions and concerns during visit. Patient and/or family verbalized understanding and agree with plan. Instructed to call the office with any questions and report to ER with any life-threatening symptoms.     Follow Up:   Return in about 3 months (around 11/30/2024).    I spent  27  minutes in the care of this patient. I personally spent 50 percent of this time counseling and discussing diagnosis, diagnostic testing, evaluation, treatment options, and management .       During this visit the following were done:  Labs Reviewed [x]    Labs Ordered []    Radiology Reports Reviewed []    Radiology Ordered []    PCP Records Reviewed []    Referring Provider Records Reviewed []    ER Records Reviewed []    Hospital Records Reviewed []    History Obtained From Family []    Radiology Images Reviewed []    Other Reviewed []    Records Requested []      JESSICA Watson  [facility]  [address] [address] [phone]

## 2024-09-03 ENCOUNTER — OFFICE VISIT (OUTPATIENT)
Dept: BEHAVIORAL HEALTH | Facility: CLINIC | Age: 64
End: 2024-09-03
Payer: MEDICARE

## 2024-09-03 DIAGNOSIS — F33.1 MODERATE EPISODE OF RECURRENT MAJOR DEPRESSIVE DISORDER: Primary | ICD-10-CM

## 2024-09-03 DIAGNOSIS — F41.1 GENERALIZED ANXIETY DISORDER: ICD-10-CM

## 2024-09-03 PROCEDURE — 90837 PSYTX W PT 60 MINUTES: CPT

## 2024-09-03 PROCEDURE — 1160F RVW MEDS BY RX/DR IN RCRD: CPT

## 2024-09-03 PROCEDURE — 1159F MED LIST DOCD IN RCRD: CPT

## 2024-09-03 NOTE — PROGRESS NOTES
"     Follow Up Adult Note     Date:2024   Patient Name: Santi Souza Sr.  : 1960   MRN: [id number]   Time IN: 1:00 pm    Time OUT: 1:57 pm     Referring Provider: Jordan Heart APRN    Chief Complaint:      ICD-10-CM ICD-9-CM   1. Moderate episode of recurrent major depressive disorder  F33.1 296.32   2. Generalized anxiety disorder  F41.1 300.02        History of Present Illness:   Santi Souza Sr. is a 64 y.o., single, disabled male who is being seen today for Psychotherapy session. Mood reported as \"decent for a change\" with somewhat lethargic but overall cooperative and improved affect. Patient presented as somewhat tired at times but remained overall calm, cooperative, more engaged, and pleasant with provider. Patient denied any current SI/HI/AVH.     \"The [employer] should be in town to help me purchase some new tires for my truck-still waiting on the tags though which is frustrating\"  \"I feel like I've been socializing more-had a cook out with some people at the apartment complex and I enjoyed getting together with people\"  \"I have been doing more around my apartment to get it put together-I guess I am happier but do have some anxiety from my short term memory and remembering to do things\"      Subjective     PHQ-9 Depression Screening  PHQ-9 Total Score:  Not completed at this time     GARETT-7   Not completed at this time    Patient's Support Network Includes:   Friends at community center and apartment complex    Functional Status: Mild impairment     Progress toward goal: Not at goal    Prognosis: Fair with Ongoing Treatment     Medications:     Current Outpatient Medications:     aspirin 81 MG chewable tablet, Chew 1 tablet., Disp: , Rfl:     atorvastatin (LIPITOR) 80 MG tablet, Take 1 tablet by mouth Daily., Disp: 90 tablet, Rfl: 1    Brexpiprazole (Rexulti) 2 MG tablet, Take 1 tablet by mouth Daily., Disp: 90 tablet, Rfl: 0    cyclobenzaprine (FLEXERIL) 5 MG tablet, Take 1 tablet " by mouth 3 (Three) Times a Day As Needed for Muscle Spasms., Disp: 30 tablet, Rfl: 1    diclofenac (VOLTAREN) 75 MG EC tablet, TAKE 1 TABLET BY MOUTH TWICE DAILY AS NEEDED FOR  BACK  PAIN,  JOINT  PAIN, Disp: 60 tablet, Rfl: 0    donepezil (ARICEPT) 10 MG tablet, Take 1 tablet by mouth Every Night., Disp: 30 tablet, Rfl: 2    DULoxetine (CYMBALTA) 60 MG capsule, Take 1 capsule by mouth in the morning, Disp: 90 capsule, Rfl: 0    empagliflozin (Jardiance) 25 MG tablet tablet, Take 1 tablet by mouth Daily., Disp: 90 tablet, Rfl: 1    fluticasone (FLONASE) 50 MCG/ACT nasal spray, 2 sprays into the nostril(s) as directed by provider Daily., Disp: 16 g, Rfl: 5    levocetirizine (XYZAL) 5 MG tablet, Take 1 tablet by mouth Every Evening., Disp: 90 tablet, Rfl: 1    Magnesium 400 MG tablet, Take 400 mg by mouth Daily., Disp: , Rfl:     memantine (NAMENDA) 5 MG tablet, Take 1 tablet by mouth 2 (Two) Times a Day., Disp: 60 tablet, Rfl: 2    nitroglycerin (NITROLINGUAL) 0.4 MG/SPRAY spray, Place 1 spray by translingual route on or under the tongue at the first sign of an attack, no more than 3 sprays / 15-minute., Disp: 1 each, Rfl: 0    omeprazole (priLOSEC) 20 MG capsule, Take 1 capsule by mouth Daily., Disp: 90 capsule, Rfl: 1    pioglitazone (Actos) 15 MG tablet, Take 1 tablet by mouth Daily., Disp: 30 tablet, Rfl: 3    prazosin (MINIPRESS) 2 MG capsule, Take 1 capsule by mouth at bedtime., Disp: 90 capsule, Rfl: 0    SITagliptin (JANUVIA) 100 MG tablet, Take 1 tablet by mouth Daily., Disp: 90 tablet, Rfl: 1    traZODone (DESYREL) 50 MG tablet, Take 1 tablet by mouth every day at bedtime., Disp: 90 tablet, Rfl: 0    Allergies:   Allergies   Allergen Reactions    Hydrocodone Itching    Penicillins Swelling and Provider Review Needed     Entire body swelled as a child     Sulfa Antibiotics Shortness Of Breath, Rash and Provider Review Needed    Codeine Nausea And Vomiting, Confusion and Provider Review Needed    Oxycontin  "[Oxycodone] Itching       Objective     Mental Status Exam:   MENTAL STATUS EXAM   General Appearance:  Cleanly groomed and dressed  Eye Contact:  Fair  Attitude:  Cooperative and polite  Motor Activity:  Normal gait, posture  Muscle Strength:  Normal  Speech:  Normal rate, tone, volume  Language:  Spontaneous  Mood and affect:  Other  Other Comment:  Mood reported as \"decent for a change\" with somewhat lethargic but overall cooperative and improved affect.  Hopelessness:  Optimistic  Loneliness: 4  Thought Process:  Linear  Associations/ Thought Content:  No delusions  Hallucinations:  None  Suicidal Ideations:  Not present  Homicidal Ideation:  Not present  Sensorium:  Other  Other Comment:  Somewhat lethargic and tired but overall alert and clear  Orientation:  Person, place, time and situation  Immediate Recall, Recent, and Remote Memory:  Intact  Attention Span/ Concentration:  Other  Other Comment:  Fair  Fund of Knowledge:  Appropriate for age and educational level  Intellectual Functioning:  Average range  Insight:  Fair  Judgement:  Good  Reliability:  Good  Impulse Control:  Good       Assessment / Plan      Visit Diagnosis/Orders Placed This Visit:    ICD-10-CM ICD-9-CM   1. Moderate episode of recurrent major depressive disorder  F33.1 296.32   2. Generalized anxiety disorder  F41.1 300.02        PLAN:  Safety: No acute safety concerns  Risk Assessment: Risk of self-harm acutely is low. Risk of self-harm chronically is also low, but could be further elevated in the event of treatment noncompliance and/or AODA.    Treatment Plan/Goals: Continue supportive psychotherapy efforts and medications as indicated. Treatment and medication options discussed during today's visit. Patient ackowledged and verbally consented to continue with current treatment plan and was educated on the importance of compliance with treatment and follow-up appointments. Patient seems reasonably able to adhere to treatment plan.  "     Assisted Patient in processing above session content; acknowledged and normalized patient’s thoughts, feelings, and concerns. Assisted patient in processing recent stressors/emotions/feelings and utilized CBT techniques to assist patient with challenging negative/irrational thoughts and beliefs and replacing them with rational ones while also utilizing Socratic Questioning techniques and open ended questions to encourage reflection. Discussed memory concerns while assisting patient with identifying normal routine/daily schedules and collaborating on organizational skills. Continued to provide praise to patient for ongoing success with socializing. Patient was receptive to therapeutic feedback.      Allowed Patient to freely discuss issues  without interruption or judgement with unconditional positive regard, active listening skills, and empathy. Therapist provided a safe, confidential environment to facilitate the development of a positive therapeutic relationship and encouraged open, honest communication. Assisted Patient in identifying risk factors which would indicate the need for higher level of care including thoughts to harm self or others and/or self-harming behavior and encouraged Patient to contact this office, call 911, or present to the nearest emergency room should any of these events occur. Discussed crisis intervention services and means to access. Patient adamantly and convincingly denies current suicidal or homicidal ideation or perceptual disturbance. Assisted Patient in processing session content; acknowledged and normalized Patient’s thoughts, feelings, and concerns by utilizing a person-centered approach in efforts to build appropriate rapport and a positive therapeutic relationship with open and honest communication. .     Follow Up:   Return in about 1 week (around 9/10/2024) for Therapy session.    Stefan Tran LCSW [facility]

## 2024-09-04 ENCOUNTER — TELEPHONE (OUTPATIENT)
Dept: FAMILY MEDICINE CLINIC | Facility: CLINIC | Age: 64
End: 2024-09-04
Payer: MEDICARE

## 2024-09-04 NOTE — TELEPHONE ENCOUNTER
"Name: Santi Souza Sr. \"Alonso Ellis\"      Relationship: Self      Best Callback Number: [phone]       HUB PROVIDED THE RELAY MESSAGE FROM THE OFFICE      PATIENT: HAS FURTHER QUESTIONS AND WOULD LIKE A CALL BACK AT THE FOLLOWING PHONE [phone]    ADDITIONAL INFORMATION: PATIENT THOUGHT THIS WAS ABOUT A LUNG CANCER SCREENING. PLEASE CALL BACK.  "

## 2024-09-04 NOTE — TELEPHONE ENCOUNTER
HUB TO RELAY    Please let patient know from labs his white blood cell count and neutrophils which are a type of white blood cell have returned to normal which is good.  Thanks

## 2024-09-04 NOTE — TELEPHONE ENCOUNTER
Spoke with patient. Explained that we have not gotten the CT results back for review but once we do we will give him a call and let him know. Pt voiced understanding.

## 2024-09-05 ENCOUNTER — TELEPHONE (OUTPATIENT)
Dept: FAMILY MEDICINE CLINIC | Facility: CLINIC | Age: 64
End: 2024-09-05
Payer: MEDICARE

## 2024-09-05 NOTE — TELEPHONE ENCOUNTER
HUB TO RELAY    Please let patient know that his lung CT scan shows no suspicious pulmonary nodules or masses.  They recommend he continue annual screening with low-dose lung CT scans in 1 year.     It does note plaque buildup of the arteries of the heart so it is important that he continue to follow-up with his cardiologist as well. Thanks

## 2024-09-06 DIAGNOSIS — G31.84 MILD COGNITIVE IMPAIRMENT: ICD-10-CM

## 2024-09-06 RX ORDER — MEMANTINE HYDROCHLORIDE 5 MG/1
5 TABLET ORAL DAILY
Qty: 30 TABLET | Refills: 0 | OUTPATIENT
Start: 2024-09-06

## 2024-09-06 NOTE — TELEPHONE ENCOUNTER
Rx Refill Note  Requested Prescriptions     Pending Prescriptions Disp Refills    memantine (NAMENDA) 5 MG tablet [Pharmacy Med Name: Memantine HCl 5 MG Oral Tablet] 30 tablet 0     Sig: Take 1 tablet by mouth once daily      Last filled:  Last office 08/30/2024  visit with prescribing clinician: 8/30/2024      Next office visit with prescribing clinician: 12/6/2024     Yaima Bejarano MA  09/06/24, 14:54 EDT    Denied-Duplicate request

## 2024-09-06 NOTE — TELEPHONE ENCOUNTER
LV for patient to call back         HUB TO RELAY     Please let patient know that his lung CT scan shows no suspicious pulmonary nodules or masses.  They recommend he continue annual screening with low-dose lung CT scans in 1 year.     It does note plaque buildup of the arteries of the heart so it is important that he continue to follow-up with his cardiologist as well. Thanks

## 2024-09-25 ENCOUNTER — OFFICE VISIT (OUTPATIENT)
Dept: BEHAVIORAL HEALTH | Facility: CLINIC | Age: 64
End: 2024-09-25
Payer: MEDICARE

## 2024-09-25 DIAGNOSIS — F33.0 MILD EPISODE OF RECURRENT MAJOR DEPRESSIVE DISORDER: Primary | ICD-10-CM

## 2024-09-25 DIAGNOSIS — R41.3 MEMORY DEFICITS: ICD-10-CM

## 2024-09-25 DIAGNOSIS — F39 MOOD DISORDER: ICD-10-CM

## 2024-09-25 DIAGNOSIS — F43.10 POST TRAUMATIC STRESS DISORDER (PTSD): ICD-10-CM

## 2024-09-25 DIAGNOSIS — F41.1 GENERALIZED ANXIETY DISORDER: ICD-10-CM

## 2024-09-25 PROCEDURE — 90834 PSYTX W PT 45 MINUTES: CPT

## 2024-09-25 PROCEDURE — 1160F RVW MEDS BY RX/DR IN RCRD: CPT

## 2024-09-25 PROCEDURE — 1159F MED LIST DOCD IN RCRD: CPT

## 2024-10-08 ENCOUNTER — OFFICE VISIT (OUTPATIENT)
Dept: BEHAVIORAL HEALTH | Facility: CLINIC | Age: 64
End: 2024-10-08
Payer: MEDICARE

## 2024-10-08 DIAGNOSIS — F41.1 GENERALIZED ANXIETY DISORDER: ICD-10-CM

## 2024-10-08 DIAGNOSIS — F33.1 MODERATE EPISODE OF RECURRENT MAJOR DEPRESSIVE DISORDER: Primary | ICD-10-CM

## 2024-10-08 PROCEDURE — 90837 PSYTX W PT 60 MINUTES: CPT

## 2024-10-08 PROCEDURE — 1160F RVW MEDS BY RX/DR IN RCRD: CPT

## 2024-10-08 PROCEDURE — 1159F MED LIST DOCD IN RCRD: CPT

## 2024-10-08 NOTE — PROGRESS NOTES
"     Follow Up Adult Note     Date:10/08/2024   Patient Name: Santi Souza Sr.  : 1960   MRN: [id number]   Time IN: 1:00 pm    Time OUT: 1:55 pm     Referring Provider: Jordan Heart APRN    Chief Complaint:      ICD-10-CM ICD-9-CM   1. Moderate episode of recurrent major depressive disorder  F33.1 296.32   2. Generalized anxiety disorder  F41.1 300.02        History of Present Illness:   Santi Souza Sr. is a 64 y.o., single, disabled male who is being seen today for scheduled Psychotherapy session. Mood reported as \"I'm here, alright I guess\" with somewhat dysphoric affect. Patient presented as more dysphoric and down at times but remained overall calm, cooperative, engaged, and pleasant with provider. Patient denied any current SI/HI/AVH. Patient expressed feeling loss of pleasure, fatigue, some guilt and shame from past relationships, decreased concentration with overall depressed mood. Furthermore, patient endorsed ongoing medical concerns and short term memory deficits only exacerbating anxiety.     \"I have a lot of aches and pains-body has been hurting\"  \"I will have 2-3 good days then 4-5 bad days\"  \"The short term memory issues suck-not being able to remember things and zoning out\"  \"Keeping track of things are getting harder and that gets me more anxious thinking about not remembering things or just that it's getting worse\"  \"I worry about our world too, society is just lost it's way\"  \"I have been thinking more about how to reach out to family and my son-it's just tough, but I know I will regret it if I don't\"      Subjective     PHQ-9 Depression Screening  PHQ-9 Total Score:  Not completed at this time     GARETT-7   Not completed at this time    Patient's Support Network Includes:   Friends at [facility] and apartment complex    Functional Status: Moderate impairment     Progress toward goal: Not at goal    Prognosis: Fair with Ongoing Treatment     Medications: "     Current Outpatient Medications:     aspirin 81 MG chewable tablet, Chew 1 tablet., Disp: , Rfl:     atorvastatin (LIPITOR) 80 MG tablet, Take 1 tablet by mouth Daily., Disp: 90 tablet, Rfl: 1    Brexpiprazole (Rexulti) 2 MG tablet, Take 1 tablet by mouth Daily., Disp: 90 tablet, Rfl: 0    cyclobenzaprine (FLEXERIL) 5 MG tablet, Take 1 tablet by mouth 3 (Three) Times a Day As Needed for Muscle Spasms., Disp: 30 tablet, Rfl: 1    diclofenac (VOLTAREN) 75 MG EC tablet, TAKE 1 TABLET BY MOUTH TWICE DAILY AS NEEDED FOR  BACK  PAIN,  JOINT  PAIN, Disp: 60 tablet, Rfl: 0    donepezil (ARICEPT) 10 MG tablet, Take 1 tablet by mouth Every Night., Disp: 30 tablet, Rfl: 2    DULoxetine (CYMBALTA) 60 MG capsule, Take 1 capsule by mouth in the morning, Disp: 90 capsule, Rfl: 0    empagliflozin (Jardiance) 25 MG tablet tablet, Take 1 tablet by mouth Daily., Disp: 90 tablet, Rfl: 1    fluticasone (FLONASE) 50 MCG/ACT nasal spray, 2 sprays into the nostril(s) as directed by provider Daily., Disp: 16 g, Rfl: 5    levocetirizine (XYZAL) 5 MG tablet, Take 1 tablet by mouth Every Evening., Disp: 90 tablet, Rfl: 1    Magnesium 400 MG tablet, Take 400 mg by mouth Daily., Disp: , Rfl:     memantine (NAMENDA) 5 MG tablet, Take 1 tablet by mouth 2 (Two) Times a Day., Disp: 60 tablet, Rfl: 2    nitroglycerin (NITROLINGUAL) 0.4 MG/SPRAY spray, Place 1 spray by translingual route on or under the tongue at the first sign of an attack, no more than 3 sprays / 15-minute., Disp: 1 each, Rfl: 0    omeprazole (priLOSEC) 20 MG capsule, Take 1 capsule by mouth Daily., Disp: 90 capsule, Rfl: 1    pioglitazone (Actos) 15 MG tablet, Take 1 tablet by mouth Daily., Disp: 30 tablet, Rfl: 3    prazosin (MINIPRESS) 2 MG capsule, Take 1 capsule by mouth at bedtime., Disp: 90 capsule, Rfl: 0    SITagliptin (JANUVIA) 100 MG tablet, Take 1 tablet by mouth Daily., Disp: 90 tablet, Rfl: 1    traZODone (DESYREL) 50 MG tablet, Take 1 tablet by mouth every day at  "bedtime., Disp: 90 tablet, Rfl: 0    Allergies:   Allergies   Allergen Reactions    Hydrocodone Itching    Penicillins Swelling and Provider Review Needed     Entire body swelled as a child     Sulfa Antibiotics Shortness Of Breath, Rash and Provider Review Needed    Codeine Nausea And Vomiting, Confusion and Provider Review Needed    Oxycontin [Oxycodone] Itching       Objective     Mental Status Exam:   MENTAL STATUS EXAM   General Appearance:  Cleanly groomed and dressed  Eye Contact:  Downcast  Attitude:  Cooperative and polite  Motor Activity:  Normal gait, posture  Muscle Strength:  Abnormal  Speech:  Normal rate, tone, volume and soft spoken  Language:  Spontaneous  Mood and affect:  Other  Other Comment:  Mood reported as \"I'm here, alright I guess\" with somewhat dysphoric affect.  Hopelessness:  2  Loneliness: 5  Thought Process:  Linear  Associations/ Thought Content:  No delusions  Hallucinations:  None  Suicidal Ideations:  Not present  Homicidal Ideation:  Not present  Sensorium:  Drowsy and clouded  Orientation:  Person, situation, time and place  Immediate Recall, Recent, and Remote Memory:  Deficit noted  Attention Span/ Concentration:  Other  Other Comment:  Fair  Fund of Knowledge:  Appropriate for age and educational level  Intellectual Functioning:  Average range  Insight:  Fair  Judgement:  Fair  Reliability:  Fair  Impulse Control:  Good       Assessment / Plan      Visit Diagnosis/Orders Placed This Visit:    ICD-10-CM ICD-9-CM   1. Moderate episode of recurrent major depressive disorder  F33.1 296.32   2. Generalized anxiety disorder  F41.1 300.02        PLAN:  Safety: No acute safety concerns  Risk Assessment: Risk of self-harm acutely is low. Risk of self-harm chronically is also low, but could be further elevated in the event of treatment noncompliance and/or AODA.    Treatment Plan/Goals: Continue supportive psychotherapy efforts and medications as indicated. Treatment and medication " options discussed during today's visit. Patient ackowledged and verbally consented to continue with current treatment plan and was educated on the importance of compliance with treatment and follow-up appointments. Patient seems reasonably able to adhere to treatment plan.      Assisted Patient in processing above session content; acknowledged and normalized patient’s thoughts, feelings, and concerns.  Assisted patient in processing recent stressors/emotions/feelings and utilized CBT techniques to assist patient with challenging negative/irrational thoughts and beliefs and replacing them with rational ones while also utilizing Socratic Questioning techniques and open ended questions to encourage reflection. Discussed and processed physical and mental health concerns  that are contributing to depressive and anxiety symptom burdens while developing strategies and coping techniques to reduce symptoms. Practiced problem solving skills to address inter and intrapersonal issues that contribute to symptom burdens. Patient was receptive to therapeutic feedback.      Allowed Patient to freely discuss issues  without interruption or judgement with unconditional positive regard, active listening skills, and empathy. Therapist provided a safe, confidential environment to facilitate the development of a positive therapeutic relationship and encouraged open, honest communication. Assisted Patient in identifying risk factors which would indicate the need for higher level of care including thoughts to harm self or others and/or self-harming behavior and encouraged Patient to contact this office, call 911, or present to the nearest emergency room should any of these events occur. Discussed crisis intervention services and means to access. Patient adamantly and convincingly denies current suicidal or homicidal ideation or perceptual disturbance. Assisted Patient in processing session content; acknowledged and normalized Patient’s  thoughts, feelings, and concerns by utilizing a person-centered approach in efforts to build appropriate rapport and a positive therapeutic relationship with open and honest communication. .     Follow Up:   Return in about 1 week (around 10/15/2024) for Therapy session.    Stefan Tran, [facility]  [facility]

## 2024-10-22 ENCOUNTER — OFFICE VISIT (OUTPATIENT)
Dept: BEHAVIORAL HEALTH | Facility: CLINIC | Age: 64
End: 2024-10-22
Payer: MEDICARE

## 2024-10-22 DIAGNOSIS — F41.1 GENERALIZED ANXIETY DISORDER: ICD-10-CM

## 2024-10-22 DIAGNOSIS — F43.10 POST TRAUMATIC STRESS DISORDER (PTSD): ICD-10-CM

## 2024-10-22 DIAGNOSIS — F39 MOOD DISORDER: ICD-10-CM

## 2024-10-22 DIAGNOSIS — F33.0 MILD EPISODE OF RECURRENT MAJOR DEPRESSIVE DISORDER: Primary | ICD-10-CM

## 2024-10-22 NOTE — PROGRESS NOTES
"     Follow Up Adult Note     Date:10/22/2024   Patient Name: Santi Souza Sr.  : 1960   MRN: [id number]   Time IN: 12:50 pm    Time OUT: 1:46 pm     Referring Provider: Jordan Heart APRN    Chief Complaint:      ICD-10-CM ICD-9-CM   1. Mild episode of recurrent major depressive disorder  F33.0 296.31   2. Generalized anxiety disorder  F41.1 300.02   3. Mood disorder  F39 296.90   4. Post traumatic stress disorder (PTSD)  F43.10 309.81        History of Present Illness:   Santi Souza Sr. is a 64 y.o., single, disabled  male who is being seen today for scheduled Psychotherapy session. Mood reported as \"fairly decent\" with overall improved and cooperative affect. Patient presented with some reported back pain but appeared more upbeat and positive from previous sessions and remained overall calm, cooperative, engaged, and pleasant with provider. Patient denied any current SI/HI/AVH.    \"I have been dealing with some pretty bad back issues the past two weeks that put me out, but it's feeling a lot better this week\"  \"Spending time in apartment and haven't been able to do much of anything\"  \"I didn't go to the doctor or hospital-worried about having to have surgery and don't want to\"  \"I did get a lot of help from my neighbors and that made me feel really good\"  \"I am thinking about making a video to send to my family explaining things I want to tell them-they won't really return my calls right now\"        Subjective     PHQ-9 Depression Screening  PHQ-9 Total Score:  Not completed at this time     GARETT-7   Not completed at this time    Patient's Support Network Includes:   Friends in apartment complex and local senior center    Functional Status: Moderate impairment     Progress toward goal: Not at goal    Prognosis: Good with Ongoing Treatment     Medications:     Current Outpatient Medications:     aspirin 81 MG chewable tablet, Chew 1 tablet., Disp: , Rfl:     atorvastatin (LIPITOR) " 80 MG tablet, Take 1 tablet by mouth Daily., Disp: 90 tablet, Rfl: 1    Brexpiprazole (Rexulti) 2 MG tablet, Take 1 tablet by mouth Daily., Disp: 90 tablet, Rfl: 0    cyclobenzaprine (FLEXERIL) 5 MG tablet, Take 1 tablet by mouth 3 (Three) Times a Day As Needed for Muscle Spasms., Disp: 30 tablet, Rfl: 1    diclofenac (VOLTAREN) 75 MG EC tablet, TAKE 1 TABLET BY MOUTH TWICE DAILY AS NEEDED FOR  BACK  PAIN,  JOINT  PAIN, Disp: 60 tablet, Rfl: 0    donepezil (ARICEPT) 10 MG tablet, Take 1 tablet by mouth Every Night., Disp: 30 tablet, Rfl: 2    DULoxetine (CYMBALTA) 60 MG capsule, Take 1 capsule by mouth in the morning, Disp: 90 capsule, Rfl: 0    empagliflozin (Jardiance) 25 MG tablet tablet, Take 1 tablet by mouth Daily., Disp: 90 tablet, Rfl: 1    fluticasone (FLONASE) 50 MCG/ACT nasal spray, 2 sprays into the nostril(s) as directed by provider Daily., Disp: 16 g, Rfl: 5    levocetirizine (XYZAL) 5 MG tablet, Take 1 tablet by mouth Every Evening., Disp: 90 tablet, Rfl: 1    Magnesium 400 MG tablet, Take 400 mg by mouth Daily., Disp: , Rfl:     memantine (NAMENDA) 5 MG tablet, Take 1 tablet by mouth 2 (Two) Times a Day., Disp: 60 tablet, Rfl: 2    nitroglycerin (NITROLINGUAL) 0.4 MG/SPRAY spray, Place 1 spray by translingual route on or under the tongue at the first sign of an attack, no more than 3 sprays / 15-minute., Disp: 1 each, Rfl: 0    omeprazole (priLOSEC) 20 MG capsule, Take 1 capsule by mouth Daily., Disp: 90 capsule, Rfl: 1    pioglitazone (Actos) 15 MG tablet, Take 1 tablet by mouth Daily., Disp: 30 tablet, Rfl: 3    prazosin (MINIPRESS) 2 MG capsule, Take 1 capsule by mouth at bedtime., Disp: 90 capsule, Rfl: 0    SITagliptin (JANUVIA) 100 MG tablet, Take 1 tablet by mouth Daily., Disp: 90 tablet, Rfl: 1    traZODone (DESYREL) 50 MG tablet, Take 1 tablet by mouth every day at bedtime., Disp: 90 tablet, Rfl: 0    Allergies:   Allergies   Allergen Reactions    Hydrocodone Itching    Penicillins Swelling  "and Provider Review Needed     Entire body swelled as a child     Sulfa Antibiotics Shortness Of Breath, Rash and Provider Review Needed    Codeine Nausea And Vomiting, Confusion and Provider Review Needed    Oxycontin [Oxycodone] Itching       Objective     Mental Status Exam:   MENTAL STATUS EXAM   General Appearance:  Cleanly groomed and dressed  Eye Contact:  Fair  Attitude:  Cooperative and polite  Motor Activity:  Slow  Muscle Strength:  Abnormal  Speech:  Normal rate, tone, volume  Language:  Spontaneous  Mood and affect:  Other  Other Comment:  Mood reported as \"fairly decent\" with overall improved and cooperative affect.  Hopelessness:  Optimistic  Loneliness: 2  Thought Process:  Logical and goal-directed  Associations/ Thought Content:  No delusions  Hallucinations:  None  Suicidal Ideations:  Not present  Homicidal Ideation:  Not present  Sensorium:  Alert and clear  Orientation:  Person, place, time and situation  Immediate Recall, Recent, and Remote Memory:  Intact  Attention Span/ Concentration:  Good  Fund of Knowledge:  Appropriate for age and educational level  Intellectual Functioning:  Average range  Insight:  Fair  Judgement:  Fair  Reliability:  Good  Impulse Control:  Good       Assessment / Plan      Visit Diagnosis/Orders Placed This Visit:    ICD-10-CM ICD-9-CM   1. Mild episode of recurrent major depressive disorder  F33.0 296.31   2. Generalized anxiety disorder  F41.1 300.02   3. Mood disorder  F39 296.90   4. Post traumatic stress disorder (PTSD)  F43.10 309.81        PLAN:  Safety: No acute safety concerns  Risk Assessment: Risk of self-harm acutely is low. Risk of self-harm chronically is also low, but could be further elevated in the event of treatment noncompliance and/or AODA.    Treatment Plan/Goals: Continue supportive psychotherapy efforts and medications as indicated. Treatment and medication options discussed during today's visit. Patient ackowledged and verbally consented to " continue with current treatment plan and was educated on the importance of compliance with treatment and follow-up appointments. Patient seems reasonably able to adhere to treatment plan.      Assisted Patient in processing above session content; acknowledged and normalized patient’s thoughts, feelings, and concerns. Assisted patient in processing recent stressors/emotions/feelings and utilized Socratic Questioning techniques and open ended questions to encourage reflection. Engaged patient in exploring and resolving thoughts and feelings that arise as a result of medical and physical conditions while developing a list of coping strategies to manage stress related to physical health. Continued to process interpersonal relationships involving family members and identifying current healthy supports. Patient was receptive to therapeutic feedback.       Allowed Patient to freely discuss issues  without interruption or judgement with unconditional positive regard, active listening skills, and empathy. Therapist provided a safe, confidential environment to facilitate the development of a positive therapeutic relationship and encouraged open, honest communication. Assisted Patient in identifying risk factors which would indicate the need for higher level of care including thoughts to harm self or others and/or self-harming behavior and encouraged Patient to contact this office, call 911, or present to the nearest emergency room should any of these events occur. Discussed crisis intervention services and means to access. Patient adamantly and convincingly denies current suicidal or homicidal ideation or perceptual disturbance. Assisted Patient in processing session content; acknowledged and normalized Patient’s thoughts, feelings, and concerns by utilizing a person-centered approach in efforts to build appropriate rapport and a positive therapeutic relationship with open and honest communication. .     Follow Up:   Return  in about 1 week (around 10/29/2024) for Therapy session.    Stefan Tran, [facility]  [facility]

## 2024-10-24 ENCOUNTER — EXTERNAL PBMM DATA (OUTPATIENT)
Dept: PHARMACY | Facility: OTHER | Age: 64
End: 2024-10-24
Payer: MEDICARE

## 2024-10-29 ENCOUNTER — OFFICE VISIT (OUTPATIENT)
Dept: BEHAVIORAL HEALTH | Facility: CLINIC | Age: 64
End: 2024-10-29
Payer: MEDICARE

## 2024-10-29 DIAGNOSIS — F33.0 MILD EPISODE OF RECURRENT MAJOR DEPRESSIVE DISORDER: ICD-10-CM

## 2024-10-29 DIAGNOSIS — F41.1 GENERALIZED ANXIETY DISORDER: Primary | ICD-10-CM

## 2024-10-29 DIAGNOSIS — F43.10 POST TRAUMATIC STRESS DISORDER (PTSD): ICD-10-CM

## 2024-10-29 PROCEDURE — 1159F MED LIST DOCD IN RCRD: CPT

## 2024-10-29 PROCEDURE — 90837 PSYTX W PT 60 MINUTES: CPT

## 2024-10-29 PROCEDURE — 1160F RVW MEDS BY RX/DR IN RCRD: CPT

## 2024-10-29 NOTE — PROGRESS NOTES
"     Follow Up Adult Note     Date:10/29/2024   Patient Name: Santi Souza Sr.  : 1960   MRN: [id number]   Time IN: 1:00 pm     Time OUT: 1:58 pm     Referring Provider: Jordan Heart APRN    Chief Complaint:      ICD-10-CM ICD-9-CM   1. Generalized anxiety disorder  F41.1 300.02   2. Mild episode of recurrent major depressive disorder  F33.0 296.31   3. Post traumatic stress disorder (PTSD)  F43.10 309.81        History of Present Illness:   Santi Souza Sr. is a 64 y.o., single, disabled male who is being seen today for scheduled Psychotherapy session. Mood reported as \"decent\" with somewhat withdrawn but overall cooperative affect. Patient presented with overall improved mood from previous session and remained calm, cooperative, engaged, and pleasant with provider. Patient denied any current SI/HI/AVH.    \"Been doing alright I guess-the pain is doing a lot better for sure\"  \"Been getting out more this past week\"  \"Miss riding my bike-it has always been therapeutic for me\"  \"Just trying to get in better shape and looking for a part time job\"  \"I want to achieve happiness and have someone around me I care about-don't want to live out life alone\"  \"I'm worried I will be down more this winter-I really would like to find a  that I can trust\"      Subjective     PHQ-9 Depression Screening  PHQ-9 Total Score:  Not completed at this time     GARETT-7   Not completed at this time    Patient's Support Network Includes:   [facility]    Functional Status: Moderate impairment     Progress toward goal: Not at goal    Prognosis: Good with Ongoing Treatment     Medications:     Current Outpatient Medications:     aspirin 81 MG chewable tablet, Chew 1 tablet., Disp: , Rfl:     atorvastatin (LIPITOR) 80 MG tablet, Take 1 tablet by mouth Daily., Disp: 90 tablet, Rfl: 1    Brexpiprazole (Rexulti) 2 MG tablet, Take 1 tablet by mouth Daily., Disp: 90 tablet, Rfl: 0    " cyclobenzaprine (FLEXERIL) 5 MG tablet, Take 1 tablet by mouth 3 (Three) Times a Day As Needed for Muscle Spasms., Disp: 30 tablet, Rfl: 1    diclofenac (VOLTAREN) 75 MG EC tablet, TAKE 1 TABLET BY MOUTH TWICE DAILY AS NEEDED FOR  BACK  PAIN,  JOINT  PAIN, Disp: 60 tablet, Rfl: 0    donepezil (ARICEPT) 10 MG tablet, Take 1 tablet by mouth Every Night., Disp: 30 tablet, Rfl: 2    DULoxetine (CYMBALTA) 60 MG capsule, Take 1 capsule by mouth in the morning, Disp: 90 capsule, Rfl: 0    empagliflozin (Jardiance) 25 MG tablet tablet, Take 1 tablet by mouth Daily., Disp: 90 tablet, Rfl: 1    fluticasone (FLONASE) 50 MCG/ACT nasal spray, 2 sprays into the nostril(s) as directed by provider Daily., Disp: 16 g, Rfl: 5    levocetirizine (XYZAL) 5 MG tablet, Take 1 tablet by mouth Every Evening., Disp: 90 tablet, Rfl: 1    Magnesium 400 MG tablet, Take 400 mg by mouth Daily., Disp: , Rfl:     memantine (NAMENDA) 5 MG tablet, Take 1 tablet by mouth 2 (Two) Times a Day., Disp: 60 tablet, Rfl: 2    nitroglycerin (NITROLINGUAL) 0.4 MG/SPRAY spray, Place 1 spray by translingual route on or under the tongue at the first sign of an attack, no more than 3 sprays / 15-minute., Disp: 1 each, Rfl: 0    omeprazole (priLOSEC) 20 MG capsule, Take 1 capsule by mouth Daily., Disp: 90 capsule, Rfl: 1    pioglitazone (Actos) 15 MG tablet, Take 1 tablet by mouth Daily., Disp: 30 tablet, Rfl: 3    prazosin (MINIPRESS) 2 MG capsule, Take 1 capsule by mouth at bedtime., Disp: 90 capsule, Rfl: 0    SITagliptin (JANUVIA) 100 MG tablet, Take 1 tablet by mouth Daily., Disp: 90 tablet, Rfl: 1    traZODone (DESYREL) 50 MG tablet, Take 1 tablet by mouth every day at bedtime., Disp: 90 tablet, Rfl: 0    Allergies:   Allergies   Allergen Reactions    Hydrocodone Itching    Penicillins Swelling and Provider Review Needed     Entire body swelled as a child     Sulfa Antibiotics Shortness Of Breath, Rash and Provider Review Needed    Codeine Nausea And  "Vomiting, Confusion and Provider Review Needed    Oxycontin [Oxycodone] Itching       Objective     Mental Status Exam:   MENTAL STATUS EXAM   General Appearance:  Cleanly groomed and dressed  Eye Contact:  Fair  Attitude:  Cooperative and polite  Motor Activity:  Normal gait, posture and slow  Muscle Strength:  Normal  Speech:  Normal rate, tone, volume  Language:  Spontaneous  Mood and affect:  Other  Other Comment:  Mood reported as \"decent\" with somewhat withdrawn but overall cooperative affect.  Hopelessness:  Optimistic  Loneliness: 4  Thought Process:  Goal-directed and linear  Associations/ Thought Content:  No delusions  Hallucinations:  None  Suicidal Ideations:  Not present  Homicidal Ideation:  Not present  Sensorium:  Other  Other Comment:  Clouded at times but overall alert and clear  Orientation:  Person, place, time and situation  Immediate Recall, Recent, and Remote Memory:  Other  Other Comment:  Fair  Attention Span/ Concentration:  Other  Other Comment:  Fair  Fund of Knowledge:  Appropriate for age and educational level  Intellectual Functioning:  Average range  Insight:  Fair  Judgement:  Fair  Reliability:  Good  Impulse Control:  Good       Assessment / Plan      Visit Diagnosis/Orders Placed This Visit:    ICD-10-CM ICD-9-CM   1. Generalized anxiety disorder  F41.1 300.02   2. Mild episode of recurrent major depressive disorder  F33.0 296.31   3. Post traumatic stress disorder (PTSD)  F43.10 309.81        PLAN:  Safety: No acute safety concerns  Risk Assessment: Risk of self-harm acutely is low. Risk of self-harm chronically is also low, but could be further elevated in the event of treatment noncompliance and/or AODA.    Treatment Plan/Goals: Continue supportive psychotherapy efforts and medications as indicated. Treatment and medication options discussed during today's visit. Patient ackowledged and verbally consented to continue with current treatment plan and was educated on the " "importance of compliance with treatment and follow-up appointments. Patient seems reasonably able to adhere to treatment plan.      Assisted Patient in processing above session content; acknowledged and normalized patient’s thoughts, feelings, and concerns.  Assisted patient in processing recent stressors/emotions/feelings and utilized Socratic Questioning techniques and open ended questions to encourage reflection. Discussed what \"happiness looks like\" and characteristic traits patient values in interpersonal relationships. Will continue to explore options and increase engagement in social activities by developing a plan for social contact and monitoring engagement with others. Discussed health and it's affects on patients mental health while collaborating on further coping techniques. Patient was receptive to therapeutic feedback.     Allowed Patient to freely discuss issues  without interruption or judgement with unconditional positive regard, active listening skills, and empathy. Therapist provided a safe, confidential environment to facilitate the development of a positive therapeutic relationship and encouraged open, honest communication. Assisted Patient in identifying risk factors which would indicate the need for higher level of care including thoughts to harm self or others and/or self-harming behavior and encouraged Patient to contact this office, call 911, or present to the nearest emergency room should any of these events occur. Discussed crisis intervention services and means to access. Patient adamantly and convincingly denies current suicidal or homicidal ideation or perceptual disturbance. Assisted Patient in processing session content; acknowledged and normalized Patient’s thoughts, feelings, and concerns by utilizing a person-centered approach in efforts to build appropriate rapport and a positive therapeutic relationship with open and honest communication. .     Follow Up:   Return in about 1 " week (around 11/5/2024) for Therapy session.    Stefan Tran, [facility]  [facility]

## 2024-11-05 ENCOUNTER — OFFICE VISIT (OUTPATIENT)
Dept: BEHAVIORAL HEALTH | Facility: CLINIC | Age: 64
End: 2024-11-05
Payer: MEDICARE

## 2024-11-05 DIAGNOSIS — F33.0 MILD EPISODE OF RECURRENT MAJOR DEPRESSIVE DISORDER: ICD-10-CM

## 2024-11-05 DIAGNOSIS — F39 MOOD DISORDER: Primary | ICD-10-CM

## 2024-11-05 DIAGNOSIS — F43.10 POST TRAUMATIC STRESS DISORDER (PTSD): ICD-10-CM

## 2024-11-05 DIAGNOSIS — F41.1 GENERALIZED ANXIETY DISORDER: ICD-10-CM

## 2024-11-05 NOTE — PROGRESS NOTES
"     Follow Up Adult Note     Date:2024   Patient Name: Santi Souza Sr.  : 1960   MRN: [id number]   Time IN: 1:00 pm    Time OUT: 1:46 pm     Referring Provider: Jordan Heart APRN    Chief Complaint:      ICD-10-CM ICD-9-CM   1. Mood disorder  F39 296.90   2. Generalized anxiety disorder  F41.1 300.02   3. Mild episode of recurrent major depressive disorder  F33.0 296.31   4. Post traumatic stress disorder (PTSD)  F43.10 309.81        History of Present Illness:   Santi Souza Sr. is a 64 y.o., single, disabled  male who is being seen today for scheduled Psychotherapy session. Mood reported as \"doing alright\" with somewhat tired but overall cooperative and brighter affect. Patient presented with overall brighter and cheerful mood while remaining calm, cooperative, engaged, and pleasant with provider. Patient denied any current SI/HI/AVH. Patient continues to show good progress over past two sessions with ongoing motivation for change. Patient did endorse some mood swings and frustrations \"with people in society\" but reports overall improved anxiety and depressive symptom burdens at this time.    \"Things seem to be a bit calmer in my life and not as anxious anymore\"  \"I guess thinking about past stress when I'm alone does cause some anxiety\"  \"My memory is so so-I have missed a few things like appointments and taking my medications that I have forgotten about\"  \"The center has been closed since last Thursday for the election so I have been spending more time with my neighbors and straightening up my house\"          Subjective     PHQ-9 Depression Screening  PHQ-9 Total Score:  Not completed at this time     GARETT-7   Not completed at this time    Patient's Support Network Includes:   local friends/neighbors, [facility]     Functional Status: Mild impairment     Progress toward goal: Not at goal    Prognosis: Good with Ongoing Treatment     Medications:     Current " Outpatient Medications:     aspirin 81 MG chewable tablet, Chew 1 tablet., Disp: , Rfl:     atorvastatin (LIPITOR) 80 MG tablet, Take 1 tablet by mouth Daily., Disp: 90 tablet, Rfl: 1    Brexpiprazole (Rexulti) 2 MG tablet, Take 1 tablet by mouth Daily., Disp: 90 tablet, Rfl: 0    cyclobenzaprine (FLEXERIL) 5 MG tablet, Take 1 tablet by mouth 3 (Three) Times a Day As Needed for Muscle Spasms., Disp: 30 tablet, Rfl: 1    diclofenac (VOLTAREN) 75 MG EC tablet, TAKE 1 TABLET BY MOUTH TWICE DAILY AS NEEDED FOR  BACK  PAIN,  JOINT  PAIN, Disp: 60 tablet, Rfl: 0    donepezil (ARICEPT) 10 MG tablet, Take 1 tablet by mouth Every Night., Disp: 30 tablet, Rfl: 2    DULoxetine (CYMBALTA) 60 MG capsule, Take 1 capsule by mouth in the morning, Disp: 90 capsule, Rfl: 0    empagliflozin (Jardiance) 25 MG tablet tablet, Take 1 tablet by mouth Daily., Disp: 90 tablet, Rfl: 1    fluticasone (FLONASE) 50 MCG/ACT nasal spray, 2 sprays into the nostril(s) as directed by provider Daily., Disp: 16 g, Rfl: 5    levocetirizine (XYZAL) 5 MG tablet, Take 1 tablet by mouth Every Evening., Disp: 90 tablet, Rfl: 1    Magnesium 400 MG tablet, Take 400 mg by mouth Daily., Disp: , Rfl:     memantine (NAMENDA) 5 MG tablet, Take 1 tablet by mouth 2 (Two) Times a Day., Disp: 60 tablet, Rfl: 2    nitroglycerin (NITROLINGUAL) 0.4 MG/SPRAY spray, Place 1 spray by translingual route on or under the tongue at the first sign of an attack, no more than 3 sprays / 15-minute., Disp: 1 each, Rfl: 0    omeprazole (priLOSEC) 20 MG capsule, Take 1 capsule by mouth Daily., Disp: 90 capsule, Rfl: 1    pioglitazone (Actos) 15 MG tablet, Take 1 tablet by mouth Daily., Disp: 30 tablet, Rfl: 3    prazosin (MINIPRESS) 2 MG capsule, Take 1 capsule by mouth at bedtime., Disp: 90 capsule, Rfl: 0    SITagliptin (JANUVIA) 100 MG tablet, Take 1 tablet by mouth Daily., Disp: 90 tablet, Rfl: 1    traZODone (DESYREL) 50 MG tablet, Take 1 tablet by mouth every day at bedtime.,  "Disp: 90 tablet, Rfl: 0    Allergies:   Allergies   Allergen Reactions    Hydrocodone Itching    Penicillins Swelling and Provider Review Needed     Entire body swelled as a child     Sulfa Antibiotics Shortness Of Breath, Rash and Provider Review Needed    Codeine Nausea And Vomiting, Confusion and Provider Review Needed    Oxycontin [Oxycodone] Itching       Objective     Mental Status Exam:   MENTAL STATUS EXAM   General Appearance:  Cleanly groomed and dressed  Eye Contact:  Fair  Attitude:  Cooperative and polite  Motor Activity:  Slow  Muscle Strength:  Normal  Speech:  Normal rate, tone, volume  Language:  Spontaneous  Mood and affect:  Other  Other Comment:  Mood reported as \"doing alright\" with somewhat tired but overall cooperative and brighter affect.  Hopelessness:  Optimistic  Loneliness: 3  Thought Process:  Goal-directed and linear  Associations/ Thought Content:  No delusions  Hallucinations:  None  Suicidal Ideations:  Not present  Homicidal Ideation:  Not present  Sensorium:  Alert and clear  Orientation:  Person, place, time and situation  Immediate Recall, Recent, and Remote Memory:  Other  Other Comment:  Fair  Attention Span/ Concentration:  Other  Other Comment:  Fair  Fund of Knowledge:  Appropriate for age and educational level  Intellectual Functioning:  Average range  Insight:  Fair  Judgement:  Fair  Reliability:  Good  Impulse Control:  Good       Assessment / Plan      Visit Diagnosis/Orders Placed This Visit:    ICD-10-CM ICD-9-CM   1. Mood disorder  F39 296.90   2. Generalized anxiety disorder  F41.1 300.02   3. Mild episode of recurrent major depressive disorder  F33.0 296.31   4. Post traumatic stress disorder (PTSD)  F43.10 309.81        PLAN:  Safety: No acute safety concerns  Risk Assessment: Risk of self-harm acutely is low. Risk of self-harm chronically is also low, but could be further elevated in the event of treatment noncompliance and/or AODA.    Treatment Plan/Goals: " Continue supportive psychotherapy efforts and medications as indicated. Treatment and medication options discussed during today's visit. Patient ackowledged and verbally consented to continue with current treatment plan and was educated on the importance of compliance with treatment and follow-up appointments. Patient seems reasonably able to adhere to treatment plan.      Assisted Patient in processing above session content; acknowledged and normalized patient’s thoughts, feelings, and concerns.  Rationalized patient thought process regarding recently increased positive mood and ongoing concerns with past negative memories. Continued to discuss and practice positive self talk and affirmations to increase confidence and self esteem. Discussed current interpersonal relationships that are satisfying and unsatisfying and worked on reducing defensiveness and expressing empathy towards others. Patient was receptive to therapeutic feedback.       Allowed Patient to freely discuss issues  without interruption or judgement with unconditional positive regard, active listening skills, and empathy. Therapist provided a safe, confidential environment to facilitate the development of a positive therapeutic relationship and encouraged open, honest communication. Assisted Patient in identifying risk factors which would indicate the need for higher level of care including thoughts to harm self or others and/or self-harming behavior and encouraged Patient to contact this office, call 911, or present to the nearest emergency room should any of these events occur. Discussed crisis intervention services and means to access. Patient adamantly and convincingly denies current suicidal or homicidal ideation or perceptual disturbance. Assisted Patient in processing session content; acknowledged and normalized Patient’s thoughts, feelings, and concerns by utilizing a person-centered approach in efforts to build appropriate rapport and a  positive therapeutic relationship with open and honest communication. .     Follow Up:   Return in about 1 week (around 11/12/2024) for Therapy session.    Stefan Tran, ARLPHW  [facility]

## 2024-11-12 ENCOUNTER — OFFICE VISIT (OUTPATIENT)
Dept: BEHAVIORAL HEALTH | Facility: CLINIC | Age: 64
End: 2024-11-12
Payer: MEDICARE

## 2024-11-12 DIAGNOSIS — F41.1 GENERALIZED ANXIETY DISORDER: Primary | ICD-10-CM

## 2024-11-12 DIAGNOSIS — F33.0 MILD EPISODE OF RECURRENT MAJOR DEPRESSIVE DISORDER: ICD-10-CM

## 2024-11-12 DIAGNOSIS — F43.10 POST TRAUMATIC STRESS DISORDER (PTSD): ICD-10-CM

## 2024-11-12 DIAGNOSIS — G31.84 MILD COGNITIVE IMPAIRMENT: ICD-10-CM

## 2024-11-12 NOTE — PROGRESS NOTES
"     Follow Up Adult Note     Date:2024   Patient Name: Santi Souza Sr.  : 1960   MRN: [id number]   Time IN: 1:01 pm    Time OUT: 1:54 pm     Referring Provider: Jordan Heart APRN    Chief Complaint:      ICD-10-CM ICD-9-CM   1. Generalized anxiety disorder  F41.1 300.02   2. Mild episode of recurrent major depressive disorder  F33.0 296.31   3. Mild cognitive impairment  G31.84 331.83   4. Post traumatic stress disorder (PTSD)  F43.10 309.81        History of Present Illness:   Santi Souza Sr. is a 64 y.o., single, disabled male who is being seen today for scheduled Psychotherapy session. Mood reported as \"pretty decent\" with somewhat tired but overall congruent affect. Patient presented with a more positive mood, appeared tired at times, but remained calm, cooperative, engaged, and pleasant with provider. Patient denied any current SI/HI/AHV.     \"Not much going on this week-just spending time with neighbors and going to the center to shoot pool\"  \"Had dinner with my neighbor of [place]-she actually invited me to her family for Thanksgiving but I don't know if I want to go\"  \"I still forget some things, not the big things, just more things in the moment like plans I made and stuff like that\"  \"I think I'm going to call my momma this weekend-really all the family I have left that I think would talk to me\"  \"I still get pretty sore but trying to get around and do things\"      Subjective     PHQ-9 Depression Screening  PHQ-9 Total Score:  Not completed at this time     GARETT-7   Not completed at this time    Patient's Support Network Includes:   Neighbors, [facility] at [facility]    Functional Status: Mild impairment     Progress toward goal: Not at goal    Prognosis: Good with Ongoing Treatment     Medications:     Current Outpatient Medications:     aspirin 81 MG chewable tablet, Chew 1 tablet., Disp: , Rfl:     atorvastatin (LIPITOR) 80 MG tablet, Take 1 tablet by mouth " Daily., Disp: 90 tablet, Rfl: 1    Brexpiprazole (Rexulti) 2 MG tablet, Take 1 tablet by mouth Daily., Disp: 90 tablet, Rfl: 0    cyclobenzaprine (FLEXERIL) 5 MG tablet, Take 1 tablet by mouth 3 (Three) Times a Day As Needed for Muscle Spasms., Disp: 30 tablet, Rfl: 1    diclofenac (VOLTAREN) 75 MG EC tablet, TAKE 1 TABLET BY MOUTH TWICE DAILY AS NEEDED FOR  BACK  PAIN,  JOINT  PAIN, Disp: 60 tablet, Rfl: 0    donepezil (ARICEPT) 10 MG tablet, Take 1 tablet by mouth Every Night., Disp: 30 tablet, Rfl: 2    DULoxetine (CYMBALTA) 60 MG capsule, Take 1 capsule by mouth in the morning, Disp: 90 capsule, Rfl: 0    empagliflozin (Jardiance) 25 MG tablet tablet, Take 1 tablet by mouth Daily., Disp: 90 tablet, Rfl: 1    fluticasone (FLONASE) 50 MCG/ACT nasal spray, 2 sprays into the nostril(s) as directed by provider Daily., Disp: 16 g, Rfl: 5    levocetirizine (XYZAL) 5 MG tablet, Take 1 tablet by mouth Every Evening., Disp: 90 tablet, Rfl: 1    Magnesium 400 MG tablet, Take 400 mg by mouth Daily., Disp: , Rfl:     memantine (NAMENDA) 5 MG tablet, Take 1 tablet by mouth 2 (Two) Times a Day., Disp: 60 tablet, Rfl: 2    nitroglycerin (NITROLINGUAL) 0.4 MG/SPRAY spray, Place 1 spray by translingual route on or under the tongue at the first sign of an attack, no more than 3 sprays / 15-minute., Disp: 1 each, Rfl: 0    omeprazole (priLOSEC) 20 MG capsule, Take 1 capsule by mouth Daily., Disp: 90 capsule, Rfl: 1    pioglitazone (Actos) 15 MG tablet, Take 1 tablet by mouth Daily., Disp: 30 tablet, Rfl: 3    prazosin (MINIPRESS) 2 MG capsule, Take 1 capsule by mouth at bedtime., Disp: 90 capsule, Rfl: 0    SITagliptin (JANUVIA) 100 MG tablet, Take 1 tablet by mouth Daily., Disp: 90 tablet, Rfl: 1    traZODone (DESYREL) 50 MG tablet, Take 1 tablet by mouth every day at bedtime., Disp: 90 tablet, Rfl: 0    Allergies:   Allergies   Allergen Reactions    Hydrocodone Itching    Penicillins Swelling and Provider Review Needed     Entire  "body swelled as a child     Sulfa Antibiotics Shortness Of Breath, Rash and Provider Review Needed    Codeine Nausea And Vomiting, Confusion and Provider Review Needed    Oxycontin [Oxycodone] Itching       Objective     Mental Status Exam:   MENTAL STATUS EXAM   General Appearance:  Cleanly groomed and dressed and looks older than stated age  Eye Contact:  Fair  Attitude:  Cooperative and polite  Motor Activity:  Slow  Muscle Strength:  Abnormal  Speech:  Soft spoken  Language:  Spontaneous  Mood and affect:  Other  Other Comment:  Mood reported as \"pretty decent\" with somewhat tired but overall congruent affect.  Hopelessness:  Optimistic  Loneliness: 4  Thought Process:  Linear  Associations/ Thought Content:  No delusions  Hallucinations:  None  Suicidal Ideations:  Not present  Homicidal Ideation:  Not present  Sensorium:  Other  Other Comment:  Clouded at times but overall alert and clear  Orientation:  Person, place, time and situation  Immediate Recall, Recent, and Remote Memory:  Other  Other Comment:  Fair  Attention Span/ Concentration:  Other  Other Comment:  Fair  Fund of Knowledge:  Appropriate for age and educational level  Intellectual Functioning:  Average range  Insight:  Fair  Judgement:  Fair  Reliability:  Good  Impulse Control:  Good       Assessment / Plan      Visit Diagnosis/Orders Placed This Visit:    ICD-10-CM ICD-9-CM   1. Generalized anxiety disorder  F41.1 300.02   2. Mild episode of recurrent major depressive disorder  F33.0 296.31   3. Mild cognitive impairment  G31.84 331.83   4. Post traumatic stress disorder (PTSD)  F43.10 309.81        PLAN:  Safety: No acute safety concerns  Risk Assessment: Risk of self-harm acutely is low. Risk of self-harm chronically is also low, but could be further elevated in the event of treatment noncompliance and/or AODA.    Treatment Plan/Goals: Continue supportive psychotherapy efforts and medications as indicated. Treatment and medication options " discussed during today's visit. Patient ackowledged and verbally consented to continue with current treatment plan and was educated on the importance of compliance with treatment and follow-up appointments. Patient seems reasonably able to adhere to treatment plan.      Assisted Patient in processing above session content; acknowledged and normalized patient’s thoughts, feelings, and concerns.  Rationalized patient thought process regarding emotions and feelings towards no family contact and health concerns. Explored history of past activities and memories that brought patient guero while also focusing on ways to incorporate further activities into his weekly schedule. Assisted patient in evaluating recent progress and ways to implement supports into his life. Patient was receptive to therapeutic feedback.      Allowed Patient to freely discuss issues  without interruption or judgement with unconditional positive regard, active listening skills, and empathy. Therapist provided a safe, confidential environment to facilitate the development of a positive therapeutic relationship and encouraged open, honest communication. Assisted Patient in identifying risk factors which would indicate the need for higher level of care including thoughts to harm self or others and/or self-harming behavior and encouraged Patient to contact this office, call 911, or present to the nearest emergency room should any of these events occur. Discussed crisis intervention services and means to access. Patient adamantly and convincingly denies current suicidal or homicidal ideation or perceptual disturbance. Assisted Patient in processing session content; acknowledged and normalized Patient’s thoughts, feelings, and concerns by utilizing a person-centered approach in efforts to build appropriate rapport and a positive therapeutic relationship with open and honest communication. .     Follow Up:   Return in about 1 week (around 11/19/2024) for  Therapy session.    Stefan Tran, [facility]  [facility]

## 2024-11-19 ENCOUNTER — OFFICE VISIT (OUTPATIENT)
Dept: BEHAVIORAL HEALTH | Facility: CLINIC | Age: 64
End: 2024-11-19
Payer: MEDICARE

## 2024-11-19 DIAGNOSIS — G31.84 MILD COGNITIVE IMPAIRMENT: ICD-10-CM

## 2024-11-19 DIAGNOSIS — F33.1 MODERATE EPISODE OF RECURRENT MAJOR DEPRESSIVE DISORDER: ICD-10-CM

## 2024-11-19 DIAGNOSIS — F41.1 GENERALIZED ANXIETY DISORDER: Primary | ICD-10-CM

## 2024-11-19 DIAGNOSIS — F39 MOOD DISORDER: ICD-10-CM

## 2024-11-19 NOTE — PROGRESS NOTES
"     Follow Up Adult Note     Date:2024   Patient Name: Santi Souza Sr.  : 1960   MRN: [id number]   Time IN: 1:05 pm    Time OUT: 1:52 pm     Referring Provider: Jordan Heart APRN    Chief Complaint:      ICD-10-CM ICD-9-CM   1. Generalized anxiety disorder  F41.1 300.02   2. Moderate episode of recurrent major depressive disorder  F33.1 296.32   3. Mood disorder  F39 296.90   4. Mild cognitive impairment  G31.84 331.83        History of Present Illness:   Santi Souza Sr. is a 64 y.o., single, disabled  male who is being seen today for scheduled Psychotherapy session. Mood reported as \"not very good\" with somewhat dysphoric and down affect. Patient presented as somewhat tired and down but remained overall calm, cooperative, mostly engaged, and pleasant with provider. Patient denied any current SI/HI/AVH.    \"I've had a stressful couple of days\"  \"My truck needs a lot of work done to it and I'm broke and finances are still a stress for me\"  \"I want to work but I don't know if my body would let me\"  \"Feeling more overwhelmed and stressed\"  \"I did call my mom last week and left a message but I still haven't heard back from her\"  \"I just want time to not worry about bad shit happening and just be\"  \"I'm done with PT now and my short term memory issues seem to be better\"  \"I have been having some physical pains and need to get into the doctor\"      Subjective     PHQ-9 Depression Screening  PHQ-9 Total Score:  Not completed at this time     GARETT-7   Not completed at this time    Patient's Support Network Includes:   Neighbors, Friends, [facility]    Functional Status: Mild impairment     Progress toward goal: Not at goal    Prognosis: Good with Ongoing Treatment     Medications:     Current Outpatient Medications:     aspirin 81 MG chewable tablet, Chew 1 tablet., Disp: , Rfl:     atorvastatin (LIPITOR) 80 MG tablet, Take 1 tablet by mouth Daily., Disp: 90 tablet, " Rfl: 1    Brexpiprazole (Rexulti) 2 MG tablet, Take 1 tablet by mouth Daily., Disp: 90 tablet, Rfl: 0    cyclobenzaprine (FLEXERIL) 5 MG tablet, Take 1 tablet by mouth 3 (Three) Times a Day As Needed for Muscle Spasms., Disp: 30 tablet, Rfl: 1    diclofenac (VOLTAREN) 75 MG EC tablet, TAKE 1 TABLET BY MOUTH TWICE DAILY AS NEEDED FOR  BACK  PAIN,  JOINT  PAIN, Disp: 60 tablet, Rfl: 0    donepezil (ARICEPT) 10 MG tablet, Take 1 tablet by mouth Every Night., Disp: 30 tablet, Rfl: 2    DULoxetine (CYMBALTA) 60 MG capsule, Take 1 capsule by mouth in the morning, Disp: 90 capsule, Rfl: 0    empagliflozin (Jardiance) 25 MG tablet tablet, Take 1 tablet by mouth Daily., Disp: 90 tablet, Rfl: 1    fluticasone (FLONASE) 50 MCG/ACT nasal spray, 2 sprays into the nostril(s) as directed by provider Daily., Disp: 16 g, Rfl: 5    levocetirizine (XYZAL) 5 MG tablet, Take 1 tablet by mouth Every Evening., Disp: 90 tablet, Rfl: 1    Magnesium 400 MG tablet, Take 400 mg by mouth Daily., Disp: , Rfl:     memantine (NAMENDA) 5 MG tablet, Take 1 tablet by mouth 2 (Two) Times a Day., Disp: 60 tablet, Rfl: 2    nitroglycerin (NITROLINGUAL) 0.4 MG/SPRAY spray, Place 1 spray by translingual route on or under the tongue at the first sign of an attack, no more than 3 sprays / 15-minute., Disp: 1 each, Rfl: 0    omeprazole (priLOSEC) 20 MG capsule, Take 1 capsule by mouth Daily., Disp: 90 capsule, Rfl: 1    pioglitazone (Actos) 15 MG tablet, Take 1 tablet by mouth Daily., Disp: 30 tablet, Rfl: 3    prazosin (MINIPRESS) 2 MG capsule, Take 1 capsule by mouth at bedtime., Disp: 90 capsule, Rfl: 0    SITagliptin (JANUVIA) 100 MG tablet, Take 1 tablet by mouth Daily., Disp: 90 tablet, Rfl: 1    traZODone (DESYREL) 50 MG tablet, Take 1 tablet by mouth every day at bedtime., Disp: 90 tablet, Rfl: 0    Allergies:   Allergies   Allergen Reactions    Hydrocodone Itching    Penicillins Swelling and Provider Review Needed     Entire body swelled as a child   "   Sulfa Antibiotics Shortness Of Breath, Rash and Provider Review Needed    Codeine Nausea And Vomiting, Confusion and Provider Review Needed    Oxycontin [Oxycodone] Itching       Objective     Mental Status Exam:   MENTAL STATUS EXAM   General Appearance:  Cleanly groomed and dressed  Eye Contact:  Fair  Attitude:  Cooperative and polite  Motor Activity:  Slow  Muscle Strength:  Abnormal  Speech:  Normal rate, tone, volume  Language:  Spontaneous  Mood and affect:  Other  Other Comment:  Mood reported as \"not very good\" with somewhat dysphoric and down affect.  Hopelessness:  Optimistic  Loneliness: 2  Thought Process:  Goal-directed and linear  Associations/ Thought Content:  No delusions  Hallucinations:  None  Suicidal Ideations:  Not present  Homicidal Ideation:  Not present  Sensorium:  Alert and clear  Orientation:  Person, place, time and situation  Immediate Recall, Recent, and Remote Memory:  Intact  Attention Span/ Concentration:  Other  Other Comment:  Fair  Fund of Knowledge:  Appropriate for age and educational level  Intellectual Functioning:  Average range  Insight:  Fair  Judgement:  Good  Reliability:  Good  Impulse Control:  Good       Assessment / Plan      Visit Diagnosis/Orders Placed This Visit:    ICD-10-CM ICD-9-CM   1. Generalized anxiety disorder  F41.1 300.02   2. Moderate episode of recurrent major depressive disorder  F33.1 296.32   3. Mood disorder  F39 296.90   4. Mild cognitive impairment  G31.84 331.83        PLAN:  Safety: No acute safety concerns  Risk Assessment: Risk of self-harm acutely is low. Risk of self-harm chronically is also low, but could be further elevated in the event of treatment noncompliance and/or AODA.    Treatment Plan/Goals: Continue supportive psychotherapy efforts and medications as indicated. Treatment and medication options discussed during today's visit. Patient ackowledged and verbally consented to continue with current treatment plan and was educated " "on the importance of compliance with treatment and follow-up appointments. Patient seems reasonably able to adhere to treatment plan.      Assisted Patient in processing above session content; acknowledged and normalized patient’s thoughts, feelings, and concerns. Assisted patient in processing recent stressors/emotions/feelings and utilized Socratic Questioning techniques and open ended questions to encourage reflection. Discussed recent frustrations and \"road blocks\" while exploring recent success and strengths to utilize during stressful events/situations. Encouraged patient to address any physical concerns that may be contributing to mental health symptom burdens and develop and use positive self talk to manage negative thoughts and emotions. Patient was receptive to therapeutic feedback.      Allowed Patient to freely discuss issues  without interruption or judgement with unconditional positive regard, active listening skills, and empathy. Therapist provided a safe, confidential environment to facilitate the development of a positive therapeutic relationship and encouraged open, honest communication. Assisted Patient in identifying risk factors which would indicate the need for higher level of care including thoughts to harm self or others and/or self-harming behavior and encouraged Patient to contact this office, call 911, or present to the nearest emergency room should any of these events occur. Discussed crisis intervention services and means to access. Patient adamantly and convincingly denies current suicidal or homicidal ideation or perceptual disturbance. Assisted Patient in processing session content; acknowledged and normalized Patient’s thoughts, feelings, and concerns by utilizing a person-centered approach in efforts to build appropriate rapport and a positive therapeutic relationship with open and honest communication. .     Follow Up:   Return in about 1 week (around 11/26/2024) for Therapy " session.    Stefan Tran, \[facility] [place] [facility]\""W  [facility]

## 2024-11-20 ENCOUNTER — EXTERNAL PBMM DATA (OUTPATIENT)
Dept: PHARMACY | Facility: OTHER | Age: 64
End: 2024-11-20
Payer: MEDICARE

## 2024-11-21 ENCOUNTER — OFFICE VISIT (OUTPATIENT)
Dept: BEHAVIORAL HEALTH | Facility: CLINIC | Age: 64
End: 2024-11-21
Payer: MEDICARE

## 2024-11-21 VITALS
DIASTOLIC BLOOD PRESSURE: 60 MMHG | HEIGHT: 68 IN | WEIGHT: 174 LBS | BODY MASS INDEX: 26.37 KG/M2 | OXYGEN SATURATION: 97 % | HEART RATE: 63 BPM | SYSTOLIC BLOOD PRESSURE: 118 MMHG

## 2024-11-21 DIAGNOSIS — F43.10 POST TRAUMATIC STRESS DISORDER (PTSD): ICD-10-CM

## 2024-11-21 DIAGNOSIS — F33.1 MODERATE EPISODE OF RECURRENT MAJOR DEPRESSIVE DISORDER: ICD-10-CM

## 2024-11-21 DIAGNOSIS — F39 MOOD DISORDER: ICD-10-CM

## 2024-11-21 DIAGNOSIS — F41.1 GENERALIZED ANXIETY DISORDER: ICD-10-CM

## 2024-11-21 DIAGNOSIS — G47.20 CIRCADIAN RHYTHM SLEEP DISORDER, UNSPECIFIED TYPE: ICD-10-CM

## 2024-11-21 RX ORDER — PRAZOSIN HYDROCHLORIDE 2 MG/1
CAPSULE ORAL
Qty: 90 CAPSULE | Refills: 0 | Status: SHIPPED | OUTPATIENT
Start: 2024-11-21

## 2024-11-21 RX ORDER — TRAZODONE HYDROCHLORIDE 50 MG/1
TABLET, FILM COATED ORAL
Qty: 90 TABLET | Refills: 0 | Status: SHIPPED | OUTPATIENT
Start: 2024-11-21

## 2024-11-21 RX ORDER — BREXPIPRAZOLE 2 MG/1
2 TABLET ORAL DAILY
Qty: 90 TABLET | Refills: 0 | Status: SHIPPED | OUTPATIENT
Start: 2024-11-21

## 2024-11-21 RX ORDER — DULOXETIN HYDROCHLORIDE 60 MG/1
CAPSULE, DELAYED RELEASE ORAL
Qty: 90 CAPSULE | Refills: 0 | Status: SHIPPED | OUTPATIENT
Start: 2024-11-21

## 2024-11-21 NOTE — PROGRESS NOTES
Follow Up Office Visit      Patient Name: Santi Souza Sr.  : 1960   MRN: [id number]     Referring Provider: Jordan Heart APRN    Chief Complaint:      ICD-10-CM ICD-9-CM   1. Moderate episode of recurrent major depressive disorder  F33.1 296.32   2. Generalized anxiety disorder  F41.1 300.02   3. Circadian rhythm sleep disorder, unspecified type  G47.20 327.30   4. Post traumatic stress disorder (PTSD)  F43.10 309.81   5. Mood disorder  F39 296.90        History of Present Illness:   Santi Souza Sr. is a 64 y.o. male who is here today for follow up with psychiatric medications    Subjective      Patient Reports:   Had a back pain incident 3 weeks ago. Was down for nearly 2 weeks.  The worst pain I had had in years.  Still seeing Stefan for therapy.  Starting Float therapy this Saturday for my PTSD and my stroke.    Review of Systems:   Review of Systems   Psychiatric/Behavioral:  Positive for depressed mood and stress. The patient is nervous/anxious.        Screening Scores:   PHQ-9 : 16  GARETT-7 : 8    RISK ASSESSMENT:  Patient denies any high risk factors today.    Medications:     Current Outpatient Medications:     aspirin 81 MG chewable tablet, Chew 1 tablet., Disp: , Rfl:     atorvastatin (LIPITOR) 80 MG tablet, Take 1 tablet by mouth Daily., Disp: 90 tablet, Rfl: 1    Brexpiprazole (Rexulti) 2 MG tablet, Take 1 tablet by mouth Daily., Disp: 90 tablet, Rfl: 0    cyclobenzaprine (FLEXERIL) 5 MG tablet, Take 1 tablet by mouth 3 (Three) Times a Day As Needed for Muscle Spasms., Disp: 30 tablet, Rfl: 1    diclofenac (VOLTAREN) 75 MG EC tablet, TAKE 1 TABLET BY MOUTH TWICE DAILY AS NEEDED FOR  BACK  PAIN,  JOINT  PAIN, Disp: 60 tablet, Rfl: 0    donepezil (ARICEPT) 10 MG tablet, Take 1 tablet by mouth Every Night., Disp: 30 tablet, Rfl: 2    DULoxetine (CYMBALTA) 60 MG capsule, Take 1 capsule by mouth in the morning, Disp: 90 capsule, Rfl: 0    empagliflozin (Jardiance) 25 MG tablet  "tablet, Take 1 tablet by mouth Daily., Disp: 90 tablet, Rfl: 1    fluticasone (FLONASE) 50 MCG/ACT nasal spray, 2 sprays into the nostril(s) as directed by provider Daily., Disp: 16 g, Rfl: 5    levocetirizine (XYZAL) 5 MG tablet, Take 1 tablet by mouth Every Evening., Disp: 90 tablet, Rfl: 1    Magnesium 400 MG tablet, Take 400 mg by mouth Daily., Disp: , Rfl:     memantine (NAMENDA) 5 MG tablet, Take 1 tablet by mouth 2 (Two) Times a Day., Disp: 60 tablet, Rfl: 2    nitroglycerin (NITROLINGUAL) 0.4 MG/SPRAY spray, Place 1 spray by translingual route on or under the tongue at the first sign of an attack, no more than 3 sprays / 15-minute., Disp: 1 each, Rfl: 0    omeprazole (priLOSEC) 20 MG capsule, Take 1 capsule by mouth Daily., Disp: 90 capsule, Rfl: 1    pioglitazone (Actos) 15 MG tablet, Take 1 tablet by mouth Daily., Disp: 30 tablet, Rfl: 3    prazosin (MINIPRESS) 2 MG capsule, Take 1 capsule by mouth at bedtime., Disp: 90 capsule, Rfl: 0    SITagliptin (JANUVIA) 100 MG tablet, Take 1 tablet by mouth Daily., Disp: 90 tablet, Rfl: 1    traZODone (DESYREL) 50 MG tablet, Take 1 tablet by mouth every day at bedtime., Disp: 90 tablet, Rfl: 0    Medication Considerations:  JAMARI reviewed and appropriate.      Allergies:   Allergies   Allergen Reactions    Hydrocodone Itching    Penicillins Swelling and Provider Review Needed     Entire body swelled as a child     Sulfa Antibiotics Shortness Of Breath, Rash and Provider Review Needed    Codeine Nausea And Vomiting, Confusion and Provider Review Needed    Oxycontin [Oxycodone] Itching       Results Reviewed:   screeners     Objective     Physical Exam:  Vital Signs:   Vitals:    11/21/24 1339   BP: 118/60   Pulse: 63   SpO2: 97%   Weight: 78.9 kg (174 lb)   Height: 172.7 cm (68\")     Body mass index is 26.46 kg/m².     Mental Status Exam:   Hygiene:   good  Cooperation:  Cooperative  Eye Contact:  Good  Psychomotor Behavior:  Appropriate  Affect:  Appropriate  Mood: " depressed  Speech:  Normal  Thought Process:  Linear  Thought Content:  Mood congruent  Suicidal:  None  Homicidal:  None  Hallucinations:  None  Delusion:  None  Memory:  Deficits-short term  Orientation:  Grossly intact  Reliability:  good  Insight:  Fair  Judgement:  Fair  Impulse Control:  Fair  Physical/Medical Issues:   back pain      Assessment / Plan      Visit Diagnosis/Orders Placed This Visit:  Diagnoses and all orders for this visit:    1. Moderate episode of recurrent major depressive disorder  -     traZODone (DESYREL) 50 MG tablet; Take 1 tablet by mouth every day at bedtime.  Dispense: 90 tablet; Refill: 0  -     DULoxetine (CYMBALTA) 60 MG capsule; Take 1 capsule by mouth in the morning  Dispense: 90 capsule; Refill: 0  -     Brexpiprazole (Rexulti) 2 MG tablet; Take 1 tablet by mouth Daily.  Dispense: 90 tablet; Refill: 0    2. Generalized anxiety disorder  -     traZODone (DESYREL) 50 MG tablet; Take 1 tablet by mouth every day at bedtime.  Dispense: 90 tablet; Refill: 0  -     prazosin (MINIPRESS) 2 MG capsule; Take 1 capsule by mouth at bedtime.  Dispense: 90 capsule; Refill: 0  -     DULoxetine (CYMBALTA) 60 MG capsule; Take 1 capsule by mouth in the morning  Dispense: 90 capsule; Refill: 0    3. Circadian rhythm sleep disorder, unspecified type  -     traZODone (DESYREL) 50 MG tablet; Take 1 tablet by mouth every day at bedtime.  Dispense: 90 tablet; Refill: 0  -     prazosin (MINIPRESS) 2 MG capsule; Take 1 capsule by mouth at bedtime.  Dispense: 90 capsule; Refill: 0    4. Post traumatic stress disorder (PTSD)  -     prazosin (MINIPRESS) 2 MG capsule; Take 1 capsule by mouth at bedtime.  Dispense: 90 capsule; Refill: 0  -     Brexpiprazole (Rexulti) 2 MG tablet; Take 1 tablet by mouth Daily.  Dispense: 90 tablet; Refill: 0    5. Mood disorder  -     Brexpiprazole (Rexulti) 2 MG tablet; Take 1 tablet by mouth Daily.  Dispense: 90 tablet; Refill: 0         Functional Status: Mild impairment      Prognosis: Guarded with Ongoing Treatment    Impression/Formulation:  Patient appeared alert and oriented. Patient is receptive to assistance with maintaining a stable lifestyle.  Patient presents with history of     ICD-10-CM ICD-9-CM   1. Moderate episode of recurrent major depressive disorder  F33.1 296.32   2. Generalized anxiety disorder  F41.1 300.02   3. Circadian rhythm sleep disorder, unspecified type  G47.20 327.30   4. Post traumatic stress disorder (PTSD)  F43.10 309.81   5. Mood disorder  F39 296.90   . Patient screened positive for depression based on a PHQ-9 score of 13 on 11/21/2024. Follow-up recommendations include: Prescribed antidepressant medication treatment.  Patient reports continued back pain that affects his mood and anxiety some days.  Overall he reports his medications are working well for him and he would like to stay at his current dosing.  He continues to see Stefan Tran for therapy  He plans to start Float therapy through [facility] this Saturday.    Treatment Plan:   Continue therapy with Stefan López rexulti, prazosin, duloxetine, trazodone    Patient will continue supportive psychotherapy efforts and medications as indicated. Clinic will obtain release of information for current treatment team for continuity of care as needed. Patient will contact this office, call 911 or present to the nearest emergency room should suicidal or homicidal ideations occur.  Discussed medication options and treatment plan of prescribed medication(s) as well as the risks, benefits, and potential side effects. Patient ackowledged and verbally consented to continue with current treatment plan and was educated on the importance of compliance with treatment and follow-up appointments.     Follow Up:   Return in about 3 months (around 2/21/2025) for Med Check.        JESSICA Nur, [facility]     This is electronically signed by JESSICA Nur,  ANGELO  [unfilled] 14:35 EST

## 2024-11-23 ENCOUNTER — POP HEALTH PHARMACY (OUTPATIENT)
Dept: PHARMACY | Facility: OTHER | Age: 64
End: 2024-11-23
Payer: MEDICARE

## 2024-11-26 ENCOUNTER — OFFICE VISIT (OUTPATIENT)
Dept: BEHAVIORAL HEALTH | Facility: CLINIC | Age: 64
End: 2024-11-26
Payer: MEDICARE

## 2024-11-26 DIAGNOSIS — F33.1 MODERATE EPISODE OF RECURRENT MAJOR DEPRESSIVE DISORDER: Primary | ICD-10-CM

## 2024-11-26 DIAGNOSIS — G31.84 MILD COGNITIVE IMPAIRMENT: ICD-10-CM

## 2024-11-26 DIAGNOSIS — F43.10 POST TRAUMATIC STRESS DISORDER (PTSD): ICD-10-CM

## 2024-11-26 DIAGNOSIS — F41.1 GENERALIZED ANXIETY DISORDER: ICD-10-CM

## 2024-11-26 NOTE — PROGRESS NOTES
"     Follow Up Adult Note     Date:2024   Patient Name: Santi Souza Sr.  : 1960   MRN: [id number]   Time IN: 1:02 pm     Time OUT: 1:57 pm     Referring Provider: Jordan Heart APRN    Chief Complaint:      ICD-10-CM ICD-9-CM   1. Moderate episode of recurrent major depressive disorder  F33.1 296.32   2. Generalized anxiety disorder  F41.1 300.02   3. Mild cognitive impairment  G31.84 331.83   4. Post traumatic stress disorder (PTSD)  F43.10 309.81        History of Present Illness:   Santi Souza Sr. is a 64 y.o., single, disabled  male who is being seen today for scheduled Psychotherapy session. Mood reported as \"It's been better\" with somewhat tired and withdrawn affect. Patient presented as tired and down at times but remained overall calm, cooperative, and pleasant with provider. Patient was engaged when asked questions. Patient denied any current SI/HI/AVH.    \"Been in more pain-sick of it and it gets my mood down\"  \"I won't take any of those pain meds, not for me\"  \"I tired this float therapy thing that the [facility] got me into-I just sat in some salt water in the dark, don't really think it did much\"  \"I haven't slept well recently, probably because of the pain\"  \"I'm planning on going to a neighbors family for the holiday\"  \"I've pretty much just been going to the center ([facility]) and spending time with the neighbors\"  \"Kind of down-even though I have my neighbors family to go to on , I don't have my own to spend time with\"      Subjective     PHQ-9 Depression Screening  PHQ-9 Total Score:  Not completed at this time     GARETT-7   Not completed at this time    Patient's Support Network Includes:   Neighbors/friends, [facility], [facility]    Functional Status: Moderate impairment     Progress toward goal: Not at goal    Prognosis: Fair with Ongoing Treatment     Medications:     Current Outpatient Medications:     aspirin 81 MG chewable tablet, Chew 1 " tablet., Disp: , Rfl:     atorvastatin (LIPITOR) 80 MG tablet, Take 1 tablet by mouth Daily., Disp: 90 tablet, Rfl: 1    Brexpiprazole (Rexulti) 2 MG tablet, Take 1 tablet by mouth Daily., Disp: 90 tablet, Rfl: 0    cyclobenzaprine (FLEXERIL) 5 MG tablet, Take 1 tablet by mouth 3 (Three) Times a Day As Needed for Muscle Spasms., Disp: 30 tablet, Rfl: 1    diclofenac (VOLTAREN) 75 MG EC tablet, TAKE 1 TABLET BY MOUTH TWICE DAILY AS NEEDED FOR  BACK  PAIN,  JOINT  PAIN, Disp: 60 tablet, Rfl: 0    donepezil (ARICEPT) 10 MG tablet, Take 1 tablet by mouth Every Night., Disp: 30 tablet, Rfl: 2    DULoxetine (CYMBALTA) 60 MG capsule, Take 1 capsule by mouth in the morning, Disp: 90 capsule, Rfl: 0    empagliflozin (Jardiance) 25 MG tablet tablet, Take 1 tablet by mouth Daily., Disp: 90 tablet, Rfl: 1    fluticasone (FLONASE) 50 MCG/ACT nasal spray, 2 sprays into the nostril(s) as directed by provider Daily., Disp: 16 g, Rfl: 5    levocetirizine (XYZAL) 5 MG tablet, Take 1 tablet by mouth Every Evening., Disp: 90 tablet, Rfl: 1    Magnesium 400 MG tablet, Take 400 mg by mouth Daily., Disp: , Rfl:     memantine (NAMENDA) 5 MG tablet, Take 1 tablet by mouth 2 (Two) Times a Day., Disp: 60 tablet, Rfl: 2    nitroglycerin (NITROLINGUAL) 0.4 MG/SPRAY spray, Place 1 spray by translingual route on or under the tongue at the first sign of an attack, no more than 3 sprays / 15-minute., Disp: 1 each, Rfl: 0    omeprazole (priLOSEC) 20 MG capsule, Take 1 capsule by mouth Daily., Disp: 90 capsule, Rfl: 1    pioglitazone (Actos) 15 MG tablet, Take 1 tablet by mouth Daily., Disp: 30 tablet, Rfl: 3    prazosin (MINIPRESS) 2 MG capsule, Take 1 capsule by mouth at bedtime., Disp: 90 capsule, Rfl: 0    SITagliptin (JANUVIA) 100 MG tablet, Take 1 tablet by mouth Daily., Disp: 90 tablet, Rfl: 1    traZODone (DESYREL) 50 MG tablet, Take 1 tablet by mouth every day at bedtime., Disp: 90 tablet, Rfl: 0    Allergies:   Allergies   Allergen  "Reactions    Hydrocodone Itching    Penicillins Swelling and Provider Review Needed     Entire body swelled as a child     Sulfa Antibiotics Shortness Of Breath, Rash and Provider Review Needed    Codeine Nausea And Vomiting, Confusion and Provider Review Needed    Oxycontin [Oxycodone] Itching       Objective     Mental Status Exam:   MENTAL STATUS EXAM   General Appearance:  Cleanly groomed and dressed  Eye Contact:  Downcast  Attitude:  Cooperative and polite  Motor Activity:  Slow  Muscle Strength:  Abnormal  Speech:  Soft spoken  Language:  Spontaneous  Mood and affect:  Other  Other Comment:  Mood reported as \"It's been better\" with somewhat tired and withdrawn affect.  Hopelessness:  Optimistic  Loneliness: 3  Thought Process:  Linear  Associations/ Thought Content:  No delusions  Hallucinations:  None  Suicidal Ideations:  Not present  Homicidal Ideation:  Not present  Sensorium:  Other  Other Comment:  Clouded at times but mostly alert  Orientation:  Person, place, time and situation  Immediate Recall, Recent, and Remote Memory:  Other  Other Comment:  Fair  Attention Span/ Concentration:  Good  Fund of Knowledge:  Appropriate for age and educational level  Intellectual Functioning:  Average range  Insight:  Fair  Judgement:  Fair  Reliability:  Good  Impulse Control:  Good       Assessment / Plan      Visit Diagnosis/Orders Placed This Visit:    ICD-10-CM ICD-9-CM   1. Moderate episode of recurrent major depressive disorder  F33.1 296.32   2. Generalized anxiety disorder  F41.1 300.02   3. Mild cognitive impairment  G31.84 331.83   4. Post traumatic stress disorder (PTSD)  F43.10 309.81        PLAN:  Safety: No acute safety concerns  Risk Assessment: Risk of self-harm acutely is low. Risk of self-harm chronically is also low, but could be further elevated in the event of treatment noncompliance and/or AODA.    Treatment Plan/Goals: Continue supportive psychotherapy efforts and medications as indicated. " Treatment and medication options discussed during today's visit. Patient ackowledged and verbally consented to continue with current treatment plan and was educated on the importance of compliance with treatment and follow-up appointments. Patient seems reasonably able to adhere to treatment plan.      Assisted Patient in processing above session content; acknowledged and normalized patient’s thoughts, feelings, and concerns. Assisted patient in processing recent stressors/emotions/feelings and utilized Socratic Questioning techniques and open ended questions to encourage reflection. Discussed and processed recent depressive mood while redirecting and assisting patient to identify activities and interpersonal relationships around him that have brought him guero to invest active time in. Continued to discuss relationships with family and ways to reunite with them. Patient was overall receptive to therapeutic feedback.      Allowed Patient to freely discuss issues  without interruption or judgement with unconditional positive regard, active listening skills, and empathy. Therapist provided a safe, confidential environment to facilitate the development of a positive therapeutic relationship and encouraged open, honest communication. Assisted Patient in identifying risk factors which would indicate the need for higher level of care including thoughts to harm self or others and/or self-harming behavior and encouraged Patient to contact this office, call 911, or present to the nearest emergency room should any of these events occur. Discussed crisis intervention services and means to access. Patient adamantly and convincingly denies current suicidal or homicidal ideation or perceptual disturbance. Assisted Patient in processing session content; acknowledged and normalized Patient’s thoughts, feelings, and concerns by utilizing a person-centered approach in efforts to build appropriate rapport and a positive therapeutic  relationship with open and honest communication. .     Follow Up:   Return in about 1 week (around 12/3/2024) for Therapy session.    Stefan Tran, RALPHW  [facility]

## 2024-11-27 DIAGNOSIS — G31.84 MILD COGNITIVE IMPAIRMENT: ICD-10-CM

## 2024-11-27 RX ORDER — MEMANTINE HYDROCHLORIDE 5 MG/1
5 TABLET ORAL 2 TIMES DAILY
Qty: 60 TABLET | Refills: 0 | Status: SHIPPED | OUTPATIENT
Start: 2024-11-27

## 2024-11-27 NOTE — TELEPHONE ENCOUNTER
Rx Refill Note  Requested Prescriptions     Pending Prescriptions Disp Refills    memantine (NAMENDA) 5 MG tablet [Pharmacy Med Name: Memantine HCl 5 MG Oral Tablet] 60 tablet 0     Sig: Take 1 tablet by mouth twice daily      Last filled:8/30/24 2 refill  Last office visit with prescribing clinician: 8/30/2024      Next office visit with prescribing clinician: 12/6/2024     CHRAMAINE COLE  11/27/24, 11:59 EST    sent to pharmacy [facility] 0 refill until upcoming appt

## 2024-12-06 ENCOUNTER — OFFICE VISIT (OUTPATIENT)
Dept: NEUROLOGY | Facility: CLINIC | Age: 64
End: 2024-12-06
Payer: MEDICARE

## 2024-12-06 VITALS
HEART RATE: 64 BPM | BODY MASS INDEX: 26.1 KG/M2 | DIASTOLIC BLOOD PRESSURE: 58 MMHG | HEIGHT: 68 IN | SYSTOLIC BLOOD PRESSURE: 104 MMHG | WEIGHT: 172.18 LBS | OXYGEN SATURATION: 97 %

## 2024-12-06 DIAGNOSIS — R26.81 GAIT INSTABILITY: ICD-10-CM

## 2024-12-06 DIAGNOSIS — G31.84 MILD COGNITIVE IMPAIRMENT: Primary | ICD-10-CM

## 2024-12-06 RX ORDER — MEMANTINE HYDROCHLORIDE 5 MG/1
5 TABLET ORAL 2 TIMES DAILY
Qty: 180 TABLET | Refills: 1 | Status: SHIPPED | OUTPATIENT
Start: 2024-12-06 | End: 2025-06-04

## 2024-12-06 RX ORDER — DONEPEZIL HYDROCHLORIDE 10 MG/1
10 TABLET, FILM COATED ORAL NIGHTLY
Qty: 90 TABLET | Refills: 1 | Status: SHIPPED | OUTPATIENT
Start: 2024-12-06 | End: 2025-06-04

## 2024-12-06 NOTE — PROGRESS NOTES
Neuro Office Visit      Encounter Date: 2024   Patient Name: Santi Souza Sr.  : 1960   MRN: [id number]   PCP:  Jordan Heart APRN     Chief Complaint:    Chief Complaint   Patient presents with    mild cognitive impairment       History of Present Illness: Santi Souza Sr. is a 64 y.o. male who is here today in Neurology for  memory loss    Initial visit 2023:  Santi Souza Sr. is a 63 y.o. male who is here today in Neurology for  memory loss     PMH of CAD, chronic back pain, depression insulin-dependent diabetes, GERD, hyperlipidemia, hypertension, neuropathy, osteoarthritis, rotator cuff syndrome, CVA in  with short-term memory and difficulty with balance, vitamin D deficiency, glasses, hearing aid use     Patient was referred by primary care after visit on 2023 for evaluation of cerebrovascular disease, memory deficits, cognitive impairment, and PTSD.  He reported at that visit that due to this history he has trouble doing daily life skills in the home such as planning and taking care of things such as bills.  He has been seeing neurology Trisha PEGUERO at [facility] and would like a second opinion.  Feels that his memory and cognitive impairment is worsening.  He is following with psychiatry now with [facility] and states this has been very helpful for him.  MRI brain performed in 2022 for memory loss which per impression stated ventricles and sulci normal in size.  Mild amount of scattered T2 hyperintensities predominantly in the subcortical regions of the frontal lobes, indicating chronic small vessel disease.  Enlarged perivascular spaces are incidentally noted in left cerebral peduncle of the midbrain.  No abnormal parenchymal susceptibility artifact or restricted diffusion.  No midline shift, hydrocephalus, or extra-axial fluid collection is evident.  He does follow with [facility] and was last seen in  their office on 4/20/2023 with MARIELENA Gorman.  Donepezil was increased to 10 mg daily.  He was assessed to have mild cognitive impairment due to vascular disease and PTSD.     In clinic today he reports that memory changes have progressively worsened, he has trouble remembering appointments, trouble remembering steps through making a sandwich, trouble with conversations - he often will lose train of thought mid conversation. [facility] describes as feeling [facility]/needing mental health help - he denies SI, he called crisis hotline yesterday, reports that [facility] feels [facility], he reports history of trauma. He notes emotional stress from co-pays and family concerns. He reports that he had CVA previously that was seen on prior MRI - per report from [facility] no stroke was mentioned, will repeat MRI brain to further assess, currently taking ASA 81 mg plus Atorvastatin 80 mg. Currently taking Donepezil 10 mg daily. He reports gait unsteadiness which was previously assessed by Dr. Gonsalez and felt to be d/t peripheral neuropathy.      Follow up visit 2/21/2024:  Alonso Ellis returns to neurology clinic for continued evaluation of memory loss and gait instability. MRI brain performed on 1/17/2024 showed findings compatible with chronic microvascular ischemic change, no restricted diffusion to suggest acute infarct.  He also has been following with behavioral health, Derrick LOPEZ for bipolar 2 disorder, generalized anxiety disorder, depression, PTSD, circadian rhythm sleep disorder. RPR nonreactive, methylmalonic acid 273, vitamin B-12 1,466, folate >20, Thyroid function was normal. Recently evicted from his home, moved into senior community, [facility] in [place]. He feels much safer where he is currently living. He has difficulty with short term memory. He feels that his memory lately has continued to decline - he does acknowledge much mental health stress in that time  which he believes has contributed to his memory loss. He is not driving.        Follow up visit 8/30/2024  Alonso Ellis returns to neurology clinic for continued evaluation of MCI, he reports that his pharmacy did not supply Donepezil and Memantine for about 2 months and he noted significant change in cognition/focus during that time, he is currently back on Donepezil and Memantine and has been on them for about a month and does note some improvement since resuming these medications. Notes that his short term memory continues to be poor overall. No SE from Donepezil or Namenda. Sleep is okay. He reports that mood comes and goes, does better when he is busy. He manages his medications. He continues to reside at [facility] in [place] and feels safe there. No falls. He reports that he will stagger some while walking, feels that his balance is off at times. Does have intermittent diabetic neuropathy pain, does not bother him all the time. He reports from prior back surgery he has numbness in his right foot as well. He is interested in PT for gait/balance training.       Current visit 12/6/2024:  Santi Souza returns to neurology clinic for continued evaluation of MCI and gait instability. He has been taking Donepezil 10 mg daily plus Memantine 5 mg BID. He feels that memory has been stable, he reports that he has been tolerating his current medication regimen without side effects. He has also been taking ASA 81 mg daily plus Atorvastatin 80 mg daily. At last visit he was referred to PT for gait/balance training, he reports that PT was helpful for balance and gait, he reports that he had an episode back pain with left sided sciatic pain, he previously followed with Dr. Suarez (neurosurgery at ) and plans on following up with him again soon. He also continues to follow up with behavioral health and feels that his mood has stabilized overall as well, he has noted improvement in memory stability in correlation with his mood  stability.       Subjective      Review of Systems   Constitutional: Negative.    HENT: Negative.     Respiratory: Negative.     Cardiovascular: Negative.    Gastrointestinal: Negative.    Endocrine: Negative.    Genitourinary: Negative.    Musculoskeletal:  Positive for gait problem.   Skin: Negative.    Neurological:  Positive for numbness.        Memory loss - stable   Psychiatric/Behavioral:  Positive for sleep disturbance.         Reports that mood is stable           Past Medical History:   Past Medical History:   Diagnosis Date    CAD (coronary artery disease) 11/03/2017    Cataracta     Chronic back pain 11/03/2017    Depression     Diabetes mellitus--Insulin Dependant 11/03/2017    IDDM    GERD (gastroesophageal reflux disease) 11/03/2017    Hyperlipidemia 11/03/2017    Hypertension 11/03/2017    Neuropathy     Osteoarthritis     Rotator cuff syndrome 6/23    Should be on my chart    Stroke 2022    short term memory and difficulty balance    Vitamin D deficiency     Wears glasses     Wears hearing aid in both ears        Past Surgical History:   Past Surgical History:   Procedure Laterality Date    BACK SURGERY  1999    4 lumbar surgeries    CARDIAC CATHETERIZATION      total of 7 stents    COLONOSCOPY      LUMBAR SPINE SURGERY  1998 2006, 2013    SHOULDER ARTHROSCOPY W/ ROTATOR CUFF REPAIR Right 9/13/2023    Procedure: SHOULDER ARTHROSCOPY WITH ROTATOR CUFF REPAIR, BICEP TENDONESIS, SUBACROMIAL DECOMPRESSION- RIGHT;  Surgeon: Geoffrey Francis MD;  Location: [facility];  Service: Orthopedics;  Laterality: Right;    TONSILLECTOMY         Family History:   Family History   Problem Relation Age of Onset    Diabetes Father     Cancer Maternal Grandfather        Social History:   Social History     Socioeconomic History    Marital status: Single   Tobacco Use    Smoking status: Every Day     Current packs/day: 0.25     Average packs/day: 0.3 packs/day for 50.9 years (12.7 ttl pk-yrs)     Types: Cigarettes      Start date: 1/1/1974     Passive exposure: Current    Smokeless tobacco: Never   Vaping Use    Vaping status: Never Used   Substance and Sexual Activity    Alcohol use: Never    Drug use: Not Currently     Frequency: 7.0 times per week     Types: Marijuana     Comment: vape every night or smoke    Sexual activity: Defer     Partners: Female     Birth control/protection: Condom       Medications:     Current Outpatient Medications:     aspirin 81 MG chewable tablet, Chew 1 tablet., Disp: , Rfl:     atorvastatin (LIPITOR) 80 MG tablet, Take 1 tablet by mouth Daily., Disp: 90 tablet, Rfl: 1    Brexpiprazole (Rexulti) 2 MG tablet, Take 1 tablet by mouth Daily., Disp: 90 tablet, Rfl: 0    cyclobenzaprine (FLEXERIL) 5 MG tablet, Take 1 tablet by mouth 3 (Three) Times a Day As Needed for Muscle Spasms., Disp: 30 tablet, Rfl: 1    diclofenac (VOLTAREN) 75 MG EC tablet, TAKE 1 TABLET BY MOUTH TWICE DAILY AS NEEDED FOR  BACK  PAIN,  JOINT  PAIN, Disp: 60 tablet, Rfl: 0    donepezil (ARICEPT) 10 MG tablet, Take 1 tablet by mouth Every Night for 180 days., Disp: 90 tablet, Rfl: 1    DULoxetine (CYMBALTA) 60 MG capsule, Take 1 capsule by mouth in the morning, Disp: 90 capsule, Rfl: 0    empagliflozin (Jardiance) 25 MG tablet tablet, Take 1 tablet by mouth Daily., Disp: 90 tablet, Rfl: 1    fluticasone (FLONASE) 50 MCG/ACT nasal spray, 2 sprays into the nostril(s) as directed by provider Daily., Disp: 16 g, Rfl: 5    levocetirizine (XYZAL) 5 MG tablet, Take 1 tablet by mouth Every Evening., Disp: 90 tablet, Rfl: 1    Magnesium 400 MG tablet, Take 400 mg by mouth Daily., Disp: , Rfl:     memantine (NAMENDA) 5 MG tablet, Take 1 tablet by mouth 2 (Two) Times a Day for 180 days., Disp: 180 tablet, Rfl: 1    nitroglycerin (NITROLINGUAL) 0.4 MG/SPRAY spray, Place 1 spray by translingual route on or under the tongue at the first sign of an attack, no more than 3 sprays / 15-minute., Disp: 1 each, Rfl: 0    omeprazole (priLOSEC) 20  "MG capsule, Take 1 capsule by mouth Daily., Disp: 90 capsule, Rfl: 1    pioglitazone (Actos) 15 MG tablet, Take 1 tablet by mouth Daily., Disp: 30 tablet, Rfl: 3    prazosin (MINIPRESS) 2 MG capsule, Take 1 capsule by mouth at bedtime., Disp: 90 capsule, Rfl: 0    SITagliptin (JANUVIA) 100 MG tablet, Take 1 tablet by mouth Daily., Disp: 90 tablet, Rfl: 1    traZODone (DESYREL) 50 MG tablet, Take 1 tablet by mouth every day at bedtime., Disp: 90 tablet, Rfl: 0    Allergies:   Allergies   Allergen Reactions    Hydrocodone Itching    Penicillins Swelling and Provider Review Needed     Entire body swelled as a child     Sulfa Antibiotics Shortness Of Breath, Rash and Provider Review Needed    Codeine Nausea And Vomiting, Confusion and Provider Review Needed    Oxycontin [Oxycodone] Itching         STEADI Fall Risk Assessment was completed, and patient is at LOW risk for falls.Assessment completed on:8/30/2024    Objective     Objective:    /58   Pulse 64   Ht 172.7 cm (68\")   Wt 78.1 kg (172 lb 2.9 oz)   SpO2 97%   BMI 26.18 kg/m²   Body mass index is 26.18 kg/m².    Physical Exam  Vitals reviewed.   Constitutional:       Appearance: Normal appearance.   HENT:      Head: Normocephalic and atraumatic.      Mouth/Throat:      Mouth: Mucous membranes are moist.      Pharynx: Oropharynx is clear.   Pulmonary:      Effort: Pulmonary effort is normal. No respiratory distress.   Skin:     General: Skin is warm and dry.   Neurological:      Mental Status: He is alert.        Neurology Exam:    General apperance: NAD.     Mental status: Alert, awake and oriented to person, year, date, day, month, state, county, city, hospital, floor    Fund of knowledge:  Normal.     Language and Speech: No aphasia or dysarthria.    Naming , Repetition and Comprehension:  Can name objects, repeat a sentence and follow commands. Speech is clear and fluent with good repetition, comprehension, and naming.    Cranial Nerves:   CN II: " Visual fields are full. Intact. Pupils - PERRLA  CN III, IV and VI: Extraocular movements are intact. Normal saccades.   CN V: Facial sensation is intact.   CN VII: Muscles of facial expression reveal no asymmetry. Intact.   CN VIII: Hearing is intact.   CN IX and X: Palate elevates symmetrically. Intact  CN XI: Shoulder shrug is intact.   CN XII: Tongue is midline without evidence of atrophy or fasciculation.     Motor:  Right UE muscle strength 5/5. Normal tone.     Left UE muscle strength 5/5. Normal tone.      Right LE muscle strength 5/5. Normal tone.     Left LE muscle strength 5-/5. Normal tone.  (Reports that this is baseline from prior surgery)    Sensory: Normal light touch sensation bilaterally.    DTRs: 2+ bilaterally in upper and lower extremities.    Coordination: Normal finger-to-nose    Gait: Slight unsteadiness noted in gait, ambulates without assistive device.              Results:   Imaging:   No Images in the past 120 days found..     Labs:   Lab Results   Component Value Date    GLUCOSE 149 (H) 08/20/2024    BUN 9 08/20/2024    CREATININE 0.91 08/20/2024     08/20/2024    K 4.5 08/20/2024     08/20/2024    CALCIUM 9.9 08/20/2024    PROTEINTOT 7.3 09/16/2023    ALBUMIN 4.7 08/20/2024    ALT 17 08/20/2024    AST 17 08/20/2024    ALKPHOS 144 (H) 08/20/2024    BILITOT 0.7 08/20/2024    GLOB 3.0 09/16/2023    AGRATIO 1.4 09/16/2023    BCR 10 08/20/2024    ANIONGAP 13.0 09/16/2023    EGFR 102.0 09/16/2023         Assessment / Plan      Assessment/Plan:   Diagnoses and all orders for this visit:    1. Mild cognitive impairment (Primary)  -     donepezil (ARICEPT) 10 MG tablet; Take 1 tablet by mouth Every Night for 180 days.  Dispense: 90 tablet; Refill: 1  -     memantine (NAMENDA) 5 MG tablet; Take 1 tablet by mouth 2 (Two) Times a Day for 180 days.  Dispense: 180 tablet; Refill: 1    2. Gait instability         Santi Souza returns to neurology clinic for continued evaluation of MCI and  gait instability. He has been taking Donepezil 10 mg daily plus Memantine 5 mg BID and feels that his memory has stabilized, will continue unchanged today. He saw benefit from PT regarding gait/balance. He is going to follow up with Dr. Suarez for his back. He was also encouraged to continue to closely follow with behavioral health. Otherwise will plan to see Santi back in clinic in 6 months or sooner for any questions or concerns.     Patient Education:       Reviewed medications, potential side effects and signs and symptoms to report. Discussed risk versus benefits of treatment plan with patient and/or family-including medications, labs and radiology that may be ordered. Addressed questions and concerns during visit. Patient and/or family verbalized understanding and agree with plan. Instructed to call the office with any questions and report to ER with any life-threatening symptoms.     Follow Up:   Return in about 6 months (around 6/6/2025).    I spent  30  minutes in the care of this patient. I personally spent 50 percent of this time counseling and discussing diagnosis, diagnostic testing, evaluation, treatment options, and management .       During this visit the following were done:  Labs Reviewed [x]    Labs Ordered []    Radiology Reports Reviewed []    Radiology Ordered []    PCP Records Reviewed []    Referring Provider Records Reviewed []    ER Records Reviewed []    Hospital Records Reviewed []    History Obtained From Family []    Radiology Images Reviewed []    Other Reviewed []    Records Requested []      JESSICA Watson  [facility] NE[facility]OLOG[facility]  [address] [facility] [address]  [facility] [id number] [phone]

## 2024-12-10 ENCOUNTER — OFFICE VISIT (OUTPATIENT)
Dept: BEHAVIORAL HEALTH | Facility: CLINIC | Age: 64
End: 2024-12-10
Payer: MEDICARE

## 2024-12-10 DIAGNOSIS — F33.1 MODERATE EPISODE OF RECURRENT MAJOR DEPRESSIVE DISORDER: Primary | ICD-10-CM

## 2024-12-10 DIAGNOSIS — F43.10 POST TRAUMATIC STRESS DISORDER (PTSD): ICD-10-CM

## 2024-12-10 DIAGNOSIS — F41.1 GENERALIZED ANXIETY DISORDER: ICD-10-CM

## 2024-12-10 NOTE — PROGRESS NOTES
"     Follow Up Adult Note     Date:12/10/2024   Patient Name: Santi Souza Sr.  : 1960   MRN: [id number]   Time IN: 1:05 pm    Time OUT: 1:50 pm     Referring Provider: Jordan Heart APRN    Chief Complaint:      ICD-10-CM ICD-9-CM   1. Moderate episode of recurrent major depressive disorder  F33.1 296.32   2. Generalized anxiety disorder  F41.1 300.02   3. Post traumatic stress disorder (PTSD)  F43.10 309.81        History of Present Illness:   Santi Souza Sr. is a 64 y.o., single, disabled  male who is being seen today for scheduled Psychotherapy session. Mood reported as \"Better than last week\" with somewhat lethargic but overall cooperative affect. Patient presented with some intermittent sadness when discussing \"fear of dying alone\" but remained overall calm, cooperative, mostly engaged, and pleasant with provider. Patient denied any current SI/HI/AVH.     \"I was pretty sick last week and just now catching up on feeling better\"  \"Some days I just don't want to do anything and I just think on the past and some of the bad memories have been coming back more\"  \"I want to get happy again-just feels like it's been so long and I miss how things used to be with my friends\"  \"I did spend Thanksgiving with my neighbor and her family and it went well and planning to go back on [place]-it was a little tough because that was the first time I've spent a holiday with other people in maybe 10 years\"      Subjective     PHQ-9 Depression Screening  PHQ-9 Total Score:  Not Completed at this time     GARETT-7   Not completed at this time    Patient's Support Network Includes:   neighbors, friends    Functional Status: Moderate impairment     Progress toward goal: Not at goal    Prognosis: Fair with Ongoing Treatment     Medications:     Current Outpatient Medications:     aspirin 81 MG chewable tablet, Chew 1 tablet., Disp: , Rfl:     atorvastatin (LIPITOR) 80 MG tablet, Take 1 tablet by mouth " Daily., Disp: 90 tablet, Rfl: 1    Brexpiprazole (Rexulti) 2 MG tablet, Take 1 tablet by mouth Daily., Disp: 90 tablet, Rfl: 0    cyclobenzaprine (FLEXERIL) 5 MG tablet, Take 1 tablet by mouth 3 (Three) Times a Day As Needed for Muscle Spasms., Disp: 30 tablet, Rfl: 1    diclofenac (VOLTAREN) 75 MG EC tablet, TAKE 1 TABLET BY MOUTH TWICE DAILY AS NEEDED FOR  BACK  PAIN,  JOINT  PAIN, Disp: 60 tablet, Rfl: 0    donepezil (ARICEPT) 10 MG tablet, Take 1 tablet by mouth Every Night for 180 days., Disp: 90 tablet, Rfl: 1    DULoxetine (CYMBALTA) 60 MG capsule, Take 1 capsule by mouth in the morning, Disp: 90 capsule, Rfl: 0    empagliflozin (Jardiance) 25 MG tablet tablet, Take 1 tablet by mouth Daily., Disp: 90 tablet, Rfl: 1    fluticasone (FLONASE) 50 MCG/ACT nasal spray, 2 sprays into the nostril(s) as directed by provider Daily., Disp: 16 g, Rfl: 5    levocetirizine (XYZAL) 5 MG tablet, Take 1 tablet by mouth Every Evening., Disp: 90 tablet, Rfl: 1    Magnesium 400 MG tablet, Take 400 mg by mouth Daily., Disp: , Rfl:     memantine (NAMENDA) 5 MG tablet, Take 1 tablet by mouth 2 (Two) Times a Day for 180 days., Disp: 180 tablet, Rfl: 1    nitroglycerin (NITROLINGUAL) 0.4 MG/SPRAY spray, Place 1 spray by translingual route on or under the tongue at the first sign of an attack, no more than 3 sprays / 15-minute., Disp: 1 each, Rfl: 0    omeprazole (priLOSEC) 20 MG capsule, Take 1 capsule by mouth Daily., Disp: 90 capsule, Rfl: 1    pioglitazone (Actos) 15 MG tablet, Take 1 tablet by mouth Daily., Disp: 30 tablet, Rfl: 3    prazosin (MINIPRESS) 2 MG capsule, Take 1 capsule by mouth at bedtime., Disp: 90 capsule, Rfl: 0    SITagliptin (JANUVIA) 100 MG tablet, Take 1 tablet by mouth Daily., Disp: 90 tablet, Rfl: 1    traZODone (DESYREL) 50 MG tablet, Take 1 tablet by mouth every day at bedtime., Disp: 90 tablet, Rfl: 0    Allergies:   Allergies   Allergen Reactions    Hydrocodone Itching    Penicillins Swelling and  "Provider Review Needed     Entire body swelled as a child     Sulfa Antibiotics Shortness Of Breath, Rash and Provider Review Needed    Codeine Nausea And Vomiting, Confusion and Provider Review Needed    Oxycontin [Oxycodone] Itching       Objective     Mental Status Exam:   MENTAL STATUS EXAM   General Appearance:  Cleanly groomed and dressed and looks older than stated age  Eye Contact:  Fair  Attitude:  Cooperative and polite  Motor Activity:  Slow and normal gait, posture  Muscle Strength:  Normal  Speech:  Normal rate, tone, volume and soft spoken  Language:  Spontaneous  Mood and affect:  Other  Other Comment:  Mood reported as \"Better than last week\" with somewhat lethargic but overall cooperative affect.  Hopelessness:  Optimistic  Loneliness: 3  Thought Process:  Linear  Associations/ Thought Content:  No delusions  Hallucinations:  None  Suicidal Ideations:  Not present  Homicidal Ideation:  Not present  Sensorium:  Other  Other Comment:  Somewhat lethargic but alert  Orientation:  Person, place, time and situation  Immediate Recall, Recent, and Remote Memory:  Other  Other Comment:  Fair  Attention Span/ Concentration:  Other  Other Comment:  Fair  Fund of Knowledge:  Appropriate for age and educational level  Intellectual Functioning:  Average range  Insight:  Fair  Judgement:  Fair  Reliability:  Good  Impulse Control:  Good       Assessment / Plan      Visit Diagnosis/Orders Placed This Visit:    ICD-10-CM ICD-9-CM   1. Moderate episode of recurrent major depressive disorder  F33.1 296.32   2. Generalized anxiety disorder  F41.1 300.02   3. Post traumatic stress disorder (PTSD)  F43.10 309.81        PLAN:  Safety: No acute safety concerns  Risk Assessment: Risk of self-harm acutely is low. Risk of self-harm chronically is also low, but could be further elevated in the event of treatment noncompliance and/or AODA.    Treatment Plan/Goals: Continue supportive psychotherapy efforts and medications as " "indicated. Treatment and medication options discussed during today's visit. Patient ackowledged and verbally consented to continue with current treatment plan and was educated on the importance of compliance with treatment and follow-up appointments. Patient seems reasonably able to adhere to treatment plan.      Assisted Patient in processing above session content; acknowledged and normalized patient’s thoughts, feelings, and concerns. Assisted patient in processing recent stressors/emotions/feelings and utilized CBT techniques to assist patient with challenging negative/irrational thoughts and beliefs and replacing them with rational ones while also utilizing Socratic Questioning techniques and open ended questions to encourage reflection. Assisted patient in developing strategies for thought distraction when ruminating on the past and begin to establish a daily gratitude practice for what he is thankful for. Identified current strengths and positives in \"here and now\" lifestyle and collaborated on further ways to improve negative mood. Patient was receptive to therapeutic feedback.       Allowed Patient to freely discuss issues  without interruption or judgement with unconditional positive regard, active listening skills, and empathy. Therapist provided a safe, confidential environment to facilitate the development of a positive therapeutic relationship and encouraged open, honest communication. Assisted Patient in identifying risk factors which would indicate the need for higher level of care including thoughts to harm self or others and/or self-harming behavior and encouraged Patient to contact this office, call 911, or present to the nearest emergency room should any of these events occur. Discussed crisis intervention services and means to access. Patient adamantly and convincingly denies current suicidal or homicidal ideation or perceptual disturbance. Assisted Patient in processing session content; " acknowledged and normalized Patient’s thoughts, feelings, and concerns by utilizing a person-centered approach in efforts to build appropriate rapport and a positive therapeutic relationship with open and honest communication. .     Follow Up:   Return in about 1 week (around 12/17/2024) for Therapy session.    Stefan Tran, [facility]  [facility]

## 2024-12-16 ENCOUNTER — POP HEALTH PHARMACY (OUTPATIENT)
Dept: PHARMACY | Facility: OTHER | Age: 64
End: 2024-12-16
Payer: MEDICARE

## 2024-12-17 ENCOUNTER — OFFICE VISIT (OUTPATIENT)
Dept: BEHAVIORAL HEALTH | Facility: CLINIC | Age: 64
End: 2024-12-17
Payer: MEDICARE

## 2024-12-17 DIAGNOSIS — F41.1 GENERALIZED ANXIETY DISORDER: ICD-10-CM

## 2024-12-17 DIAGNOSIS — R41.3 MEMORY DEFICITS: ICD-10-CM

## 2024-12-17 DIAGNOSIS — F43.10 POST TRAUMATIC STRESS DISORDER (PTSD): ICD-10-CM

## 2024-12-17 DIAGNOSIS — G31.84 MILD COGNITIVE IMPAIRMENT: ICD-10-CM

## 2024-12-17 DIAGNOSIS — F33.0 MILD EPISODE OF RECURRENT MAJOR DEPRESSIVE DISORDER: Primary | ICD-10-CM

## 2024-12-17 NOTE — PROGRESS NOTES
"     Follow Up Adult Note     Date:2024   Patient Name: Santi Souza Sr.  : 1960   MRN: [id number]   Time IN: 1:00 pm       Time OUT: 2:00 pm     Referring Provider: Jordan Heart APRN    Chief Complaint:      ICD-10-CM ICD-9-CM   1. Mild episode of recurrent major depressive disorder  F33.0 296.31   2. Generalized anxiety disorder  F41.1 300.02   3. Post traumatic stress disorder (PTSD)  F43.10 309.81   4. Memory deficits  R41.3 780.93   5. Mild cognitive impairment  G31.84 331.83        History of Present Illness:   Santi Souza Sr. is a 64 y.o., single, disabled  male who is being seen today for scheduled Psychotherapy session. Mood reported as \"fair\" with somewhat tired but overall cooperative affect. Patient presented as somewhat tired and low energy but overall calm, cooperative, engaged, and pleasant with provider. Patient reports symptoms of sadness, low energy, worrying, with depressive and mild anxiety symptoms of which the holiday season exacerbate do to no contact with family. Furthermore, patient reports stress related to intermittent memory and cognitive concerns.    \"Anxiety hasn't been as bad the past week but it does come at times when I stress about my memory\"  \"Still no contact with my family-I may try again in a couple of days-it is hard to think about this time of the year being alone\"  \"My memory sometimes just escapes me and it scares me\"  \"Next year has to be a better year and I plan on doing more\"  \"I have a few things going on the rest of the week-a dinner at the [employer] and [employer] and I'm going to try that float therapy again\"      Subjective     PHQ-9 Depression Screening  PHQ-9 Total Score:  Not completed at this time     GARETT-7   Not completed at this time    Patient's Support Network Includes:   neighbors, friends, community center    Functional Status: Moderate impairment     Progress toward goal: Not at goal    Prognosis: Fair with Ongoing " Treatment     Medications:     Current Outpatient Medications:     aspirin 81 MG chewable tablet, Chew 1 tablet., Disp: , Rfl:     atorvastatin (LIPITOR) 80 MG tablet, Take 1 tablet by mouth Daily., Disp: 90 tablet, Rfl: 1    Brexpiprazole (Rexulti) 2 MG tablet, Take 1 tablet by mouth Daily., Disp: 90 tablet, Rfl: 0    cyclobenzaprine (FLEXERIL) 5 MG tablet, Take 1 tablet by mouth 3 (Three) Times a Day As Needed for Muscle Spasms., Disp: 30 tablet, Rfl: 1    diclofenac (VOLTAREN) 75 MG EC tablet, TAKE 1 TABLET BY MOUTH TWICE DAILY AS NEEDED FOR  BACK  PAIN,  JOINT  PAIN, Disp: 60 tablet, Rfl: 0    donepezil (ARICEPT) 10 MG tablet, Take 1 tablet by mouth Every Night for 180 days., Disp: 90 tablet, Rfl: 1    DULoxetine (CYMBALTA) 60 MG capsule, Take 1 capsule by mouth in the morning, Disp: 90 capsule, Rfl: 0    empagliflozin (Jardiance) 25 MG tablet tablet, Take 1 tablet by mouth Daily., Disp: 90 tablet, Rfl: 1    fluticasone (FLONASE) 50 MCG/ACT nasal spray, 2 sprays into the nostril(s) as directed by provider Daily., Disp: 16 g, Rfl: 5    levocetirizine (XYZAL) 5 MG tablet, Take 1 tablet by mouth Every Evening., Disp: 90 tablet, Rfl: 1    Magnesium 400 MG tablet, Take 400 mg by mouth Daily., Disp: , Rfl:     memantine (NAMENDA) 5 MG tablet, Take 1 tablet by mouth 2 (Two) Times a Day for 180 days., Disp: 180 tablet, Rfl: 1    nitroglycerin (NITROLINGUAL) 0.4 MG/SPRAY spray, Place 1 spray by translingual route on or under the tongue at the first sign of an attack, no more than 3 sprays / 15-minute., Disp: 1 each, Rfl: 0    omeprazole (priLOSEC) 20 MG capsule, Take 1 capsule by mouth Daily., Disp: 90 capsule, Rfl: 1    pioglitazone (Actos) 15 MG tablet, Take 1 tablet by mouth Daily., Disp: 30 tablet, Rfl: 3    prazosin (MINIPRESS) 2 MG capsule, Take 1 capsule by mouth at bedtime., Disp: 90 capsule, Rfl: 0    SITagliptin (JANUVIA) 100 MG tablet, Take 1 tablet by mouth Daily., Disp: 90 tablet, Rfl: 1    traZODone  "(DESYREL) 50 MG tablet, Take 1 tablet by mouth every day at bedtime., Disp: 90 tablet, Rfl: 0    Allergies:   Allergies   Allergen Reactions    Hydrocodone Itching    Penicillins Swelling and Provider Review Needed     Entire body swelled as a child     Sulfa Antibiotics Shortness Of Breath, Rash and Provider Review Needed    Codeine Nausea And Vomiting, Confusion and Provider Review Needed    Oxycontin [Oxycodone] Itching       Objective     Mental Status Exam:   MENTAL STATUS EXAM   General Appearance:  Cleanly groomed and dressed  Eye Contact:  Fair  Attitude:  Cooperative and polite  Motor Activity:  Slow  Muscle Strength:  Normal  Speech:  Normal rate, tone, volume and soft spoken  Language:  Spontaneous  Mood and affect:  Other  Other Comment:  Mood reported as \"fair\" with somewhat tired but overall cooperative affect.  Hopelessness:  2  Loneliness: 3  Thought Process:  Goal-directed and linear  Associations/ Thought Content:  No delusions  Hallucinations:  None  Suicidal Ideations:  Not present  Homicidal Ideation:  Not present  Sensorium:  Other  Other Comment:  Clouded at times but overall alert and clear  Orientation:  Person, place, time and situation  Immediate Recall, Recent, and Remote Memory:  Other  Other Comment:  Fair  Attention Span/ Concentration:  Other  Other Comment:  Fair  Fund of Knowledge:  Appropriate for age and educational level  Intellectual Functioning:  Average range  Insight:  Fair  Judgement:  Fair  Reliability:  Good  Impulse Control:  Good       Assessment / Plan      Visit Diagnosis/Orders Placed This Visit:    ICD-10-CM ICD-9-CM   1. Mild episode of recurrent major depressive disorder  F33.0 296.31   2. Generalized anxiety disorder  F41.1 300.02   3. Post traumatic stress disorder (PTSD)  F43.10 309.81   4. Memory deficits  R41.3 780.93   5. Mild cognitive impairment  G31.84 331.83        PLAN:  Safety: No acute safety concerns  Risk Assessment: Risk of self-harm acutely is low. " Risk of self-harm chronically is also low, but could be further elevated in the event of treatment noncompliance and/or AODA.    Treatment Plan/Goals: Continue supportive psychotherapy efforts and medications as indicated. Treatment and medication options discussed during today's visit. Patient ackowledged and verbally consented to continue with current treatment plan and was educated on the importance of compliance with treatment and follow-up appointments. Patient seems reasonably able to adhere to treatment plan.      Assisted Patient in processing above session content; acknowledged and normalized patient’s thoughts, feelings, and concerns.  Rationalized patient thought process regarding recent memory and cognitive concerns which in turn exacerbate anxiety and depressive symptoms. Discussed health issues and the importance of maintaining a healthy diet and activity schedule. Continued to process past trauma and concerns with family dynamics while collaborating on further coping techniques.       Allowed Patient to freely discuss issues  without interruption or judgement with unconditional positive regard, active listening skills, and empathy. Therapist provided a safe, confidential environment to facilitate the development of a positive therapeutic relationship and encouraged open, honest communication. Assisted Patient in identifying risk factors which would indicate the need for higher level of care including thoughts to harm self or others and/or self-harming behavior and encouraged Patient to contact this office, call 911, or present to the nearest emergency room should any of these events occur. Discussed crisis intervention services and means to access. Patient adamantly and convincingly denies current suicidal or homicidal ideation or perceptual disturbance. Assisted Patient in processing session content; acknowledged and normalized Patient’s thoughts, feelings, and concerns by utilizing a person-centered  approach in efforts to build appropriate rapport and a positive therapeutic relationship with open and honest communication. .     Follow Up:   Return in about 1 week (around 12/24/2024) for Therapy session.    Stefan Tran, RALPHW  [facility]

## 2024-12-28 DIAGNOSIS — E11.42 TYPE 2 DIABETES MELLITUS WITH DIABETIC POLYNEUROPATHY, WITHOUT LONG-TERM CURRENT USE OF INSULIN: ICD-10-CM

## 2024-12-30 RX ORDER — PIOGLITAZONE 15 MG/1
15 TABLET ORAL DAILY
Qty: 30 TABLET | Refills: 0 | Status: SHIPPED | OUTPATIENT
Start: 2024-12-30

## 2024-12-31 ENCOUNTER — OFFICE VISIT (OUTPATIENT)
Dept: BEHAVIORAL HEALTH | Facility: CLINIC | Age: 64
End: 2024-12-31
Payer: MEDICARE

## 2024-12-31 DIAGNOSIS — F33.1 MODERATE EPISODE OF RECURRENT MAJOR DEPRESSIVE DISORDER: Primary | ICD-10-CM

## 2024-12-31 DIAGNOSIS — F41.1 GENERALIZED ANXIETY DISORDER: ICD-10-CM

## 2024-12-31 NOTE — PROGRESS NOTES
"     Follow Up Adult Note     Date:2024   Patient Name: Santi Souza Sr.  : 1960   MRN: [id number]   Time IN: 12:55 pm    Time OUT: 1:50 pm     Referring Provider: Jordan Heart APRN    Chief Complaint:      ICD-10-CM ICD-9-CM   1. Moderate episode of recurrent major depressive disorder  F33.1 296.32   2. Generalized anxiety disorder  F41.1 300.02        History of Present Illness:   Santi Souza Sr. is a 64 y.o., single, disabled  male who is being seen today for scheduled Psychotherapy session. Mood reported as \"not so good\" with somewhat dysphoric but overall cooperative affect. Patient presented with more depressive symptoms than previously reporting increase in worrying and sadness with depressive symptoms stating, \"It seems like everything in general has depressed me\". Patient reported he has been \"worrying about so much again\" but did not fully elaborate at this time but did report stressors related to finances, transportation (vehicle recently broke down), and \"just struggling with motivation and wanting to do things and next year just has to be better\".       Subjective     PHQ-9 Depression Screening  PHQ-9 Total Score:  Not completed at this time     GARETT-7   Not completed at this time    Patient's Support Network Includes:   Neighbors/friends, [facility]    Functional Status: Moderate impairment     Progress toward goal: Not at goal    Prognosis: Fair with Ongoing Treatment     Medications:     Current Outpatient Medications:     aspirin 81 MG chewable tablet, Chew 1 tablet., Disp: , Rfl:     atorvastatin (LIPITOR) 80 MG tablet, Take 1 tablet by mouth Daily., Disp: 90 tablet, Rfl: 1    Brexpiprazole (Rexulti) 2 MG tablet, Take 1 tablet by mouth Daily., Disp: 90 tablet, Rfl: 0    cyclobenzaprine (FLEXERIL) 5 MG tablet, Take 1 tablet by mouth 3 (Three) Times a Day As Needed for Muscle Spasms., Disp: 30 tablet, Rfl: 1    diclofenac (VOLTAREN) 75 MG EC tablet, " TAKE 1 TABLET BY MOUTH TWICE DAILY AS NEEDED FOR  BACK  PAIN,  JOINT  PAIN, Disp: 60 tablet, Rfl: 0    donepezil (ARICEPT) 10 MG tablet, Take 1 tablet by mouth Every Night for 180 days., Disp: 90 tablet, Rfl: 1    DULoxetine (CYMBALTA) 60 MG capsule, Take 1 capsule by mouth in the morning, Disp: 90 capsule, Rfl: 0    empagliflozin (Jardiance) 25 MG tablet tablet, Take 1 tablet by mouth Daily., Disp: 90 tablet, Rfl: 1    fluticasone (FLONASE) 50 MCG/ACT nasal spray, 2 sprays into the nostril(s) as directed by provider Daily., Disp: 16 g, Rfl: 5    levocetirizine (XYZAL) 5 MG tablet, Take 1 tablet by mouth Every Evening., Disp: 90 tablet, Rfl: 1    Magnesium 400 MG tablet, Take 400 mg by mouth Daily., Disp: , Rfl:     memantine (NAMENDA) 5 MG tablet, Take 1 tablet by mouth 2 (Two) Times a Day for 180 days., Disp: 180 tablet, Rfl: 1    nitroglycerin (NITROLINGUAL) 0.4 MG/SPRAY spray, Place 1 spray by translingual route on or under the tongue at the first sign of an attack, no more than 3 sprays / 15-minute., Disp: 1 each, Rfl: 0    omeprazole (priLOSEC) 20 MG capsule, Take 1 capsule by mouth Daily., Disp: 90 capsule, Rfl: 1    pioglitazone (ACTOS) 15 MG tablet, Take 1 tablet by mouth once daily, Disp: 30 tablet, Rfl: 0    prazosin (MINIPRESS) 2 MG capsule, Take 1 capsule by mouth at bedtime., Disp: 90 capsule, Rfl: 0    SITagliptin (JANUVIA) 100 MG tablet, Take 1 tablet by mouth Daily., Disp: 90 tablet, Rfl: 1    traZODone (DESYREL) 50 MG tablet, Take 1 tablet by mouth every day at bedtime., Disp: 90 tablet, Rfl: 0    Allergies:   Allergies   Allergen Reactions    Hydrocodone Itching    Penicillins Swelling and Provider Review Needed     Entire body swelled as a child     Sulfa Antibiotics Shortness Of Breath, Rash and Provider Review Needed    Codeine Nausea And Vomiting, Confusion and Provider Review Needed    Oxycontin [Oxycodone] Itching       Objective     Mental Status Exam:   MENTAL STATUS EXAM   General  "Appearance:  Cleanly groomed and dressed and looks older than stated age  Eye Contact:  Fair  Attitude:  Cooperative and polite  Motor Activity:  Normal gait, posture and slow  Muscle Strength:  Normal  Speech:  Normal rate, tone, volume and soft spoken  Language:  Spontaneous  Mood and affect:  Other  Other Comment:  Mood reported as \"not so good\" with somewhat dysphoric but overall cooperative affect.  Hopelessness:  Optimistic  Loneliness: 3  Thought Process:  Linear  Associations/ Thought Content:  No delusions  Hallucinations:  None  Suicidal Ideations:  Not present  Homicidal Ideation:  Not present  Sensorium:  Other  Other Comment:  Somewhat lethargic but overall alert and clear  Orientation:  Person, place, time and situation  Immediate Recall, Recent, and Remote Memory:  Other  Other Comment:  Fair  Attention Span/ Concentration:  Other  Other Comment:  Fair  Fund of Knowledge:  Appropriate for age and educational level  Intellectual Functioning:  Average range  Insight:  Fair  Judgement:  Fair  Reliability:  Good  Impulse Control:  Good       Assessment / Plan      Visit Diagnosis/Orders Placed This Visit:    ICD-10-CM ICD-9-CM   1. Moderate episode of recurrent major depressive disorder  F33.1 296.32   2. Generalized anxiety disorder  F41.1 300.02        PLAN:  Safety: No acute safety concerns  Risk Assessment: Risk of self-harm acutely is low. Risk of self-harm chronically is also low, but could be further elevated in the event of treatment noncompliance and/or AODA.    Treatment Plan/Goals: Continue supportive psychotherapy efforts and medications as indicated. Treatment and medication options discussed during today's visit. Patient ackowledged and verbally consented to continue with current treatment plan and was educated on the importance of compliance with treatment and follow-up appointments. Patient seems reasonably able to adhere to treatment plan.      Assisted Patient in processing above session " content; acknowledged and normalized patient’s thoughts, feelings, and concerns. Assisted patient in processing recent stressors/emotions/feelings and utilized CBT techniques to assist patient with challenging negative/irrational thoughts and beliefs and replacing them with rational ones while also utilizing Socratic Questioning techniques and open ended questions to encourage reflection. Discussed past year stressors and successes while identifying current strengths and weaknesses. Assisted patient in identifying future aspirations and expectations to go into the new year with. Patient was receptive to therapeutic feedback.      Allowed Patient to freely discuss issues  without interruption or judgement with unconditional positive regard, active listening skills, and empathy. Therapist provided a safe, confidential environment to facilitate the development of a positive therapeutic relationship and encouraged open, honest communication. Assisted Patient in identifying risk factors which would indicate the need for higher level of care including thoughts to harm self or others and/or self-harming behavior and encouraged Patient to contact this office, call 911, or present to the nearest emergency room should any of these events occur. Discussed crisis intervention services and means to access. Patient adamantly and convincingly denies current suicidal or homicidal ideation or perceptual disturbance. Assisted Patient in processing session content; acknowledged and normalized Patient’s thoughts, feelings, and concerns by utilizing a person-centered approach in efforts to build appropriate rapport and a positive therapeutic relationship with open and honest communication. .     Follow Up:   Return in about 1 week (around 1/7/2025) for Therapy session.    Stefan Tran [facility]  [facility]

## 2025-01-14 ENCOUNTER — OFFICE VISIT (OUTPATIENT)
Dept: BEHAVIORAL HEALTH | Facility: CLINIC | Age: 65
End: 2025-01-14
Payer: MEDICARE

## 2025-01-14 DIAGNOSIS — F41.9 ANXIETY AND DEPRESSION: Primary | ICD-10-CM

## 2025-01-14 DIAGNOSIS — F43.10 POST TRAUMATIC STRESS DISORDER (PTSD): ICD-10-CM

## 2025-01-14 DIAGNOSIS — F32.A ANXIETY AND DEPRESSION: Primary | ICD-10-CM

## 2025-01-14 NOTE — PLAN OF CARE
Discussed care plan with patient and collaborated on goals and objectives to address presenting mental health concerns

## 2025-01-14 NOTE — PROGRESS NOTES
"     Follow Up Adult Note     Date:2025   Patient Name: Santi Souza Sr.  : 1960   MRN: [id number]   Time IN: 1:05 pm     Time OUT: 1:50 pm     Referring Provider: Jordan Heart APRN    Chief Complaint:      ICD-10-CM ICD-9-CM   1. Anxiety and depression  F41.9 300.00    F32.A 311   2. Post traumatic stress disorder (PTSD)  F43.10 309.81        History of Present Illness:   Santi Souza Sr. is a 64 y.o., single, disabled  male who is being seen today for scheduled Psychotherapy session. Mood reported as \"so so\" with somewhat withdrawn and tired affect. Patient presented as somewhat tired and withdrawn on this date but remained overall calm, cooperative, engaged when asked questions, and pleasant with provider. Patient denied any current SI/HI/AVH. Patient reports ongoing anxiety and depressive symptoms with more isolation and feeling worried recently with winter weather exacerbating symptoms burdens but did state, \"I am starting to feel a bit better now that I'm able to get out of the house\". Patient reports overall concentration concerns but did state, \"it's still improving from what it was\". Patient reports he continues to attend the [employer] daily (when it is open) and spends time with his neighbors but still feels \"lonely\" at times as he has had no contact with direct family to date. Patient continues to report overall motivation for change and stated, \"this year will be better than last\".       Subjective     PHQ-9 Depression Screening  PHQ-9 Total Score:  Not completed at this time     GARETT-7   Not completed at this time    Patient's Support Network Includes:   Neighbor, [facility], Friends    Functional Status: Moderate impairment     Progress toward goal: Not at goal    Prognosis: Good with Ongoing Treatment     Medications:     Current Outpatient Medications:     aspirin 81 MG chewable tablet, Chew 1 tablet., Disp: , Rfl:     atorvastatin (LIPITOR) " 80 MG tablet, Take 1 tablet by mouth Daily., Disp: 90 tablet, Rfl: 1    Brexpiprazole (Rexulti) 2 MG tablet, Take 1 tablet by mouth Daily., Disp: 90 tablet, Rfl: 0    cyclobenzaprine (FLEXERIL) 5 MG tablet, Take 1 tablet by mouth 3 (Three) Times a Day As Needed for Muscle Spasms., Disp: 30 tablet, Rfl: 1    diclofenac (VOLTAREN) 75 MG EC tablet, TAKE 1 TABLET BY MOUTH TWICE DAILY AS NEEDED FOR  BACK  PAIN,  JOINT  PAIN, Disp: 60 tablet, Rfl: 0    donepezil (ARICEPT) 10 MG tablet, Take 1 tablet by mouth Every Night for 180 days., Disp: 90 tablet, Rfl: 1    DULoxetine (CYMBALTA) 60 MG capsule, Take 1 capsule by mouth in the morning, Disp: 90 capsule, Rfl: 0    empagliflozin (Jardiance) 25 MG tablet tablet, Take 1 tablet by mouth Daily., Disp: 90 tablet, Rfl: 1    fluticasone (FLONASE) 50 MCG/ACT nasal spray, 2 sprays into the nostril(s) as directed by provider Daily., Disp: 16 g, Rfl: 5    levocetirizine (XYZAL) 5 MG tablet, Take 1 tablet by mouth Every Evening., Disp: 90 tablet, Rfl: 1    Magnesium 400 MG tablet, Take 400 mg by mouth Daily., Disp: , Rfl:     memantine (NAMENDA) 5 MG tablet, Take 1 tablet by mouth 2 (Two) Times a Day for 180 days., Disp: 180 tablet, Rfl: 1    nitroglycerin (NITROLINGUAL) 0.4 MG/SPRAY spray, Place 1 spray by translingual route on or under the tongue at the first sign of an attack, no more than 3 sprays / 15-minute., Disp: 1 each, Rfl: 0    omeprazole (priLOSEC) 20 MG capsule, Take 1 capsule by mouth Daily., Disp: 90 capsule, Rfl: 1    pioglitazone (ACTOS) 15 MG tablet, Take 1 tablet by mouth once daily, Disp: 30 tablet, Rfl: 0    prazosin (MINIPRESS) 2 MG capsule, Take 1 capsule by mouth at bedtime., Disp: 90 capsule, Rfl: 0    SITagliptin (JANUVIA) 100 MG tablet, Take 1 tablet by mouth Daily., Disp: 90 tablet, Rfl: 1    traZODone (DESYREL) 50 MG tablet, Take 1 tablet by mouth every day at bedtime., Disp: 90 tablet, Rfl: 0    Allergies:   Allergies   Allergen Reactions    Hydrocodone  "Itching    Penicillins Swelling and Provider Review Needed     Entire body swelled as a child     Sulfa Antibiotics Shortness Of Breath, Rash and Provider Review Needed    Codeine Nausea And Vomiting, Confusion and Provider Review Needed    Oxycontin [Oxycodone] Itching       Objective     Mental Status Exam:   MENTAL STATUS EXAM   General Appearance:  Looks older than stated age and cleanly groomed and dressed  Eye Contact:  Downcast  Attitude:  Cooperative and polite  Motor Activity:  Normal gait, posture and slow  Muscle Strength:  Abnormal  Speech:  Normal rate, tone, volume and soft spoken  Language:  Spontaneous  Mood and affect:  Other  Other Comment:  Mood reported as \"so so\" with somewhat withdrawn and tired affect.  Hopelessness:  Optimistic  Loneliness: 4  Thought Process:  Linear  Associations/ Thought Content:  No delusions  Hallucinations:  None  Suicidal Ideations:  Not present  Homicidal Ideation:  Not present  Sensorium:  Drowsy  Orientation:  Person, place, time and situation  Immediate Recall, Recent, and Remote Memory:  Other  Other Comment:  Fair  Attention Span/ Concentration:  Other  Other Comment:  Fair  Fund of Knowledge:  Appropriate for age and educational level  Intellectual Functioning:  Average range  Insight:  Fair  Judgement:  Fair  Reliability:  Good  Impulse Control:  Good       Assessment / Plan      Visit Diagnosis/Orders Placed This Visit:    ICD-10-CM ICD-9-CM   1. Anxiety and depression  F41.9 300.00    F32.A 311   2. Post traumatic stress disorder (PTSD)  F43.10 309.81        PLAN:  Safety: No acute safety concerns  Risk Assessment: Risk of self-harm acutely is low. Risk of self-harm chronically is also low, but could be further elevated in the event of treatment noncompliance and/or AODA.    Treatment Plan/Goals: Continue supportive psychotherapy efforts and medications as indicated. Treatment and medication options discussed during today's visit. Patient ackowledged and " verbally consented to continue with current treatment plan and was educated on the importance of compliance with treatment and follow-up appointments. Patient seems reasonably able to adhere to treatment plan.      Assisted Patient in processing above session content; acknowledged and normalized patient’s thoughts, feelings, and concerns.  Rationalized patient thought process regarding recent moods and feeling lonely and overwhelmed. Discussed recent routines while providing praise for ongoing motivation and determination to work on stressors. Discussed goals for year and began collaborating on ways to achieve further peace with self and perceived negative situations.       Allowed Patient to freely discuss issues  without interruption or judgement with unconditional positive regard, active listening skills, and empathy. Therapist provided a safe, confidential environment to facilitate the development of a positive therapeutic relationship and encouraged open, honest communication. Assisted Patient in identifying risk factors which would indicate the need for higher level of care including thoughts to harm self or others and/or self-harming behavior and encouraged Patient to contact this office, call 911, or present to the nearest emergency room should any of these events occur. Discussed crisis intervention services and means to access. Patient adamantly and convincingly denies current suicidal or homicidal ideation or perceptual disturbance. Assisted Patient in processing session content; acknowledged and normalized Patient’s thoughts, feelings, and concerns by utilizing a person-centered approach in efforts to build appropriate rapport and a positive therapeutic relationship with open and honest communication. .     Follow Up:   Return in about 1 week (around 1/21/2025) for Therapy session.    Stefan Tran LCSW [facility]

## 2025-01-14 NOTE — TREATMENT PLAN
Multi-Disciplinary Problems (from Behavioral Health Treatment Plan)      Active Problems       Problem: Anxiety  Start Date: 01/14/25      Problem Details: The patient self-scales this problem as a 7 with 10 being the worst.          Goal Priority Start Date Expected End Date End Date    Patient will develop and implement behavioral and cognitive strategies to reduce anxiety and irrational fears. -- 01/14/25 07/15/25 --    Goal Details: Progress toward goal:  Not appropriate to rate progress toward goal since this is the initial treatment plan.          Goal Intervention Frequency Start Date End Date    Help patient explore past emotional issues in relation to present anxiety. Q3 Months 01/14/25 --    Intervention Details: Duration of treatment until patient is able to challenge negative thought patterns and identify and replace unhelpful or distorted thoughts with more realistic and positive perspectives.          Goal Intervention Frequency Start Date End Date    Help patient develop an awareness of their cognitive and physical responses to anxiety. Q3 Months 01/14/25 --    Intervention Details: Duration of treatment until patient is able to develop, and identify, an understanding of the physical, emotional, and cognitive symptoms of anxiety along with identifying triggers for anxiety and learning strategies for managing triggers and anxiety symptoms.                  Problem: Depression  Start Date: 01/14/25      Problem Details: The patient self-scales this problem as a 7 with 10 being the worst.          Goal Priority Start Date Expected End Date End Date    Patient will demonstrate the ability to initiate new constructive life skills outside of sessions on a consistent basis. -- 01/14/25 07/15/25 --    Goal Details: Progress toward goal:  Not appropriate to rate progress toward goal since this is the initial treatment plan.          Goal Intervention Frequency Start Date End Date    Assist patient in setting  attainable activities of daily living goals. Q Month 01/14/25 --    Intervention Details: Client will identify and engage in enjoyable activities that promote relaxation and positive emotions to include hobbies and/or socializing        Goal Intervention Frequency Start Date End Date    Provide education about depression Q3 Months 01/14/25 --    Intervention Details: Duration of treatment until patient is able to identify physical, cognitive, and psychological symptoms and triggers of depression.          Goal Intervention Frequency Start Date End Date    Assist patient in developing healthy coping strategies. Q3 Months 01/14/25 --    Intervention Details: Duration of treatment until patient is able to identify and effectively practice stress management skills such as deep breathing, visualization, mindfulness, to manage negative emotions and triggers related to depression.                  Problem: Post Traumatic Stress  Start Date: 01/14/25      Problem Details: The patient self-scales this problem as a 6 with 10 being the worst.          Goal Priority Start Date Expected End Date End Date    Patient will process and move through trauma in a way that improves self regard and the patients ability to function optimally in the world around them. -- 01/14/25 07/15/25 --    Goal Details: Progress toward goal:  Not appropriate to rate progress toward goal since this is the initial treatment plan.          Goal Intervention Frequency Start Date End Date    Process trauma in the context of the safe session environment. Q3 Months 01/14/25 --    Intervention Details: Duration of treatment until patient is able to identify three emotions that are often triggered by the trauma and practice coping skills to manage them during sessions.          Goal Intervention Frequency Start Date End Date    Develop a plan of behavior changes that will reduce the stress of the trauma. Q3 Months 01/14/25 --    Intervention Details: Duration  of treatment until patient identifies a healthy self care plan that includes at least three activities that promote positive emotions and demonstrate the ability to utilize healthy coping techniques.                          Reviewed By       Stefan Tran LCSW 01/14/25 1640                     I have discussed and reviewed this treatment plan with the patient.

## 2025-01-17 DIAGNOSIS — E78.2 MIXED HYPERLIPIDEMIA: ICD-10-CM

## 2025-01-17 DIAGNOSIS — J30.9 ALLERGIC RHINITIS, UNSPECIFIED SEASONALITY, UNSPECIFIED TRIGGER: ICD-10-CM

## 2025-01-17 RX ORDER — LEVOCETIRIZINE DIHYDROCHLORIDE 5 MG/1
5 TABLET, FILM COATED ORAL EVERY EVENING
Qty: 30 TABLET | Refills: 0 | Status: SHIPPED | OUTPATIENT
Start: 2025-01-17

## 2025-01-17 RX ORDER — ATORVASTATIN CALCIUM 80 MG/1
80 TABLET, FILM COATED ORAL DAILY
Qty: 30 TABLET | Refills: 0 | Status: SHIPPED | OUTPATIENT
Start: 2025-01-17

## 2025-01-21 ENCOUNTER — OFFICE VISIT (OUTPATIENT)
Dept: BEHAVIORAL HEALTH | Facility: CLINIC | Age: 65
End: 2025-01-21
Payer: MEDICARE

## 2025-01-21 DIAGNOSIS — F41.1 GENERALIZED ANXIETY DISORDER: ICD-10-CM

## 2025-01-21 DIAGNOSIS — F33.0 MILD EPISODE OF RECURRENT MAJOR DEPRESSIVE DISORDER: Primary | ICD-10-CM

## 2025-01-21 DIAGNOSIS — F43.10 POST TRAUMATIC STRESS DISORDER (PTSD): ICD-10-CM

## 2025-01-21 NOTE — PROGRESS NOTES
"     Follow Up Adult Note     Date:2025   Patient Name: Santi Souza Sr.  : 1960   MRN: [id number]   Time IN: 1:00 pm    Time OUT: 1:45 pm     Referring Provider: Jordan Heart APRN    Chief Complaint:      ICD-10-CM ICD-9-CM   1. Mild episode of recurrent major depressive disorder  F33.0 296.31   2. Generalized anxiety disorder  F41.1 300.02   3. Post traumatic stress disorder (PTSD)  F43.10 309.81        History of Present Illness:   Santi Souza Sr. is a 64 y.o., single, disabled  male who is being seen today for scheduled Psychotherapy session. Mood reported as \"decent\" with overall improved and more upbeat affect. Patient presented as more outgoing and positive while remaining calm, cooperative, engaged, and pleasant with provider. Patient denied any current SI/HI/AVH. Patient reports some depressive symptoms with generalized anxiety along with increased racing thoughts of memories of negative moments/trauma but does report having a more positive outlook and overall positive mood compared to previous session.     \"Pretty much going to the center and hanging out at home with my neighbor-she is good for me and supportive and makes sure I am doing what I need to be doing\"  \"I have been remembering things I don't want to and those memories have happened a few times the last week-they start to cause me to get down but I'm getting better at redirecting my mind\"  \"I am grateful for what I have and how far I've come over the last year\"      Subjective     PHQ-9 Depression Screening  PHQ-9 Total Score:  Not completed at this time     GARETT-7   Not completed at this time    Patient's Support Network Includes:   Neighbors, senior center/friends    Functional Status: Mild impairment     Progress toward goal: Not at goal    Prognosis: Good with Ongoing Treatment     Medications:     Current Outpatient Medications:     aspirin 81 MG chewable tablet, Chew 1 tablet., Disp: , Rfl:     " atorvastatin (LIPITOR) 80 MG tablet, Take 1 tablet by mouth once daily, Disp: 30 tablet, Rfl: 0    Brexpiprazole (Rexulti) 2 MG tablet, Take 1 tablet by mouth Daily., Disp: 90 tablet, Rfl: 0    cyclobenzaprine (FLEXERIL) 5 MG tablet, Take 1 tablet by mouth 3 (Three) Times a Day As Needed for Muscle Spasms., Disp: 30 tablet, Rfl: 1    diclofenac (VOLTAREN) 75 MG EC tablet, TAKE 1 TABLET BY MOUTH TWICE DAILY AS NEEDED FOR  BACK  PAIN,  JOINT  PAIN, Disp: 60 tablet, Rfl: 0    donepezil (ARICEPT) 10 MG tablet, Take 1 tablet by mouth Every Night for 180 days., Disp: 90 tablet, Rfl: 1    DULoxetine (CYMBALTA) 60 MG capsule, Take 1 capsule by mouth in the morning, Disp: 90 capsule, Rfl: 0    empagliflozin (Jardiance) 25 MG tablet tablet, Take 1 tablet by mouth Daily., Disp: 90 tablet, Rfl: 1    fluticasone (FLONASE) 50 MCG/ACT nasal spray, 2 sprays into the nostril(s) as directed by provider Daily., Disp: 16 g, Rfl: 5    levocetirizine (XYZAL) 5 MG tablet, TAKE 1 TABLET BY MOUTH ONCE DAILY IN THE EVENING, Disp: 30 tablet, Rfl: 0    Magnesium 400 MG tablet, Take 400 mg by mouth Daily., Disp: , Rfl:     memantine (NAMENDA) 5 MG tablet, Take 1 tablet by mouth 2 (Two) Times a Day for 180 days., Disp: 180 tablet, Rfl: 1    nitroglycerin (NITROLINGUAL) 0.4 MG/SPRAY spray, Place 1 spray by translingual route on or under the tongue at the first sign of an attack, no more than 3 sprays / 15-minute., Disp: 1 each, Rfl: 0    omeprazole (priLOSEC) 20 MG capsule, Take 1 capsule by mouth Daily., Disp: 90 capsule, Rfl: 1    pioglitazone (ACTOS) 15 MG tablet, Take 1 tablet by mouth once daily, Disp: 30 tablet, Rfl: 0    prazosin (MINIPRESS) 2 MG capsule, Take 1 capsule by mouth at bedtime., Disp: 90 capsule, Rfl: 0    SITagliptin (JANUVIA) 100 MG tablet, Take 1 tablet by mouth Daily., Disp: 90 tablet, Rfl: 1    traZODone (DESYREL) 50 MG tablet, Take 1 tablet by mouth every day at bedtime., Disp: 90 tablet, Rfl: 0    Allergies:   Allergies  "  Allergen Reactions    Hydrocodone Itching    Penicillins Swelling and Provider Review Needed     Entire body swelled as a child     Sulfa Antibiotics Shortness Of Breath, Rash and Provider Review Needed    Codeine Nausea And Vomiting, Confusion and Provider Review Needed    Oxycontin [Oxycodone] Itching       Objective     Mental Status Exam:   MENTAL STATUS EXAM   General Appearance:  Cleanly groomed and dressed  Eye Contact:  Fair  Attitude:  Cooperative and polite  Motor Activity:  Normal gait, posture and slow  Muscle Strength:  Normal  Speech:  Normal rate, tone, volume  Language:  Spontaneous  Mood and affect:  Other  Other Comment:  Mood reported as \"decent\" with overall improved and more upbeat affect.  Hopelessness:  Optimistic  Loneliness: 2  Thought Process:  Linear  Associations/ Thought Content:  No delusions  Hallucinations:  None  Suicidal Ideations:  Not present  Homicidal Ideation:  Not present  Sensorium:  Alert and clear  Orientation:  Person, place, time and situation  Immediate Recall, Recent, and Remote Memory:  Intact  Attention Span/ Concentration:  Good  Fund of Knowledge:  Appropriate for age and educational level  Intellectual Functioning:  Average range  Insight:  Fair  Judgement:  Good  Reliability:  Good  Impulse Control:  Good     Assessment / Plan      Visit Diagnosis/Orders Placed This Visit:    ICD-10-CM ICD-9-CM   1. Mild episode of recurrent major depressive disorder  F33.0 296.31   2. Generalized anxiety disorder  F41.1 300.02   3. Post traumatic stress disorder (PTSD)  F43.10 309.81        PLAN:  Safety: No acute safety concerns  Risk Assessment: Risk of self-harm acutely is low. Risk of self-harm chronically is also low, but could be further elevated in the event of treatment noncompliance and/or AODA.    Treatment Plan/Goals: Continue supportive psychotherapy efforts and medications as indicated. Treatment and medication options discussed during today's visit. Patient " "ackowledged and verbally consented to continue with current treatment plan and was educated on the importance of compliance with treatment and follow-up appointments. Patient seems reasonably able to adhere to treatment plan.      Assisted Patient in processing above session content; acknowledged and normalized patient’s thoughts, feelings, and concerns. Assisted patient in processing recent stressors/emotions/feelings and utilized Socratic Questioning techniques and open ended questions to encourage reflection. Discussed \"finding purpose\" in past events from previous year and how he it has led him to a more stable living environment with more like minded and healthy supports. Practiced gratitude exercise and continued to identify activities to engage in to maintain ongoing progress. Patient was receptive to therapeutic feedback.     Allowed Patient to freely discuss issues  without interruption or judgement with unconditional positive regard, active listening skills, and empathy. Therapist provided a safe, confidential environment to facilitate the development of a positive therapeutic relationship and encouraged open, honest communication. Assisted Patient in identifying risk factors which would indicate the need for higher level of care including thoughts to harm self or others and/or self-harming behavior and encouraged Patient to contact this office, call 911, or present to the nearest emergency room should any of these events occur. Discussed crisis intervention services and means to access. Patient adamantly and convincingly denies current suicidal or homicidal ideation or perceptual disturbance. Assisted Patient in processing session content; acknowledged and normalized Patient’s thoughts, feelings, and concerns by utilizing a person-centered approach in efforts to build appropriate rapport and a positive therapeutic relationship with open and honest communication. .     Follow Up:   Return in about 1 week " (around 1/28/2025) for Therapy session.    Stefan Tran, [facility]  [facility]

## 2025-01-24 DIAGNOSIS — E11.42 TYPE 2 DIABETES MELLITUS WITH DIABETIC POLYNEUROPATHY, WITHOUT LONG-TERM CURRENT USE OF INSULIN: ICD-10-CM

## 2025-01-24 RX ORDER — PIOGLITAZONE 15 MG/1
15 TABLET ORAL DAILY
Qty: 30 TABLET | Refills: 0 | Status: SHIPPED | OUTPATIENT
Start: 2025-01-24

## 2025-01-28 ENCOUNTER — OFFICE VISIT (OUTPATIENT)
Dept: BEHAVIORAL HEALTH | Facility: CLINIC | Age: 65
End: 2025-01-28
Payer: MEDICARE

## 2025-01-28 DIAGNOSIS — F33.0 MILD EPISODE OF RECURRENT MAJOR DEPRESSIVE DISORDER: ICD-10-CM

## 2025-01-28 DIAGNOSIS — F41.1 GENERALIZED ANXIETY DISORDER: Primary | ICD-10-CM

## 2025-01-28 DIAGNOSIS — F43.10 POST TRAUMATIC STRESS DISORDER (PTSD): ICD-10-CM

## 2025-01-28 DIAGNOSIS — G31.84 MILD COGNITIVE IMPAIRMENT: ICD-10-CM

## 2025-01-28 NOTE — PROGRESS NOTES
"     Follow Up Adult Note     Date:2025   Patient Name: Santi Souza Sr.  : 1960   MRN: [id number]   Time IN: 1:04 pm    Time OUT: 2:45 pm     Referring Provider: Jordan Heart APRN    Chief Complaint:      ICD-10-CM ICD-9-CM   1. Generalized anxiety disorder  F41.1 300.02   2. Mild episode of recurrent major depressive disorder  F33.0 296.31   3. Mild cognitive impairment  G31.84 331.83   4. Post traumatic stress disorder (PTSD)  F43.10 309.81        History of Present Illness:   Santi Souza Sr. is a 64 y.o., single, disabled  male who is being seen today for scheduled Psychotherapy session. Mood reported as \"so, so\" with somewhat tired but cooperative affect. Patient presented as somewhat tired but was overall engaged, pleasant, calm, and cooperative with therapist and denied any current SI/HI/AVH.     \"My anxiety has been fair-it's still there but I am happier these days overall\"  \"I always have some type of depression, but it's gotten better with the weather improving and I'm able to get out of the apartment\"  \"I have been keeping up with my routines and keeping in touch with more people\"  \"I should be able to get my dentures soon and that will help with my diet\"  \"Always stress with my finances and truck issues\"      Subjective     PHQ-9 Depression Screening  PHQ-9 Total Score:  Not completed at this time     GARETT-7   Not completed at this time    Patient's Support Network Includes:   Neighbors/friends and [facility] Friends    Functional Status: Mild impairment     Progress toward goal: Not at goal    Prognosis: Good with Ongoing Treatment     Medications:     Current Outpatient Medications:     aspirin 81 MG chewable tablet, Chew 1 tablet., Disp: , Rfl:     atorvastatin (LIPITOR) 80 MG tablet, Take 1 tablet by mouth once daily, Disp: 30 tablet, Rfl: 0    Brexpiprazole (Rexulti) 2 MG tablet, Take 1 tablet by mouth Daily., Disp: 90 tablet, Rfl: 0    cyclobenzaprine " (FLEXERIL) 5 MG tablet, Take 1 tablet by mouth 3 (Three) Times a Day As Needed for Muscle Spasms., Disp: 30 tablet, Rfl: 1    diclofenac (VOLTAREN) 75 MG EC tablet, TAKE 1 TABLET BY MOUTH TWICE DAILY AS NEEDED FOR  BACK  PAIN,  JOINT  PAIN, Disp: 60 tablet, Rfl: 0    donepezil (ARICEPT) 10 MG tablet, Take 1 tablet by mouth Every Night for 180 days., Disp: 90 tablet, Rfl: 1    DULoxetine (CYMBALTA) 60 MG capsule, Take 1 capsule by mouth in the morning, Disp: 90 capsule, Rfl: 0    empagliflozin (Jardiance) 25 MG tablet tablet, Take 1 tablet by mouth Daily., Disp: 90 tablet, Rfl: 1    fluticasone (FLONASE) 50 MCG/ACT nasal spray, 2 sprays into the nostril(s) as directed by provider Daily., Disp: 16 g, Rfl: 5    levocetirizine (XYZAL) 5 MG tablet, TAKE 1 TABLET BY MOUTH ONCE DAILY IN THE EVENING, Disp: 30 tablet, Rfl: 0    Magnesium 400 MG tablet, Take 400 mg by mouth Daily., Disp: , Rfl:     memantine (NAMENDA) 5 MG tablet, Take 1 tablet by mouth 2 (Two) Times a Day for 180 days., Disp: 180 tablet, Rfl: 1    nitroglycerin (NITROLINGUAL) 0.4 MG/SPRAY spray, Place 1 spray by translingual route on or under the tongue at the first sign of an attack, no more than 3 sprays / 15-minute., Disp: 1 each, Rfl: 0    omeprazole (priLOSEC) 20 MG capsule, Take 1 capsule by mouth Daily., Disp: 90 capsule, Rfl: 1    pioglitazone (ACTOS) 15 MG tablet, Take 1 tablet by mouth once daily, Disp: 30 tablet, Rfl: 0    prazosin (MINIPRESS) 2 MG capsule, Take 1 capsule by mouth at bedtime., Disp: 90 capsule, Rfl: 0    SITagliptin (JANUVIA) 100 MG tablet, Take 1 tablet by mouth Daily., Disp: 90 tablet, Rfl: 1    traZODone (DESYREL) 50 MG tablet, Take 1 tablet by mouth every day at bedtime., Disp: 90 tablet, Rfl: 0    Allergies:   Allergies   Allergen Reactions    Hydrocodone Itching    Penicillins Swelling and Provider Review Needed     Entire body swelled as a child     Sulfa Antibiotics Shortness Of Breath, Rash and Provider Review Needed     "Codeine Nausea And Vomiting, Confusion and Provider Review Needed    Oxycontin [Oxycodone] Itching       Objective     Mental Status Exam:   MENTAL STATUS EXAM   General Appearance:  Cleanly groomed and dressed  Eye Contact:  Fair  Attitude:  Cooperative and polite  Motor Activity:  Slow and normal gait, posture  Muscle Strength:  Normal  Speech:  Normal rate, tone, volume and soft spoken  Language:  Spontaneous  Mood and affect:  Other  Other Comment:  Mood reported as \"so, so\" with somewhat tired but cooperative affect.  Hopelessness:  Denies  Loneliness: 3  Thought Process:  Goal-directed and logical  Associations/ Thought Content:  No delusions  Hallucinations:  None  Suicidal Ideations:  Not present  Homicidal Ideation:  Not present  Sensorium:  Alert and clear  Orientation:  Person, place, time and situation  Immediate Recall, Recent, and Remote Memory:  Intact  Attention Span/ Concentration:  Good  Fund of Knowledge:  Appropriate for age and educational level  Intellectual Functioning:  Average range  Insight:  Good  Judgement:  Good  Reliability:  Good  Impulse Control:  Good       Assessment / Plan      Visit Diagnosis/Orders Placed This Visit:    ICD-10-CM ICD-9-CM   1. Generalized anxiety disorder  F41.1 300.02   2. Mild episode of recurrent major depressive disorder  F33.0 296.31   3. Mild cognitive impairment  G31.84 331.83   4. Post traumatic stress disorder (PTSD)  F43.10 309.81        PLAN:  Safety: No acute safety concerns  Risk Assessment: Risk of self-harm acutely is low. Risk of self-harm chronically is also low, but could be further elevated in the event of treatment noncompliance and/or AODA.    Treatment Plan/Goals: Continue supportive psychotherapy efforts and medications as indicated. Treatment and medication options discussed during today's visit. Patient ackowledged and verbally consented to continue with current treatment plan and was educated on the importance of compliance with " treatment and follow-up appointments. Patient seems reasonably able to adhere to treatment plan.      Assisted Patient in processing above session content; acknowledged and normalized patient’s thoughts, feelings, and concerns. Assisted patient in processing recent stressors/emotions/feelings and utilized Socratic Questioning techniques and open ended questions to encourage reflection. Provided praise to patient for continuing to improve overall mood and staying active and in touch with healthy supports. Encouraged patient to begin processing past trauma related to interpersonal and family relationships. Patient was receptive to therapeutic feedback.      Allowed Patient to freely discuss issues  without interruption or judgement with unconditional positive regard, active listening skills, and empathy. Therapist provided a safe, confidential environment to facilitate the development of a positive therapeutic relationship and encouraged open, honest communication. Assisted Patient in identifying risk factors which would indicate the need for higher level of care including thoughts to harm self or others and/or self-harming behavior and encouraged Patient to contact this office, call 911, or present to the nearest emergency room should any of these events occur. Discussed crisis intervention services and means to access. Patient adamantly and convincingly denies current suicidal or homicidal ideation or perceptual disturbance. Assisted Patient in processing session content; acknowledged and normalized Patient’s thoughts, feelings, and concerns by utilizing a person-centered approach in efforts to build appropriate rapport and a positive therapeutic relationship with open and honest communication. .     Follow Up:   Return in about 1 week (around 2/4/2025) for Therapy session.    Stefan Tran [facility]  [facility]

## 2025-02-04 ENCOUNTER — OFFICE VISIT (OUTPATIENT)
Dept: BEHAVIORAL HEALTH | Facility: CLINIC | Age: 65
End: 2025-02-04
Payer: MEDICARE

## 2025-02-04 DIAGNOSIS — F33.0 MILD EPISODE OF RECURRENT MAJOR DEPRESSIVE DISORDER: ICD-10-CM

## 2025-02-04 DIAGNOSIS — F43.10 POST TRAUMATIC STRESS DISORDER (PTSD): ICD-10-CM

## 2025-02-04 DIAGNOSIS — F41.1 GENERALIZED ANXIETY DISORDER: Primary | ICD-10-CM

## 2025-02-04 NOTE — PROGRESS NOTES
"     Follow Up Adult Note     Date:2025   Patient Name: Santi Souza Sr.  : 1960   MRN: [id number]   Time IN: 1:05 pm    Time OUT: 1:50 pm     Referring Provider: Jordan Heart APRN    Chief Complaint:      ICD-10-CM ICD-9-CM   1. Generalized anxiety disorder  F41.1 300.02   2. Mild episode of recurrent major depressive disorder  F33.0 296.31   3. Post traumatic stress disorder (PTSD)  F43.10 309.81        History of Present Illness:   Santi Souza Sr. is a 64 y.o., single, disabled  male who is being seen today for scheduled Psychotherapy session. Mood reported as \"decent\" with overall calm and cooperative affect. Patient presented with more focus and overall brighter mood and remained calm, cooperative, engaged, and pleasant with provider. Patient denied any current SI/HI/AVH.     \"I'm not really sad lately, but not really happy either-somewhere in between\"  \"My nightmares are doing better and my sleep is better\"  \"Physically feeling rough and worn out, been having back pain but finding ways to manage\"  \"My memory and concentration is a lot better recently\"      Subjective     PHQ-9 Depression Screening  PHQ-9 Total Score:  Not completed at this time     GARETT-7   Not completed at this time    Patient's Support Network Includes:   Neighbors, [facility] friends    Functional Status: Mild impairment     Progress toward goal: Not at goal    Prognosis: Good with Ongoing Treatment     Medications:     Current Outpatient Medications:     aspirin 81 MG chewable tablet, Chew 1 tablet., Disp: , Rfl:     atorvastatin (LIPITOR) 80 MG tablet, Take 1 tablet by mouth once daily, Disp: 30 tablet, Rfl: 0    Brexpiprazole (Rexulti) 2 MG tablet, Take 1 tablet by mouth Daily., Disp: 90 tablet, Rfl: 0    cyclobenzaprine (FLEXERIL) 5 MG tablet, Take 1 tablet by mouth 3 (Three) Times a Day As Needed for Muscle Spasms., Disp: 30 tablet, Rfl: 1    diclofenac (VOLTAREN) 75 MG EC tablet, TAKE 1 " TABLET BY MOUTH TWICE DAILY AS NEEDED FOR  BACK  PAIN,  JOINT  PAIN, Disp: 60 tablet, Rfl: 0    donepezil (ARICEPT) 10 MG tablet, Take 1 tablet by mouth Every Night for 180 days., Disp: 90 tablet, Rfl: 1    DULoxetine (CYMBALTA) 60 MG capsule, Take 1 capsule by mouth in the morning, Disp: 90 capsule, Rfl: 0    empagliflozin (Jardiance) 25 MG tablet tablet, Take 1 tablet by mouth Daily., Disp: 90 tablet, Rfl: 1    fluticasone (FLONASE) 50 MCG/ACT nasal spray, 2 sprays into the nostril(s) as directed by provider Daily., Disp: 16 g, Rfl: 5    levocetirizine (XYZAL) 5 MG tablet, TAKE 1 TABLET BY MOUTH ONCE DAILY IN THE EVENING, Disp: 30 tablet, Rfl: 0    Magnesium 400 MG tablet, Take 400 mg by mouth Daily., Disp: , Rfl:     memantine (NAMENDA) 5 MG tablet, Take 1 tablet by mouth 2 (Two) Times a Day for 180 days., Disp: 180 tablet, Rfl: 1    nitroglycerin (NITROLINGUAL) 0.4 MG/SPRAY spray, Place 1 spray by translingual route on or under the tongue at the first sign of an attack, no more than 3 sprays / 15-minute., Disp: 1 each, Rfl: 0    omeprazole (priLOSEC) 20 MG capsule, Take 1 capsule by mouth Daily., Disp: 90 capsule, Rfl: 1    pioglitazone (ACTOS) 15 MG tablet, Take 1 tablet by mouth once daily, Disp: 30 tablet, Rfl: 0    prazosin (MINIPRESS) 2 MG capsule, Take 1 capsule by mouth at bedtime., Disp: 90 capsule, Rfl: 0    SITagliptin (JANUVIA) 100 MG tablet, Take 1 tablet by mouth Daily., Disp: 90 tablet, Rfl: 1    traZODone (DESYREL) 50 MG tablet, Take 1 tablet by mouth every day at bedtime., Disp: 90 tablet, Rfl: 0    Allergies:   Allergies   Allergen Reactions    Hydrocodone Itching    Penicillins Swelling and Provider Review Needed     Entire body swelled as a child     Sulfa Antibiotics Shortness Of Breath, Rash and Provider Review Needed    Codeine Nausea And Vomiting, Confusion and Provider Review Needed    Oxycontin [Oxycodone] Itching       Objective     Mental Status Exam:   MENTAL STATUS EXAM   General  "Appearance:  Cleanly groomed and dressed  Eye Contact:  Fair  Attitude:  Cooperative and polite  Motor Activity:  Normal gait, posture and slow  Muscle Strength:  Normal  Speech:  Normal rate, tone, volume and soft spoken  Language:  Spontaneous  Mood and affect:  Other  Other Comment:  Mood reported as \"decent\" with overall calm and cooperative affect.  Hopelessness:  Optimistic  Loneliness: 3  Thought Process:  Goal-directed, linear and logical  Associations/ Thought Content:  No delusions  Hallucinations:  None  Suicidal Ideations:  Not present  Homicidal Ideation:  Not present  Sensorium:  Alert and clear  Orientation:  Person, place, time and situation  Immediate Recall, Recent, and Remote Memory:  Intact  Attention Span/ Concentration:  Good  Fund of Knowledge:  Appropriate for age and educational level  Intellectual Functioning:  Average range  Insight:  Fair  Judgement:  Good  Reliability:  Good  Impulse Control:  Good       Assessment / Plan      Visit Diagnosis/Orders Placed This Visit:    ICD-10-CM ICD-9-CM   1. Generalized anxiety disorder  F41.1 300.02   2. Mild episode of recurrent major depressive disorder  F33.0 296.31   3. Post traumatic stress disorder (PTSD)  F43.10 309.81        PLAN:  Safety: No acute safety concerns  Risk Assessment: Risk of self-harm acutely is low. Risk of self-harm chronically is also low, but could be further elevated in the event of treatment noncompliance and/or AODA.    Treatment Plan/Goals: Continue supportive psychotherapy efforts and medications as indicated. Treatment and medication options discussed during today's visit. Patient ackowledged and verbally consented to continue with current treatment plan and was educated on the importance of compliance with treatment and follow-up appointments. Patient seems reasonably able to adhere to treatment plan.      Assisted Patient in processing above session content; acknowledged and normalized patient’s thoughts, feelings, " and concerns.  Assisted patient in processing recent stressors/emotions/feelings and utilized Socratic Questioning techniques and open ended questions to encourage reflection. Explored recent positive moments and experiences and guided patient in identifying ways to maintain a healthy and fulfilling life. Continued to explore coping techniques and self care activities.      Allowed Patient to freely discuss issues  without interruption or judgement with unconditional positive regard, active listening skills, and empathy. Therapist provided a safe, confidential environment to facilitate the development of a positive therapeutic relationship and encouraged open, honest communication. Assisted Patient in identifying risk factors which would indicate the need for higher level of care including thoughts to harm self or others and/or self-harming behavior and encouraged Patient to contact this office, call 911, or present to the nearest emergency room should any of these events occur. Discussed crisis intervention services and means to access. Patient adamantly and convincingly denies current suicidal or homicidal ideation or perceptual disturbance. Assisted Patient in processing session content; acknowledged and normalized Patient’s thoughts, feelings, and concerns by utilizing a person-centered approach in efforts to build appropriate rapport and a positive therapeutic relationship with open and honest communication. .     Follow Up:   Return in about 1 week (around 2/11/2025) for Therapy session.    Stefan Tran, [facility]  [facility]

## 2025-02-13 DIAGNOSIS — J30.9 ALLERGIC RHINITIS, UNSPECIFIED SEASONALITY, UNSPECIFIED TRIGGER: ICD-10-CM

## 2025-02-13 DIAGNOSIS — E78.2 MIXED HYPERLIPIDEMIA: ICD-10-CM

## 2025-02-13 RX ORDER — ATORVASTATIN CALCIUM 80 MG/1
80 TABLET, FILM COATED ORAL DAILY
Qty: 30 TABLET | Refills: 0 | Status: SHIPPED | OUTPATIENT
Start: 2025-02-13

## 2025-02-13 RX ORDER — LEVOCETIRIZINE DIHYDROCHLORIDE 5 MG/1
5 TABLET, FILM COATED ORAL EVERY EVENING
Qty: 30 TABLET | Refills: 0 | Status: SHIPPED | OUTPATIENT
Start: 2025-02-13

## 2025-02-18 ENCOUNTER — OFFICE VISIT (OUTPATIENT)
Dept: BEHAVIORAL HEALTH | Facility: CLINIC | Age: 65
End: 2025-02-18
Payer: MEDICARE

## 2025-02-18 DIAGNOSIS — F32.A ANXIETY AND DEPRESSION: Primary | ICD-10-CM

## 2025-02-18 DIAGNOSIS — F41.9 ANXIETY AND DEPRESSION: Primary | ICD-10-CM

## 2025-02-18 NOTE — PROGRESS NOTES
"     Follow Up Adult Note     Date:2025   Patient Name: Santi Souza Sr.  : 1960   MRN: [id number]   Time IN: 1:02 pm    Time OUT: 2:00 pm     Referring Provider: Jordan Heart APRN    Chief Complaint:      ICD-10-CM ICD-9-CM   1. Anxiety and depression  F41.9 300.00    F32.A 311        History of Present Illness:   Santi Souza Sr. is a 64 y.o. male who is being seen today for scheduled Psychotherapy session. Mood reported as \"decent\" with overall cooperative and more outgoing affect. Patient presented as more positive and engaged, and remained calm, cooperative and pleasant with provider while denying any current SI/HI/AVH. Patient reports intermittent anxiety and depressive symptoms with concerns related to future anticipated anxiety and health concerns along with physical/medical concerns but did state, \"I am feeling more at peace these days\" and finding more pleasure in his day to day activities. Patient reports he has experienced some depression around the weather and is \"looking forward to getting back on my bike\" as a coping technique for anxiety and sadness. Patient continues to show good progress at this time.       Subjective     PHQ-9 Depression Screening  PHQ-9 Total Score:  Not completed at this time     GARETT-7   Not completed at this time    Patient's Support Network Includes:   Neighbors, [facility] at [facility]    Functional Status: Mild impairment     Progress toward goal: Not at goal    Prognosis: Good with Ongoing Treatment     Medications:     Current Outpatient Medications:     aspirin 81 MG chewable tablet, Chew 1 tablet., Disp: , Rfl:     atorvastatin (LIPITOR) 80 MG tablet, Take 1 tablet by mouth once daily, Disp: 30 tablet, Rfl: 0    Brexpiprazole (Rexulti) 2 MG tablet, Take 1 tablet by mouth Daily., Disp: 90 tablet, Rfl: 0    cyclobenzaprine (FLEXERIL) 5 MG tablet, Take 1 tablet by mouth 3 (Three) Times a Day As Needed for Muscle Spasms., Disp: 30 tablet, Rfl: " 1    diclofenac (VOLTAREN) 75 MG EC tablet, TAKE 1 TABLET BY MOUTH TWICE DAILY AS NEEDED FOR  BACK  PAIN,  JOINT  PAIN, Disp: 60 tablet, Rfl: 0    donepezil (ARICEPT) 10 MG tablet, Take 1 tablet by mouth Every Night for 180 days., Disp: 90 tablet, Rfl: 1    DULoxetine (CYMBALTA) 60 MG capsule, Take 1 capsule by mouth in the morning, Disp: 90 capsule, Rfl: 0    empagliflozin (Jardiance) 25 MG tablet tablet, Take 1 tablet by mouth Daily., Disp: 90 tablet, Rfl: 1    fluticasone (FLONASE) 50 MCG/ACT nasal spray, 2 sprays into the nostril(s) as directed by provider Daily., Disp: 16 g, Rfl: 5    levocetirizine (XYZAL) 5 MG tablet, TAKE 1 TABLET BY MOUTH ONCE DAILY IN THE EVENING, Disp: 30 tablet, Rfl: 0    Magnesium 400 MG tablet, Take 400 mg by mouth Daily., Disp: , Rfl:     memantine (NAMENDA) 5 MG tablet, Take 1 tablet by mouth 2 (Two) Times a Day for 180 days., Disp: 180 tablet, Rfl: 1    nitroglycerin (NITROLINGUAL) 0.4 MG/SPRAY spray, Place 1 spray by translingual route on or under the tongue at the first sign of an attack, no more than 3 sprays / 15-minute., Disp: 1 each, Rfl: 0    omeprazole (priLOSEC) 20 MG capsule, Take 1 capsule by mouth Daily., Disp: 90 capsule, Rfl: 1    pioglitazone (ACTOS) 15 MG tablet, Take 1 tablet by mouth once daily, Disp: 30 tablet, Rfl: 0    prazosin (MINIPRESS) 2 MG capsule, Take 1 capsule by mouth at bedtime., Disp: 90 capsule, Rfl: 0    SITagliptin (JANUVIA) 100 MG tablet, Take 1 tablet by mouth Daily., Disp: 90 tablet, Rfl: 1    traZODone (DESYREL) 50 MG tablet, Take 1 tablet by mouth every day at bedtime., Disp: 90 tablet, Rfl: 0    Allergies:   Allergies   Allergen Reactions    Hydrocodone Itching    Penicillins Swelling and Provider Review Needed     Entire body swelled as a child     Sulfa Antibiotics Shortness Of Breath, Rash and Provider Review Needed    Codeine Nausea And Vomiting, Confusion and Provider Review Needed    Oxycontin [Oxycodone] Itching       Objective  "    Mental Status Exam:   MENTAL STATUS EXAM   General Appearance:  Cleanly groomed and dressed  Eye Contact:  Fair  Attitude:  Cooperative and polite  Motor Activity:  Normal gait, posture and slow  Muscle Strength:  Normal  Speech:  Normal rate, tone, volume and soft spoken  Language:  Spontaneous  Mood and affect:  Other  Other Comment:  Mood reported as \"decent\" with overall cooperative and more outgoing affect.  Hopelessness:  Optimistic  Loneliness: 2  Thought Process:  Goal-directed and linear  Associations/ Thought Content:  No delusions  Hallucinations:  None  Suicidal Ideations:  Not present  Homicidal Ideation:  Not present  Sensorium:  Alert and clear  Orientation:  Person, place, time and situation  Immediate Recall, Recent, and Remote Memory:  Intact  Attention Span/ Concentration:  Good  Fund of Knowledge:  Appropriate for age and educational level  Intellectual Functioning:  Average range  Insight:  Good  Judgement:  Good  Reliability:  Good  Impulse Control:  Good       Assessment / Plan      Visit Diagnosis/Orders Placed This Visit:    ICD-10-CM ICD-9-CM   1. Anxiety and depression  F41.9 300.00    F32.A 311        PLAN:  Safety: No acute safety concerns  Risk Assessment: Risk of self-harm acutely is low. Risk of self-harm chronically is also low, but could be further elevated in the event of treatment noncompliance and/or AODA.    Treatment Plan/Goals: Continue supportive psychotherapy efforts and medications as indicated. Treatment and medication options discussed during today's visit. Patient ackowledged and verbally consented to continue with current treatment plan and was educated on the importance of compliance with treatment and follow-up appointments. Patient seems reasonably able to adhere to treatment plan.      Assisted Patient in processing above session content; acknowledged and normalized patient’s thoughts, feelings, and concerns. Assisted patient in processing recent " "stressors/emotions/feelings and utilized Socratic Questioning techniques and open ended questions to encourage reflection. Continued to support patient in overall treatment goals and mental health progress while guiding patient in identifying \"reasons to be happy and things I look forward to\" to help boost overall mood. Patient was receptive to therapeutic feedback.      Allowed Patient to freely discuss issues  without interruption or judgement with unconditional positive regard, active listening skills, and empathy. Therapist provided a safe, confidential environment to facilitate the development of a positive therapeutic relationship and encouraged open, honest communication. Assisted Patient in identifying risk factors which would indicate the need for higher level of care including thoughts to harm self or others and/or self-harming behavior and encouraged Patient to contact this office, call 911, or present to the nearest emergency room should any of these events occur. Discussed crisis intervention services and means to access. Patient adamantly and convincingly denies current suicidal or homicidal ideation or perceptual disturbance. Assisted Patient in processing session content; acknowledged and normalized Patient’s thoughts, feelings, and concerns by utilizing a person-centered approach in efforts to build appropriate rapport and a positive therapeutic relationship with open and honest communication. .     Follow Up:   Return in about 1 week (around 2/25/2025) for Therapy session.    Stefan Tran LCSW  [facility]   "

## 2025-02-21 DIAGNOSIS — E11.42 TYPE 2 DIABETES MELLITUS WITH DIABETIC POLYNEUROPATHY, WITHOUT LONG-TERM CURRENT USE OF INSULIN: ICD-10-CM

## 2025-02-21 RX ORDER — PIOGLITAZONE 15 MG/1
15 TABLET ORAL DAILY
Qty: 30 TABLET | Refills: 0 | Status: SHIPPED | OUTPATIENT
Start: 2025-02-21

## 2025-02-25 ENCOUNTER — OFFICE VISIT (OUTPATIENT)
Dept: BEHAVIORAL HEALTH | Facility: CLINIC | Age: 65
End: 2025-02-25
Payer: MEDICARE

## 2025-02-25 DIAGNOSIS — F39 MOOD DISORDER: ICD-10-CM

## 2025-02-25 DIAGNOSIS — F41.9 ANXIETY AND DEPRESSION: Primary | ICD-10-CM

## 2025-02-25 DIAGNOSIS — F33.1 MODERATE EPISODE OF RECURRENT MAJOR DEPRESSIVE DISORDER: ICD-10-CM

## 2025-02-25 DIAGNOSIS — F41.1 GENERALIZED ANXIETY DISORDER: ICD-10-CM

## 2025-02-25 DIAGNOSIS — G47.20 CIRCADIAN RHYTHM SLEEP DISORDER, UNSPECIFIED TYPE: ICD-10-CM

## 2025-02-25 DIAGNOSIS — F43.10 POST TRAUMATIC STRESS DISORDER (PTSD): ICD-10-CM

## 2025-02-25 DIAGNOSIS — F32.A ANXIETY AND DEPRESSION: Primary | ICD-10-CM

## 2025-02-25 RX ORDER — BREXPIPRAZOLE 2 MG/1
2 TABLET ORAL DAILY
Qty: 30 TABLET | Refills: 0 | Status: SHIPPED | OUTPATIENT
Start: 2025-02-25

## 2025-02-25 RX ORDER — PRAZOSIN HYDROCHLORIDE 2 MG/1
CAPSULE ORAL
Qty: 30 CAPSULE | Refills: 0 | Status: SHIPPED | OUTPATIENT
Start: 2025-02-25

## 2025-02-25 RX ORDER — DULOXETIN HYDROCHLORIDE 60 MG/1
CAPSULE, DELAYED RELEASE ORAL
Qty: 30 CAPSULE | Refills: 0 | Status: SHIPPED | OUTPATIENT
Start: 2025-02-25

## 2025-02-25 RX ORDER — TRAZODONE HYDROCHLORIDE 50 MG/1
TABLET ORAL
Qty: 30 TABLET | Refills: 0 | Status: SHIPPED | OUTPATIENT
Start: 2025-02-25

## 2025-02-25 NOTE — PROGRESS NOTES
"     Follow Up Adult Note     Date:2025   Patient Name: Santi Souza Sr.  : 1960   MRN: [id number]   Time IN: 1:03 pm      Time OUT: 2:00 pm     Referring Provider: Jordan Heart APRN    Chief Complaint:      ICD-10-CM ICD-9-CM   1. Anxiety and depression  F41.9 300.00    F32.A 311        History of Present Illness:   Santi Souza Sr. is a 64 y.o. male who is being seen today for Psychotherapy session. Mood reported as \"alright\" with somewhat tired but overall relaxed and cooperative affect. Patient appeared more tired and drowsy than prior sessions but overall calm, cooperative, engaged, when asked questions, and polite with provider. Patient denied any current SI/HI/AVH. Patient reports experiencing anxiety and depressive symptoms but overall improvements at this time    \"Little stressed because they messed up my dentures and I have to wait another week\"  \"Feeling incognito past few days-not really mad but just in a don't really care mood\"  \"Looking forward to warmer weather so I can get out on my bike\"      Subjective     PHQ-9 Depression Screening  PHQ-9 Total Score:  Not completed at this time     GARETT-7   Not completed at this time    Patient's Support Network Includes:   Friends/Neighbors/[facility]     Functional Status: Mild impairment     Progress toward goal: Not at goal    Prognosis: Good with Ongoing Treatment     Medications:     Current Outpatient Medications:     aspirin 81 MG chewable tablet, Chew 1 tablet., Disp: , Rfl:     atorvastatin (LIPITOR) 80 MG tablet, Take 1 tablet by mouth once daily, Disp: 30 tablet, Rfl: 0    Brexpiprazole (Rexulti) 2 MG tablet, Take 1 tablet by mouth Daily., Disp: 90 tablet, Rfl: 0    cyclobenzaprine (FLEXERIL) 5 MG tablet, Take 1 tablet by mouth 3 (Three) Times a Day As Needed for Muscle Spasms., Disp: 30 tablet, Rfl: 1    diclofenac (VOLTAREN) 75 MG EC tablet, TAKE 1 TABLET BY MOUTH TWICE DAILY AS NEEDED FOR  BACK  PAIN,  JOINT  PAIN, " Disp: 60 tablet, Rfl: 0    donepezil (ARICEPT) 10 MG tablet, Take 1 tablet by mouth Every Night for 180 days., Disp: 90 tablet, Rfl: 1    DULoxetine (CYMBALTA) 60 MG capsule, Take 1 capsule by mouth in the morning, Disp: 90 capsule, Rfl: 0    empagliflozin (Jardiance) 25 MG tablet tablet, Take 1 tablet by mouth Daily., Disp: 90 tablet, Rfl: 1    fluticasone (FLONASE) 50 MCG/ACT nasal spray, 2 sprays into the nostril(s) as directed by provider Daily., Disp: 16 g, Rfl: 5    levocetirizine (XYZAL) 5 MG tablet, TAKE 1 TABLET BY MOUTH ONCE DAILY IN THE EVENING, Disp: 30 tablet, Rfl: 0    Magnesium 400 MG tablet, Take 400 mg by mouth Daily., Disp: , Rfl:     memantine (NAMENDA) 5 MG tablet, Take 1 tablet by mouth 2 (Two) Times a Day for 180 days., Disp: 180 tablet, Rfl: 1    nitroglycerin (NITROLINGUAL) 0.4 MG/SPRAY spray, Place 1 spray by translingual route on or under the tongue at the first sign of an attack, no more than 3 sprays / 15-minute., Disp: 1 each, Rfl: 0    omeprazole (priLOSEC) 20 MG capsule, Take 1 capsule by mouth Daily., Disp: 90 capsule, Rfl: 1    pioglitazone (ACTOS) 15 MG tablet, Take 1 tablet by mouth once daily, Disp: 30 tablet, Rfl: 0    prazosin (MINIPRESS) 2 MG capsule, Take 1 capsule by mouth at bedtime., Disp: 90 capsule, Rfl: 0    SITagliptin (JANUVIA) 100 MG tablet, Take 1 tablet by mouth Daily., Disp: 90 tablet, Rfl: 1    traZODone (DESYREL) 50 MG tablet, Take 1 tablet by mouth every day at bedtime., Disp: 90 tablet, Rfl: 0    Allergies:   Allergies   Allergen Reactions    Hydrocodone Itching    Penicillins Swelling and Provider Review Needed     Entire body swelled as a child     Sulfa Antibiotics Shortness Of Breath, Rash and Provider Review Needed    Codeine Nausea And Vomiting, Confusion and Provider Review Needed    Oxycontin [Oxycodone] Itching       Objective     Mental Status Exam:   MENTAL STATUS EXAM   General Appearance:  Cleanly groomed and dressed  Eye Contact:  Fair  Attitude:   "Cooperative and polite  Motor Activity:  Slow  Muscle Strength:  Abnormal  Speech:  Normal rate, tone, volume and soft spoken  Language:  Spontaneous  Mood and affect:  Other  Other Comment:  Mood reported as \"alright\" with somewhat tired but overall relaxed and cooperative affect.  Hopelessness:  Denies  Loneliness: 4  Thought Process:  Linear and goal-directed  Associations/ Thought Content:  No delusions  Hallucinations:  None  Suicidal Ideations:  Not present  Homicidal Ideation:  Not present  Sensorium:  Drowsy and clear  Orientation:  Person, place, situation and time  Immediate Recall, Recent, and Remote Memory:  Other  Other Comment:  Fair  Attention Span/ Concentration:  Other  Other Comment:  Fair  Fund of Knowledge:  Appropriate for age and educational level  Intellectual Functioning:  Average range  Insight:  Fair  Judgement:  Good  Reliability:  Good  Impulse Control:  Good       Assessment / Plan      Visit Diagnosis/Orders Placed This Visit:    ICD-10-CM ICD-9-CM   1. Anxiety and depression  F41.9 300.00    F32.A 311        PLAN:  Safety: No acute safety concerns  Risk Assessment: Risk of self-harm acutely is low. Risk of self-harm chronically is also low, but could be further elevated in the event of treatment noncompliance and/or AODA.    Treatment Plan/Goals: Continue supportive psychotherapy efforts and medications as indicated. Treatment and medication options discussed during today's visit. Patient ackowledged and verbally consented to continue with current treatment plan and was educated on the importance of compliance with treatment and follow-up appointments. Patient seems reasonably able to adhere to treatment plan.      Assisted Patient in processing above session content; acknowledged and normalized patient’s thoughts, feelings, and concerns. Assisted patient in processing recent stressors/emotions/feelings and utilized Socratic Questioning techniques and open ended questions to encourage " reflection. Explored recent stressors and triggers while guiding patient on identifying current strengths and coping techniques he has utilized to help relieve stress     Allowed Patient to freely discuss issues  without interruption or judgement with unconditional positive regard, active listening skills, and empathy. Therapist provided a safe, confidential environment to facilitate the development of a positive therapeutic relationship and encouraged open, honest communication. Assisted Patient in identifying risk factors which would indicate the need for higher level of care including thoughts to harm self or others and/or self-harming behavior and encouraged Patient to contact this office, call 911, or present to the nearest emergency room should any of these events occur. Discussed crisis intervention services and means to access. Patient adamantly and convincingly denies current suicidal or homicidal ideation or perceptual disturbance. Assisted Patient in processing session content; acknowledged and normalized Patient’s thoughts, feelings, and concerns by utilizing a person-centered approach in efforts to build appropriate rapport and a positive therapeutic relationship with open and honest communication. .     Follow Up:   Return in about 1 week (around 3/4/2025) for Therapy session.    Stefan Tran LCSW  [facility]

## 2025-02-25 NOTE — TELEPHONE ENCOUNTER
Incoming Refill Request      Medication requested (name and dose):     TRAZODONE (DESYREL) 50 MG TABLET   PRAZOSIN (MINIPRESS) 2 MG TABLET   DULOXETINE (CYMBALTA) 60 MG CAPSULE   BREXPIPRAZOLE (REXULTI) 2 MG TABLET     Pharmacy where request should be sent:     [facility] [place]     Additional details provided by patient:     PT IS OUT OF MEDICATION. NEXT APPT IS 03/04/25    Best call back number: [phone]    Does the patient have less than a 3 day supply:  [x] Yes  [] No    Trice Mayo Rep  02/25/25, 13:48 EST        {Tip  DIRECTIONS Send the encounter HIGH priority, If patient has less than a 3 day supply. If the patient will run out of medication over the weekend add that information to the additional details line. Send this encounter to the clinical pool:[id number]

## 2025-03-18 ENCOUNTER — OFFICE VISIT (OUTPATIENT)
Dept: BEHAVIORAL HEALTH | Facility: CLINIC | Age: 65
End: 2025-03-18
Payer: MEDICARE

## 2025-03-18 DIAGNOSIS — F41.9 ANXIETY AND DEPRESSION: Primary | ICD-10-CM

## 2025-03-18 DIAGNOSIS — E78.2 MIXED HYPERLIPIDEMIA: ICD-10-CM

## 2025-03-18 DIAGNOSIS — F43.10 POST TRAUMATIC STRESS DISORDER (PTSD): ICD-10-CM

## 2025-03-18 DIAGNOSIS — F32.A ANXIETY AND DEPRESSION: Primary | ICD-10-CM

## 2025-03-18 DIAGNOSIS — E11.42 TYPE 2 DIABETES MELLITUS WITH DIABETIC POLYNEUROPATHY, WITHOUT LONG-TERM CURRENT USE OF INSULIN: ICD-10-CM

## 2025-03-18 DIAGNOSIS — J30.9 ALLERGIC RHINITIS, UNSPECIFIED SEASONALITY, UNSPECIFIED TRIGGER: ICD-10-CM

## 2025-03-18 RX ORDER — ATORVASTATIN CALCIUM 80 MG/1
80 TABLET, FILM COATED ORAL DAILY
Qty: 30 TABLET | Refills: 0 | Status: SHIPPED | OUTPATIENT
Start: 2025-03-18

## 2025-03-18 RX ORDER — LEVOCETIRIZINE DIHYDROCHLORIDE 5 MG/1
5 TABLET, FILM COATED ORAL EVERY EVENING
Qty: 30 TABLET | Refills: 0 | Status: SHIPPED | OUTPATIENT
Start: 2025-03-18

## 2025-03-18 RX ORDER — PIOGLITAZONE 15 MG/1
15 TABLET ORAL DAILY
Qty: 30 TABLET | Refills: 0 | Status: SHIPPED | OUTPATIENT
Start: 2025-03-18

## 2025-03-18 NOTE — PROGRESS NOTES
"     Follow Up Adult Note     Date:2025   Patient Name: Santi Souza Sr.  : 1960   MRN: [id number]   Time IN: 1:10 pm    Time OUT: 1:56 pm     Referring Provider: Jordan Heart APRN    Chief Complaint:      ICD-10-CM ICD-9-CM   1. Anxiety and depression  F41.9 300.00    F32.A 311   2. Post traumatic stress disorder (PTSD)  F43.10 309.81        History of Present Illness:   Santi Souza Sr. is a 64 y.o., single, disabled  male who is being seen today for scheduled Psychotherapy session. Mood reported as \"not much of a mood\" with somewhat tired but overall cooperative affect. Patient presented as somewhat tired but overall calm, cooperative, engaged, and pleasant with provider and denied any current SI/HI/AVH. Patient reports experiencing some anxiety and depression with factors around health reporting recently experiencing difficulties stating, \"been down bad the past 2 weeks-back liyah out and my stomach was pretty upset\" along with general feelings of \"low motivation and just no drive and really frustrated\". Furthermore, patient discussed emotional concerns exacerbating symptom burdens when speaking about having no contact with family and stated, \"I have been thinking about seeing them more recently, and last night it actually hit me pretty hard\". Patient reports he is looking forward to the weather warming up and \"getting my bike out on the road to help with my moods\".       Subjective     PHQ-9 Depression Screening  PHQ-9 Total Score:  Not completed at this time     GARETT-7   Not completed at this time    Patient's Support Network Includes:   Neighbors/friends and [facility]    Functional Status: Mild impairment     Progress toward goal: Not at goal    Prognosis: Good with Ongoing Treatment     Medications:     Current Outpatient Medications:     aspirin 81 MG chewable tablet, Chew 1 tablet., Disp: , Rfl:     atorvastatin (LIPITOR) 80 MG tablet, Take 1 tablet by " mouth once daily, Disp: 30 tablet, Rfl: 0    Brexpiprazole (Rexulti) 2 MG tablet, Take 1 tablet by mouth Daily., Disp: 30 tablet, Rfl: 0    cyclobenzaprine (FLEXERIL) 5 MG tablet, Take 1 tablet by mouth 3 (Three) Times a Day As Needed for Muscle Spasms., Disp: 30 tablet, Rfl: 1    diclofenac (VOLTAREN) 75 MG EC tablet, TAKE 1 TABLET BY MOUTH TWICE DAILY AS NEEDED FOR  BACK  PAIN,  JOINT  PAIN, Disp: 60 tablet, Rfl: 0    donepezil (ARICEPT) 10 MG tablet, Take 1 tablet by mouth Every Night for 180 days., Disp: 90 tablet, Rfl: 1    DULoxetine (CYMBALTA) 60 MG capsule, Take 1 capsule by mouth in the morning, Disp: 30 capsule, Rfl: 0    empagliflozin (Jardiance) 25 MG tablet tablet, Take 1 tablet by mouth Daily., Disp: 90 tablet, Rfl: 1    fluticasone (FLONASE) 50 MCG/ACT nasal spray, 2 sprays into the nostril(s) as directed by provider Daily., Disp: 16 g, Rfl: 5    levocetirizine (XYZAL) 5 MG tablet, TAKE 1 TABLET BY MOUTH ONCE DAILY IN THE EVENING, Disp: 30 tablet, Rfl: 0    Magnesium 400 MG tablet, Take 400 mg by mouth Daily., Disp: , Rfl:     memantine (NAMENDA) 5 MG tablet, Take 1 tablet by mouth 2 (Two) Times a Day for 180 days., Disp: 180 tablet, Rfl: 1    nitroglycerin (NITROLINGUAL) 0.4 MG/SPRAY spray, Place 1 spray by translingual route on or under the tongue at the first sign of an attack, no more than 3 sprays / 15-minute., Disp: 1 each, Rfl: 0    omeprazole (priLOSEC) 20 MG capsule, Take 1 capsule by mouth Daily., Disp: 90 capsule, Rfl: 1    pioglitazone (ACTOS) 15 MG tablet, Take 1 tablet by mouth once daily, Disp: 30 tablet, Rfl: 0    prazosin (MINIPRESS) 2 MG capsule, Take 1 capsule by mouth at bedtime., Disp: 30 capsule, Rfl: 0    SITagliptin (JANUVIA) 100 MG tablet, Take 1 tablet by mouth Daily., Disp: 90 tablet, Rfl: 1    traZODone (DESYREL) 50 MG tablet, Take 1 tablet by mouth every day at bedtime., Disp: 30 tablet, Rfl: 0    Allergies:   Allergies   Allergen Reactions    Hydrocodone Itching     "Penicillins Swelling and Provider Review Needed     Entire body swelled as a child     Sulfa Antibiotics Shortness Of Breath, Rash and Provider Review Needed    Codeine Nausea And Vomiting, Confusion and Provider Review Needed    Oxycontin [Oxycodone] Itching       Objective     Mental Status Exam:   MENTAL STATUS EXAM   General Appearance:  Cleanly groomed and dressed  Eye Contact:  Fair  Attitude:  Cooperative and polite  Motor Activity:  Slow and normal gait, posture  Muscle Strength:  Normal  Speech:  Normal rate, tone, volume and soft spoken  Language:  Spontaneous  Mood and affect:  Other  Other Comment:  Mood reported as \"not much of a mood\" with somewhat tired but overall cooperative affect.  Hopelessness:  Optimistic  Loneliness: 3  Thought Process:  Goal-directed and linear  Associations/ Thought Content:  No delusions  Hallucinations:  None  Suicidal Ideations:  Not present  Homicidal Ideation:  Not present  Sensorium:  Alert and clear  Orientation:  Person, place, time and situation  Immediate Recall, Recent, and Remote Memory:  Other  Other Comment:  Fair  Attention Span/ Concentration:  Other  Other Comment:  Fair  Fund of Knowledge:  Appropriate for age and educational level  Intellectual Functioning:  Average range  Insight:  Fair  Judgement:  Good  Reliability:  Good  Impulse Control:  Good       Assessment / Plan      Visit Diagnosis/Orders Placed This Visit:    ICD-10-CM ICD-9-CM   1. Anxiety and depression  F41.9 300.00    F32.A 311   2. Post traumatic stress disorder (PTSD)  F43.10 309.81        PLAN:  Safety: No acute safety concerns  Risk Assessment: Risk of self-harm acutely is low. Risk of self-harm chronically is also low, but could be further elevated in the event of treatment noncompliance and/or AODA.    Treatment Plan/Goals: Continue supportive psychotherapy efforts and medications as indicated. Treatment and medication options discussed during today's visit. Patient ackowledged and " verbally consented to continue with current treatment plan and was educated on the importance of compliance with treatment and follow-up appointments. Patient seems reasonably able to adhere to treatment plan.      Assisted Patient in processing above session content; acknowledged and normalized patient’s thoughts, feelings, and concerns. Assisted patient in processing recent stressors/emotions/feelings and utilized Socratic Questioning techniques and open ended questions to encourage reflection. Guided patient in identifying and challenging anxious and stressful thoughts and developing relaxation strategies. Processed feelings around family and the pros and cons of reaching out to them. Patient was overall receptive to therapeutic feedback. Will review treatment plan/progress at follow up session.       Allowed Patient to freely discuss issues  without interruption or judgement with unconditional positive regard, active listening skills, and empathy. Therapist provided a safe, confidential environment to facilitate the development of a positive therapeutic relationship and encouraged open, honest communication. Assisted Patient in identifying risk factors which would indicate the need for higher level of care including thoughts to harm self or others and/or self-harming behavior and encouraged Patient to contact this office, call 911, or present to the nearest emergency room should any of these events occur. Discussed crisis intervention services and means to access. Patient adamantly and convincingly denies current suicidal or homicidal ideation or perceptual disturbance. Assisted Patient in processing session content; acknowledged and normalized Patient’s thoughts, feelings, and concerns by utilizing a person-centered approach in efforts to build appropriate rapport and a positive therapeutic relationship with open and honest communication. .     Follow Up:   Return in about 1 week (around 3/25/2025) for Therapy  session.    Stefan Tran, [facility]  [facility]

## 2025-03-25 ENCOUNTER — OFFICE VISIT (OUTPATIENT)
Dept: BEHAVIORAL HEALTH | Facility: CLINIC | Age: 65
End: 2025-03-25
Payer: MEDICARE

## 2025-03-25 DIAGNOSIS — F32.A ANXIETY AND DEPRESSION: Primary | ICD-10-CM

## 2025-03-25 DIAGNOSIS — F41.9 ANXIETY AND DEPRESSION: Primary | ICD-10-CM

## 2025-03-25 DIAGNOSIS — F43.10 POST TRAUMATIC STRESS DISORDER (PTSD): ICD-10-CM

## 2025-03-25 NOTE — PROGRESS NOTES
"     Follow Up Adult Note     Date:2025   Patient Name: Santi Souza Sr.  : 1960   MRN: [id number]   Time IN: 1:05 pm    Time OUT: 1:50 pm     Referring Provider: Jordan Heart APRN    Chief Complaint:      ICD-10-CM ICD-9-CM   1. Anxiety and depression  F41.9 300.00    F32.A 311   2. Post traumatic stress disorder (PTSD)  F43.10 309.81        History of Present Illness:   Santi Souza Sr. is a 64 y.o., single, disabled  male who is being seen today for scheduled Psychotherapy session. Mood reported as \"tired but living\" with somewhat withdrawn but overall cooperative affect. Patient presented as somewhat tired and appeared withdrawn at times but remained overall calm, cooperative, engaged when asked questions, and pleasant with provider. Patient denied any current SI/HI/AVH. Patient reports ongoing anxiety and depression with symptoms of lower energy and motivation, intermittent irritability and restlessness, lower mood, fatigue and stated, \"I'm just hanging in day to day and feel like there is more I should be doing\". Furthermore, patient continues to report intrusive thoughts of past trauma and negative experiences with a focus on family relations and stated, \"I think about it more and more, but don't really know what I want to do about it\". Patient reports he is looking forward to warmer weather so that he can get his bike \"on the road\" and hopefully receives his dentures later this week.       Subjective     PHQ-9 Depression Screening  PHQ-9 Total Score:  Not completed at this time     GARETT-7   Not completed at this time    Patient's Support Network Includes:   Neighbors and senior center    Functional Status: Moderate impairment     Progress toward goal: Not at goal    Prognosis: Good with Ongoing Treatment     Medications:     Current Outpatient Medications:     aspirin 81 MG chewable tablet, Chew 1 tablet., Disp: , Rfl:     atorvastatin (LIPITOR) 80 MG tablet, Take 1 " tablet by mouth once daily, Disp: 30 tablet, Rfl: 0    Brexpiprazole (Rexulti) 2 MG tablet, Take 1 tablet by mouth Daily., Disp: 30 tablet, Rfl: 0    cyclobenzaprine (FLEXERIL) 5 MG tablet, Take 1 tablet by mouth 3 (Three) Times a Day As Needed for Muscle Spasms., Disp: 30 tablet, Rfl: 1    diclofenac (VOLTAREN) 75 MG EC tablet, TAKE 1 TABLET BY MOUTH TWICE DAILY AS NEEDED FOR  BACK  PAIN,  JOINT  PAIN, Disp: 60 tablet, Rfl: 0    donepezil (ARICEPT) 10 MG tablet, Take 1 tablet by mouth Every Night for 180 days., Disp: 90 tablet, Rfl: 1    DULoxetine (CYMBALTA) 60 MG capsule, Take 1 capsule by mouth in the morning, Disp: 30 capsule, Rfl: 0    empagliflozin (Jardiance) 25 MG tablet tablet, Take 1 tablet by mouth Daily., Disp: 90 tablet, Rfl: 1    fluticasone (FLONASE) 50 MCG/ACT nasal spray, 2 sprays into the nostril(s) as directed by provider Daily., Disp: 16 g, Rfl: 5    levocetirizine (XYZAL) 5 MG tablet, TAKE 1 TABLET BY MOUTH ONCE DAILY IN THE EVENING, Disp: 30 tablet, Rfl: 0    Magnesium 400 MG tablet, Take 400 mg by mouth Daily., Disp: , Rfl:     memantine (NAMENDA) 5 MG tablet, Take 1 tablet by mouth 2 (Two) Times a Day for 180 days., Disp: 180 tablet, Rfl: 1    nitroglycerin (NITROLINGUAL) 0.4 MG/SPRAY spray, Place 1 spray by translingual route on or under the tongue at the first sign of an attack, no more than 3 sprays / 15-minute., Disp: 1 each, Rfl: 0    omeprazole (priLOSEC) 20 MG capsule, Take 1 capsule by mouth Daily., Disp: 90 capsule, Rfl: 1    pioglitazone (ACTOS) 15 MG tablet, Take 1 tablet by mouth once daily, Disp: 30 tablet, Rfl: 0    prazosin (MINIPRESS) 2 MG capsule, Take 1 capsule by mouth at bedtime., Disp: 30 capsule, Rfl: 0    SITagliptin (JANUVIA) 100 MG tablet, Take 1 tablet by mouth Daily., Disp: 90 tablet, Rfl: 1    traZODone (DESYREL) 50 MG tablet, Take 1 tablet by mouth every day at bedtime., Disp: 30 tablet, Rfl: 0    Allergies:   Allergies   Allergen Reactions    Hydrocodone Itching  "   Penicillins Swelling and Provider Review Needed     Entire body swelled as a child     Sulfa Antibiotics Shortness Of Breath, Rash and Provider Review Needed    Codeine Nausea And Vomiting, Confusion and Provider Review Needed    Oxycontin [Oxycodone] Itching       Objective     Mental Status Exam:   MENTAL STATUS EXAM   General Appearance:  Cleanly groomed and dressed  Eye Contact:  Fair  Attitude:  Cooperative and polite  Motor Activity:  Normal gait, posture and slow  Muscle Strength:  Normal  Speech:  Normal rate, tone, volume and soft spoken  Language:  Spontaneous  Mood and affect:  Other  Other Comment:  Mood reported as \"tired but living\" with somewhat withdrawn but overall cooperative affect.  Hopelessness:  Optimistic  Loneliness: 3  Thought Process:  Goal-directed and linear  Associations/ Thought Content:  No delusions  Hallucinations:  None  Suicidal Ideations:  Not present  Homicidal Ideation:  Not present  Sensorium:  Other  Other Comment:  Somewhat drowsy but overall alert and clear  Orientation:  Person, place, time and situation  Immediate Recall, Recent, and Remote Memory:  Intact  Attention Span/ Concentration:  Good  Fund of Knowledge:  Appropriate for age and educational level  Intellectual Functioning:  Average range  Insight:  Fair  Judgement:  Good  Reliability:  Good  Impulse Control:  Good       Assessment / Plan      Visit Diagnosis/Orders Placed This Visit:    ICD-10-CM ICD-9-CM   1. Anxiety and depression  F41.9 300.00    F32.A 311   2. Post traumatic stress disorder (PTSD)  F43.10 309.81        PLAN:  Safety: No acute safety concerns  Risk Assessment: Risk of self-harm acutely is low. Risk of self-harm chronically is also low, but could be further elevated in the event of treatment noncompliance and/or AODA.    Treatment Plan/Goals: Continue supportive psychotherapy efforts and medications as indicated. Treatment and medication options discussed during today's visit. Patient " ackowledged and verbally consented to continue with current treatment plan and was educated on the importance of compliance with treatment and follow-up appointments. Patient seems reasonably able to adhere to treatment plan.      Assisted Patient in processing above session content; acknowledged and normalized patient’s thoughts, feelings, and concerns. Assisted patient in processing recent stressors/emotions/feelings and utilized CBT techniques to assist patient with challenging negative/irrational thoughts and beliefs and replacing them with rational ones while also utilizing Socratic Questioning techniques and open ended questions to encourage reflection. Continued to process relationships with a focus on past family relations and how they have affected patients experiences and moods. Challenged patient to identify current strengths and things he is grateful for while looking at different activities to feel days with. Encouraged patient to continue thinking about reaching out to family and will follow up at next session.      Allowed Patient to freely discuss issues  without interruption or judgement with unconditional positive regard, active listening skills, and empathy. Therapist provided a safe, confidential environment to facilitate the development of a positive therapeutic relationship and encouraged open, honest communication. Assisted Patient in identifying risk factors which would indicate the need for higher level of care including thoughts to harm self or others and/or self-harming behavior and encouraged Patient to contact this office, call 911, or present to the nearest emergency room should any of these events occur. Discussed crisis intervention services and means to access. Patient adamantly and convincingly denies current suicidal or homicidal ideation or perceptual disturbance. Assisted Patient in processing session content; acknowledged and normalized Patient’s thoughts, feelings, and concerns  by utilizing a person-centered approach in efforts to build appropriate rapport and a positive therapeutic relationship with open and honest communication. .     Follow Up:   Return in about 1 week (around 4/1/2025) for Therapy session.    Stefan Tran, [facility]  [facility]

## 2025-04-01 ENCOUNTER — OFFICE VISIT (OUTPATIENT)
Dept: BEHAVIORAL HEALTH | Facility: CLINIC | Age: 65
End: 2025-04-01
Payer: MEDICARE

## 2025-04-01 VITALS
BODY MASS INDEX: 26.37 KG/M2 | WEIGHT: 174 LBS | DIASTOLIC BLOOD PRESSURE: 64 MMHG | HEIGHT: 68 IN | HEART RATE: 60 BPM | SYSTOLIC BLOOD PRESSURE: 124 MMHG | OXYGEN SATURATION: 96 %

## 2025-04-01 DIAGNOSIS — F33.1 MODERATE EPISODE OF RECURRENT MAJOR DEPRESSIVE DISORDER: ICD-10-CM

## 2025-04-01 DIAGNOSIS — F41.9 ANXIETY AND DEPRESSION: Primary | ICD-10-CM

## 2025-04-01 DIAGNOSIS — F43.10 POST TRAUMATIC STRESS DISORDER (PTSD): ICD-10-CM

## 2025-04-01 DIAGNOSIS — F39 MOOD DISORDER: ICD-10-CM

## 2025-04-01 DIAGNOSIS — F41.1 GENERALIZED ANXIETY DISORDER: ICD-10-CM

## 2025-04-01 DIAGNOSIS — G47.20 CIRCADIAN RHYTHM SLEEP DISORDER, UNSPECIFIED TYPE: ICD-10-CM

## 2025-04-01 DIAGNOSIS — F32.A ANXIETY AND DEPRESSION: Primary | ICD-10-CM

## 2025-04-01 RX ORDER — TRAZODONE HYDROCHLORIDE 50 MG/1
TABLET ORAL
Qty: 30 TABLET | Refills: 2 | Status: SHIPPED | OUTPATIENT
Start: 2025-04-01

## 2025-04-01 RX ORDER — DULOXETIN HYDROCHLORIDE 60 MG/1
CAPSULE, DELAYED RELEASE ORAL
Qty: 30 CAPSULE | Refills: 2 | Status: SHIPPED | OUTPATIENT
Start: 2025-04-01

## 2025-04-01 RX ORDER — PRAZOSIN HYDROCHLORIDE 2 MG/1
CAPSULE ORAL
Qty: 30 CAPSULE | Refills: 2 | Status: SHIPPED | OUTPATIENT
Start: 2025-04-01

## 2025-04-01 RX ORDER — BREXPIPRAZOLE 2 MG/1
2 TABLET ORAL DAILY
Qty: 30 TABLET | Refills: 2 | Status: SHIPPED | OUTPATIENT
Start: 2025-04-01

## 2025-04-01 NOTE — PROGRESS NOTES
"     Follow Up Adult Note     Date:2025   Patient Name: Santi Souza Sr.  : 1960   MRN: [id number]   Time IN: 1:00 pm    Time OUT: 2:00 pm     Referring Provider: Jordan Heart APRN    Chief Complaint:      ICD-10-CM ICD-9-CM   1. Anxiety and depression  F41.9 300.00    F32.A 311   2. Post traumatic stress disorder (PTSD)  F43.10 309.81        History of Present Illness:   Santi Souza Sr. is a 64 y.o., single, disabled  male who is being seen today for scheduled Psychotherapy session. Mood reported as \"doing okay\" with brighter and cooperative affect. Patient presented with overall brighter and upbeat mood and remained calm, cooperative, engaged, and pleasant with provider. Patient denied any current SI/HI/AVH. Patient reports feeling \"healthier and not in as much pain\" but voice ongoing anxiety and sadness along with intermittent frustration around dealing with \"being alone\" and minimal to no contact with family. Patient reported he has reached out to contact his mother via telephone intermittently with no contact and stated, \"I admit I could have made a better effort\" and later stated, \"I should have tried harder with my son\" but later stated, \"I just had a lot to deal with and all the lies when I tried to be in his life and just got tired of getting arrested and losing jobs over it\". 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 Patient voiced that he continues to do better with his day to day life and overall routines and stated, \"I have come a long way and still want to do more\".       Subjective     PHQ-9 Depression Screening  PHQ-9 Total Score:  Not completed at this time     GARETT-7   Not completed at this time    Patient's Support Network Includes:   friends, Senior Center    Functional Status: Mild impairment     Progress toward goal: Not at goal    Prognosis: Good with Ongoing Treatment     Medications:     Current Outpatient Medications: "     aspirin 81 MG chewable tablet, Chew 1 tablet., Disp: , Rfl:     atorvastatin (LIPITOR) 80 MG tablet, Take 1 tablet by mouth once daily, Disp: 30 tablet, Rfl: 0    Brexpiprazole (Rexulti) 2 MG tablet, Take 1 tablet by mouth Daily., Disp: 30 tablet, Rfl: 2    cyclobenzaprine (FLEXERIL) 5 MG tablet, Take 1 tablet by mouth 3 (Three) Times a Day As Needed for Muscle Spasms., Disp: 30 tablet, Rfl: 1    diclofenac (VOLTAREN) 75 MG EC tablet, TAKE 1 TABLET BY MOUTH TWICE DAILY AS NEEDED FOR  BACK  PAIN,  JOINT  PAIN, Disp: 60 tablet, Rfl: 0    donepezil (ARICEPT) 10 MG tablet, Take 1 tablet by mouth Every Night for 180 days., Disp: 90 tablet, Rfl: 1    DULoxetine (CYMBALTA) 60 MG capsule, Take 1 capsule by mouth in the morning, Disp: 30 capsule, Rfl: 2    empagliflozin (Jardiance) 25 MG tablet tablet, Take 1 tablet by mouth Daily., Disp: 90 tablet, Rfl: 1    fluticasone (FLONASE) 50 MCG/ACT nasal spray, 2 sprays into the nostril(s) as directed by provider Daily., Disp: 16 g, Rfl: 5    levocetirizine (XYZAL) 5 MG tablet, TAKE 1 TABLET BY MOUTH ONCE DAILY IN THE EVENING, Disp: 30 tablet, Rfl: 0    Magnesium 400 MG tablet, Take 400 mg by mouth Daily., Disp: , Rfl:     memantine (NAMENDA) 5 MG tablet, Take 1 tablet by mouth 2 (Two) Times a Day for 180 days., Disp: 180 tablet, Rfl: 1    nitroglycerin (NITROLINGUAL) 0.4 MG/SPRAY spray, Place 1 spray by translingual route on or under the tongue at the first sign of an attack, no more than 3 sprays / 15-minute., Disp: 1 each, Rfl: 0    omeprazole (priLOSEC) 20 MG capsule, Take 1 capsule by mouth Daily., Disp: 90 capsule, Rfl: 1    pioglitazone (ACTOS) 15 MG tablet, Take 1 tablet by mouth once daily, Disp: 30 tablet, Rfl: 0    prazosin (MINIPRESS) 2 MG capsule, Take 1 capsule by mouth at bedtime., Disp: 30 capsule, Rfl: 2    SITagliptin (JANUVIA) 100 MG tablet, Take 1 tablet by mouth Daily., Disp: 90 tablet, Rfl: 1    traZODone (DESYREL) 50 MG tablet, Take 1 tablet by mouth  "every day at bedtime., Disp: 30 tablet, Rfl: 2    Allergies:   Allergies   Allergen Reactions    Hydrocodone Itching    Penicillins Swelling and Provider Review Needed     Entire body swelled as a child     Sulfa Antibiotics Shortness Of Breath, Rash and Provider Review Needed    Codeine Nausea And Vomiting, Confusion and Provider Review Needed    Oxycontin [Oxycodone] Itching       Objective     Mental Status Exam:   MENTAL STATUS EXAM   General Appearance:  Cleanly groomed and dressed  Eye Contact:  Good eye contact  Attitude:  Cooperative and polite  Motor Activity:  Normal gait, posture and slow  Muscle Strength:  Normal  Speech:  Normal rate, tone, volume  Language:  Spontaneous  Mood and affect:  Other  Other Comment:  Mood reported as \"doing okay\" with brighter and cooperative affect.  Hopelessness:  Optimistic  Loneliness: 3  Thought Process:  Goal-directed and linear  Associations/ Thought Content:  No delusions  Hallucinations:  None  Suicidal Ideations:  Not present  Homicidal Ideation:  Not present  Sensorium:  Alert and clear  Orientation:  Person, place, time and situation  Immediate Recall, Recent, and Remote Memory:  Intact  Attention Span/ Concentration:  Good  Fund of Knowledge:  Appropriate for age and educational level  Intellectual Functioning:  Average range  Insight:  Fair  Judgement:  Good  Reliability:  Good  Impulse Control:  Good       Assessment / Plan      Visit Diagnosis/Orders Placed This Visit:    ICD-10-CM ICD-9-CM   1. Anxiety and depression  F41.9 300.00    F32.A 311   2. Post traumatic stress disorder (PTSD)  F43.10 309.81        PLAN:  Safety: No acute safety concerns  Risk Assessment: Risk of self-harm acutely is low. Risk of self-harm chronically is also low, but could be further elevated in the event of treatment noncompliance and/or AODA.    Treatment Plan/Goals: Continue supportive psychotherapy efforts and medications as indicated. Treatment and medication options discussed " during today's visit. Patient ackowledged and verbally consented to continue with current treatment plan and was educated on the importance of compliance with treatment and follow-up appointments. Patient seems reasonably able to adhere to treatment plan.      Assisted Patient in processing above session content; acknowledged and normalized patient’s thoughts, feelings, and concerns. Assisted patient in processing recent stressors/emotions/feelings and utilized Socratic Questioning techniques and open ended questions to encourage reflection. Guided patient in exploring feelings/emotions/reactions around displacement from family and utilized Socratic Questioning techniques to challenge patient to consider and identify reasons behind fears to reach out. Continued to explore recent successes while identifying strengths and future aspirations. Patient was receptive to therapeutic feedback.      Allowed Patient to freely discuss issues  without interruption or judgement with unconditional positive regard, active listening skills, and empathy. Therapist provided a safe, confidential environment to facilitate the development of a positive therapeutic relationship and encouraged open, honest communication. Assisted Patient in identifying risk factors which would indicate the need for higher level of care including thoughts to harm self or others and/or self-harming behavior and encouraged Patient to contact this office, call 911, or present to the nearest emergency room should any of these events occur. Discussed crisis intervention services and means to access. Patient adamantly and convincingly denies current suicidal or homicidal ideation or perceptual disturbance. Assisted Patient in processing session content; acknowledged and normalized Patient’s thoughts, feelings, and concerns by utilizing a person-centered approach in efforts to build appropriate rapport and a positive therapeutic relationship with open and honest  communication. .     Follow Up:   Return in about 1 week (around 4/8/2025) for Therapy session.    Stefan Tran, [facility]  [facility]

## 2025-04-01 NOTE — PROGRESS NOTES
Follow Up Office Visit      Patient Name: Santi Souza Sr.  : 1960   MRN: [id number]     Referring Provider: Jordan Heart APRN    Chief Complaint:      ICD-10-CM ICD-9-CM   1. Moderate episode of recurrent major depressive disorder  F33.1 296.32   2. Generalized anxiety disorder  F41.1 300.02   3. Circadian rhythm sleep disorder, unspecified type  G47.20 327.30   4. Post traumatic stress disorder (PTSD)  F43.10 309.81   5. Mood disorder  F39 296.90        History of Present Illness:   Santi Souza Sr. is a 64 y.o. male who is here today for follow up with psychiatric medication.    Subjective      Patient Reports:   I think everything is doing ok for me.  I am sleeping better.  I dont think anything needs adjusting.  I still see Stefan for therapy on  at 1 pm.  I just wish I could get my motorcycle out if the weather would cooperate.  I have cabin fever I guess.    Review of Systems:   Review of Systems   Psychiatric/Behavioral:  Positive for depressed mood.        Screening Scores:   PHQ-9 : 14  GARETT-7 : 7    RISK ASSESSMENT:  Patient denies any high risk factors today.    Medications:     Current Outpatient Medications:     aspirin 81 MG chewable tablet, Chew 1 tablet., Disp: , Rfl:     atorvastatin (LIPITOR) 80 MG tablet, Take 1 tablet by mouth once daily, Disp: 30 tablet, Rfl: 0    Brexpiprazole (Rexulti) 2 MG tablet, Take 1 tablet by mouth Daily., Disp: 30 tablet, Rfl: 2    cyclobenzaprine (FLEXERIL) 5 MG tablet, Take 1 tablet by mouth 3 (Three) Times a Day As Needed for Muscle Spasms., Disp: 30 tablet, Rfl: 1    diclofenac (VOLTAREN) 75 MG EC tablet, TAKE 1 TABLET BY MOUTH TWICE DAILY AS NEEDED FOR  BACK  PAIN,  JOINT  PAIN, Disp: 60 tablet, Rfl: 0    donepezil (ARICEPT) 10 MG tablet, Take 1 tablet by mouth Every Night for 180 days., Disp: 90 tablet, Rfl: 1    DULoxetine (CYMBALTA) 60 MG capsule, Take 1 capsule by mouth in the morning, Disp: 30 capsule, Rfl: 2     "empagliflozin (Jardiance) 25 MG tablet tablet, Take 1 tablet by mouth Daily., Disp: 90 tablet, Rfl: 1    fluticasone (FLONASE) 50 MCG/ACT nasal spray, 2 sprays into the nostril(s) as directed by provider Daily., Disp: 16 g, Rfl: 5    levocetirizine (XYZAL) 5 MG tablet, TAKE 1 TABLET BY MOUTH ONCE DAILY IN THE EVENING, Disp: 30 tablet, Rfl: 0    Magnesium 400 MG tablet, Take 400 mg by mouth Daily., Disp: , Rfl:     memantine (NAMENDA) 5 MG tablet, Take 1 tablet by mouth 2 (Two) Times a Day for 180 days., Disp: 180 tablet, Rfl: 1    nitroglycerin (NITROLINGUAL) 0.4 MG/SPRAY spray, Place 1 spray by translingual route on or under the tongue at the first sign of an attack, no more than 3 sprays / 15-minute., Disp: 1 each, Rfl: 0    omeprazole (priLOSEC) 20 MG capsule, Take 1 capsule by mouth Daily., Disp: 90 capsule, Rfl: 1    pioglitazone (ACTOS) 15 MG tablet, Take 1 tablet by mouth once daily, Disp: 30 tablet, Rfl: 0    prazosin (MINIPRESS) 2 MG capsule, Take 1 capsule by mouth at bedtime., Disp: 30 capsule, Rfl: 2    SITagliptin (JANUVIA) 100 MG tablet, Take 1 tablet by mouth Daily., Disp: 90 tablet, Rfl: 1    traZODone (DESYREL) 50 MG tablet, Take 1 tablet by mouth every day at bedtime., Disp: 30 tablet, Rfl: 2    Medication Considerations:  JAMARI reviewed and appropriate.      Allergies:   Allergies   Allergen Reactions    Hydrocodone Itching    Penicillins Swelling and Provider Review Needed     Entire body swelled as a child     Sulfa Antibiotics Shortness Of Breath, Rash and Provider Review Needed    Codeine Nausea And Vomiting, Confusion and Provider Review Needed    Oxycontin [Oxycodone] Itching       Results Reviewed:   screeners     Objective     Physical Exam:  Vital Signs:   Vitals:    04/01/25 1405   BP: 124/64   Pulse: 60   SpO2: 96%   Weight: 78.9 kg (174 lb)   Height: 172.7 cm (68\")     Body mass index is 26.46 kg/m².     Mental Status Exam:   Hygiene:   good  Cooperation:  Cooperative  Eye Contact:  " Good  Psychomotor Behavior:  Appropriate  Affect:  Appropriate  Mood: depressed  Speech:  Normal  Thought Process:  Goal directed and Linear  Thought Content:  Mood congruent  Suicidal:  None  Homicidal:  None  Hallucinations:  None  Delusion:  None  Memory:  Deficits  Orientation:  Grossly intact  Reliability:  fair  Insight:  Fair  Judgement:  Fair  Impulse Control:  Fair  Physical/Medical Issues:   shoulder pain      Assessment / Plan      Visit Diagnosis/Orders Placed This Visit:  Diagnoses and all orders for this visit:    1. Moderate episode of recurrent major depressive disorder  -     traZODone (DESYREL) 50 MG tablet; Take 1 tablet by mouth every day at bedtime.  Dispense: 30 tablet; Refill: 2  -     DULoxetine (CYMBALTA) 60 MG capsule; Take 1 capsule by mouth in the morning  Dispense: 30 capsule; Refill: 2  -     Brexpiprazole (Rexulti) 2 MG tablet; Take 1 tablet by mouth Daily.  Dispense: 30 tablet; Refill: 2    2. Generalized anxiety disorder  -     traZODone (DESYREL) 50 MG tablet; Take 1 tablet by mouth every day at bedtime.  Dispense: 30 tablet; Refill: 2  -     prazosin (MINIPRESS) 2 MG capsule; Take 1 capsule by mouth at bedtime.  Dispense: 30 capsule; Refill: 2  -     DULoxetine (CYMBALTA) 60 MG capsule; Take 1 capsule by mouth in the morning  Dispense: 30 capsule; Refill: 2    3. Circadian rhythm sleep disorder, unspecified type  -     traZODone (DESYREL) 50 MG tablet; Take 1 tablet by mouth every day at bedtime.  Dispense: 30 tablet; Refill: 2  -     prazosin (MINIPRESS) 2 MG capsule; Take 1 capsule by mouth at bedtime.  Dispense: 30 capsule; Refill: 2    4. Post traumatic stress disorder (PTSD)  -     prazosin (MINIPRESS) 2 MG capsule; Take 1 capsule by mouth at bedtime.  Dispense: 30 capsule; Refill: 2  -     Brexpiprazole (Rexulti) 2 MG tablet; Take 1 tablet by mouth Daily.  Dispense: 30 tablet; Refill: 2    5. Mood disorder  -     Brexpiprazole (Rexulti) 2 MG tablet; Take 1 tablet by mouth  "Daily.  Dispense: 30 tablet; Refill: 2         Functional Status: Mild impairment     Prognosis: Guarded with Ongoing Treatment    Impression/Formulation:  Patient appeared alert and oriented. Patient is receptive to assistance with maintaining a stable lifestyle.  Patient presents with history of     ICD-10-CM ICD-9-CM   1. Moderate episode of recurrent major depressive disorder  F33.1 296.32   2. Generalized anxiety disorder  F41.1 300.02   3. Circadian rhythm sleep disorder, unspecified type  G47.20 327.30   4. Post traumatic stress disorder (PTSD)  F43.10 309.81   5. Mood disorder  F39 296.90   Patient screened positive for depression based on a PHQ-9 score of 14 on 4/1/2025. Follow-up recommendations include: Continue with medication management.  Patient reports feeling stable overall, sleep is improved.  He thinks his current depressed mood may be related more to weather and \"cabin fever\".  He doesn't feel his medications need to be adjusted at this time.  He continues therapy with Stefan Tran    Treatment Plan:   Continue rexulti, prazosin, trazodone, duloxetine  Continue therapy with Stefan    Patient will continue supportive psychotherapy efforts and medications as indicated. Clinic will obtain release of information for current treatment team for continuity of care as needed. Patient will contact this office, call 911 or present to the nearest emergency room should suicidal or homicidal ideations occur.  Discussed medication options and treatment plan of prescribed medication(s) as well as the risks, benefits, and potential side effects. Patient ackowledged and verbally consented to continue with current treatment plan and was educated on the importance of compliance with treatment and follow-up appointments.     Follow Up:   Return in about 2 months (around 6/1/2025).        JESSICA Nur, [facility]     This is electronically signed by JESSICA Nur, " PM[facility]NP-BC  [unfilled] 14:24 EDT

## 2025-04-08 ENCOUNTER — OFFICE VISIT (OUTPATIENT)
Dept: BEHAVIORAL HEALTH | Facility: CLINIC | Age: 65
End: 2025-04-08
Payer: MEDICARE

## 2025-04-08 DIAGNOSIS — F43.10 POST TRAUMATIC STRESS DISORDER (PTSD): ICD-10-CM

## 2025-04-08 DIAGNOSIS — F41.9 ANXIETY AND DEPRESSION: Primary | ICD-10-CM

## 2025-04-08 DIAGNOSIS — F32.A ANXIETY AND DEPRESSION: Primary | ICD-10-CM
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
## 2025-04-15 ENCOUNTER — OFFICE VISIT (OUTPATIENT)
Dept: BEHAVIORAL HEALTH | Facility: CLINIC | Age: 65
End: 2025-04-15
Payer: MEDICARE

## 2025-04-15 DIAGNOSIS — F41.9 ANXIETY AND DEPRESSION: Primary | ICD-10-CM

## 2025-04-15 DIAGNOSIS — F43.10 POST TRAUMATIC STRESS DISORDER (PTSD): ICD-10-CM

## 2025-04-15 DIAGNOSIS — F32.A ANXIETY AND DEPRESSION: Primary | ICD-10-CM
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
## 2025-04-16 DIAGNOSIS — E11.42 TYPE 2 DIABETES MELLITUS WITH DIABETIC POLYNEUROPATHY, WITHOUT LONG-TERM CURRENT USE OF INSULIN: ICD-10-CM

## 2025-04-16 DIAGNOSIS — J30.9 ALLERGIC RHINITIS, UNSPECIFIED SEASONALITY, UNSPECIFIED TRIGGER: ICD-10-CM

## 2025-04-16 DIAGNOSIS — E78.2 MIXED HYPERLIPIDEMIA: ICD-10-CM

## 2025-04-16 RX ORDER — LEVOCETIRIZINE DIHYDROCHLORIDE 5 MG/1
5 TABLET, FILM COATED ORAL EVERY EVENING
Qty: 30 TABLET | Refills: 0 | OUTPATIENT
Start: 2025-04-16

## 2025-04-16 RX ORDER — PIOGLITAZONE 15 MG/1
15 TABLET ORAL DAILY
Qty: 30 TABLET | Refills: 0 | OUTPATIENT
Start: 2025-04-16

## 2025-04-16 RX ORDER — ATORVASTATIN CALCIUM 80 MG/1
80 TABLET, FILM COATED ORAL DAILY
Qty: 30 TABLET | Refills: 0 | OUTPATIENT
Start: 2025-04-16

## 2025-04-21 ENCOUNTER — OFFICE VISIT (OUTPATIENT)
Dept: FAMILY MEDICINE CLINIC | Facility: CLINIC | Age: 65
End: 2025-04-21
Payer: MEDICARE

## 2025-04-21 VITALS
WEIGHT: 172.06 LBS | BODY MASS INDEX: 26.08 KG/M2 | DIASTOLIC BLOOD PRESSURE: 62 MMHG | HEIGHT: 68 IN | OXYGEN SATURATION: 97 % | SYSTOLIC BLOOD PRESSURE: 124 MMHG | HEART RATE: 71 BPM

## 2025-04-21 DIAGNOSIS — K21.9 GASTROESOPHAGEAL REFLUX DISEASE, UNSPECIFIED WHETHER ESOPHAGITIS PRESENT: ICD-10-CM

## 2025-04-21 DIAGNOSIS — Z12.5 SCREENING FOR PROSTATE CANCER: ICD-10-CM

## 2025-04-21 DIAGNOSIS — E78.2 MIXED HYPERLIPIDEMIA: ICD-10-CM

## 2025-04-21 DIAGNOSIS — Z79.899 ENCOUNTER FOR LONG-TERM (CURRENT) USE OF MEDICATIONS: ICD-10-CM

## 2025-04-21 DIAGNOSIS — J30.9 ALLERGIC RHINITIS, UNSPECIFIED SEASONALITY, UNSPECIFIED TRIGGER: Primary | ICD-10-CM

## 2025-04-21 DIAGNOSIS — I10 BENIGN ESSENTIAL HTN: ICD-10-CM

## 2025-04-21 DIAGNOSIS — E11.42 TYPE 2 DIABETES MELLITUS WITH DIABETIC POLYNEUROPATHY, WITHOUT LONG-TERM CURRENT USE OF INSULIN: ICD-10-CM

## 2025-04-21 DIAGNOSIS — F17.210 SMOKING GREATER THAN 30 PACK YEARS: ICD-10-CM

## 2025-04-21 DIAGNOSIS — I25.10 CORONARY ARTERY DISEASE INVOLVING NATIVE CORONARY ARTERY OF NATIVE HEART WITHOUT ANGINA PECTORIS: ICD-10-CM

## 2025-04-21 PROCEDURE — G2211 COMPLEX E/M VISIT ADD ON: HCPCS | Performed by: NURSE PRACTITIONER

## 2025-04-21 PROCEDURE — 3078F DIAST BP <80 MM HG: CPT | Performed by: NURSE PRACTITIONER

## 2025-04-21 PROCEDURE — 1159F MED LIST DOCD IN RCRD: CPT | Performed by: NURSE PRACTITIONER

## 2025-04-21 PROCEDURE — 1160F RVW MEDS BY RX/DR IN RCRD: CPT | Performed by: NURSE PRACTITIONER

## 2025-04-21 PROCEDURE — 99214 OFFICE O/P EST MOD 30 MIN: CPT | Performed by: NURSE PRACTITIONER

## 2025-04-21 PROCEDURE — 3074F SYST BP LT 130 MM HG: CPT | Performed by: NURSE PRACTITIONER

## 2025-04-21 RX ORDER — OMEPRAZOLE 20 MG/1
20 CAPSULE, DELAYED RELEASE ORAL DAILY
Qty: 90 CAPSULE | Refills: 1 | Status: SHIPPED | OUTPATIENT
Start: 2025-04-21

## 2025-04-21 RX ORDER — ATORVASTATIN CALCIUM 80 MG/1
80 TABLET, FILM COATED ORAL DAILY
Qty: 90 TABLET | Refills: 1 | Status: SHIPPED | OUTPATIENT
Start: 2025-04-21

## 2025-04-21 RX ORDER — LEVOCETIRIZINE DIHYDROCHLORIDE 5 MG/1
5 TABLET, FILM COATED ORAL EVERY EVENING
Qty: 90 TABLET | Refills: 1 | Status: SHIPPED | OUTPATIENT
Start: 2025-04-21

## 2025-04-21 RX ORDER — FLUTICASONE PROPIONATE 50 MCG
2 SPRAY, SUSPENSION (ML) NASAL DAILY
Qty: 16 G | Refills: 5 | Status: SHIPPED | OUTPATIENT
Start: 2025-04-21

## 2025-04-21 NOTE — PROGRESS NOTES
Chief Complaint  Hyperlipidemia, Hypertension, and Diabetes    Subjective          Santi Souza Sr. presents to [facility]  Hyperlipidemia  Pertinent negatives include no shortness of breath.   Hypertension  Pertinent negatives include no shortness of breath.   Diabetes  Pertinent negatives for hypoglycemia include no dizziness. Pertinent negatives for diabetes include no fatigue, no polydipsia, no polyphagia, no polyuria and no weakness.     History of Present Illness  The patient is a 64-year-old male who presents for medication refills. He has a history of hyperlipidemia, which is well-managed with atorvastatin; allergies, controlled with levocetirizine and Flonase; and GERD, managed with omeprazole. He reports no dizziness, headache, chest pain, chest pressure, shortness of breath, or respiratory distress. No urinary or bowel issues are reported. He continues to smoke but expresses a desire to quit.    His smoking habit remains unchanged at approximately half a pack per day for the past 6 to 8 months.    Blood glucose levels have not been monitored for nearly a year, but the medication regimen, which includes Jardiance, pioglitazone, and Januvia, is continued. No foot infections are not reported. States feet are fine.     Routine blood work is due, and fasting has been completed today. An appointment with Dr. Wong is planned per pt. report. Consultation with an endocrinologist is intended based on upcoming A1c results. Regular consultations are maintained with Derrick De León in psychiatry and Chelsey Ramirez in neurology.  Eye examinations are up-to-date, and a new pair of glasses was recently acquired. Dentures are used, although discomfort is experienced with the lower plate.    Overall states he is doing well.    States he is currently waiting for a particular program to send him nicotine patches which he states he will start to try and quit smoking when he receives them.  " He knows not to smoke while having patches on.    SOCIAL HISTORY  He continues to smoke approximately half a pack a day.    Patient Care Team:  Jordan Heart APRN as PCP - General (Nurse Practitioner)  Derrick Richardson APRN as Nurse Practitioner (Behavioral Health)  Rosenstein, Kyle S, MD as Consulting Physician (Endocrinology)  Rolo Wong MD as Consulting Physician (Cardiology)  Stefan Tran LCSW as  (Behavioral Health)  Marcos Sosa MD as Consulting Physician (Urology)  Chelsey Ramirez APRN as Nurse Practitioner (Neurology)     Objective   Vital Signs:   /62   Pulse 71   Ht 172.7 cm (68\")   Wt 78 kg (172 lb 1 oz)   SpO2 97%   BMI 26.16 kg/m²     Body mass index is 26.16 kg/m².    Review of Systems   Constitutional:  Negative for chills, fatigue and fever.   HENT:  Negative for congestion.    Eyes:  Negative for visual disturbance.   Respiratory:  Negative for cough, shortness of breath and wheezing.    Cardiovascular: Negative.    Gastrointestinal:  Negative for abdominal pain, diarrhea, nausea and vomiting.   Endocrine: Negative for polydipsia, polyphagia and polyuria.   Genitourinary:  Negative for decreased urine volume, dysuria, frequency, hematuria and urgency.   Musculoskeletal:  Negative for back pain.   Skin:  Negative for color change, rash, skin lesions and wound.   Neurological:  Negative for dizziness, weakness, light-headedness, numbness and headache.   Psychiatric/Behavioral: Negative.         Past History:  Medical History: has a past medical history of CAD (coronary artery disease) (11/03/2017), Cataracta, Chronic back pain (11/03/2017), Depression, Diabetes mellitus--Insulin Dependant (11/03/2017), GERD (gastroesophageal reflux disease) (11/03/2017), Hyperlipidemia (11/03/2017), Hypertension (11/03/2017), Neuropathy, Osteoarthritis, Rotator cuff syndrome (6/23), Stroke (2022), Vitamin D deficiency, Wears glasses, and Wears hearing aid in both " ears.   Surgical History: has a past surgical history that includes Lumbar spine surgery (1998); Back surgery (1999); Colonoscopy; Cardiac catheterization; Tonsillectomy; and Shoulder arthroscopy w/ rotator cuff repair (Right, 9/13/2023).   Family History: family history includes Cancer in his maternal grandfather; Diabetes in his father.   Social History: reports that he has been smoking cigarettes. He started smoking about 51 years ago. He has a 25.7 pack-year smoking history. He has been exposed to tobacco smoke. He has never used smokeless tobacco. He reports that he does not currently use drugs after having used the following drugs: Marijuana. Frequency: 7.00 times per week. He reports that he does not drink alcohol.    PHQ-2 Depression Screening  Little interest or pleasure in doing things? Not at all   Feeling down, depressed, or hopeless? Not at all   PHQ-2 Total Score 0       PHQ-9 Depression Screening  Little interest or pleasure in doing things? Not at all   Feeling down, depressed, or hopeless? Not at all   PHQ-2 Total Score 0   Trouble falling or staying asleep, or sleeping too much?     Feeling tired or having little energy?     Poor appetite or overeating?     Feeling bad about yourself - or that you are a failure or have let yourself or your family down?     Trouble concentrating on things, such as reading the newspaper or watching television?     Moving or speaking so slowly that other people could have noticed? Or the opposite - being so fidgety or restless that you have been moving around a lot more than usual?     Thoughts that you would be better off dead, or of hurting yourself in some way?     PHQ-9 Total Score     If you checked off any problems, how difficult have these problems made it for you to do your work, take care of things at home, or get along with other people? Not difficult at all        PHQ-9 Total Score:        Patient screened positive for depression based on a PHQ-9 score of  14 on 4/1/2025. Follow-up recommendations include:          Current Outpatient Medications:     aspirin 81 MG chewable tablet, Chew 1 tablet., Disp: , Rfl:     atorvastatin (LIPITOR) 80 MG tablet, Take 1 tablet by mouth Daily., Disp: 90 tablet, Rfl: 1    Brexpiprazole (Rexulti) 2 MG tablet, Take 1 tablet by mouth Daily., Disp: 30 tablet, Rfl: 2    diclofenac (VOLTAREN) 75 MG EC tablet, TAKE 1 TABLET BY MOUTH TWICE DAILY AS NEEDED FOR  BACK  PAIN,  JOINT  PAIN, Disp: 60 tablet, Rfl: 0    donepezil (ARICEPT) 10 MG tablet, Take 1 tablet by mouth Every Night for 180 days., Disp: 90 tablet, Rfl: 1    DULoxetine (CYMBALTA) 60 MG capsule, Take 1 capsule by mouth in the morning, Disp: 30 capsule, Rfl: 2    empagliflozin (Jardiance) 25 MG tablet tablet, Take 1 tablet by mouth Daily., Disp: 90 tablet, Rfl: 1    fluticasone (FLONASE) 50 MCG/ACT nasal spray, Administer 2 sprays into the nostril(s) as directed by provider Daily., Disp: 16 g, Rfl: 5    levocetirizine (XYZAL) 5 MG tablet, Take 1 tablet by mouth Every Evening., Disp: 90 tablet, Rfl: 1    Magnesium 400 MG tablet, Take 400 mg by mouth Daily., Disp: , Rfl:     memantine (NAMENDA) 5 MG tablet, Take 1 tablet by mouth 2 (Two) Times a Day for 180 days., Disp: 180 tablet, Rfl: 1    nitroglycerin (NITROLINGUAL) 0.4 MG/SPRAY spray, Place 1 spray by translingual route on or under the tongue at the first sign of an attack, no more than 3 sprays / 15-minute., Disp: 1 each, Rfl: 0    omeprazole (priLOSEC) 20 MG capsule, Take 1 capsule by mouth Daily., Disp: 90 capsule, Rfl: 1    pioglitazone (ACTOS) 15 MG tablet, Take 1 tablet by mouth once daily, Disp: 30 tablet, Rfl: 0    prazosin (MINIPRESS) 2 MG capsule, Take 1 capsule by mouth at bedtime., Disp: 30 capsule, Rfl: 2    SITagliptin (JANUVIA) 100 MG tablet, Take 1 tablet by mouth Daily., Disp: 90 tablet, Rfl: 1    traZODone (DESYREL) 50 MG tablet, Take 1 tablet by mouth every day at bedtime., Disp: 30 tablet, Rfl: 2   (Not in a  hospital admission)     Allergies: Hydrocodone, Penicillins, Sulfa antibiotics, Codeine, and Oxycontin [oxycodone]    Physical Exam  Constitutional:       Appearance: Normal appearance.   HENT:      Right Ear: Tympanic membrane, ear canal and external ear normal.      Left Ear: Tympanic membrane, ear canal and external ear normal.      Nose: Nose normal.      Mouth/Throat:      Mouth: Mucous membranes are moist.      Pharynx: Oropharynx is clear.   Eyes:      Extraocular Movements: Extraocular movements intact.      Conjunctiva/sclera: Conjunctivae normal.      Pupils: Pupils are equal, round, and reactive to light.   Cardiovascular:      Rate and Rhythm: Normal rate and regular rhythm.      Heart sounds: Normal heart sounds.   Pulmonary:      Effort: Pulmonary effort is normal.      Breath sounds: Normal breath sounds.   Skin:     General: Skin is warm.      Findings: No erythema or rash.   Neurological:      General: No focal deficit present.      Mental Status: He is alert and oriented to person, place, and time. Mental status is at baseline.   Psychiatric:         Mood and Affect: Mood normal.         Behavior: Behavior normal.         Thought Content: Thought content normal.         Judgment: Judgment normal.        Physical Exam  Mouth/Throat: Mucous membranes moist, no erythema, no exudate  Respiratory: Clear to auscultation, no wheezing, rales or rhonchi    Result Review :          Results               Assessment and Plan    Diagnoses and all orders for this visit:    1. Allergic rhinitis, unspecified seasonality, unspecified trigger (Primary)  -     fluticasone (FLONASE) 50 MCG/ACT nasal spray; Administer 2 sprays into the nostril(s) as directed by provider Daily.  Dispense: 16 g; Refill: 5  -     levocetirizine (XYZAL) 5 MG tablet; Take 1 tablet by mouth Every Evening.  Dispense: 90 tablet; Refill: 1    2. Mixed hyperlipidemia  Assessment & Plan:    continue to follow-up with cardiology    Orders:  -      atorvastatin (LIPITOR) 80 MG tablet; Take 1 tablet by mouth Daily.  Dispense: 90 tablet; Refill: 1    3. Benign essential HTN  Assessment & Plan:   continue to follow-up with cardiology      4. Gastroesophageal reflux disease, unspecified whether esophagitis present  -     omeprazole (priLOSEC) 20 MG capsule; Take 1 capsule by mouth Daily.  Dispense: 90 capsule; Refill: 1    5. Encounter for long-term (current) use of medications  -     CBC & Differential  -     Comprehensive Metabolic Panel  -     Magnesium    6. Type 2 diabetes mellitus with diabetic polyneuropathy, without long-term current use of insulin  Assessment & Plan:   continue current meds.  Continue to follow-up with endocrinology.    Orders:  -     Hemoglobin A1c    7. Coronary artery disease involving native coronary artery of native heart without angina pectoris  Assessment & Plan:   continue to follow-up with cardiology      8. Screening for prostate cancer  -     PSA Screen    9. Smoking greater than 30 pack years      Assessment & Plan  1. Hyperlipidemia.  - Condition is well-managed with atorvastatin.  - No reported symptoms such as dizziness, headache, chest pain, or shortness of breath.  Proper diet and exercise plan discussed and encouraged.  Continue to follow-up with cardiology.  - Prescription refill for atorvastatin provided.    2. Allergies.  - Allergies effectively controlled with levocetirizine and Flonase.  - No reported symptoms such as trouble breathing or urinary/bowel issues.  - Prescription refills for levocetirizine and Flonase issued.    3. Gastroesophageal Reflux Disease (GERD).  - GERD well-managed with omeprazole.  - No reported symptoms such as chest pressure or pain.  - Prescription refill for omeprazole provided.    4. Diabetes Mellitus.  - Currently on Jardiance, pioglitazone, and Januvia.  - Blood work to be conducted today to assess liver and kidney function, A1c levels, and PSA.  - Continue to follow-up with  endocrinology.  Check feet daily.  Annual eye exams encouraged.  Proper diet and exercise plan discussed and encouraged.  Risk of meds discussed and understood.  Return to clinic or ED with any issues or concerns.    5. Smoking Cessation.  - Continues to smoke approximately half a pack a day.  - Form for nicotine patches signed and sent to assist with smoking cessation.    6. Health Maintenance.  - Colonoscopy is up-to-date.  - Needs to schedule an appointment with Dr. Wong for follow-up on potential heart blockages.  He states he will schedule this.  - Needs to visit endocrinology depending on A1c results.  He states he will continue to follow-up with endocrinology  - Sees Derrick De León for psychiatry and Chelsey Ramirez for neurology.  - Recently got a new pair of glasses and is trying to use dentures regularly.                    Follow Up   Return in about 6 months (around 10/21/2025), or if symptoms worsen or fail to improve.  Patient was given instructions and counseling regarding his condition or for health maintenance advice. Please see specific information pulled into the AVS if appropriate.     Patient or patient representative verbalized consent for the use of Ambient Listening during the visit with  JESSICA Chandler for chart documentation. 4/21/2025  10:18 EDT    JESSICA Chandler

## 2025-04-22 LAB
ALBUMIN SERPL-MCNC: 4.3 G/DL (ref 3.9–4.9)
ALP SERPL-CCNC: 119 IU/L (ref 44–121)
ALT SERPL-CCNC: 17 IU/L (ref 0–44)
AST SERPL-CCNC: 13 IU/L (ref 0–40)
BASOPHILS # BLD AUTO: 0.1 X10E3/UL (ref 0–0.2)
BASOPHILS NFR BLD AUTO: 1 %
BILIRUB SERPL-MCNC: 0.4 MG/DL (ref 0–1.2)
BUN SERPL-MCNC: 10 MG/DL (ref 8–27)
BUN/CREAT SERPL: 12 (ref 10–24)
CALCIUM SERPL-MCNC: 9.4 MG/DL (ref 8.6–10.2)
CHLORIDE SERPL-SCNC: 103 MMOL/L (ref 96–106)
CO2 SERPL-SCNC: 22 MMOL/L (ref 20–29)
CREAT SERPL-MCNC: 0.86 MG/DL (ref 0.76–1.27)
EGFRCR SERPLBLD CKD-EPI 2021: 96 ML/MIN/1.73
EOSINOPHIL # BLD AUTO: 0.1 X10E3/UL (ref 0–0.4)
EOSINOPHIL NFR BLD AUTO: 1 %
ERYTHROCYTE [DISTWIDTH] IN BLOOD BY AUTOMATED COUNT: 12.1 % (ref 11.6–15.4)
GLOBULIN SER CALC-MCNC: 2.6 G/DL (ref 1.5–4.5)
GLUCOSE SERPL-MCNC: 201 MG/DL (ref 70–99)
HBA1C MFR BLD: 9 % (ref 4.8–5.6)
HCT VFR BLD AUTO: 44.4 % (ref 37.5–51)
HGB BLD-MCNC: 15.1 G/DL (ref 13–17.7)
IMM GRANULOCYTES # BLD AUTO: 0 X10E3/UL (ref 0–0.1)
IMM GRANULOCYTES NFR BLD AUTO: 0 %
LYMPHOCYTES # BLD AUTO: 2.4 X10E3/UL (ref 0.7–3.1)
LYMPHOCYTES NFR BLD AUTO: 27 %
MAGNESIUM SERPL-MCNC: 1.8 MG/DL (ref 1.6–2.3)
MCH RBC QN AUTO: 33.1 PG (ref 26.6–33)
MCHC RBC AUTO-ENTMCNC: 34 G/DL (ref 31.5–35.7)
MCV RBC AUTO: 97 FL (ref 79–97)
MONOCYTES # BLD AUTO: 0.6 X10E3/UL (ref 0.1–0.9)
MONOCYTES NFR BLD AUTO: 7 %
NEUTROPHILS # BLD AUTO: 5.6 X10E3/UL (ref 1.4–7)
NEUTROPHILS NFR BLD AUTO: 64 %
PLATELET # BLD AUTO: 160 X10E3/UL (ref 150–450)
POTASSIUM SERPL-SCNC: 4.3 MMOL/L (ref 3.5–5.2)
PROT SERPL-MCNC: 6.9 G/DL (ref 6–8.5)
PSA SERPL-MCNC: 0.5 NG/ML (ref 0–4)
RBC # BLD AUTO: 4.56 X10E6/UL (ref 4.14–5.8)
SODIUM SERPL-SCNC: 140 MMOL/L (ref 134–144)
WBC # BLD AUTO: 8.8 X10E3/UL (ref 3.4–10.8)

## 2025-04-23 ENCOUNTER — TELEPHONE (OUTPATIENT)
Dept: FAMILY MEDICINE CLINIC | Facility: CLINIC | Age: 65
End: 2025-04-23
Payer: MEDICARE

## 2025-04-23 NOTE — TELEPHONE ENCOUNTER
"Caller: Santi Souza Sr. \"Alonso Ellis\"    Relationship: Self    Best call back number: [phone]    What is the best time to reach you: ANYTIME    What was the call regarding: PATIENT STATES THAT PAPERWORK FOR HIM TO STOP SMOKING WAS FAXED AND RETURNED BUT WASN'T FILLED OUT CORRECTLY. PATIENT STATED THAT ANOTHER FORM WAS FAXED TODAY AND TO PLEASE FILL IT OUT AND RETURN.    "

## 2025-05-02 ENCOUNTER — OFFICE VISIT (OUTPATIENT)
Dept: FAMILY MEDICINE CLINIC | Facility: CLINIC | Age: 65
End: 2025-05-02
Payer: MEDICARE

## 2025-05-02 VITALS
DIASTOLIC BLOOD PRESSURE: 64 MMHG | HEART RATE: 78 BPM | HEIGHT: 68 IN | SYSTOLIC BLOOD PRESSURE: 118 MMHG | WEIGHT: 174.5 LBS | OXYGEN SATURATION: 97 % | BODY MASS INDEX: 26.45 KG/M2

## 2025-05-02 DIAGNOSIS — E11.65 TYPE 2 DIABETES MELLITUS WITH HYPERGLYCEMIA, WITH LONG-TERM CURRENT USE OF INSULIN: Primary | ICD-10-CM

## 2025-05-02 DIAGNOSIS — Z79.4 TYPE 2 DIABETES MELLITUS WITH HYPERGLYCEMIA, WITH LONG-TERM CURRENT USE OF INSULIN: Primary | ICD-10-CM

## 2025-05-02 PROBLEM — E11.42 TYPE 2 DIABETES MELLITUS WITH DIABETIC POLYNEUROPATHY, WITHOUT LONG-TERM CURRENT USE OF INSULIN: Status: RESOLVED | Noted: 2022-04-06 | Resolved: 2025-05-02

## 2025-05-02 RX ORDER — ACYCLOVIR 400 MG/1
1 TABLET ORAL ONCE
Qty: 1 EACH | Refills: 0 | Status: SHIPPED | OUTPATIENT
Start: 2025-05-02 | End: 2025-05-02

## 2025-05-02 RX ORDER — INSULIN GLARGINE 300 U/ML
10 INJECTION, SOLUTION SUBCUTANEOUS DAILY
Qty: 3 ML | Refills: 2 | Status: SHIPPED | OUTPATIENT
Start: 2025-05-02

## 2025-05-02 RX ORDER — ACYCLOVIR 400 MG/1
1 TABLET ORAL
Qty: 9 EACH | Refills: 1 | Status: SHIPPED | OUTPATIENT
Start: 2025-05-02

## 2025-05-02 NOTE — PROGRESS NOTES
"Chief Complaint  discuss labs    Subjective          Santi Souza Sr. presents to [facility]  History of Present Illness  History of Present Illness  The patient is here to discuss recent labs drawn on 04/21/2025. Glucose was high at 201, and his A1c has increased to 9.0. Current medications for diabetes include pioglitazone, Januvia, and Jardiance. He states he seems to be doing well on these medications. Feet are fine with no cuts or sores. He tries to watch his diet and walk daily.    Adherence to the prescribed regimen of pioglitazone, Januvia, and Jardiance is reported. An inability to perform fingerstick glucose monitoring is mentioned. A previous experience with continuous glucose monitoring is recalled, which was discontinued due to insurance coverage issues when levels were well-controlled.     Dentures have been used for the past 2 to 3 weeks, with today marking the first day of resumption after a 4 to 5 day break due to gum discomfort. No difficulty in eating with the dentures is reported.    Assistance is requested in completing the necessary paperwork for nicotine patches as part of an attempt to quit smoking.    SOCIAL HISTORY  The patient admits to smoking.    Patient Care Team:  Jordan Heart APRN as PCP - General (Nurse Practitioner)  Derrick Richardson APRN as Nurse Practitioner (Behavioral Health)  Rosenstein, Kyle S, MD as Consulting Physician (Endocrinology)  Rolo Wong MD as Consulting Physician (Cardiology)  Stefan Tran LCSW as  (Behavioral Health)  Marcos Sosa MD as Consulting Physician (Urology)  Chelsey Ramirez APRN as Nurse Practitioner (Neurology)     Objective   Vital Signs:   /64   Pulse 78   Ht 172.7 cm (68\")   Wt 79.2 kg (174 lb 8 oz)   SpO2 97%   BMI 26.53 kg/m²     Body mass index is 26.53 kg/m².    Review of Systems   Constitutional:  Negative for chills, fatigue and fever.   HENT:  Negative " for congestion.    Eyes:  Negative for visual disturbance.   Respiratory:  Negative for cough, shortness of breath and wheezing.    Cardiovascular:  Negative for chest pain.   Endocrine: Negative for polydipsia, polyphagia and polyuria.   Genitourinary:  Negative for dysuria and frequency.   Skin: Negative.    Neurological:  Negative for numbness and headache.   Psychiatric/Behavioral:  Negative for suicidal ideas.        Past History:  Medical History: has a past medical history of CAD (coronary artery disease) (11/03/2017), Cataracta, Chronic back pain (11/03/2017), Depression, Diabetes mellitus--Insulin Dependant (11/03/2017), GERD (gastroesophageal reflux disease) (11/03/2017), Hyperlipidemia (11/03/2017), Hypertension (11/03/2017), Neuropathy, Osteoarthritis, Rotator cuff syndrome (6/23), Stroke (2022), Vitamin D deficiency, Wears glasses, and Wears hearing aid in both ears.   Surgical History: has a past surgical history that includes Lumbar spine surgery (1998); Back surgery (1999); Colonoscopy; Cardiac catheterization; Tonsillectomy; and Shoulder arthroscopy w/ rotator cuff repair (Right, 9/13/2023).   Family History: family history includes Cancer in his maternal grandfather; Diabetes in his father.   Social History: reports that he has been smoking cigarettes. He started smoking about 51 years ago. He has a 25.7 pack-year smoking history. He has been exposed to tobacco smoke. He has never used smokeless tobacco. He reports that he does not currently use drugs after having used the following drugs: Marijuana. Frequency: 7.00 times per week. He reports that he does not drink alcohol.    PHQ-2 Depression Screening  Little interest or pleasure in doing things? Not at all   Feeling down, depressed, or hopeless? Not at all   PHQ-2 Total Score 0       PHQ-9 Depression Screening  Little interest or pleasure in doing things? Not at all   Feeling down, depressed, or hopeless? Not at all   PHQ-2 Total Score 0    Trouble falling or staying asleep, or sleeping too much?     Feeling tired or having little energy?     Poor appetite or overeating?     Feeling bad about yourself - or that you are a failure or have let yourself or your family down?     Trouble concentrating on things, such as reading the newspaper or watching television?     Moving or speaking so slowly that other people could have noticed? Or the opposite - being so fidgety or restless that you have been moving around a lot more than usual?     Thoughts that you would be better off dead, or of hurting yourself in some way?     PHQ-9 Total Score     If you checked off any problems, how difficult have these problems made it for you to do your work, take care of things at home, or get along with other people? Not difficult at all        PHQ-9 Total Score:        Patient screened positive for depression based on a PHQ-9 score of 14 on 4/1/2025. Follow-up recommendations include:       Current Outpatient Medications:     aspirin 81 MG chewable tablet, Chew 1 tablet., Disp: , Rfl:     atorvastatin (LIPITOR) 80 MG tablet, Take 1 tablet by mouth Daily., Disp: 90 tablet, Rfl: 1    Brexpiprazole (Rexulti) 2 MG tablet, Take 1 tablet by mouth Daily., Disp: 30 tablet, Rfl: 2    diclofenac (VOLTAREN) 75 MG EC tablet, TAKE 1 TABLET BY MOUTH TWICE DAILY AS NEEDED FOR  BACK  PAIN,  JOINT  PAIN, Disp: 60 tablet, Rfl: 0    donepezil (ARICEPT) 10 MG tablet, Take 1 tablet by mouth Every Night for 180 days., Disp: 90 tablet, Rfl: 1    DULoxetine (CYMBALTA) 60 MG capsule, Take 1 capsule by mouth in the morning, Disp: 30 capsule, Rfl: 2    empagliflozin (Jardiance) 25 MG tablet tablet, Take 1 tablet by mouth Daily., Disp: 90 tablet, Rfl: 1    fluticasone (FLONASE) 50 MCG/ACT nasal spray, Administer 2 sprays into the nostril(s) as directed by provider Daily., Disp: 16 g, Rfl: 5    levocetirizine (XYZAL) 5 MG tablet, Take 1 tablet by mouth Every Evening., Disp: 90 tablet, Rfl: 1     Magnesium 400 MG tablet, Take 400 mg by mouth Daily., Disp: , Rfl:     memantine (NAMENDA) 5 MG tablet, Take 1 tablet by mouth 2 (Two) Times a Day for 180 days., Disp: 180 tablet, Rfl: 1    nitroglycerin (NITROLINGUAL) 0.4 MG/SPRAY spray, Place 1 spray by translingual route on or under the tongue at the first sign of an attack, no more than 3 sprays / 15-minute., Disp: 1 each, Rfl: 0    omeprazole (priLOSEC) 20 MG capsule, Take 1 capsule by mouth Daily., Disp: 90 capsule, Rfl: 1    prazosin (MINIPRESS) 2 MG capsule, Take 1 capsule by mouth at bedtime., Disp: 30 capsule, Rfl: 2    traZODone (DESYREL) 50 MG tablet, Take 1 tablet by mouth every day at bedtime., Disp: 30 tablet, Rfl: 2    Continuous Glucose  (Dexcom G7 ) device, Use 1 Device 1 (One) Time for 1 dose., Disp: 1 each, Rfl: 0    Continuous Glucose Sensor (Dexcom G7 Sensor) misc, Use 1 Device Every 10 (Ten) Days., Disp: 9 each, Rfl: 1    Insulin Glargine, 1 Unit Dial, (Toujeo SoloStar) 300 UNIT/ML solution pen-injector injection, Inject 10 Units under the skin into the appropriate area as directed Daily., Disp: 3 mL, Rfl: 2   (Not in a hospital admission)     Allergies: Hydrocodone, Penicillins, Sulfa antibiotics, Codeine, and Oxycontin [oxycodone]    Physical Exam  Constitutional:       Appearance: Normal appearance.   Eyes:      Conjunctiva/sclera: Conjunctivae normal.      Pupils: Pupils are equal, round, and reactive to light.   Cardiovascular:      Rate and Rhythm: Normal rate and regular rhythm.      Heart sounds: Normal heart sounds.   Pulmonary:      Effort: Pulmonary effort is normal.      Breath sounds: Normal breath sounds.   Neurological:      General: No focal deficit present.      Mental Status: He is alert and oriented to person, place, and time. Mental status is at baseline.   Psychiatric:         Mood and Affect: Mood normal.         Behavior: Behavior normal.         Thought Content: Thought content normal.         Judgment:  Judgment normal.        Physical Exam  Extremities: Feet are fine with no cuts or sores.    Result Review :          Results  Labs   - Glucose: 04/21/2025, 201 mg/dL   - A1c: 04/21/2025, 9.0%             Assessment and Plan    Diagnoses and all orders for this visit:    1. Type 2 diabetes mellitus with hyperglycemia, with long-term current use of insulin (Primary)  -     Continuous Glucose  (Dexcom G7 ) device; Use 1 Device 1 (One) Time for 1 dose.  Dispense: 1 each; Refill: 0  -     Continuous Glucose Sensor (Dexcom G7 Sensor) misc; Use 1 Device Every 10 (Ten) Days.  Dispense: 9 each; Refill: 1  -     Insulin Glargine, 1 Unit Dial, (Toujeo SoloStar) 300 UNIT/ML solution pen-injector injection; Inject 10 Units under the skin into the appropriate area as directed Daily.  Dispense: 3 mL; Refill: 2      Assessment & Plan  1. Diabetes mellitus.  - Glucose level is elevated at 201, and A1c has increased to 9.0, indicating poor glycemic control.  - Currently on pioglitazone, Januvia, and Jardiance. Plan to discontinue pioglitazone and Januvia, and initiate Toujeo insulin at a starting dose of 10 units once daily. Will continue taking Jardiance.  - Prescription for Dexcom 7 continuous glucose monitor will be provided, which should sync with his smartphone. Advised to monitor blood sugar levels closely and report back by Wednesday.  - If blood sugar levels remain high, insulin dosage will be gradually increased every 3-5 days. Advised to consume peanut butter crackers or cheese crackers in case of hypoglycemia.  Education provided on signs symptoms of hypoglycemia and what to do if occurs.  Check feet daily.  Annual eye exams encouraged.  Risk of meds discussed and understood.    2. Dentures.  - Reports some initial discomfort with dentures but is now able to wear them and eat normally.  - No further intervention is required at this time.  - Discussed the importance of regular use and adaptation period for  dentures.  - Encouraged to continue using dentures and report any persistent issues.    3. Smoking cessation.  - Attempting to quit smoking and has requested assistance in completing the necessary paperwork for nicotine patches.  - Necessary paperwork for nicotine patches will be completed and faxed over today.  - Discussed the importance of smoking cessation for overall health improvement.  - Encouraged to follow up if additional support or resources are needed.    Follow-up  - Follow up in 3 months.  - Weekly contact to monitor blood sugar levels and adjust insulin dosage as needed.                    Follow Up   Return in about 3 months (around 8/2/2025), or if symptoms worsen or fail to improve.  Patient was given instructions and counseling regarding his condition or for health maintenance advice. Please see specific information pulled into the AVS if appropriate.     Patient or patient representative verbalized consent for the use of Ambient Listening during the visit with  JESSICA Chandler for chart documentation. 5/2/2025  10:29 EDT    JESSICA Chandler

## 2025-05-05 ENCOUNTER — TELEPHONE (OUTPATIENT)
Dept: FAMILY MEDICINE CLINIC | Facility: CLINIC | Age: 65
End: 2025-05-05
Payer: MEDICARE

## 2025-05-05 RX ORDER — PEN NEEDLE, DIABETIC 32 GX 1/6"
NEEDLE, DISPOSABLE MISCELLANEOUS
Qty: 100 EACH | Refills: 2 | Status: SHIPPED | OUTPATIENT
Start: 2025-05-05

## 2025-05-05 NOTE — TELEPHONE ENCOUNTER
"  Caller: Santi Souza Sr. \"Alonso Ellis\"    Relationship: Self    Best call back number: [phone]     Requested Prescriptions:   PIN NEEDLES FOR INSULIN PEN       Pharmacy where request should be sent: [address] [phone] [facility] [phone] [facility]     Last office visit with prescribing clinician: 5/2/2025   Last telemedicine visit with prescribing clinician: Visit date not found   Next office visit with prescribing clinician: 8/6/2025     Additional details provided by patient: PLEASE CALL IN     Does the patient have less than a 3 day supply:  [x] Yes  [] No    Would you like a call back once the refill request has been completed: [] Yes [x] No    If the office needs to give you a call back, can they leave a voicemail: [] Yes [x] No    Trice Giraldo Rep   05/05/25 10:11 EDT   "

## 2025-05-05 NOTE — TELEPHONE ENCOUNTER
"    Caller: Santi Souza Sr. \"Alonso Ellis\"    Relationship to patient: Self      Best call back number: [phone]     Provider: GERALD NICKERSON    Medication PA needed: GLUCOSE METER    Reason for call/Prior Auth: PHARMACY SAID PRIOR AUTH NEEDED FOR THIS   "

## 2025-05-07 ENCOUNTER — OFFICE VISIT (OUTPATIENT)
Dept: ENDOCRINOLOGY | Facility: CLINIC | Age: 65
End: 2025-05-07
Payer: MEDICARE

## 2025-05-07 VITALS
WEIGHT: 177 LBS | SYSTOLIC BLOOD PRESSURE: 122 MMHG | HEART RATE: 50 BPM | DIASTOLIC BLOOD PRESSURE: 64 MMHG | HEIGHT: 68 IN | BODY MASS INDEX: 26.83 KG/M2 | OXYGEN SATURATION: 96 %

## 2025-05-07 DIAGNOSIS — E11.42 TYPE 2 DIABETES MELLITUS WITH DIABETIC POLYNEUROPATHY, WITHOUT LONG-TERM CURRENT USE OF INSULIN: Primary | ICD-10-CM

## 2025-05-07 DIAGNOSIS — Z79.4 TYPE 2 DIABETES MELLITUS WITH HYPERGLYCEMIA, WITH LONG-TERM CURRENT USE OF INSULIN: ICD-10-CM

## 2025-05-07 DIAGNOSIS — Z72.0 TOBACCO USE: ICD-10-CM

## 2025-05-07 DIAGNOSIS — E11.65 TYPE 2 DIABETES MELLITUS WITH HYPERGLYCEMIA, WITH LONG-TERM CURRENT USE OF INSULIN: ICD-10-CM

## 2025-05-07 LAB
EXPIRATION DATE: ABNORMAL
GLUCOSE BLDC GLUCOMTR-MCNC: 193 MG/DL (ref 70–130)
Lab: ABNORMAL

## 2025-05-07 RX ORDER — ACYCLOVIR 400 MG/1
1 TABLET ORAL
COMMUNITY

## 2025-05-07 NOTE — ASSESSMENT & PLAN NOTE
Diabetes is not controlled.  A1C is 9% 4/21/2025; too early for recheck  Encouraged continued BG monitoring; in process of obtaining CGM.     Discussed GLP1a; deferred due to concerns for appetite/weight loss.  Has been on insulin in past and agreeable to restarting for now  Rx: Continue toujeo 10 units at bedtime and instructions provided for titration as needed if blood sugars continue more than 140.  Continue Jardiance 25 mg.    Cautioned risk for hypoglycemia; advised glucose supplement as needed.    Foot exam due next office visit  Microalbumin due next office visit; unable to perform in office today  Eye exam continue follow-up yearly  LDL at goal <100; continue statin  BP at goal <130/80    Discussed complications associated with diabetes.   Encouraged continued effort toward lifestyle management:

## 2025-05-07 NOTE — PATIENT INSTRUCTIONS
Diabetes Treatment Recommendations  25     Santi Willie Souza Sr. 1960     Your A1C is:   Lab Results   Component Value Date    HGBA1C 9.0 (H) 2025    HGBA1C 8.4 (H) 2024    HGBA1C 8.6 (H) 2024       ADA General Goals: A1c: < 7%                                                  Fasting/before meal glucose: <150 mg/dL                                    2 Hour after meal glucoses: < 180 mg/dL                                        Bedtime glucose:120-180                                                                Glucose testing frequency: daily with insulin use.     Medication Changes:   Jardiance 25 m tablet once daily     Insulin dosing:  Basal insulin (Long acting) Toujeo  10 units at bedtime  Increase by 1 units every 3 days if fasting blood sugar is more than 140.  Contact office if up to 20 units for additional adjustment.   Keep at current dose if fasting blood sugar is between .  Decrease by 1 units if fasting blood sugar is less than 80 or concerns for hypoglycemia overnight or between meals.    Keep records of your glucose levels and insulin adjustments.   We may ask you to keep records on the content of your meals with insulin doses and before/after meal glucose levels to evaluate your ratios.    Call for advice if you have unexplained or unexpected hypoglycemia  (glucose < 70) or persistent high glucose > 250.    Office: [phone]    eNema Palomo PA-C

## 2025-05-13 ENCOUNTER — OFFICE VISIT (OUTPATIENT)
Dept: BEHAVIORAL HEALTH | Facility: CLINIC | Age: 65
End: 2025-05-13
Payer: MEDICARE

## 2025-05-13 DIAGNOSIS — F32.A ANXIETY AND DEPRESSION: Primary | ICD-10-CM

## 2025-05-13 DIAGNOSIS — F43.10 POST TRAUMATIC STRESS DISORDER (PTSD): ICD-10-CM

## 2025-05-13 DIAGNOSIS — F41.9 ANXIETY AND DEPRESSION: Primary | ICD-10-CM

## 2025-05-13 DIAGNOSIS — G31.84 MILD COGNITIVE IMPAIRMENT: ICD-10-CM
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
## 2025-05-14 NOTE — PLAN OF CARE
Reviewed treatment plan and care plan goals and adjusted objectives to address ongoing mental health concerns. Patient reports overall improvements with anxiety and depressive symptoms and would like to focus on PTSD symptom burdens.

## 2025-05-14 NOTE — TREATMENT PLAN
Multi-Disciplinary Problems (from Behavioral Health Treatment Plan)      Active Problems       Problem: Anxiety  Start Date: 05/14/25      Problem Details: The patient self-scales this problem as a 5 with 10 being the worst.          Goal Priority Start Date Expected End Date End Date    Patient will develop and implement behavioral and cognitive strategies to reduce anxiety and irrational fears. -- 05/14/25 11/12/25 --    Goal Details: Progress toward goal:  The patient self-scales their progress related to this goal as a 5 with 10 being the worst.        Goal Intervention Frequency Start Date End Date    Help patient explore past emotional issues in relation to present anxiety. Q3 Months 05/14/25 --    Intervention Details: Duration of treatment until patient is able to challenge negative thought patterns and identify and replace unhelpful or distorted thoughts with more realistic and positive perspectives.        Goal Intervention Frequency Start Date End Date    Help patient develop an awareness of their cognitive and physical responses to anxiety. Q3 Months 05/14/25 --    Intervention Details: Duration of treatment until patient is able to develop, and identify, an understanding of the physical, emotional, and cognitive symptoms of anxiety along with identifying triggers for anxiety and learning strategies for managing triggers and anxiety symptoms.                Problem: Depression  Start Date: 05/14/25      Problem Details: The patient self-scales this problem as a 4 with 10 being the worst.          Goal Priority Start Date Expected End Date End Date    Patient will demonstrate the ability to initiate new constructive life skills outside of sessions on a consistent basis. -- 05/14/25 11/12/25 --    Goal Details: Progress toward goal:  The patient self-scales their progress related to this goal as a 4 with 10 being the worst.        Goal Intervention Frequency Start Date End Date    Assist patient in setting  attainable activities of daily living goals. Q3 Months 05/14/25 --    Intervention Details: Client will identify and engage in enjoyable activities that promote relaxation and positive emotions to include hobbies and/or socializing        Goal Intervention Frequency Start Date End Date    Assist patient in developing healthy coping strategies. Q3 Months 05/14/25 --    Intervention Details: Duration of treatment until patient is able to identify and effectively practice stress management skills such as deep breathing, visualization, mindfulness, to manage negative emotions and triggers related to depression.                Problem: Trauma and Stressor Related Disorders  Start Date: 05/14/25      Problem Details: The patient self-scales this problem as a 6 with 10 being the worst.          Goal Priority Start Date Expected End Date End Date    Patient will process and move through trauma in a way that improves self regard and the patients ability to function optimally in the world around them. -- 05/14/25 11/12/25 --    Goal Details: Progress toward goal:  The patient self-scales their progress related to this goal as a 6 with 10 being the worst.        Goal Intervention Frequency Start Date End Date    Process trauma in the context of the safe session environment. Q3 Months 05/14/25 --    Intervention Details: Duration of treatment until patient is able to practice techniques for expressing their emotions in a safe and healthy way to include talking with trusted support systems.        Goal Intervention Frequency Start Date End Date    Develop a plan of behavior changes that will reduce the stress of the trauma. Q3 Months 05/14/25 --    Intervention Details: Duration of treatment until patient identifies a healthy self care plan that includes at least three activities that promote positive emotions and demonstrate the ability to utilize healthy coping techniques.                Problem: Relationship Issues  Start  Date: 05/14/25      Problem Details: The patient self-scales this problem as a 4 with 10 being the worst.          Goal Priority Start Date Expected End Date End Date    Patient will initiate personal change to improve relationships. -- 05/14/25 11/12/25 --    Goal Details: Progress toward goal:  The patient self-scales their progress related to this goal as a 4 with 10 being the worst.        Goal Intervention Frequency Start Date End Date    Assist patient in identifying behaviors that focus on relationship building and process the changes needed to improve relationships. Q3 Months 05/14/25 --    Intervention Details: Duration of treatment until patient reports an overall improvement in communication and relationship satisfaction with family and able to identify conflict resolution skills.                        Reviewed By       Stefan Tran LCSW 05/14/25 3946                     I have discussed and reviewed this treatment plan with the patient.

## 2025-05-20 ENCOUNTER — OFFICE VISIT (OUTPATIENT)
Dept: BEHAVIORAL HEALTH | Facility: CLINIC | Age: 65
End: 2025-05-20
Payer: MEDICARE

## 2025-05-20 DIAGNOSIS — F43.10 POST TRAUMATIC STRESS DISORDER (PTSD): Primary | ICD-10-CM

## 2025-05-20 DIAGNOSIS — F33.0 MILD EPISODE OF RECURRENT MAJOR DEPRESSIVE DISORDER: ICD-10-CM

## 2025-05-20 DIAGNOSIS — F41.1 GENERALIZED ANXIETY DISORDER: ICD-10-CM
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
## 2025-06-03 ENCOUNTER — OFFICE VISIT (OUTPATIENT)
Dept: BEHAVIORAL HEALTH | Facility: CLINIC | Age: 65
End: 2025-06-03
Payer: MEDICARE

## 2025-06-03 VITALS
OXYGEN SATURATION: 95 % | HEART RATE: 60 BPM | HEIGHT: 68 IN | BODY MASS INDEX: 26.67 KG/M2 | WEIGHT: 176 LBS | DIASTOLIC BLOOD PRESSURE: 60 MMHG | SYSTOLIC BLOOD PRESSURE: 118 MMHG

## 2025-06-03 DIAGNOSIS — F39 MOOD DISORDER: ICD-10-CM

## 2025-06-03 DIAGNOSIS — F41.1 GENERALIZED ANXIETY DISORDER: ICD-10-CM

## 2025-06-03 DIAGNOSIS — F43.10 POST TRAUMATIC STRESS DISORDER (PTSD): Primary | ICD-10-CM

## 2025-06-03 DIAGNOSIS — G47.20 CIRCADIAN RHYTHM SLEEP DISORDER, UNSPECIFIED TYPE: ICD-10-CM

## 2025-06-03 DIAGNOSIS — F33.1 MODERATE EPISODE OF RECURRENT MAJOR DEPRESSIVE DISORDER: ICD-10-CM

## 2025-06-03 RX ORDER — PRAZOSIN HYDROCHLORIDE 2 MG/1
CAPSULE ORAL
Qty: 30 CAPSULE | Refills: 2 | Status: SHIPPED | OUTPATIENT
Start: 2025-06-03

## 2025-06-03 RX ORDER — DULOXETIN HYDROCHLORIDE 60 MG/1
CAPSULE, DELAYED RELEASE ORAL
Qty: 30 CAPSULE | Refills: 2 | Status: SHIPPED | OUTPATIENT
Start: 2025-06-03

## 2025-06-03 RX ORDER — TRAZODONE HYDROCHLORIDE 50 MG/1
TABLET ORAL
Qty: 30 TABLET | Refills: 2 | Status: SHIPPED | OUTPATIENT
Start: 2025-06-03

## 2025-06-03 RX ORDER — BREXPIPRAZOLE 2 MG/1
2 TABLET ORAL DAILY
Qty: 30 TABLET | Refills: 2 | Status: SHIPPED | OUTPATIENT
Start: 2025-06-03

## 2025-06-03 NOTE — PROGRESS NOTES
Follow Up Office Visit      Patient Name: Santi Souza Sr.  : 1960   MRN: [id number]     Referring Provider: Jordan Heart APRN    Chief Complaint:      ICD-10-CM ICD-9-CM   1. Post traumatic stress disorder (PTSD)  F43.10 309.81   2. Moderate episode of recurrent major depressive disorder  F33.1 296.32   3. Generalized anxiety disorder  F41.1 300.02   4. Circadian rhythm sleep disorder, unspecified type  G47.20 327.30   5. Mood disorder  F39 296.90        History of Present Illness:   Santi Souza Sr. is a 64 y.o. male who is here today for follow up with psychiatric medications.    Subjective      Patient Reports:     I think everything is ok.  I am sleeping about 6-8 hours.  I haven't had a cigarette since yesterday afternoon.  Starting my patches tomorrow.      Review of Systems:   Review of Systems   Psychiatric/Behavioral: Negative.         Screening Scores:   PHQ-9 : 11  GARETT-7 : 7    RISK ASSESSMENT:  Patient denies any high risk factors today.    Medications:     Current Outpatient Medications:     aspirin 81 MG chewable tablet, Chew 1 tablet., Disp: , Rfl:     atorvastatin (LIPITOR) 80 MG tablet, Take 1 tablet by mouth Daily., Disp: 90 tablet, Rfl: 1    Brexpiprazole (Rexulti) 2 MG tablet, Take 1 tablet by mouth Daily., Disp: 30 tablet, Rfl: 2    Continuous Glucose  (Dexcom G7 ) device, Use 1 Piece Every 3 (Three) Months., Disp: , Rfl:     Continuous Glucose Sensor (Dexcom G7 Sensor) misc, Use 1 Device Every 10 (Ten) Days., Disp: 9 each, Rfl: 1    diclofenac (VOLTAREN) 75 MG EC tablet, TAKE 1 TABLET BY MOUTH TWICE DAILY AS NEEDED FOR  BACK  PAIN,  JOINT  PAIN, Disp: 60 tablet, Rfl: 0    donepezil (ARICEPT) 10 MG tablet, Take 1 tablet by mouth Every Night for 180 days., Disp: 90 tablet, Rfl: 1    DULoxetine (CYMBALTA) 60 MG capsule, Take 1 capsule by mouth in the morning, Disp: 30 capsule, Rfl: 2    empagliflozin (Jardiance) 25 MG tablet tablet, Take 1 tablet by  "mouth Daily., Disp: 90 tablet, Rfl: 1    fluticasone (FLONASE) 50 MCG/ACT nasal spray, Administer 2 sprays into the nostril(s) as directed by provider Daily., Disp: 16 g, Rfl: 5    Insulin Glargine, 1 Unit Dial, (Toujeo SoloStar) 300 UNIT/ML solution pen-injector injection, Inject 10 Units under the skin into the appropriate area as directed Daily., Disp: 3 mL, Rfl: 2    Insulin Pen Needle (NovoFine Plus Pen Needle) 32G X 4 MM misc, Use with once daily toujeo insulin, Disp: 100 each, Rfl: 2    levocetirizine (XYZAL) 5 MG tablet, Take 1 tablet by mouth Every Evening., Disp: 90 tablet, Rfl: 1    Magnesium 400 MG tablet, Take 400 mg by mouth Daily., Disp: , Rfl:     memantine (NAMENDA) 5 MG tablet, Take 1 tablet by mouth 2 (Two) Times a Day for 180 days., Disp: 180 tablet, Rfl: 1    nitroglycerin (NITROLINGUAL) 0.4 MG/SPRAY spray, Place 1 spray by translingual route on or under the tongue at the first sign of an attack, no more than 3 sprays / 15-minute., Disp: 1 each, Rfl: 0    omeprazole (priLOSEC) 20 MG capsule, Take 1 capsule by mouth Daily., Disp: 90 capsule, Rfl: 1    prazosin (MINIPRESS) 2 MG capsule, Take 1 capsule by mouth at bedtime., Disp: 30 capsule, Rfl: 2    traZODone (DESYREL) 50 MG tablet, Take 1 tablet by mouth every day at bedtime., Disp: 30 tablet, Rfl: 2    Medication Considerations:  JAMARI reviewed and appropriate.      Allergies:   Allergies   Allergen Reactions    Hydrocodone Itching    Penicillins Swelling and Provider Review Needed     Entire body swelled as a child     Sulfa Antibiotics Shortness Of Breath, Rash and Provider Review Needed    Codeine Nausea And Vomiting, Confusion and Provider Review Needed    Oxycontin [Oxycodone] Itching       Results Reviewed:   screeners     Objective     Physical Exam:  Vital Signs:   Vitals:    06/03/25 1354   BP: 118/60   Pulse: 60   SpO2: 95%   Weight: 79.8 kg (176 lb)   Height: 172.7 cm (68\")     Body mass index is 26.76 kg/m².     Mental Status Exam: "   Hygiene:   good  Cooperation:  Cooperative  Eye Contact:  Good  Psychomotor Behavior:  Appropriate  Affect:  Appropriate  Mood: normal  Speech:  Normal  Thought Process:  Goal directed and Linear  Thought Content:  Normal  Suicidal:  None  Homicidal:  None  Hallucinations:  None  Delusion:  None  Memory:  Intact  Orientation:  Grossly intact  Reliability:  good  Insight:  Fair  Judgement:  Fair  Impulse Control:  Fair  Physical/Medical Issues:  nothing reported today     Assessment / Plan      Visit Diagnosis/Orders Placed This Visit:  Diagnoses and all orders for this visit:    1. Post traumatic stress disorder (PTSD) (Primary)  -     prazosin (MINIPRESS) 2 MG capsule; Take 1 capsule by mouth at bedtime.  Dispense: 30 capsule; Refill: 2  -     Brexpiprazole (Rexulti) 2 MG tablet; Take 1 tablet by mouth Daily.  Dispense: 30 tablet; Refill: 2    2. Moderate episode of recurrent major depressive disorder  -     traZODone (DESYREL) 50 MG tablet; Take 1 tablet by mouth every day at bedtime.  Dispense: 30 tablet; Refill: 2  -     DULoxetine (CYMBALTA) 60 MG capsule; Take 1 capsule by mouth in the morning  Dispense: 30 capsule; Refill: 2  -     Brexpiprazole (Rexulti) 2 MG tablet; Take 1 tablet by mouth Daily.  Dispense: 30 tablet; Refill: 2    3. Generalized anxiety disorder  -     traZODone (DESYREL) 50 MG tablet; Take 1 tablet by mouth every day at bedtime.  Dispense: 30 tablet; Refill: 2  -     prazosin (MINIPRESS) 2 MG capsule; Take 1 capsule by mouth at bedtime.  Dispense: 30 capsule; Refill: 2  -     DULoxetine (CYMBALTA) 60 MG capsule; Take 1 capsule by mouth in the morning  Dispense: 30 capsule; Refill: 2    4. Circadian rhythm sleep disorder, unspecified type  -     traZODone (DESYREL) 50 MG tablet; Take 1 tablet by mouth every day at bedtime.  Dispense: 30 tablet; Refill: 2  -     prazosin (MINIPRESS) 2 MG capsule; Take 1 capsule by mouth at bedtime.  Dispense: 30 capsule; Refill: 2    5. Mood disorder  -      Brexpiprazole (Rexulti) 2 MG tablet; Take 1 tablet by mouth Daily.  Dispense: 30 tablet; Refill: 2         Functional Status: Mild impairment     Prognosis: Good with Ongoing Treatment     Impression/Formulation:  Patient appeared alert and oriented. Patient is receptive to assistance with maintaining a stable lifestyle.  Patient presents with history of     ICD-10-CM ICD-9-CM   1. Post traumatic stress disorder (PTSD)  F43.10 309.81   2. Moderate episode of recurrent major depressive disorder  F33.1 296.32   3. Generalized anxiety disorder  F41.1 300.02   4. Circadian rhythm sleep disorder, unspecified type  G47.20 327.30   5. Mood disorder  F39 296.90   Patient screened positive for depression based on a PHQ-9 score of 11 on 6/3/2025. Follow-up recommendations include: Continue with medication management.  Planning on smoking cessation with patches starting tomorrow through [employer].  His last cigarette was yesterday.  Patient reports his mood is stable and is doing well overall and doesn't need any medications changes or adjustments at this time.  He continues to see his therapist weekly.    Treatment Plan:   Continue weekly therapy with Stefan Tran  Continue rexulti, cymbalta, prazosin, trazodone    Patient will continue supportive psychotherapy efforts and medications as indicated. Clinic will obtain release of information for current treatment team for continuity of care as needed. Patient will contact this office, call 911 or present to the nearest emergency room should suicidal or homicidal ideations occur.  Discussed medication options and treatment plan of prescribed medication(s) as well as the risks, benefits, and potential side effects. Patient ackowledged and verbally consented to continue with current treatment plan and was educated on the importance of compliance with treatment and follow-up appointments.     Follow Up:   Return in about 3 months (around 9/3/2025) for Med Check.        Derrick  JESSICA Richardson, PMHNP-BC  [facility]     This is electronically signed by JESSICA Nur, BRIEN-BC  [unfilled] 14:25 EDT

## 2025-06-06 ENCOUNTER — OFFICE VISIT (OUTPATIENT)
Dept: NEUROLOGY | Facility: CLINIC | Age: 65
End: 2025-06-06
Payer: MEDICARE

## 2025-06-06 VITALS
HEIGHT: 68 IN | BODY MASS INDEX: 26.83 KG/M2 | WEIGHT: 177 LBS | OXYGEN SATURATION: 95 % | SYSTOLIC BLOOD PRESSURE: 90 MMHG | HEART RATE: 54 BPM | DIASTOLIC BLOOD PRESSURE: 48 MMHG

## 2025-06-06 DIAGNOSIS — G31.84 MILD COGNITIVE IMPAIRMENT: ICD-10-CM

## 2025-06-06 RX ORDER — DONEPEZIL HYDROCHLORIDE 10 MG/1
10 TABLET, FILM COATED ORAL NIGHTLY
Qty: 90 TABLET | Refills: 1 | Status: SHIPPED | OUTPATIENT
Start: 2025-06-06 | End: 2025-12-03

## 2025-06-06 RX ORDER — MEMANTINE HYDROCHLORIDE 5 MG/1
5 TABLET ORAL 2 TIMES DAILY
Qty: 180 TABLET | Refills: 1 | Status: SHIPPED | OUTPATIENT
Start: 2025-06-06 | End: 2025-12-03

## 2025-06-06 NOTE — PROGRESS NOTES
Neuro Office Visit      Encounter Date: 2025   Patient Name: Santi Souza Sr.  : 1960   MRN: [id number]   PCP:  Jordan Heart APRN     Chief Complaint:    Chief Complaint   Patient presents with   • Follow-up     Mild cognitive impairment       History of Present Illness: Santi Souza Sr. is a 64 y.o. male who is here today in Neurology for  memory loss    Initial visit 2023:  Santi Souza Sr. is a 63 y.o. male who is here today in Neurology for  memory loss     PMH of CAD, chronic back pain, depression insulin-dependent diabetes, GERD, hyperlipidemia, hypertension, neuropathy, osteoarthritis, rotator cuff syndrome, CVA in  with short-term memory and difficulty with balance, vitamin D deficiency, glasses, hearing aid use     Patient was referred by primary care after visit on 2023 for evaluation of cerebrovascular disease, memory deficits, cognitive impairment, and PTSD.  He reported at that visit that due to this history he has trouble doing daily life skills in the home such as planning and taking care of things such as bills.  He has been seeing neurology Trisha PEGUERO at [facility] and would like a second opinion.  Feels that his memory and cognitive impairment is worsening.  He is following with [facility]ry now with [facility] and states this has been very helpful for him.  MRI brain performed in 2022 for memory loss which per impression stated ventricles and sulci normal in size.  Mild amount of scattered T2 hyperintensities predominantly in the subcortical regions of the frontal lobes, indicating chronic small vessel disease.  Enlarged perivascular spaces are incidentally noted in left cerebral peduncle of the midbrain.  No abnormal parenchymal susceptibility artifact or restricted diffusion.  No midline shift, hydrocephalus, or extra-axial fluid collection is evident.  He does follow with [facility] neuroscience [place] and was  last seen in their office on 4/20/2023 with MARIELENA Gorman.  Donepezil was increased to 10 mg daily.  He was assessed to have mild cognitive impairment due to vascular disease and PTSD.     In clinic today he reports that memory changes have progressively worsened, he has trouble remembering appointments, trouble remembering steps through making a sandwich, trouble with conversations - he often will lose train of thought mid conversation. [facility] he describes as feeling [facility]/needing mental health help - he denies SI, he called crisis hotline yesterday, reports that [facility] feels [facility], he reports history of trauma. He notes emotional stress from co-pays and family concerns. He reports that he had CVA previously that was seen on prior MRI - per report from [facility] no stroke was mentioned, will repeat MRI brain to further assess, currently taking ASA 81 mg plus Atorvastatin 80 mg. Currently taking Donepezil 10 mg daily. He reports gait unsteadiness which was previously assessed by Dr. Gonsalez and felt to be d/t peripheral neuropathy.      Follow up visit 2/21/2024:  Alonso Ellis returns to neurology clinic for continued evaluation of memory loss and gait instability. MRI brain performed on 1/17/2024 showed findings compatible with chronic microvascular ischemic change, no restricted diffusion to suggest acute infarct.  He also has been following with behavioral health, Derrick LOPEZ for bipolar 2 disorder, generalized anxiety disorder, depression, PTSD, circadian rhythm sleep disorder. RPR nonreactive, methylmalonic acid 273, vitamin B-12 1,466, folate >20, Thyroid function was normal. Recently evicted from his home, moved into senior community, [facility] in [place]. He feels much safer where he is currently living. He has difficulty with short term memory. He feels that his memory lately has continued to decline - he does acknowledge much mental health stress in  that time which he believes has contributed to his memory loss. He is not driving.        Follow up visit 8/30/2024  Alonso Ellis returns to neurology clinic for continued evaluation of MCI, he reports that his pharmacy did not supply Donepezil and Memantine for about 2 months and he noted significant change in cognition/focus during that time, he is currently back on Donepezil and Memantine and has been on them for about a month and does note some improvement since resuming these medications. Notes that his short term memory continues to be poor overall. No SE from Donepezil or Namenda. Sleep is okay. He reports that mood comes and goes, does better when he is busy. He manages his medications. He continues to reside at [facility] in [place] and feels safe there. No falls. He reports that he will stagger some while walking, feels that his balance is off at times. Does have intermittent diabetic neuropathy pain, does not bother him all the time. He reports from prior back surgery he has numbness in his right foot as well. He is interested in PT for gait/balance training.       Follow up visit 12/6/2024:  Santi Souza returns to neurology clinic for continued evaluation of MCI and gait instability. He has been taking Donepezil 10 mg daily plus Memantine 5 mg BID. He feels that memory has been stable, he reports that he has been tolerating his current medication regimen without side effects. He has also been taking ASA 81 mg daily plus Atorvastatin 80 mg daily. At last visit he was referred to PT for gait/balance training, he reports that PT was helpful for balance and gait, he reports that he had an episode back pain with left sided sciatic pain, he previously followed with Dr. Suarez (neurosurgery at ) and plans on following up with him again soon. He also continues to follow up with behavioral health and feels that his mood has stabilized overall as well, he has noted improvement in memory stability in correlation  with his mood stability.       Current visit 6/6/2025:  Alonso Ellis returns for routine follow up. He reports that memory can vary from day to day, feels like his memory is variable depending on the day but overall has been stable. Will lose train of thought at times. No issues with driving, no issues with medications. No issues with ADL's. Counseling has been going well, he reports that his mood has been stable. His living situation is stable. Still taking Donepezil 10 mg daily and Memantine 5 mg BID. He also continues to take ASA 81 mg daily plus Atorvastatin 80 mg daily.  Denies side effects from medications. No falls. Back pain has improved. He denies any questions or new concerns for today's visit.    Subjective      Review of Systems   Constitutional: Negative.    HENT: Negative.     Respiratory: Negative.     Cardiovascular: Negative.    Gastrointestinal: Negative.    Endocrine: Negative.    Genitourinary: Negative.    Musculoskeletal:  Positive for gait problem.   Skin: Negative.    Neurological:  Positive for numbness.        Memory loss - stable   Psychiatric/Behavioral:  Positive for sleep disturbance.         Reports that mood is stable           Past Medical History:   Past Medical History:   Diagnosis Date   • CAD (coronary artery disease) 11/03/2017   • Cataracta    • Chronic back pain 11/03/2017   • Depression    • Diabetes mellitus--Insulin Dependant 11/03/2017    IDDM   • GERD (gastroesophageal reflux disease) 11/03/2017   • Hyperlipidemia 11/03/2017   • Hypertension 11/03/2017   • Neuropathy    • Osteoarthritis    • Rotator cuff syndrome 6/23    Should be on my chart   • Stroke 2022    short term memory and difficulty balance   • Vitamin D deficiency    • Wears glasses    • Wears hearing aid in both ears        Past Surgical History:   Past Surgical History:   Procedure Laterality Date   • BACK SURGERY  1999    4 lumbar surgeries   • CARDIAC CATHETERIZATION      total of 7 stents   • COLONOSCOPY     •  LUMBAR SPINE SURGERY  1998 2006, 2013   • SHOULDER ARTHROSCOPY W/ ROTATOR CUFF REPAIR Right 9/13/2023    Procedure: SHOULDER ARTHROSCOPY WITH ROTATOR CUFF REPAIR, BICEP TENDONESIS, SUBACROMIAL DECOMPRESSION- RIGHT;  Surgeon: Geoffrey Francis MD;  Location: [facility];  Service: Orthopedics;  Laterality: Right;   • TONSILLECTOMY         Family History:   Family History   Problem Relation Age of Onset   • Diabetes Father    • Cancer Maternal Grandfather        Social History:   Social History     Socioeconomic History   • Marital status: Single   Tobacco Use   • Smoking status: Former     Current packs/day: 0.50     Average packs/day: 0.5 packs/day for 51.4 years (25.7 ttl pk-yrs)     Types: Cigarettes     Start date: 1/1/1974     Passive exposure: Past   • Smokeless tobacco: Never   Vaping Use   • Vaping status: Never Used   Substance and Sexual Activity   • Alcohol use: Never   • Drug use: Not Currently     Frequency: 7.0 times per week     Types: Marijuana     Comment: vape every night or smoke   • Sexual activity: Defer     Partners: Female     Birth control/protection: Condom       Medications:     Current Outpatient Medications:   •  aspirin 81 MG chewable tablet, Chew 1 tablet., Disp: , Rfl:   •  atorvastatin (LIPITOR) 80 MG tablet, Take 1 tablet by mouth Daily., Disp: 90 tablet, Rfl: 1  •  Brexpiprazole (Rexulti) 2 MG tablet, Take 1 tablet by mouth Daily., Disp: 30 tablet, Rfl: 2  •  Continuous Glucose  (Dexcom G7 ) device, Use 1 Piece Every 3 (Three) Months., Disp: , Rfl:   •  Continuous Glucose Sensor (Dexcom G7 Sensor) misc, Use 1 Device Every 10 (Ten) Days., Disp: 9 each, Rfl: 1  •  diclofenac (VOLTAREN) 75 MG EC tablet, TAKE 1 TABLET BY MOUTH TWICE DAILY AS NEEDED FOR  BACK  PAIN,  JOINT  PAIN, Disp: 60 tablet, Rfl: 0  •  donepezil (ARICEPT) 10 MG tablet, Take 1 tablet by mouth Every Night for 180 days., Disp: 90 tablet, Rfl: 1  •  DULoxetine (CYMBALTA) 60 MG capsule, Take 1 capsule  "by mouth in the morning, Disp: 30 capsule, Rfl: 2  •  fluticasone (FLONASE) 50 MCG/ACT nasal spray, Administer 2 sprays into the nostril(s) as directed by provider Daily., Disp: 16 g, Rfl: 5  •  Insulin Glargine, 1 Unit Dial, (Toujeo SoloStar) 300 UNIT/ML solution pen-injector injection, Inject 10 Units under the skin into the appropriate area as directed Daily., Disp: 3 mL, Rfl: 2  •  Insulin Pen Needle (NovoFine Plus Pen Needle) 32G X 4 MM misc, Use with once daily toujeo insulin, Disp: 100 each, Rfl: 2  •  levocetirizine (XYZAL) 5 MG tablet, Take 1 tablet by mouth Every Evening., Disp: 90 tablet, Rfl: 1  •  Magnesium 400 MG tablet, Take 400 mg by mouth Daily., Disp: , Rfl:   •  memantine (NAMENDA) 5 MG tablet, Take 1 tablet by mouth 2 (Two) Times a Day for 180 days., Disp: 180 tablet, Rfl: 1  •  nitroglycerin (NITROLINGUAL) 0.4 MG/SPRAY spray, Place 1 spray by translingual route on or under the tongue at the first sign of an attack, no more than 3 sprays / 15-minute., Disp: 1 each, Rfl: 0  •  omeprazole (priLOSEC) 20 MG capsule, Take 1 capsule by mouth Daily., Disp: 90 capsule, Rfl: 1  •  prazosin (MINIPRESS) 2 MG capsule, Take 1 capsule by mouth at bedtime., Disp: 30 capsule, Rfl: 2  •  traZODone (DESYREL) 50 MG tablet, Take 1 tablet by mouth every day at bedtime., Disp: 30 tablet, Rfl: 2    Allergies:   Allergies   Allergen Reactions   • Hydrocodone Itching   • Penicillins Swelling and Provider Review Needed     Entire body swelled as a child    • Sulfa Antibiotics Shortness Of Breath, Rash and Provider Review Needed   • Codeine Nausea And Vomiting, Confusion and Provider Review Needed   • Oxycontin [Oxycodone] Itching         STEADI Fall Risk Assessment was completed, and patient is at LOW risk for falls.Assessment completed on:6/6/2025    Objective     Objective:    BP 90/48   Pulse 54   Ht 172.7 cm (68\")   Wt 80.3 kg (177 lb)   SpO2 95%   BMI 26.91 kg/m²   Body mass index is 26.91 kg/m².    Physical " Exam  Vitals reviewed.   Constitutional:       Appearance: Normal appearance.   HENT:      Head: Normocephalic and atraumatic.      Mouth/Throat:      Mouth: Mucous membranes are moist.      Pharynx: Oropharynx is clear.   Pulmonary:      Effort: Pulmonary effort is normal. No respiratory distress.   Skin:     General: Skin is warm and dry.   Neurological:      Mental Status: He is alert.          Neurology Exam:    General apperance: NAD.     Mental status: Alert, awake and oriented to person, year, day, month, state, county, city, hospital, floor. Disoriented to date    Fund of knowledge:  Normal.     Language and Speech: No aphasia or dysarthria.    Naming , Repetition and Comprehension:  Can name objects, repeat a sentence and follow commands. Speech is clear and fluent with good repetition, comprehension, and naming.    Cranial Nerves:   CN II: Visual fields are full. Intact. Pupils - PERRLA  CN III, IV and VI: Extraocular movements are intact. Normal saccades.   CN V: Facial sensation is intact.   CN VII: Muscles of facial expression reveal no asymmetry. Intact.   CN VIII: Hearing is intact.   CN IX and X: Palate elevates symmetrically. Intact  CN XI: Shoulder shrug is intact.   CN XII: Tongue is midline without evidence of atrophy or fasciculation.     Motor:  Right UE muscle strength 5/5. Normal tone.     Left UE muscle strength 5/5. Normal tone.      Right LE muscle strength 5/5. Normal tone.     Left LE muscle strength 5-/5. Normal tone.  (Reports that this is baseline from prior surgery)    Sensory: Normal light touch sensation bilaterally.    DTRs: 2+ bilaterally in upper and lower extremities.    Coordination: Normal finger-to-nose    Gait: Slight unsteadiness noted in gait, ambulates without assistive device.              Results:   Imaging:   No Images in the past 120 days found..     Labs:   Lab Results   Component Value Date    GLUCOSE 201 (H) 04/21/2025    BUN 10 04/21/2025    CREATININE 0.86  04/21/2025     04/21/2025    K 4.3 04/21/2025     04/21/2025    CALCIUM 9.4 04/21/2025    PROTEINTOT 6.9 04/21/2025    ALBUMIN 4.3 04/21/2025    ALT 17 04/21/2025    AST 13 04/21/2025    ALKPHOS 119 04/21/2025    BILITOT 0.4 04/21/2025    GLOB 2.6 04/21/2025    AGRATIO 1.9 01/09/2024    BCR 12 04/21/2025    ANIONGAP 13.0 09/16/2023    EGFR 96 04/21/2025         Assessment / Plan      Assessment/Plan:   Diagnoses and all orders for this visit:    1. Mild cognitive impairment  -     donepezil (ARICEPT) 10 MG tablet; Take 1 tablet by mouth Every Night for 180 days.  Dispense: 90 tablet; Refill: 1  -     memantine (NAMENDA) 5 MG tablet; Take 1 tablet by mouth 2 (Two) Times a Day for 180 days.  Dispense: 180 tablet; Refill: 1  -     Ambulatory Referral to Speech Therapy for Evaluation & Treatment      Mr. Souza returns for routine follow up. MMSE 24/30, recall 2/3. Reports that memory has overall been stable but still notes difficulty with short term memory. We have elected today to continue current medications and to add cognitive therapy, prefers that this is done in [place]. Will continue Donepezil and Memantine unchanged today and plan to see back in clinic in 6 months or sooner for any concerns.        Patient Education:       Reviewed medications, potential side effects and signs and symptoms to report. Discussed risk versus benefits of treatment plan with patient and/or family-including medications, labs and radiology that may be ordered. Addressed questions and concerns during visit. Patient and/or family verbalized understanding and agree with plan. Instructed to call the office with any questions.    Follow Up:   Return in about 6 months (around 12/6/2025).    I spent 27 minutes in the care of this patient. I personally spent 50 percent of this time counseling and discussing diagnosis, diagnostic testing, evaluation, treatment options, and management .       During this visit the following were  done:  Labs Reviewed [x]    Labs Ordered []    Radiology Reports Reviewed []    Radiology Ordered []    PCP Records Reviewed []    Referring Provider Records Reviewed []    ER Records Reviewed []    Hospital Records Reviewed []    History Obtained From Family []    Radiology Images Reviewed []    Other Reviewed []    Records Requested []      JESSICA Watson  [facility]  [address] [address] [phone]

## 2025-06-10 ENCOUNTER — OFFICE VISIT (OUTPATIENT)
Dept: BEHAVIORAL HEALTH | Facility: CLINIC | Age: 65
End: 2025-06-10
Payer: MEDICARE

## 2025-06-10 DIAGNOSIS — F32.A ANXIETY AND DEPRESSION: Primary | ICD-10-CM

## 2025-06-10 DIAGNOSIS — F43.10 POST TRAUMATIC STRESS DISORDER (PTSD): ICD-10-CM

## 2025-06-10 DIAGNOSIS — G31.84 MILD COGNITIVE IMPAIRMENT: ICD-10-CM

## 2025-06-10 DIAGNOSIS — F41.9 ANXIETY AND DEPRESSION: Primary | ICD-10-CM

## 2025-06-10 NOTE — PROGRESS NOTES
"     Follow Up Adult Note     Date:06/10/2025   Patient Name: Santi Souza Sr.  : 1960   MRN: [id number]   Time IN: 1:00 pm    Time OUT: 1:45 pm     Referring Provider: Jordan Heart APRN    Chief Complaint:      ICD-10-CM ICD-9-CM   1. Anxiety and depression  F41.9 300.00    F32.A 311   2. Mild cognitive impairment  G31.84 331.83   3. Post traumatic stress disorder (PTSD)  F43.10 309.81        History of Present Illness:   Santi Souza Sr. is a 64 y.o., single, retired  male who is being seen today for scheduled Psychotherapy session. Mood reported as \"it's okay\" with somewhat tired but overall calm and cooperative affect. Patient presented as calm, cooperative, engaged, and pleasant with provider and denied any current SI/HI/AVH. Patient reports intermittent anxiety and depressive symptoms but did state, \"it has been better overall\" with reported concerns over feeling lonely at times as a lot of his friends are busy with their families due to summer events and stated, \"I do feel a little lost sometimes and just trying to find things to do\". Patient reported he has successfully stopped smoking for roughly the past 3 weeks and reports improved health, mood, and sleep. Patient shared that he is starting speech therapy stating, \"I have been mumbling and jumbling my words more\" with some mild memory concerns that occur \"in the moment when I'm asked questions\" but is able to recall past events when not feeling pressured.       Subjective     PHQ-9 Depression Screening  PHQ-9 Total Score:  Not completed at this time     GARETT-7   Not completed at this time    Patient's Support Network Includes:  Friends    Functional Status: Mild impairment     Progress toward goal: Not at goal    Prognosis: Good with Ongoing Treatment     Medications:     Current Outpatient Medications:     aspirin 81 MG chewable tablet, Chew 1 tablet., Disp: , Rfl:     atorvastatin (LIPITOR) 80 MG tablet, Take 1 tablet " by mouth Daily., Disp: 90 tablet, Rfl: 1    Brexpiprazole (Rexulti) 2 MG tablet, Take 1 tablet by mouth Daily., Disp: 30 tablet, Rfl: 2    Continuous Glucose  (Dexcom G7 ) device, Use 1 Piece Every 3 (Three) Months., Disp: , Rfl:     Continuous Glucose Sensor (Dexcom G7 Sensor) misc, Use 1 Device Every 10 (Ten) Days., Disp: 9 each, Rfl: 1    diclofenac (VOLTAREN) 75 MG EC tablet, TAKE 1 TABLET BY MOUTH TWICE DAILY AS NEEDED FOR  BACK  PAIN,  JOINT  PAIN, Disp: 60 tablet, Rfl: 0    donepezil (ARICEPT) 10 MG tablet, Take 1 tablet by mouth Every Night for 180 days., Disp: 90 tablet, Rfl: 1    DULoxetine (CYMBALTA) 60 MG capsule, Take 1 capsule by mouth in the morning, Disp: 30 capsule, Rfl: 2    fluticasone (FLONASE) 50 MCG/ACT nasal spray, Administer 2 sprays into the nostril(s) as directed by provider Daily., Disp: 16 g, Rfl: 5    Insulin Glargine, 1 Unit Dial, (Toujeo SoloStar) 300 UNIT/ML solution pen-injector injection, Inject 10 Units under the skin into the appropriate area as directed Daily., Disp: 3 mL, Rfl: 2    Insulin Pen Needle (NovoFine Plus Pen Needle) 32G X 4 MM misc, Use with once daily toujeo insulin, Disp: 100 each, Rfl: 2    levocetirizine (XYZAL) 5 MG tablet, Take 1 tablet by mouth Every Evening., Disp: 90 tablet, Rfl: 1    Magnesium 400 MG tablet, Take 400 mg by mouth Daily., Disp: , Rfl:     memantine (NAMENDA) 5 MG tablet, Take 1 tablet by mouth 2 (Two) Times a Day for 180 days., Disp: 180 tablet, Rfl: 1    nitroglycerin (NITROLINGUAL) 0.4 MG/SPRAY spray, Place 1 spray by translingual route on or under the tongue at the first sign of an attack, no more than 3 sprays / 15-minute., Disp: 1 each, Rfl: 0    omeprazole (priLOSEC) 20 MG capsule, Take 1 capsule by mouth Daily., Disp: 90 capsule, Rfl: 1    prazosin (MINIPRESS) 2 MG capsule, Take 1 capsule by mouth at bedtime., Disp: 30 capsule, Rfl: 2    traZODone (DESYREL) 50 MG tablet, Take 1 tablet by mouth every day at bedtime.,  "Disp: 30 tablet, Rfl: 2    Allergies:   Allergies   Allergen Reactions    Hydrocodone Itching    Penicillins Swelling and Provider Review Needed     Entire body swelled as a child     Sulfa Antibiotics Shortness Of Breath, Rash and Provider Review Needed    Codeine Nausea And Vomiting, Confusion and Provider Review Needed    Oxycontin [Oxycodone] Itching       Objective     Mental Status Exam:   MENTAL STATUS EXAM   General Appearance:  Cleanly groomed and dressed  Eye Contact:  Good eye contact  Attitude:  Cooperative and polite  Motor Activity:  Normal gait, posture and slow  Muscle Strength:  Normal  Speech:  Normal rate, tone, volume and soft spoken  Language:  Spontaneous  Mood and affect:  Other  Other Comment:  Mood reported as \"it's okay\" with somewhat tired but overall calm and cooperative affect  Hopelessness:  Denies  Loneliness: 2  Thought Process:  Goal-directed, linear and logical  Associations/ Thought Content:  No delusions  Hallucinations:  None  Suicidal Ideations:  Not present  Homicidal Ideation:  Not present  Sensorium:  Other  Other Comment:  Little drowsy but overall alert and clear  Orientation:  Person, place, time and situation  Immediate Recall, Recent, and Remote Memory:  Other  Other Comment:  Fair  Attention Span/ Concentration:  Good  Fund of Knowledge:  Appropriate for age and educational level  Intellectual Functioning:  Average range  Insight:  Good  Judgement:  Good  Reliability:  Good  Impulse Control:  Good       Assessment / Plan      Visit Diagnosis/Orders Placed This Visit:    ICD-10-CM ICD-9-CM   1. Anxiety and depression  F41.9 300.00    F32.A 311   2. Mild cognitive impairment  G31.84 331.83   3. Post traumatic stress disorder (PTSD)  F43.10 309.81        PLAN:  Safety: No acute safety concerns  Risk Assessment: Risk of self-harm acutely is low. Risk of self-harm chronically is also low, but could be further elevated in the event of treatment noncompliance and/or " AODA.    Treatment Plan/Goals: Continue supportive psychotherapy efforts and medications as indicated. Treatment and medication options discussed during today's visit. Patient ackowledged and verbally consented to continue with current treatment plan and was educated on the importance of compliance with treatment and follow-up appointments. Patient seems reasonably able to adhere to treatment plan.      Assisted Patient in processing above session content; acknowledged and normalized patient’s thoughts, feelings, and concerns. Assisted patient in processing recent stressors/emotions/feelings and utilized Socratic Questioning techniques and open ended questions to encourage reflection. Continued to work on memory aids and organizational strategies to manage daily tasks and cognitive exercises aimed at stimulating memory and executive functions. Addressed emotional reaction and frustrations related to cognitive challenges and discussed activities to address loneliness. Patient was receptive to therapeutic feedback.     Allowed Patient to freely discuss issues  without interruption or judgement with unconditional positive regard, active listening skills, and empathy. Therapist provided a safe, confidential environment to facilitate the development of a positive therapeutic relationship and encouraged open, honest communication. Assisted Patient in identifying risk factors which would indicate the need for higher level of care including thoughts to harm self or others and/or self-harming behavior and encouraged Patient to contact this office, call 911, or present to the nearest emergency room should any of these events occur. Discussed crisis intervention services and means to access. Patient adamantly and convincingly denies current suicidal or homicidal ideation or perceptual disturbance. Assisted Patient in processing session content; acknowledged and normalized Patient’s thoughts, feelings, and concerns by utilizing  a person-centered approach in efforts to build appropriate rapport and a positive therapeutic relationship with open and honest communication. .     Follow Up:   Return in about 1 week (around 6/17/2025) for Therapy session.    Stefan Tran LCSW  [facility]

## 2025-06-24 ENCOUNTER — OFFICE VISIT (OUTPATIENT)
Dept: BEHAVIORAL HEALTH | Facility: CLINIC | Age: 65
End: 2025-06-24
Payer: MEDICARE

## 2025-06-24 DIAGNOSIS — G31.84 MILD COGNITIVE IMPAIRMENT: ICD-10-CM

## 2025-06-24 DIAGNOSIS — F33.0 MILD EPISODE OF RECURRENT MAJOR DEPRESSIVE DISORDER: Primary | ICD-10-CM

## 2025-06-24 DIAGNOSIS — F41.1 GENERALIZED ANXIETY DISORDER: ICD-10-CM

## 2025-06-24 DIAGNOSIS — F43.10 POST TRAUMATIC STRESS DISORDER (PTSD): ICD-10-CM

## 2025-06-24 PROCEDURE — 1160F RVW MEDS BY RX/DR IN RCRD: CPT

## 2025-06-24 PROCEDURE — 90834 PSYTX W PT 45 MINUTES: CPT

## 2025-06-24 PROCEDURE — 1159F MED LIST DOCD IN RCRD: CPT

## 2025-06-24 NOTE — PROGRESS NOTES
"     Follow Up Adult Note     Date:2025   Patient Name: Santi Souza Sr.  : 1960   MRN: [id number]   Time IN: 1:02 pm    Time OUT: 1:48 pm     Referring Provider: Jordan Heart APRN    Chief Complaint:      ICD-10-CM ICD-9-CM   1. Mild episode of recurrent major depressive disorder  F33.0 296.31   2. Mild cognitive impairment  G31.84 331.83   3. Post traumatic stress disorder (PTSD)  F43.10 309.81   4. Generalized anxiety disorder  F41.1 300.02        History of Present Illness:   Santi Souza Sr. is a 64 y.o., single, disabled  male who is being seen today for scheduled Psychotherapy session. Mood reported as \"okay\" with somewhat tired but overall cooperative affect. Patient presented as calm, cooperative, mostly engaged, and pleasant with provider. Patient denied any current SI/HI/AVH. Patient reports \"having less energy in doing things I enjoy\" with less interest in activities. Patient reported he \"wants to exercise more\" but voiced decreased motivation to start tasks. Patient described occasional forgetfulness but reported no significant impact on daily routines. Patient reports he continues to do well with not smoking.     Subjective     PHQ-9 Depression Screening  PHQ-9 Total Score:  Not completed at this time     GARETT-7   Not completed at this time    Patient's Support Network Includes:  Neighbors/friends, [facility]    Functional Status: Moderate impairment     Progress toward goal: Not at goal    Prognosis: Good with Ongoing Treatment     Medications:     Current Outpatient Medications:     aspirin 81 MG chewable tablet, Chew 1 tablet., Disp: , Rfl:     atorvastatin (LIPITOR) 80 MG tablet, Take 1 tablet by mouth Daily., Disp: 90 tablet, Rfl: 1    Brexpiprazole (Rexulti) 2 MG tablet, Take 1 tablet by mouth Daily., Disp: 30 tablet, Rfl: 2    Continuous Glucose  (Dexcom G7 ) device, Use 1 Piece Every 3 (Three) Months., Disp: , Rfl:     Continuous " Glucose Sensor (Dexcom G7 Sensor) misc, Use 1 Device Every 10 (Ten) Days., Disp: 9 each, Rfl: 1    diclofenac (VOLTAREN) 75 MG EC tablet, TAKE 1 TABLET BY MOUTH TWICE DAILY AS NEEDED FOR  BACK  PAIN,  JOINT  PAIN, Disp: 60 tablet, Rfl: 0    donepezil (ARICEPT) 10 MG tablet, Take 1 tablet by mouth Every Night for 180 days., Disp: 90 tablet, Rfl: 1    DULoxetine (CYMBALTA) 60 MG capsule, Take 1 capsule by mouth in the morning, Disp: 30 capsule, Rfl: 2    fluticasone (FLONASE) 50 MCG/ACT nasal spray, Administer 2 sprays into the nostril(s) as directed by provider Daily., Disp: 16 g, Rfl: 5    Insulin Glargine, 1 Unit Dial, (Toujeo SoloStar) 300 UNIT/ML solution pen-injector injection, Inject 10 Units under the skin into the appropriate area as directed Daily., Disp: 3 mL, Rfl: 2    Insulin Pen Needle (NovoFine Plus Pen Needle) 32G X 4 MM misc, Use with once daily toujeo insulin, Disp: 100 each, Rfl: 2    levocetirizine (XYZAL) 5 MG tablet, Take 1 tablet by mouth Every Evening., Disp: 90 tablet, Rfl: 1    Magnesium 400 MG tablet, Take 400 mg by mouth Daily., Disp: , Rfl:     memantine (NAMENDA) 5 MG tablet, Take 1 tablet by mouth 2 (Two) Times a Day for 180 days., Disp: 180 tablet, Rfl: 1    nitroglycerin (NITROLINGUAL) 0.4 MG/SPRAY spray, Place 1 spray by translingual route on or under the tongue at the first sign of an attack, no more than 3 sprays / 15-minute., Disp: 1 each, Rfl: 0    omeprazole (priLOSEC) 20 MG capsule, Take 1 capsule by mouth Daily., Disp: 90 capsule, Rfl: 1    prazosin (MINIPRESS) 2 MG capsule, Take 1 capsule by mouth at bedtime., Disp: 30 capsule, Rfl: 2    traZODone (DESYREL) 50 MG tablet, Take 1 tablet by mouth every day at bedtime., Disp: 30 tablet, Rfl: 2    Allergies:   Allergies   Allergen Reactions    Hydrocodone Itching    Penicillins Swelling and Provider Review Needed     Entire body swelled as a child     Sulfa Antibiotics Shortness Of Breath, Rash and Provider Review Needed    Codeine  "Nausea And Vomiting, Confusion and Provider Review Needed    Oxycontin [Oxycodone] Itching       Objective     Mental Status Exam:   MENTAL STATUS EXAM   General Appearance:  Cleanly groomed and dressed  Eye Contact:  Fair  Attitude:  Cooperative and polite  Motor Activity:  Normal gait, posture and slow  Muscle Strength:  Normal  Speech:  Normal rate, tone, volume and soft spoken  Language:  Spontaneous  Mood and affect:  Other  Other Comment:  Mood reported as \"okay\" with somewhat tired but overall cooperative affect.  Hopelessness:  Denies  Loneliness: 3  Thought Process:  Goal-directed and linear  Associations/ Thought Content:  No delusions  Hallucinations:  None  Suicidal Ideations:  Not present  Homicidal Ideation:  Not present  Sensorium:  Other  Other Comment:  Somewhat tired but overall alert/clear  Orientation:  Person, place, time and situation  Immediate Recall, Recent, and Remote Memory:  Intact  Attention Span/ Concentration:  Good  Fund of Knowledge:  Appropriate for age and educational level  Intellectual Functioning:  Average range  Insight:  Fair  Judgement:  Good  Reliability:  Good  Impulse Control:  Good       Assessment / Plan      Visit Diagnosis/Orders Placed This Visit:    ICD-10-CM ICD-9-CM   1. Mild episode of recurrent major depressive disorder  F33.0 296.31   2. Mild cognitive impairment  G31.84 331.83   3. Post traumatic stress disorder (PTSD)  F43.10 309.81   4. Generalized anxiety disorder  F41.1 300.02        PLAN:  Safety: No acute safety concerns  Risk Assessment: Risk of self-harm acutely is low. Risk of self-harm chronically is also low, but could be further elevated in the event of treatment noncompliance and/or AODA.    Treatment Plan/Goals: Continue supportive psychotherapy efforts and medications as indicated. Treatment and medication options discussed during today's visit. Patient ackowledged and verbally consented to continue with current treatment plan and was educated on " the importance of compliance with treatment and follow-up appointments. Patient seems reasonably able to adhere to treatment plan.      Assisted Patient in processing above session content; acknowledged and normalized patient’s thoughts, feelings, and concerns. Assisted patient in processing recent stressors/emotions/feelings and utilized Socratic Questioning techniques and open ended questions to encourage reflection. Worked on addressing depressive symptoms and improving overall mood/motivation regulations. Continued to work on behavioral activation strategies to encourage the patient to increase engagement in enjoyable activities and encouraged the client to attend social activities. Patient was receptive to therapeutic feedback.     Allowed Patient to freely discuss issues  without interruption or judgement with unconditional positive regard, active listening skills, and empathy. Therapist provided a safe, confidential environment to facilitate the development of a positive therapeutic relationship and encouraged open, honest communication. Assisted Patient in identifying risk factors which would indicate the need for higher level of care including thoughts to harm self or others and/or self-harming behavior and encouraged Patient to contact this office, call 911, or present to the nearest emergency room should any of these events occur. Discussed crisis intervention services and means to access. Patient adamantly and convincingly denies current suicidal or homicidal ideation or perceptual disturbance. Assisted Patient in processing session content; acknowledged and normalized Patient’s thoughts, feelings, and concerns by utilizing a person-centered approach in efforts to build appropriate rapport and a positive therapeutic relationship with open and honest communication. .     Follow Up:   Return in about 1 week (around 7/1/2025) for Therapy session.    Stefan Tran [facility]  [facility]

## 2025-07-08 ENCOUNTER — OFFICE VISIT (OUTPATIENT)
Dept: BEHAVIORAL HEALTH | Facility: CLINIC | Age: 65
End: 2025-07-08
Payer: MEDICARE

## 2025-07-08 DIAGNOSIS — G31.84 MILD COGNITIVE IMPAIRMENT: ICD-10-CM

## 2025-07-08 DIAGNOSIS — F32.A ANXIETY AND DEPRESSION: Primary | ICD-10-CM

## 2025-07-08 DIAGNOSIS — F43.10 POST TRAUMATIC STRESS DISORDER (PTSD): ICD-10-CM

## 2025-07-08 DIAGNOSIS — F41.9 ANXIETY AND DEPRESSION: Primary | ICD-10-CM

## 2025-07-08 PROCEDURE — 1160F RVW MEDS BY RX/DR IN RCRD: CPT

## 2025-07-08 PROCEDURE — 90834 PSYTX W PT 45 MINUTES: CPT

## 2025-07-08 PROCEDURE — 1159F MED LIST DOCD IN RCRD: CPT

## 2025-07-08 NOTE — PROGRESS NOTES
"     Follow Up Adult Note     Date:2025   Patient Name: Santi Souza Sr.  : 1960   MRN: [id number]   Time IN: 1:02 pm    Time OUT: 1:46 pm     Referring Provider: Jordan Heart APRN    Chief Complaint:      ICD-10-CM ICD-9-CM   1. Anxiety and depression  F41.9 300.00    F32.A 311   2. Post traumatic stress disorder (PTSD)  F43.10 309.81   3. Mild cognitive impairment  G31.84 331.83        History of Present Illness:   Santi Souza Sr. is a 64 y.o., single, disabled male who is being seen today for scheduled Psychotherapy session. Mood reported as \"I'm doing and getting by\" with somewhat tired but overall calm and cooperative affect. Patient presented as somewhat tired at times but overall improved presentation and remained calm, cooperative, engaged, and pleasant with provider. Patient denied any current SI/HI/AVH. Patient reports stressors around medical issues with a focus on his heart. Patient reports ongoing mental health distress around family dynamics reporting he has not had contact with his family \"in several months\" but voiced that he \"needs to call my mom and see how everyone is doing\". Patient reports his overall anxiety and depressive symptoms have improved and stated, \"I haven't had any major bad thoughts or crazy anxiety\" and later stated, \"I have been coming to peace with things and appreciating the positives in my life\".       Subjective     PHQ-9 Depression Screening  PHQ-9 Total Score:  Not completed at this time     GARETT-7   Not completed at this time    Patient's Support Network Includes:  neighbor, friends    Functional Status: Mild impairment     Progress toward goal: Not at goal    Prognosis: Good with Ongoing Treatment     Medications:     Current Outpatient Medications:     aspirin 81 MG chewable tablet, Chew 1 tablet., Disp: , Rfl:     atorvastatin (LIPITOR) 80 MG tablet, Take 1 tablet by mouth Daily., Disp: 90 tablet, Rfl: 1    Brexpiprazole (Rexulti) 2 MG " tablet, Take 1 tablet by mouth Daily., Disp: 30 tablet, Rfl: 2    Continuous Glucose  (Dexcom G7 ) device, Use 1 Piece Every 3 (Three) Months., Disp: , Rfl:     Continuous Glucose Sensor (Dexcom G7 Sensor) misc, Use 1 Device Every 10 (Ten) Days., Disp: 9 each, Rfl: 1    diclofenac (VOLTAREN) 75 MG EC tablet, TAKE 1 TABLET BY MOUTH TWICE DAILY AS NEEDED FOR  BACK  PAIN,  JOINT  PAIN, Disp: 60 tablet, Rfl: 0    donepezil (ARICEPT) 10 MG tablet, Take 1 tablet by mouth Every Night for 180 days., Disp: 90 tablet, Rfl: 1    DULoxetine (CYMBALTA) 60 MG capsule, Take 1 capsule by mouth in the morning, Disp: 30 capsule, Rfl: 2    fluticasone (FLONASE) 50 MCG/ACT nasal spray, Administer 2 sprays into the nostril(s) as directed by provider Daily., Disp: 16 g, Rfl: 5    Insulin Glargine, 1 Unit Dial, (Toujeo SoloStar) 300 UNIT/ML solution pen-injector injection, Inject 10 Units under the skin into the appropriate area as directed Daily., Disp: 3 mL, Rfl: 2    Insulin Pen Needle (NovoFine Plus Pen Needle) 32G X 4 MM misc, Use with once daily toujeo insulin, Disp: 100 each, Rfl: 2    levocetirizine (XYZAL) 5 MG tablet, Take 1 tablet by mouth Every Evening., Disp: 90 tablet, Rfl: 1    Magnesium 400 MG tablet, Take 400 mg by mouth Daily., Disp: , Rfl:     memantine (NAMENDA) 5 MG tablet, Take 1 tablet by mouth 2 (Two) Times a Day for 180 days., Disp: 180 tablet, Rfl: 1    nitroglycerin (NITROLINGUAL) 0.4 MG/SPRAY spray, Place 1 spray by translingual route on or under the tongue at the first sign of an attack, no more than 3 sprays / 15-minute., Disp: 1 each, Rfl: 0    omeprazole (priLOSEC) 20 MG capsule, Take 1 capsule by mouth Daily., Disp: 90 capsule, Rfl: 1    prazosin (MINIPRESS) 2 MG capsule, Take 1 capsule by mouth at bedtime., Disp: 30 capsule, Rfl: 2    traZODone (DESYREL) 50 MG tablet, Take 1 tablet by mouth every day at bedtime., Disp: 30 tablet, Rfl: 2    Allergies:   Allergies   Allergen Reactions     "Hydrocodone Itching    Penicillins Swelling and Provider Review Needed     Entire body swelled as a child     Sulfa Antibiotics Shortness Of Breath, Rash and Provider Review Needed    Codeine Nausea And Vomiting, Confusion and Provider Review Needed    Oxycontin [Oxycodone] Itching       Objective     Mental Status Exam:   MENTAL STATUS EXAM   General Appearance:  Cleanly groomed and dressed  Eye Contact:  Fair  Attitude:  Cooperative and polite  Motor Activity:  Normal gait, posture and slow  Muscle Strength:  Normal  Speech:  Normal rate, tone, volume and soft spoken  Language:  Spontaneous  Mood and affect:  Other  Other Comment:  Mood reported as \"I'm doing and getting by\" with somewhat tired but overall calm and cooperative affect.  Hopelessness:  Denies  Loneliness: 3  Thought Process:  Logical and goal-directed  Associations/ Thought Content:  No delusions  Hallucinations:  None  Suicidal Ideations:  Not present  Homicidal Ideation:  Not present  Sensorium:  Alert and clear  Orientation:  Person, place, time and situation  Immediate Recall, Recent, and Remote Memory:  Other  Other Comment:  Fair  Attention Span/ Concentration:  Good  Fund of Knowledge:  Appropriate for age and educational level  Intellectual Functioning:  Average range  Insight:  Good  Judgement:  Good  Reliability:  Good  Impulse Control:  Good       Assessment / Plan      Visit Diagnosis/Orders Placed This Visit:    ICD-10-CM ICD-9-CM   1. Anxiety and depression  F41.9 300.00    F32.A 311   2. Post traumatic stress disorder (PTSD)  F43.10 309.81   3. Mild cognitive impairment  G31.84 331.83        PLAN:  Safety: No acute safety concerns  Risk Assessment: Risk of self-harm acutely is low. Risk of self-harm chronically is also low, but could be further elevated in the event of treatment noncompliance and/or AODA.    Treatment Plan/Goals: Continue supportive psychotherapy efforts and medications as indicated. Treatment and medication options " discussed during today's visit. Patient ackowledged and verbally consented to continue with current treatment plan and was educated on the importance of compliance with treatment and follow-up appointments. Patient seems reasonably able to adhere to treatment plan.      Assisted Patient in processing above session content; acknowledged and normalized patient’s thoughts, feelings, and concerns. Assisted patient in processing recent stressors/emotions/feelings and utilized Socratic Questioning techniques and open ended questions to encourage reflection. Processed feelings/thoughts around family dynamics and lack of interaction/communication. Implemented CBT techniques to challenge negative thought patterns around health and family stress and challenged patient to identify more logical and positive outcomes/solutions to presenting concerns. Patient was receptive to therapeutic feedback.     Allowed Patient to freely discuss issues  without interruption or judgement with unconditional positive regard, active listening skills, and empathy. Therapist provided a safe, confidential environment to facilitate the development of a positive therapeutic relationship and encouraged open, honest communication. Assisted Patient in identifying risk factors which would indicate the need for higher level of care including thoughts to harm self or others and/or self-harming behavior and encouraged Patient to contact this office, call 911, or present to the nearest emergency room should any of these events occur. Discussed crisis intervention services and means to access. Patient adamantly and convincingly denies current suicidal or homicidal ideation or perceptual disturbance. Assisted Patient in processing session content; acknowledged and normalized Patient’s thoughts, feelings, and concerns by utilizing a person-centered approach in efforts to build appropriate rapport and a positive therapeutic relationship with open and honest  communication. .     Follow Up:   Return in about 1 week (around 7/15/2025) for Therapy session.    Stefan Tran, [facility]  [facility]

## 2025-07-15 ENCOUNTER — OFFICE VISIT (OUTPATIENT)
Dept: BEHAVIORAL HEALTH | Facility: CLINIC | Age: 65
End: 2025-07-15
Payer: MEDICARE

## 2025-07-15 DIAGNOSIS — F33.0 MILD EPISODE OF RECURRENT MAJOR DEPRESSIVE DISORDER: Primary | ICD-10-CM

## 2025-07-15 DIAGNOSIS — F41.1 GENERALIZED ANXIETY DISORDER: ICD-10-CM

## 2025-07-15 DIAGNOSIS — F43.10 POST TRAUMATIC STRESS DISORDER (PTSD): ICD-10-CM

## 2025-07-15 DIAGNOSIS — G31.84 MILD COGNITIVE IMPAIRMENT: ICD-10-CM

## 2025-07-15 NOTE — PROGRESS NOTES
"      Follow Up Adult Note     Date:07/15/2025   Patient Name: Santi Souza Sr.  : 1960   MRN: [id number]   Time IN: 1:00 pm    Time OUT: 1:44 pm     Referring Provider: Jordan Heart APRN    Chief Complaint:      ICD-10-CM ICD-9-CM   1. Mild episode of recurrent major depressive disorder  F33.0 296.31   2. Generalized anxiety disorder  F41.1 300.02   3. Post traumatic stress disorder (PTSD)  F43.10 309.81   4. Mild cognitive impairment  G31.84 331.83        History of Present Illness:   Santi Souza Sr. is a 65 y.o., single, disabled male who is being seen today for scheduled Psychotherapy session. Mood reported as \"it's okay\" with somewhat tired and lethargic but overall cooperative affect. Patient showed some signs of lethargy but remained overall calm, cooperative, engaged, and pleasant with provider. Patient denied any current SI/HI/AVH. Patient reports feeling \"more worn out all the time\" expressing more lethargy and lower motivation/energy with anxiety and worry around health concerns. Patient reports limited activities outside of attending his local senior center during the week and stated, \"I will just sit on the couch and next thing I know I'm waking up a couple hours later and just don't have any energy and want to do anything\".       Subjective     PHQ-9 Depression Screening  PHQ-9 Total Score:  Not completed at this time     GARETT-7   Not completed at this time    Patient's Support Network Includes:  community center friends, neighbor    Functional Status: Mild impairment     Progress toward goal: Not at goal    Prognosis: Good with Ongoing Treatment     Medications:     Current Outpatient Medications:     aspirin 81 MG chewable tablet, Chew 1 tablet., Disp: , Rfl:     atorvastatin (LIPITOR) 80 MG tablet, Take 1 tablet by mouth Daily., Disp: 90 tablet, Rfl: 1    Brexpiprazole (Rexulti) 2 MG tablet, Take 1 tablet by mouth Daily., Disp: 30 tablet, Rfl: 2    Continuous Glucose "  (Dexcom G7 ) device, Use 1 Piece Every 3 (Three) Months., Disp: , Rfl:     Continuous Glucose Sensor (Dexcom G7 Sensor) misc, Use 1 Device Every 10 (Ten) Days., Disp: 9 each, Rfl: 1    diclofenac (VOLTAREN) 75 MG EC tablet, TAKE 1 TABLET BY MOUTH TWICE DAILY AS NEEDED FOR  BACK  PAIN,  JOINT  PAIN, Disp: 60 tablet, Rfl: 0    donepezil (ARICEPT) 10 MG tablet, Take 1 tablet by mouth Every Night for 180 days., Disp: 90 tablet, Rfl: 1    DULoxetine (CYMBALTA) 60 MG capsule, Take 1 capsule by mouth in the morning, Disp: 30 capsule, Rfl: 2    fluticasone (FLONASE) 50 MCG/ACT nasal spray, Administer 2 sprays into the nostril(s) as directed by provider Daily., Disp: 16 g, Rfl: 5    Insulin Glargine, 1 Unit Dial, (Toujeo SoloStar) 300 UNIT/ML solution pen-injector injection, Inject 10 Units under the skin into the appropriate area as directed Daily., Disp: 3 mL, Rfl: 2    Insulin Pen Needle (NovoFine Plus Pen Needle) 32G X 4 MM misc, Use with once daily toujeo insulin, Disp: 100 each, Rfl: 2    levocetirizine (XYZAL) 5 MG tablet, Take 1 tablet by mouth Every Evening., Disp: 90 tablet, Rfl: 1    Magnesium 400 MG tablet, Take 400 mg by mouth Daily., Disp: , Rfl:     memantine (NAMENDA) 5 MG tablet, Take 1 tablet by mouth 2 (Two) Times a Day for 180 days., Disp: 180 tablet, Rfl: 1    nitroglycerin (NITROLINGUAL) 0.4 MG/SPRAY spray, Place 1 spray by translingual route on or under the tongue at the first sign of an attack, no more than 3 sprays / 15-minute., Disp: 1 each, Rfl: 0    omeprazole (priLOSEC) 20 MG capsule, Take 1 capsule by mouth Daily., Disp: 90 capsule, Rfl: 1    prazosin (MINIPRESS) 2 MG capsule, Take 1 capsule by mouth at bedtime., Disp: 30 capsule, Rfl: 2    traZODone (DESYREL) 50 MG tablet, Take 1 tablet by mouth every day at bedtime., Disp: 30 tablet, Rfl: 2    Allergies:   Allergies   Allergen Reactions    Hydrocodone Itching    Penicillins Swelling and Provider Review Needed     Entire body  "swelled as a child     Sulfa Antibiotics Shortness Of Breath, Rash and Provider Review Needed    Codeine Nausea And Vomiting, Confusion and Provider Review Needed    Oxycontin [Oxycodone] Itching       Objective     Mental Status Exam:   MENTAL STATUS EXAM   General Appearance:  Cleanly groomed and dressed and well developed  Eye Contact:  Fair  Attitude:  Cooperative and polite  Motor Activity:  Normal gait, posture and slow  Muscle Strength:  Normal  Speech:  Normal rate, tone, volume and soft spoken  Language:  Spontaneous  Mood and affect:  Other  Other Comment:  Mood reported as \"it's okay\" with somewhat tired and lethargic but overall cooperative affect.  Hopelessness:  Denies  Loneliness: 2  Thought Process:  Logical and goal-directed  Associations/ Thought Content:  No delusions  Hallucinations:  None  Suicidal Ideations:  Not present  Homicidal Ideation:  Not present  Sensorium:  Other  Other Comment:  Somewhat lethargic but overall alert and clear  Orientation:  Person, place, time and situation  Immediate Recall, Recent, and Remote Memory:  Other  Other Comment:  Fair  Attention Span/ Concentration:  Good  Fund of Knowledge:  Appropriate for age and educational level  Intellectual Functioning:  Average range  Insight:  Good  Judgement:  Good  Reliability:  Good  Impulse Control:  Good       Assessment / Plan      Visit Diagnosis/Orders Placed This Visit:    ICD-10-CM ICD-9-CM   1. Mild episode of recurrent major depressive disorder  F33.0 296.31   2. Generalized anxiety disorder  F41.1 300.02   3. Post traumatic stress disorder (PTSD)  F43.10 309.81   4. Mild cognitive impairment  G31.84 331.83        PLAN:  Safety: No acute safety concerns  Risk Assessment: Risk of self-harm acutely is low. Risk of self-harm chronically is also low, but could be further elevated in the event of treatment noncompliance and/or AODA.    Treatment Plan/Goals: Continue supportive psychotherapy efforts and medications as " indicated. Treatment and medication options discussed during today's visit. Patient ackowledged and verbally consented to continue with current treatment plan and was educated on the importance of compliance with treatment and follow-up appointments. Patient seems reasonably able to adhere to treatment plan.      Assisted Patient in processing above session content; acknowledged and normalized patient’s thoughts, feelings, and concerns. Assisted patient in processing recent stressors/emotions/feelings and utilized Socratic Questioning techniques and open ended questions to encourage reflection. Processed and discussed recent moods and symptoms (both mental and physical) and discussed ways to monitor them moving forward. Discussed concerns with heart health and ways to challenge negative thought patterns and developing more healthy coping. Encouraged patient to engage in more social engagement and recreational/physical activities. Patient was receptive to therapeutic feedback.     Allowed Patient to freely discuss issues  without interruption or judgement with unconditional positive regard, active listening skills, and empathy. Therapist provided a safe, confidential environment to facilitate the development of a positive therapeutic relationship and encouraged open, honest communication. Assisted Patient in identifying risk factors which would indicate the need for higher level of care including thoughts to harm self or others and/or self-harming behavior and encouraged Patient to contact this office, call 911, or present to the nearest emergency room should any of these events occur. Discussed crisis intervention services and means to access. Patient adamantly and convincingly denies current suicidal or homicidal ideation or perceptual disturbance. Assisted Patient in processing session content; acknowledged and normalized Patient’s thoughts, feelings, and concerns by utilizing a person-centered approach in efforts  to build appropriate rapport and a positive therapeutic relationship with open and honest communication. .     Follow Up:   Return in about 1 week (around 7/22/2025) for Therapy session.    RALPH BooW  [facility]

## 2025-07-22 ENCOUNTER — OFFICE VISIT (OUTPATIENT)
Dept: BEHAVIORAL HEALTH | Facility: CLINIC | Age: 65
End: 2025-07-22
Payer: MEDICARE

## 2025-07-22 DIAGNOSIS — G31.84 MILD COGNITIVE IMPAIRMENT: ICD-10-CM

## 2025-07-22 DIAGNOSIS — F33.0 MILD EPISODE OF RECURRENT MAJOR DEPRESSIVE DISORDER: ICD-10-CM

## 2025-07-22 DIAGNOSIS — F43.10 POST TRAUMATIC STRESS DISORDER (PTSD): ICD-10-CM

## 2025-07-22 DIAGNOSIS — F41.1 GENERALIZED ANXIETY DISORDER: Primary | ICD-10-CM

## 2025-07-22 NOTE — PROGRESS NOTES
"     Follow Up Adult Note     Date:2025   Patient Name: Santi Souza Sr.  : 1960   MRN: [id number]   Time IN: 1:04 pm    Time OUT: 1:44 pm     Referring Provider: Jordan Heart APRN    Chief Complaint:      ICD-10-CM ICD-9-CM   1. Generalized anxiety disorder  F41.1 300.02   2. Post traumatic stress disorder (PTSD)  F43.10 309.81   3. Mild episode of recurrent major depressive disorder  F33.0 296.31   4. Mild cognitive impairment  G31.84 331.83        History of Present Illness:   Santi Souza Sr. is a 65 y.o., single, disabled male who is being seen today for scheduled Psychotherapy session. Mood reported as \"It's okay\" with somewhat reserved/tired but overall cooperative affect. Patient presented as somewhat reserved and tired but remained overall calm, cooperative, engaged, and pleasant with provider. Patient denied any current SI/HI/AVH. Patient reported generalized anxiety with recent increase in recurring nightmares/flashbacks related to past trauma and stated, \"I have been having more nightmares and waking up angry\". Patient reports feelings of loneliness and sadness at times which is likely intensified by ongoing lack of contact with family. Patient reports stress around general and heart health.       Subjective     PHQ-9 Depression Screening  PHQ-9 Total Score:  Not completed at this time     GARETT-7   Not completed at this time    Patient's Support Network Includes:  Neighbors, Community Center    Functional Status: Mild impairment     Progress toward goal: Not at goal    Prognosis: Good with Ongoing Treatment     Medications:     Current Outpatient Medications:     aspirin 81 MG chewable tablet, Chew 1 tablet., Disp: , Rfl:     atorvastatin (LIPITOR) 80 MG tablet, Take 1 tablet by mouth Daily., Disp: 90 tablet, Rfl: 1    Brexpiprazole (Rexulti) 2 MG tablet, Take 1 tablet by mouth Daily., Disp: 30 tablet, Rfl: 2    Continuous Glucose  (Dexcom G7 ) device, Use 1 " Piece Every 3 (Three) Months., Disp: , Rfl:     Continuous Glucose Sensor (Dexcom G7 Sensor) misc, Use 1 Device Every 10 (Ten) Days., Disp: 9 each, Rfl: 1    diclofenac (VOLTAREN) 75 MG EC tablet, TAKE 1 TABLET BY MOUTH TWICE DAILY AS NEEDED FOR  BACK  PAIN,  JOINT  PAIN, Disp: 60 tablet, Rfl: 0    donepezil (ARICEPT) 10 MG tablet, Take 1 tablet by mouth Every Night for 180 days., Disp: 90 tablet, Rfl: 1    DULoxetine (CYMBALTA) 60 MG capsule, Take 1 capsule by mouth in the morning, Disp: 30 capsule, Rfl: 2    fluticasone (FLONASE) 50 MCG/ACT nasal spray, Administer 2 sprays into the nostril(s) as directed by provider Daily., Disp: 16 g, Rfl: 5    Insulin Glargine, 1 Unit Dial, (Toujeo SoloStar) 300 UNIT/ML solution pen-injector injection, Inject 10 Units under the skin into the appropriate area as directed Daily., Disp: 3 mL, Rfl: 2    Insulin Pen Needle (NovoFine Plus Pen Needle) 32G X 4 MM misc, Use with once daily toujeo insulin, Disp: 100 each, Rfl: 2    levocetirizine (XYZAL) 5 MG tablet, Take 1 tablet by mouth Every Evening., Disp: 90 tablet, Rfl: 1    Magnesium 400 MG tablet, Take 400 mg by mouth Daily., Disp: , Rfl:     memantine (NAMENDA) 5 MG tablet, Take 1 tablet by mouth 2 (Two) Times a Day for 180 days., Disp: 180 tablet, Rfl: 1    nitroglycerin (NITROLINGUAL) 0.4 MG/SPRAY spray, Place 1 spray by translingual route on or under the tongue at the first sign of an attack, no more than 3 sprays / 15-minute., Disp: 1 each, Rfl: 0    omeprazole (priLOSEC) 20 MG capsule, Take 1 capsule by mouth Daily., Disp: 90 capsule, Rfl: 1    prazosin (MINIPRESS) 2 MG capsule, Take 1 capsule by mouth at bedtime., Disp: 30 capsule, Rfl: 2    traZODone (DESYREL) 50 MG tablet, Take 1 tablet by mouth every day at bedtime., Disp: 30 tablet, Rfl: 2    Allergies:   Allergies   Allergen Reactions    Hydrocodone Itching    Penicillins Swelling and Provider Review Needed     Entire body swelled as a child     Sulfa Antibiotics  "Shortness Of Breath, Rash and Provider Review Needed    Codeine Nausea And Vomiting, Confusion and Provider Review Needed    Oxycontin [Oxycodone] Itching       Objective     Mental Status Exam:   MENTAL STATUS EXAM   General Appearance:  Cleanly groomed and dressed and looks older than stated age  Eye Contact:  Fair  Attitude:  Other  Other Comment:  Somewhat reserved and tired but overall cooperative and polite  Motor Activity:  Normal gait, posture and slow  Muscle Strength:  Normal  Speech:  Soft spoken  Language:  Spontaneous  Mood and affect:  Other  Other Comment:  Mood reported as \"It's okay\" with somewhat reserved/tired but overall cooperative affect.  Hopelessness:  Denies  Loneliness: 3  Thought Process:  Goal-directed and linear  Associations/ Thought Content:  No delusions  Hallucinations:  None  Suicidal Ideations:  Not present  Homicidal Ideation:  Not present  Sensorium:  Other  Other Comment:  Somewhat drowsy but overall alert and clear  Orientation:  Person, place, time and situation  Immediate Recall, Recent, and Remote Memory:  Other  Other Comment:  Fair  Attention Span/ Concentration:  Good  Fund of Knowledge:  Appropriate for age and educational level  Intellectual Functioning:  Average range  Insight:  Fair  Judgement:  Good  Reliability:  Good  Impulse Control:  Good       Assessment / Plan      Visit Diagnosis/Orders Placed This Visit:    ICD-10-CM ICD-9-CM   1. Generalized anxiety disorder  F41.1 300.02   2. Post traumatic stress disorder (PTSD)  F43.10 309.81   3. Mild episode of recurrent major depressive disorder  F33.0 296.31   4. Mild cognitive impairment  G31.84 331.83        PLAN:  Safety: No acute safety concerns  Risk Assessment: Risk of self-harm acutely is low. Risk of self-harm chronically is also low, but could be further elevated in the event of treatment noncompliance and/or AODA.    Treatment Plan/Goals: Continue supportive psychotherapy efforts and medications as " indicated. Treatment and medication options discussed during today's visit. Patient ackowledged and verbally consented to continue with current treatment plan and was educated on the importance of compliance with treatment and follow-up appointments. Patient seems reasonably able to adhere to treatment plan.      Assisted Patient in processing above session content; acknowledged and normalized patient’s thoughts, feelings, and concerns. Assisted patient in processing recent stressors/emotions/feelings and utilized Socratic Questioning techniques and open ended questions to encourage reflection. Explored affects of loneliness and sadness and provided psychoeducation on the connection between social isolation, anxiety, and PTSD symptoms. Discussed strategies for managing intrusive thoughts and flashbacks with a focus on grounding techniques. Patient was receptive to therapeutic feedback.     Allowed Patient to freely discuss issues  without interruption or judgement with unconditional positive regard, active listening skills, and empathy. Therapist provided a safe, confidential environment to facilitate the development of a positive therapeutic relationship and encouraged open, honest communication. Assisted Patient in identifying risk factors which would indicate the need for higher level of care including thoughts to harm self or others and/or self-harming behavior and encouraged Patient to contact this office, call 911, or present to the nearest emergency room should any of these events occur. Discussed crisis intervention services and means to access. Patient adamantly and convincingly denies current suicidal or homicidal ideation or perceptual disturbance. Assisted Patient in processing session content; acknowledged and normalized Patient’s thoughts, feelings, and concerns by utilizing a person-centered approach in efforts to build appropriate rapport and a positive therapeutic relationship with open and honest  communication. .     Follow Up:   Return in about 1 week (around 7/29/2025) for Therapy session.    Stefan Tran, [facility]  [facility]

## 2025-07-31 DIAGNOSIS — Z79.4 TYPE 2 DIABETES MELLITUS WITH HYPERGLYCEMIA, WITH LONG-TERM CURRENT USE OF INSULIN: ICD-10-CM

## 2025-07-31 DIAGNOSIS — E11.65 TYPE 2 DIABETES MELLITUS WITH HYPERGLYCEMIA, WITH LONG-TERM CURRENT USE OF INSULIN: ICD-10-CM

## 2025-07-31 RX ORDER — ACYCLOVIR 400 MG/1
TABLET ORAL
Qty: 9 EACH | Refills: 0 | Status: SHIPPED | OUTPATIENT
Start: 2025-07-31

## 2025-08-19 ENCOUNTER — OFFICE VISIT (OUTPATIENT)
Dept: BEHAVIORAL HEALTH | Facility: CLINIC | Age: 65
End: 2025-08-19
Payer: MEDICARE

## 2025-08-19 ENCOUNTER — TELEPHONE (OUTPATIENT)
Dept: ADMINISTRATIVE | Facility: OTHER | Age: 65
End: 2025-08-19
Payer: MEDICARE

## 2025-08-19 DIAGNOSIS — F41.1 GENERALIZED ANXIETY DISORDER: ICD-10-CM

## 2025-08-19 DIAGNOSIS — F43.10 POST TRAUMATIC STRESS DISORDER (PTSD): ICD-10-CM

## 2025-08-19 DIAGNOSIS — F33.0 MILD EPISODE OF RECURRENT MAJOR DEPRESSIVE DISORDER: Primary | ICD-10-CM

## 2025-08-22 ENCOUNTER — OFFICE VISIT (OUTPATIENT)
Dept: FAMILY MEDICINE CLINIC | Facility: CLINIC | Age: 65
End: 2025-08-22
Payer: MEDICARE

## 2025-08-22 VITALS
DIASTOLIC BLOOD PRESSURE: 64 MMHG | HEIGHT: 68 IN | HEART RATE: 58 BPM | WEIGHT: 177.06 LBS | SYSTOLIC BLOOD PRESSURE: 130 MMHG | BODY MASS INDEX: 26.83 KG/M2 | OXYGEN SATURATION: 97 %

## 2025-08-22 DIAGNOSIS — Z79.4 TYPE 2 DIABETES MELLITUS WITH HYPERGLYCEMIA, WITH LONG-TERM CURRENT USE OF INSULIN: ICD-10-CM

## 2025-08-22 DIAGNOSIS — M54.50 CHRONIC MIDLINE LOW BACK PAIN, UNSPECIFIED WHETHER SCIATICA PRESENT: ICD-10-CM

## 2025-08-22 DIAGNOSIS — F17.210 SMOKING GREATER THAN 30 PACK YEARS: ICD-10-CM

## 2025-08-22 DIAGNOSIS — Z71.89 ADVANCED DIRECTIVES, COUNSELING/DISCUSSION: ICD-10-CM

## 2025-08-22 DIAGNOSIS — E11.65 TYPE 2 DIABETES MELLITUS WITH HYPERGLYCEMIA, WITH LONG-TERM CURRENT USE OF INSULIN: ICD-10-CM

## 2025-08-22 DIAGNOSIS — R32 URINARY INCONTINENCE, UNSPECIFIED TYPE: ICD-10-CM

## 2025-08-22 DIAGNOSIS — G47.00 INSOMNIA, UNSPECIFIED TYPE: ICD-10-CM

## 2025-08-22 DIAGNOSIS — F39 MOOD DISORDER: ICD-10-CM

## 2025-08-22 DIAGNOSIS — Z12.5 SCREENING FOR PROSTATE CANCER: ICD-10-CM

## 2025-08-22 DIAGNOSIS — E78.2 MIXED HYPERLIPIDEMIA: ICD-10-CM

## 2025-08-22 DIAGNOSIS — K21.9 GASTROESOPHAGEAL REFLUX DISEASE, UNSPECIFIED WHETHER ESOPHAGITIS PRESENT: ICD-10-CM

## 2025-08-22 DIAGNOSIS — Z12.11 SCREENING FOR COLON CANCER: ICD-10-CM

## 2025-08-22 DIAGNOSIS — Z79.899 ENCOUNTER FOR LONG-TERM (CURRENT) USE OF MEDICATIONS: ICD-10-CM

## 2025-08-22 DIAGNOSIS — I25.10 CORONARY ARTERY DISEASE INVOLVING NATIVE CORONARY ARTERY OF NATIVE HEART WITHOUT ANGINA PECTORIS: ICD-10-CM

## 2025-08-22 DIAGNOSIS — M25.50 ARTHRALGIA, UNSPECIFIED JOINT: ICD-10-CM

## 2025-08-22 DIAGNOSIS — Z12.2 SCREENING FOR LUNG CANCER: ICD-10-CM

## 2025-08-22 DIAGNOSIS — G89.29 CHRONIC MIDLINE LOW BACK PAIN, UNSPECIFIED WHETHER SCIATICA PRESENT: ICD-10-CM

## 2025-08-22 DIAGNOSIS — J30.9 ALLERGIC RHINITIS, UNSPECIFIED SEASONALITY, UNSPECIFIED TRIGGER: ICD-10-CM

## 2025-08-22 DIAGNOSIS — F32.A ANXIETY AND DEPRESSION: ICD-10-CM

## 2025-08-22 DIAGNOSIS — F43.10 POST TRAUMATIC STRESS DISORDER (PTSD): ICD-10-CM

## 2025-08-22 DIAGNOSIS — I10 BENIGN ESSENTIAL HTN: ICD-10-CM

## 2025-08-22 DIAGNOSIS — Z00.00 MEDICARE ANNUAL WELLNESS VISIT, SUBSEQUENT: Primary | ICD-10-CM

## 2025-08-22 DIAGNOSIS — I67.9 CEREBROVASCULAR DISEASE: ICD-10-CM

## 2025-08-22 DIAGNOSIS — F41.9 ANXIETY AND DEPRESSION: ICD-10-CM

## 2025-08-22 DIAGNOSIS — M54.2 NECK PAIN: ICD-10-CM

## 2025-08-22 RX ORDER — LEVOCETIRIZINE DIHYDROCHLORIDE 5 MG/1
5 TABLET, FILM COATED ORAL EVERY EVENING
Qty: 90 TABLET | Refills: 1 | Status: SHIPPED | OUTPATIENT
Start: 2025-08-22

## 2025-08-22 RX ORDER — ATORVASTATIN CALCIUM 80 MG/1
80 TABLET, FILM COATED ORAL DAILY
Qty: 90 TABLET | Refills: 1 | Status: SHIPPED | OUTPATIENT
Start: 2025-08-22

## 2025-08-22 RX ORDER — FLUTICASONE PROPIONATE 50 MCG
2 SPRAY, SUSPENSION (ML) NASAL DAILY
Qty: 16 G | Refills: 5 | Status: SHIPPED | OUTPATIENT
Start: 2025-08-22

## 2025-08-22 RX ORDER — INSULIN GLARGINE 300 U/ML
10 INJECTION, SOLUTION SUBCUTANEOUS DAILY
Qty: 3 ML | Refills: 2 | Status: SHIPPED | OUTPATIENT
Start: 2025-08-22

## 2025-08-22 RX ORDER — OMEPRAZOLE 20 MG/1
20 CAPSULE, DELAYED RELEASE ORAL DAILY
Qty: 90 CAPSULE | Refills: 1 | Status: SHIPPED | OUTPATIENT
Start: 2025-08-22

## 2025-08-26 ENCOUNTER — OFFICE VISIT (OUTPATIENT)
Dept: BEHAVIORAL HEALTH | Facility: CLINIC | Age: 65
End: 2025-08-26
Payer: MEDICARE

## 2025-08-26 DIAGNOSIS — F33.0 MILD EPISODE OF RECURRENT MAJOR DEPRESSIVE DISORDER: ICD-10-CM

## 2025-08-26 DIAGNOSIS — G31.84 MILD COGNITIVE IMPAIRMENT: ICD-10-CM

## 2025-08-26 DIAGNOSIS — F41.1 GENERALIZED ANXIETY DISORDER: Primary | ICD-10-CM

## 2025-08-26 DIAGNOSIS — F43.10 POST TRAUMATIC STRESS DISORDER (PTSD): ICD-10-CM

## 2025-08-27 LAB
ALBUMIN SERPL-MCNC: 4.3 G/DL (ref 3.5–5.2)
ALBUMIN/GLOB SERPL: 1.6 G/DL
ALP SERPL-CCNC: 104 U/L (ref 39–117)
ALT SERPL-CCNC: 10 U/L (ref 1–41)
AST SERPL-CCNC: 15 U/L (ref 1–40)
BASOPHILS # BLD AUTO: 0.07 10*3/MM3 (ref 0–0.2)
BASOPHILS NFR BLD AUTO: 0.8 % (ref 0–1.5)
BILIRUB SERPL-MCNC: 0.5 MG/DL (ref 0–1.2)
BUN SERPL-MCNC: 10 MG/DL (ref 8–23)
BUN/CREAT SERPL: 10.5 (ref 7–25)
CALCIUM SERPL-MCNC: 9.6 MG/DL (ref 8.6–10.5)
CHLORIDE SERPL-SCNC: 104 MMOL/L (ref 98–107)
CHOLEST SERPL-MCNC: 230 MG/DL (ref 0–200)
CO2 SERPL-SCNC: 24.7 MMOL/L (ref 22–29)
CREAT SERPL-MCNC: 0.95 MG/DL (ref 0.76–1.27)
EGFRCR SERPLBLD CKD-EPI 2021: 88.3 ML/MIN/1.73
EOSINOPHIL # BLD AUTO: 0.14 10*3/MM3 (ref 0–0.4)
EOSINOPHIL NFR BLD AUTO: 1.5 % (ref 0.3–6.2)
ERYTHROCYTE [DISTWIDTH] IN BLOOD BY AUTOMATED COUNT: 12.1 % (ref 12.3–15.4)
GLOBULIN SER CALC-MCNC: 2.7 GM/DL
GLUCOSE SERPL-MCNC: 125 MG/DL (ref 65–99)
HBA1C MFR BLD: 7 % (ref 4.8–5.6)
HCT VFR BLD AUTO: 43 % (ref 37.5–51)
HDLC SERPL-MCNC: 38 MG/DL (ref 40–60)
HGB BLD-MCNC: 14.1 G/DL (ref 13–17.7)
IMM GRANULOCYTES # BLD AUTO: 0.03 10*3/MM3 (ref 0–0.05)
IMM GRANULOCYTES NFR BLD AUTO: 0.3 % (ref 0–0.5)
LDLC SERPL CALC-MCNC: 161 MG/DL (ref 0–100)
LYMPHOCYTES # BLD AUTO: 3.61 10*3/MM3 (ref 0.7–3.1)
LYMPHOCYTES NFR BLD AUTO: 38.9 % (ref 19.6–45.3)
MAGNESIUM SERPL-MCNC: 1.8 MG/DL (ref 1.6–2.4)
MCH RBC QN AUTO: 31.5 PG (ref 26.6–33)
MCHC RBC AUTO-ENTMCNC: 32.8 G/DL (ref 31.5–35.7)
MCV RBC AUTO: 96.2 FL (ref 79–97)
MONOCYTES # BLD AUTO: 0.69 10*3/MM3 (ref 0.1–0.9)
MONOCYTES NFR BLD AUTO: 7.4 % (ref 5–12)
NEUTROPHILS # BLD AUTO: 4.74 10*3/MM3 (ref 1.7–7)
NEUTROPHILS NFR BLD AUTO: 51.1 % (ref 42.7–76)
NRBC BLD AUTO-RTO: 0 /100 WBC (ref 0–0.2)
PLATELET # BLD AUTO: 181 10*3/MM3 (ref 140–450)
POTASSIUM SERPL-SCNC: 4.4 MMOL/L (ref 3.5–5.2)
PROT SERPL-MCNC: 7 G/DL (ref 6–8.5)
PSA SERPL-MCNC: 1.4 NG/ML (ref 0–4)
RBC # BLD AUTO: 4.47 10*6/MM3 (ref 4.14–5.8)
REFLEX: NORMAL
SODIUM SERPL-SCNC: 142 MMOL/L (ref 136–145)
TRIGL SERPL-MCNC: 166 MG/DL (ref 0–150)
TSH SERPL DL<=0.005 MIU/L-ACNC: 1.15 UIU/ML (ref 0.27–4.2)
VLDLC SERPL CALC-MCNC: 31 MG/DL (ref 5–40)
WBC # BLD AUTO: 9.28 10*3/MM3 (ref 3.4–10.8)

## (undated) DEVICE — FIRSTPASS MINI STRAIGHT SUTURE PASSER: Brand: FIRSTPASS

## (undated) DEVICE — T-MAX DISPOSABLE FACE MASK 8 PER BOX

## (undated) DEVICE — 4.5 ELITE, VULCAN                                    GENERATOR-COMPATIBLE ELECTROBLADE                                    RESECTORS, MAROON, PACKAGED 3 PER                                    BOX, STERILE

## (undated) DEVICE — NEEDLE, QUINCKE, 18GX3.5": Brand: MEDLINE

## (undated) DEVICE — DYONICS 25 INFLOW TUBE SET, 3 PER BOX

## (undated) DEVICE — GLV SURG BIOGEL LTX PF 7 1/2

## (undated) DEVICE — CANN 5.5 WO/HOLES LTX FREE ORANGE

## (undated) DEVICE — CANNULA THREADED FLEX 8.0 X 72MM: Brand: CLEAR-TRAC

## (undated) DEVICE — DRSNG PAD ABD 8X10IN STRL

## (undated) DEVICE — PK MAJ SHLDR SPLT 10

## (undated) DEVICE — TUBING, SUCTION, 1/4" X 10', STRAIGHT: Brand: MEDLINE

## (undated) DEVICE — INTENDED FOR TISSUE SEPARATION, AND OTHER PROCEDURES THAT REQUIRE A SHARP SURGICAL BLADE TO PUNCTURE OR CUT.: Brand: BARD-PARKER ® STAINLESS STEEL BLADES

## (undated) DEVICE — APPL CHLORAPREP TINTED 26ML TEAL

## (undated) DEVICE — DRP SURG SHLDR INVISISHIELD PCH W/DRN/PRT 36X29IN

## (undated) DEVICE — SHOULDER STABILIZATION KIT,                                    DISPOSABLE 12 PER BOX

## (undated) DEVICE — REAMR PILOTED HD 8MM

## (undated) DEVICE — BLANKT WARM LOWR/BDY 100X120CM

## (undated) DEVICE — KT PUMP INFUBLOCK MDL 2100 PMKITSOLIS

## (undated) DEVICE — GLV SURG SENSICARE PI ORTHO SZ7.5 LF STRL

## (undated) DEVICE — SUT ETHLN 3/0 FS1 30IN 669H

## (undated) DEVICE — SPNG GZ WOVN 4X4IN 12PLY 10/BX STRL

## (undated) DEVICE — STRIP,CLOSURE,WOUND,MEDI-STRIP,1/2X4: Brand: MEDLINE

## (undated) DEVICE — GOWN,NON-REINFORCED,SIRUS,SET IN SLV,XL: Brand: MEDLINE